# Patient Record
Sex: FEMALE | Race: OTHER | Employment: OTHER | URBAN - METROPOLITAN AREA
[De-identification: names, ages, dates, MRNs, and addresses within clinical notes are randomized per-mention and may not be internally consistent; named-entity substitution may affect disease eponyms.]

---

## 2017-01-12 ENCOUNTER — HOSPITAL ENCOUNTER (EMERGENCY)
Age: 42
Discharge: LEFT BEFORE DISCHARGE | End: 2017-01-12
Payer: COMMERCIAL

## 2017-01-12 VITALS
BODY MASS INDEX: 39.68 KG/M2 | OXYGEN SATURATION: 96 % | SYSTOLIC BLOOD PRESSURE: 114 MMHG | WEIGHT: 293 LBS | TEMPERATURE: 98 F | DIASTOLIC BLOOD PRESSURE: 76 MMHG | HEIGHT: 72 IN | HEART RATE: 86 BPM | RESPIRATION RATE: 18 BRPM

## 2017-01-12 LAB
BACTERIA: ABNORMAL /HPF
BILIRUBIN URINE: NEGATIVE
BLOOD, URINE: NEGATIVE
CLARITY: CLEAR
COLOR: YELLOW
EPITHELIAL CELLS, UA: ABNORMAL /HPF
GLUCOSE URINE: >=1000 MG/DL
KETONES, URINE: NEGATIVE MG/DL
LEUKOCYTE ESTERASE, URINE: ABNORMAL
NITRITE, URINE: NEGATIVE
PH UA: 5.5 (ref 5–9)
PROTEIN UA: NEGATIVE MG/DL
RBC UA: ABNORMAL /HPF (ref 0–2)
SPECIFIC GRAVITY UA: 1.02 (ref 1–1.03)
UROBILINOGEN, URINE: 0.2 E.U./DL
WBC UA: ABNORMAL /HPF (ref 0–5)
YEAST: PRESENT

## 2017-01-12 PROCEDURE — 99283 EMERGENCY DEPT VISIT LOW MDM: CPT

## 2017-01-12 PROCEDURE — 81001 URINALYSIS AUTO W/SCOPE: CPT

## 2017-01-12 PROCEDURE — 4500000002 HC ER NO CHARGE

## 2017-01-12 ASSESSMENT — PAIN SCALES - GENERAL: PAINLEVEL_OUTOF10: 10

## 2017-01-12 ASSESSMENT — PAIN DESCRIPTION - DESCRIPTORS: DESCRIPTORS: SHARP

## 2017-01-12 ASSESSMENT — PAIN DESCRIPTION - FREQUENCY: FREQUENCY: CONTINUOUS

## 2017-01-12 ASSESSMENT — PAIN DESCRIPTION - ORIENTATION: ORIENTATION: LEFT

## 2017-01-12 ASSESSMENT — PAIN DESCRIPTION - LOCATION: LOCATION: ABDOMEN

## 2017-02-13 ENCOUNTER — HOSPITAL ENCOUNTER (EMERGENCY)
Age: 42
Discharge: HOME | End: 2017-02-13
Payer: COMMERCIAL

## 2017-02-13 VITALS
TEMPERATURE: 98.4 F | HEART RATE: 108 BPM | WEIGHT: 293 LBS | RESPIRATION RATE: 20 BRPM | OXYGEN SATURATION: 96 % | SYSTOLIC BLOOD PRESSURE: 122 MMHG | DIASTOLIC BLOOD PRESSURE: 77 MMHG | BODY MASS INDEX: 44.08 KG/M2

## 2017-02-13 DIAGNOSIS — G89.29 OTHER CHRONIC PAIN: ICD-10-CM

## 2017-02-13 DIAGNOSIS — R51.9 ACUTE NONINTRACTABLE HEADACHE, UNSPECIFIED HEADACHE TYPE: Primary | ICD-10-CM

## 2017-02-13 LAB
CHP ED QC CHECK: NORMAL
CHP ED QC CHECK: NORMAL
GLUCOSE BLD-MCNC: 227 MG/DL
GLUCOSE BLD-MCNC: 227 MG/DL (ref 60–115)
PERFORMED ON: ABNORMAL
PREGNANCY TEST URINE, POC: NEGATIVE

## 2017-02-13 PROCEDURE — 93005 ELECTROCARDIOGRAM TRACING: CPT

## 2017-02-13 PROCEDURE — 99284 EMERGENCY DEPT VISIT MOD MDM: CPT

## 2017-02-13 PROCEDURE — 6370000000 HC RX 637 (ALT 250 FOR IP): Performed by: PERSONAL EMERGENCY RESPONSE ATTENDANT

## 2017-02-13 PROCEDURE — 6360000002 HC RX W HCPCS: Performed by: PERSONAL EMERGENCY RESPONSE ATTENDANT

## 2017-02-13 RX ORDER — ONDANSETRON 4 MG/1
4 TABLET, ORALLY DISINTEGRATING ORAL ONCE
Status: COMPLETED | OUTPATIENT
Start: 2017-02-13 | End: 2017-02-13

## 2017-02-13 RX ORDER — ACETAMINOPHEN 500 MG
1000 TABLET ORAL ONCE
Status: COMPLETED | OUTPATIENT
Start: 2017-02-13 | End: 2017-02-13

## 2017-02-13 RX ORDER — CYCLOBENZAPRINE HCL 10 MG
10 TABLET ORAL ONCE
Status: COMPLETED | OUTPATIENT
Start: 2017-02-13 | End: 2017-02-13

## 2017-02-13 RX ADMIN — ACETAMINOPHEN 1000 MG: 500 TABLET ORAL at 23:04

## 2017-02-13 RX ADMIN — CYCLOBENZAPRINE HYDROCHLORIDE 10 MG: 10 TABLET, FILM COATED ORAL at 23:04

## 2017-02-13 RX ADMIN — ONDANSETRON 4 MG: 4 TABLET, ORALLY DISINTEGRATING ORAL at 23:04

## 2017-02-13 ASSESSMENT — ENCOUNTER SYMPTOMS
SORE THROAT: 0
ABDOMINAL PAIN: 0
COLOR CHANGE: 0
VOMITING: 0
DIARRHEA: 0
RHINORRHEA: 0
SHORTNESS OF BREATH: 0
COUGH: 0
BLOOD IN STOOL: 0
NAUSEA: 1

## 2017-02-13 ASSESSMENT — PAIN DESCRIPTION - PAIN TYPE: TYPE: ACUTE PAIN

## 2017-02-13 ASSESSMENT — PAIN DESCRIPTION - LOCATION: LOCATION: HEAD

## 2017-02-13 ASSESSMENT — PAIN SCALES - GENERAL: PAINLEVEL_OUTOF10: 10

## 2017-02-13 ASSESSMENT — PAIN DESCRIPTION - DESCRIPTORS: DESCRIPTORS: ACHING;HEADACHE

## 2017-02-14 LAB
EKG ATRIAL RATE: 98 BPM
EKG P AXIS: 31 DEGREES
EKG P-R INTERVAL: 156 MS
EKG Q-T INTERVAL: 362 MS
EKG QRS DURATION: 84 MS
EKG QTC CALCULATION (BAZETT): 462 MS
EKG R AXIS: 12 DEGREES
EKG T AXIS: 5 DEGREES
EKG VENTRICULAR RATE: 98 BPM

## 2017-02-27 ENCOUNTER — HOSPITAL ENCOUNTER (OUTPATIENT)
Dept: PHYSICAL THERAPY | Age: 42
Setting detail: THERAPIES SERIES
Discharge: HOME | End: 2017-02-27
Payer: COMMERCIAL

## 2017-02-27 PROCEDURE — 97162 PT EVAL MOD COMPLEX 30 MIN: CPT

## 2017-02-27 ASSESSMENT — PAIN DESCRIPTION - LOCATION: LOCATION: BACK

## 2017-02-27 ASSESSMENT — PAIN SCALES - GENERAL: PAINLEVEL_OUTOF10: 10

## 2017-02-28 ENCOUNTER — HOSPITAL ENCOUNTER (OUTPATIENT)
Dept: PHYSICAL THERAPY | Age: 42
Setting detail: THERAPIES SERIES
Discharge: HOME | End: 2017-02-28
Payer: COMMERCIAL

## 2017-02-28 PROCEDURE — 97113 AQUATIC THERAPY/EXERCISES: CPT

## 2017-02-28 ASSESSMENT — PAIN DESCRIPTION - ORIENTATION: ORIENTATION: RIGHT;LEFT

## 2017-02-28 ASSESSMENT — PAIN DESCRIPTION - LOCATION: LOCATION: BACK

## 2017-02-28 ASSESSMENT — PAIN SCALES - GENERAL: PAINLEVEL_OUTOF10: 8

## 2017-03-07 ENCOUNTER — HOSPITAL ENCOUNTER (OUTPATIENT)
Dept: PHYSICAL THERAPY | Age: 42
Setting detail: THERAPIES SERIES
Discharge: HOME | End: 2017-03-07
Payer: COMMERCIAL

## 2017-03-07 PROCEDURE — 97113 AQUATIC THERAPY/EXERCISES: CPT

## 2017-03-09 ENCOUNTER — HOSPITAL ENCOUNTER (OUTPATIENT)
Dept: PHYSICAL THERAPY | Age: 42
Setting detail: THERAPIES SERIES
Discharge: HOME | End: 2017-03-09
Payer: COMMERCIAL

## 2017-03-14 ENCOUNTER — HOSPITAL ENCOUNTER (OUTPATIENT)
Dept: PHYSICAL THERAPY | Age: 42
Setting detail: THERAPIES SERIES
Discharge: HOME | End: 2017-03-14
Payer: COMMERCIAL

## 2017-03-14 PROCEDURE — 97113 AQUATIC THERAPY/EXERCISES: CPT

## 2017-03-14 ASSESSMENT — PAIN DESCRIPTION - ORIENTATION: ORIENTATION: LOWER;LEFT;RIGHT

## 2017-03-14 ASSESSMENT — PAIN DESCRIPTION - DESCRIPTORS: DESCRIPTORS: ACHING

## 2017-03-14 ASSESSMENT — PAIN SCALES - GENERAL: PAINLEVEL_OUTOF10: 8

## 2017-03-14 ASSESSMENT — PAIN DESCRIPTION - LOCATION: LOCATION: BACK;LEG

## 2017-03-16 ENCOUNTER — HOSPITAL ENCOUNTER (OUTPATIENT)
Dept: PHYSICAL THERAPY | Age: 42
Setting detail: THERAPIES SERIES
Discharge: HOME | End: 2017-03-16
Payer: COMMERCIAL

## 2017-03-16 PROCEDURE — 97113 AQUATIC THERAPY/EXERCISES: CPT

## 2017-03-21 ENCOUNTER — HOSPITAL ENCOUNTER (OUTPATIENT)
Dept: PHYSICAL THERAPY | Age: 42
Setting detail: THERAPIES SERIES
Discharge: HOME | End: 2017-03-21
Payer: COMMERCIAL

## 2017-03-21 PROCEDURE — 97113 AQUATIC THERAPY/EXERCISES: CPT

## 2017-03-21 ASSESSMENT — PAIN DESCRIPTION - ORIENTATION: ORIENTATION: LOWER

## 2017-03-21 ASSESSMENT — PAIN DESCRIPTION - LOCATION: LOCATION: BACK

## 2017-03-21 ASSESSMENT — PAIN SCALES - GENERAL: PAINLEVEL_OUTOF10: 8

## 2017-03-23 ENCOUNTER — HOSPITAL ENCOUNTER (OUTPATIENT)
Dept: PHYSICAL THERAPY | Age: 42
Setting detail: THERAPIES SERIES
Discharge: HOME | End: 2017-03-23
Payer: COMMERCIAL

## 2017-03-23 PROCEDURE — 97113 AQUATIC THERAPY/EXERCISES: CPT

## 2017-03-23 PROCEDURE — 97110 THERAPEUTIC EXERCISES: CPT

## 2017-04-06 ENCOUNTER — HOSPITAL ENCOUNTER (OUTPATIENT)
Dept: PHYSICAL THERAPY | Age: 42
Setting detail: THERAPIES SERIES
Discharge: HOME | End: 2017-04-06
Payer: COMMERCIAL

## 2017-04-06 PROCEDURE — 97113 AQUATIC THERAPY/EXERCISES: CPT

## 2017-04-06 ASSESSMENT — PAIN DESCRIPTION - FREQUENCY: FREQUENCY: CONTINUOUS

## 2017-04-06 ASSESSMENT — PAIN DESCRIPTION - DESCRIPTORS: DESCRIPTORS: TIGHTNESS

## 2017-04-06 ASSESSMENT — PAIN SCALES - GENERAL: PAINLEVEL_OUTOF10: 8

## 2017-04-06 ASSESSMENT — PAIN DESCRIPTION - LOCATION: LOCATION: BACK

## 2017-04-06 ASSESSMENT — PAIN DESCRIPTION - ORIENTATION: ORIENTATION: LOWER

## 2017-04-06 ASSESSMENT — PAIN DESCRIPTION - PAIN TYPE: TYPE: CHRONIC PAIN

## 2017-04-19 ENCOUNTER — HOSPITAL ENCOUNTER (OUTPATIENT)
Dept: PHYSICAL THERAPY | Age: 42
Setting detail: THERAPIES SERIES
Discharge: HOME | End: 2017-04-19
Payer: COMMERCIAL

## 2017-04-19 PROCEDURE — 97113 AQUATIC THERAPY/EXERCISES: CPT

## 2017-04-19 ASSESSMENT — PAIN DESCRIPTION - PAIN TYPE: TYPE: CHRONIC PAIN

## 2017-04-19 ASSESSMENT — PAIN DESCRIPTION - LOCATION: LOCATION: BACK

## 2017-04-19 ASSESSMENT — PAIN DESCRIPTION - ORIENTATION: ORIENTATION: LOWER

## 2017-04-19 ASSESSMENT — PAIN DESCRIPTION - DESCRIPTORS: DESCRIPTORS: ACHING

## 2017-04-19 ASSESSMENT — PAIN SCALES - GENERAL: PAINLEVEL_OUTOF10: 9

## 2017-04-27 ENCOUNTER — HOSPITAL ENCOUNTER (OUTPATIENT)
Dept: PHYSICAL THERAPY | Age: 42
Setting detail: THERAPIES SERIES
Discharge: HOME | End: 2017-04-27
Payer: COMMERCIAL

## 2017-04-27 PROCEDURE — 97113 AQUATIC THERAPY/EXERCISES: CPT

## 2017-04-27 ASSESSMENT — PAIN DESCRIPTION - ORIENTATION: ORIENTATION: LOWER

## 2017-04-27 ASSESSMENT — PAIN DESCRIPTION - LOCATION: LOCATION: BACK

## 2017-04-27 ASSESSMENT — PAIN DESCRIPTION - FREQUENCY: FREQUENCY: CONTINUOUS

## 2017-04-27 ASSESSMENT — PAIN DESCRIPTION - DESCRIPTORS: DESCRIPTORS: ACHING

## 2017-04-27 ASSESSMENT — PAIN DESCRIPTION - PAIN TYPE: TYPE: CHRONIC PAIN

## 2017-04-27 ASSESSMENT — PAIN SCALES - GENERAL: PAINLEVEL_OUTOF10: 9

## 2017-05-04 ENCOUNTER — HOSPITAL ENCOUNTER (OUTPATIENT)
Dept: PHYSICAL THERAPY | Age: 42
Setting detail: THERAPIES SERIES
Discharge: HOME | End: 2017-05-04
Payer: COMMERCIAL

## 2017-05-04 PROCEDURE — 97113 AQUATIC THERAPY/EXERCISES: CPT

## 2017-05-04 ASSESSMENT — PAIN DESCRIPTION - LOCATION: LOCATION: BACK

## 2017-05-04 ASSESSMENT — PAIN SCALES - GENERAL: PAINLEVEL_OUTOF10: 8

## 2017-05-04 ASSESSMENT — PAIN DESCRIPTION - ORIENTATION: ORIENTATION: LOWER

## 2017-05-10 ENCOUNTER — HOSPITAL ENCOUNTER (OUTPATIENT)
Dept: PHYSICAL THERAPY | Age: 42
Setting detail: THERAPIES SERIES
Discharge: HOME | End: 2017-05-10
Payer: COMMERCIAL

## 2017-05-10 PROCEDURE — 97113 AQUATIC THERAPY/EXERCISES: CPT

## 2017-05-10 ASSESSMENT — PAIN SCALES - GENERAL: PAINLEVEL_OUTOF10: 6

## 2017-05-10 ASSESSMENT — PAIN DESCRIPTION - DESCRIPTORS: DESCRIPTORS: ACHING

## 2017-05-10 ASSESSMENT — PAIN DESCRIPTION - LOCATION: LOCATION: BACK

## 2017-05-10 ASSESSMENT — PAIN DESCRIPTION - ORIENTATION: ORIENTATION: LOWER

## 2017-05-11 ENCOUNTER — HOSPITAL ENCOUNTER (OUTPATIENT)
Dept: PHYSICAL THERAPY | Age: 42
Setting detail: THERAPIES SERIES
Discharge: HOME | End: 2017-05-11
Payer: COMMERCIAL

## 2017-05-11 PROCEDURE — 97113 AQUATIC THERAPY/EXERCISES: CPT

## 2017-05-11 ASSESSMENT — PAIN DESCRIPTION - DESCRIPTORS: DESCRIPTORS: ACHING

## 2017-05-11 ASSESSMENT — PAIN DESCRIPTION - LOCATION: LOCATION: BACK

## 2017-05-11 ASSESSMENT — PAIN DESCRIPTION - ORIENTATION: ORIENTATION: LOWER

## 2017-05-11 ASSESSMENT — PAIN SCALES - GENERAL: PAINLEVEL_OUTOF10: 7

## 2017-05-11 ASSESSMENT — PAIN DESCRIPTION - PAIN TYPE: TYPE: CHRONIC PAIN

## 2017-05-18 ENCOUNTER — HOSPITAL ENCOUNTER (OUTPATIENT)
Dept: PHYSICAL THERAPY | Age: 42
Setting detail: THERAPIES SERIES
Discharge: HOME | End: 2017-05-18
Payer: COMMERCIAL

## 2017-05-18 PROCEDURE — 97113 AQUATIC THERAPY/EXERCISES: CPT

## 2017-05-18 ASSESSMENT — PAIN DESCRIPTION - ORIENTATION: ORIENTATION: LOWER

## 2017-05-18 ASSESSMENT — PAIN DESCRIPTION - LOCATION: LOCATION: BACK

## 2017-05-18 ASSESSMENT — PAIN SCALES - GENERAL: PAINLEVEL_OUTOF10: 8

## 2017-05-18 ASSESSMENT — PAIN DESCRIPTION - PAIN TYPE: TYPE: CHRONIC PAIN

## 2017-05-18 ASSESSMENT — PAIN DESCRIPTION - DESCRIPTORS: DESCRIPTORS: ACHING

## 2017-05-20 ENCOUNTER — HOSPITAL ENCOUNTER (EMERGENCY)
Age: 42
Discharge: HOME | End: 2017-05-20
Payer: COMMERCIAL

## 2017-05-20 VITALS
RESPIRATION RATE: 20 BRPM | SYSTOLIC BLOOD PRESSURE: 136 MMHG | OXYGEN SATURATION: 98 % | TEMPERATURE: 98.9 F | HEART RATE: 93 BPM | DIASTOLIC BLOOD PRESSURE: 84 MMHG

## 2017-05-20 DIAGNOSIS — G89.29 CHRONIC ABDOMINAL PAIN: Primary | ICD-10-CM

## 2017-05-20 DIAGNOSIS — Z79.4 UNCONTROLLED TYPE 2 DIABETES MELLITUS WITH HYPERGLYCEMIA, WITH LONG-TERM CURRENT USE OF INSULIN (HCC): ICD-10-CM

## 2017-05-20 DIAGNOSIS — E66.01 MORBID OBESITY, UNSPECIFIED OBESITY TYPE (HCC): ICD-10-CM

## 2017-05-20 DIAGNOSIS — E11.65 UNCONTROLLED TYPE 2 DIABETES MELLITUS WITH HYPERGLYCEMIA, WITH LONG-TERM CURRENT USE OF INSULIN (HCC): ICD-10-CM

## 2017-05-20 DIAGNOSIS — R10.9 CHRONIC ABDOMINAL PAIN: Primary | ICD-10-CM

## 2017-05-20 LAB
GLUCOSE BLD-MCNC: 265 MG/DL
GLUCOSE BLD-MCNC: 265 MG/DL (ref 60–115)
PERFORMED ON: ABNORMAL

## 2017-05-20 PROCEDURE — 6360000002 HC RX W HCPCS: Performed by: PHYSICIAN ASSISTANT

## 2017-05-20 PROCEDURE — 6370000000 HC RX 637 (ALT 250 FOR IP): Performed by: PHYSICIAN ASSISTANT

## 2017-05-20 PROCEDURE — 99283 EMERGENCY DEPT VISIT LOW MDM: CPT

## 2017-05-20 RX ORDER — METOCLOPRAMIDE 10 MG/1
10 TABLET ORAL 4 TIMES DAILY
Qty: 20 TABLET | Refills: 0 | Status: ON HOLD | OUTPATIENT
Start: 2017-05-20 | End: 2019-01-11 | Stop reason: HOSPADM

## 2017-05-20 RX ORDER — DICYCLOMINE HYDROCHLORIDE 10 MG/1
10 CAPSULE ORAL
Qty: 30 CAPSULE | Refills: 0 | Status: ON HOLD | OUTPATIENT
Start: 2017-05-20 | End: 2019-01-11 | Stop reason: HOSPADM

## 2017-05-20 RX ORDER — ONDANSETRON 4 MG/1
4 TABLET, ORALLY DISINTEGRATING ORAL ONCE
Status: COMPLETED | OUTPATIENT
Start: 2017-05-20 | End: 2017-05-20

## 2017-05-20 RX ADMIN — ONDANSETRON 4 MG: 4 TABLET, ORALLY DISINTEGRATING ORAL at 19:54

## 2017-05-20 RX ADMIN — Medication 30 ML: at 19:54

## 2017-05-20 ASSESSMENT — ENCOUNTER SYMPTOMS
PHOTOPHOBIA: 0
NAUSEA: 1
COUGH: 0
ABDOMINAL PAIN: 1
BACK PAIN: 0
SORE THROAT: 0
TROUBLE SWALLOWING: 0
SHORTNESS OF BREATH: 0
DIARRHEA: 1
VOMITING: 1

## 2017-05-20 ASSESSMENT — PAIN DESCRIPTION - LOCATION: LOCATION: ABDOMEN

## 2017-05-20 ASSESSMENT — PAIN DESCRIPTION - PAIN TYPE: TYPE: CHRONIC PAIN

## 2017-05-20 ASSESSMENT — PAIN SCALES - GENERAL: PAINLEVEL_OUTOF10: 8

## 2017-06-14 ENCOUNTER — HOSPITAL ENCOUNTER (OUTPATIENT)
Dept: PHYSICAL THERAPY | Age: 42
Setting detail: THERAPIES SERIES
Discharge: HOME | End: 2017-06-14
Payer: COMMERCIAL

## 2017-06-14 PROCEDURE — 97113 AQUATIC THERAPY/EXERCISES: CPT

## 2017-06-14 ASSESSMENT — PAIN DESCRIPTION - ORIENTATION: ORIENTATION: LOWER

## 2017-06-14 ASSESSMENT — PAIN DESCRIPTION - PAIN TYPE: TYPE: CHRONIC PAIN

## 2017-06-14 ASSESSMENT — PAIN DESCRIPTION - DESCRIPTORS: DESCRIPTORS: ACHING

## 2017-06-14 ASSESSMENT — PAIN SCALES - GENERAL: PAINLEVEL_OUTOF10: 9

## 2017-06-14 ASSESSMENT — PAIN DESCRIPTION - LOCATION: LOCATION: BACK

## 2017-06-21 ENCOUNTER — HOSPITAL ENCOUNTER (OUTPATIENT)
Dept: PHYSICAL THERAPY | Age: 42
Setting detail: THERAPIES SERIES
Discharge: HOME | End: 2017-06-21
Payer: COMMERCIAL

## 2017-06-21 PROCEDURE — 97110 THERAPEUTIC EXERCISES: CPT

## 2017-06-21 ASSESSMENT — PAIN DESCRIPTION - LOCATION: LOCATION: BACK

## 2017-06-21 ASSESSMENT — PAIN SCALES - GENERAL: PAINLEVEL_OUTOF10: 8

## 2017-06-21 ASSESSMENT — PAIN DESCRIPTION - DESCRIPTORS: DESCRIPTORS: ACHING

## 2017-06-21 ASSESSMENT — PAIN DESCRIPTION - ORIENTATION: ORIENTATION: LOWER

## 2017-06-22 ENCOUNTER — HOSPITAL ENCOUNTER (OUTPATIENT)
Dept: PHYSICAL THERAPY | Age: 42
Setting detail: THERAPIES SERIES
Discharge: HOME | End: 2017-06-22
Payer: COMMERCIAL

## 2017-06-22 PROCEDURE — 97110 THERAPEUTIC EXERCISES: CPT

## 2017-06-22 ASSESSMENT — PAIN SCALES - GENERAL: PAINLEVEL_OUTOF10: 10

## 2017-06-22 ASSESSMENT — PAIN DESCRIPTION - LOCATION: LOCATION: BACK;ABDOMEN

## 2017-06-22 ASSESSMENT — PAIN DESCRIPTION - DESCRIPTORS: DESCRIPTORS: ACHING;CRAMPING

## 2017-06-22 ASSESSMENT — PAIN DESCRIPTION - ORIENTATION: ORIENTATION: LOWER

## 2017-06-28 ENCOUNTER — HOSPITAL ENCOUNTER (OUTPATIENT)
Dept: PHYSICAL THERAPY | Age: 42
Setting detail: THERAPIES SERIES
Discharge: HOME | End: 2017-06-28
Payer: COMMERCIAL

## 2017-06-28 PROCEDURE — 97110 THERAPEUTIC EXERCISES: CPT

## 2017-06-28 ASSESSMENT — PAIN SCALES - GENERAL: PAINLEVEL_OUTOF10: 4

## 2017-06-28 ASSESSMENT — PAIN DESCRIPTION - DESCRIPTORS: DESCRIPTORS: ACHING

## 2017-06-28 ASSESSMENT — PAIN DESCRIPTION - LOCATION: LOCATION: BACK;ABDOMEN

## 2017-06-28 ASSESSMENT — PAIN DESCRIPTION - PAIN TYPE: TYPE: CHRONIC PAIN

## 2017-06-28 ASSESSMENT — PAIN DESCRIPTION - ORIENTATION: ORIENTATION: LOWER

## 2017-06-29 ENCOUNTER — HOSPITAL ENCOUNTER (OUTPATIENT)
Dept: PHYSICAL THERAPY | Age: 42
Setting detail: THERAPIES SERIES
Discharge: HOME | End: 2017-06-29
Payer: COMMERCIAL

## 2017-06-29 PROCEDURE — 97110 THERAPEUTIC EXERCISES: CPT

## 2017-06-29 ASSESSMENT — PAIN DESCRIPTION - LOCATION: LOCATION: BACK

## 2017-06-29 ASSESSMENT — PAIN DESCRIPTION - ORIENTATION: ORIENTATION: LOWER

## 2017-06-29 ASSESSMENT — PAIN DESCRIPTION - DESCRIPTORS: DESCRIPTORS: ACHING

## 2017-07-05 ENCOUNTER — HOSPITAL ENCOUNTER (OUTPATIENT)
Dept: PHYSICAL THERAPY | Age: 42
Setting detail: THERAPIES SERIES
Discharge: HOME | End: 2017-07-05
Payer: COMMERCIAL

## 2017-07-05 PROCEDURE — 97110 THERAPEUTIC EXERCISES: CPT

## 2017-07-05 ASSESSMENT — PAIN DESCRIPTION - DESCRIPTORS: DESCRIPTORS: ACHING

## 2017-07-05 ASSESSMENT — PAIN SCALES - GENERAL: PAINLEVEL_OUTOF10: 3

## 2017-07-05 ASSESSMENT — PAIN DESCRIPTION - LOCATION: LOCATION: BACK

## 2017-07-05 ASSESSMENT — PAIN DESCRIPTION - ORIENTATION: ORIENTATION: LOWER

## 2017-07-12 ENCOUNTER — HOSPITAL ENCOUNTER (OUTPATIENT)
Dept: PHYSICAL THERAPY | Age: 42
Setting detail: THERAPIES SERIES
Discharge: HOME | End: 2017-07-12
Payer: COMMERCIAL

## 2017-07-12 PROCEDURE — 97110 THERAPEUTIC EXERCISES: CPT

## 2017-07-12 ASSESSMENT — PAIN DESCRIPTION - LOCATION: LOCATION: ABDOMEN

## 2017-07-12 ASSESSMENT — PAIN DESCRIPTION - PAIN TYPE: TYPE: CHRONIC PAIN

## 2017-07-12 ASSESSMENT — PAIN SCALES - GENERAL: PAINLEVEL_OUTOF10: 10

## 2017-07-13 ASSESSMENT — PAIN DESCRIPTION - LOCATION: LOCATION: ABDOMEN

## 2017-07-13 ASSESSMENT — PAIN SCALES - GENERAL: PAINLEVEL_OUTOF10: 8

## 2017-07-19 ENCOUNTER — HOSPITAL ENCOUNTER (OUTPATIENT)
Dept: PHYSICAL THERAPY | Age: 42
Setting detail: THERAPIES SERIES
Discharge: HOME | End: 2017-07-19
Payer: COMMERCIAL

## 2017-07-26 ENCOUNTER — HOSPITAL ENCOUNTER (OUTPATIENT)
Dept: PHYSICAL THERAPY | Age: 42
Setting detail: THERAPIES SERIES
Discharge: HOME | End: 2017-07-26
Payer: COMMERCIAL

## 2017-07-27 ENCOUNTER — HOSPITAL ENCOUNTER (OUTPATIENT)
Dept: PHYSICAL THERAPY | Age: 42
Setting detail: THERAPIES SERIES
Discharge: HOME | End: 2017-07-27
Payer: COMMERCIAL

## 2017-07-27 PROCEDURE — 97110 THERAPEUTIC EXERCISES: CPT

## 2017-07-27 ASSESSMENT — PAIN SCALES - GENERAL: PAINLEVEL_OUTOF10: 8

## 2017-07-27 ASSESSMENT — PAIN DESCRIPTION - ORIENTATION: ORIENTATION: LOWER

## 2017-07-27 ASSESSMENT — PAIN DESCRIPTION - LOCATION: LOCATION: BACK

## 2017-08-10 ENCOUNTER — HOSPITAL ENCOUNTER (OUTPATIENT)
Dept: PHYSICAL THERAPY | Age: 42
Setting detail: THERAPIES SERIES
Discharge: HOME | End: 2017-08-10
Payer: COMMERCIAL

## 2017-08-10 PROCEDURE — 97110 THERAPEUTIC EXERCISES: CPT

## 2017-08-10 ASSESSMENT — PAIN SCALES - GENERAL: PAINLEVEL_OUTOF10: 7

## 2017-08-10 ASSESSMENT — PAIN DESCRIPTION - ORIENTATION: ORIENTATION: LOWER

## 2017-08-10 ASSESSMENT — PAIN DESCRIPTION - LOCATION: LOCATION: BACK

## 2017-08-10 ASSESSMENT — PAIN DESCRIPTION - DESCRIPTORS: DESCRIPTORS: ACHING

## 2017-08-10 ASSESSMENT — PAIN DESCRIPTION - FREQUENCY: FREQUENCY: CONTINUOUS

## 2017-08-10 ASSESSMENT — PAIN DESCRIPTION - PAIN TYPE: TYPE: CHRONIC PAIN

## 2017-09-07 ENCOUNTER — HOSPITAL ENCOUNTER (EMERGENCY)
Age: 42
Discharge: HOME | End: 2017-09-07
Attending: FAMILY MEDICINE
Payer: COMMERCIAL

## 2017-09-07 ENCOUNTER — APPOINTMENT (OUTPATIENT)
Dept: CT IMAGING | Age: 42
End: 2017-09-07
Payer: COMMERCIAL

## 2017-09-07 VITALS
RESPIRATION RATE: 15 BRPM | OXYGEN SATURATION: 99 % | TEMPERATURE: 96.9 F | SYSTOLIC BLOOD PRESSURE: 117 MMHG | WEIGHT: 293 LBS | BODY MASS INDEX: 39.68 KG/M2 | DIASTOLIC BLOOD PRESSURE: 78 MMHG | HEIGHT: 72 IN | HEART RATE: 88 BPM

## 2017-09-07 DIAGNOSIS — B37.81 CANDIDA ESOPHAGITIS (HCC): ICD-10-CM

## 2017-09-07 DIAGNOSIS — R10.13 ABDOMINAL PAIN, EPIGASTRIC: Primary | ICD-10-CM

## 2017-09-07 LAB
ALBUMIN SERPL-MCNC: 4.2 G/DL (ref 3.9–4.9)
ALP BLD-CCNC: 104 U/L (ref 40–130)
ALT SERPL-CCNC: 50 U/L (ref 0–33)
AMPHETAMINE SCREEN, URINE: NORMAL
AMYLASE: 12 U/L (ref 28–100)
ANION GAP SERPL CALCULATED.3IONS-SCNC: 22 MEQ/L (ref 7–13)
AST SERPL-CCNC: 32 U/L (ref 0–35)
BARBITURATE SCREEN URINE: NORMAL
BASOPHILS ABSOLUTE: 0.1 K/UL (ref 0–0.2)
BASOPHILS RELATIVE PERCENT: 0.9 %
BENZODIAZEPINE SCREEN, URINE: NORMAL
BILIRUB SERPL-MCNC: 0.3 MG/DL (ref 0–1.2)
BILIRUBIN URINE: NEGATIVE
BLOOD, URINE: NEGATIVE
BUN BLDV-MCNC: 18 MG/DL (ref 6–20)
CALCIUM SERPL-MCNC: 9.6 MG/DL (ref 8.6–10.2)
CANNABINOID SCREEN URINE: NORMAL
CHLORIDE BLD-SCNC: 95 MEQ/L (ref 98–107)
CLARITY: CLEAR
CO2: 22 MEQ/L (ref 22–29)
COCAINE METABOLITE SCREEN URINE: NORMAL
COLOR: YELLOW
CREAT SERPL-MCNC: 0.77 MG/DL (ref 0.5–0.9)
EOSINOPHILS ABSOLUTE: 0 K/UL (ref 0–0.7)
EOSINOPHILS RELATIVE PERCENT: 0.1 %
GFR AFRICAN AMERICAN: >60
GFR NON-AFRICAN AMERICAN: >60
GLOBULIN: 2.8 G/DL (ref 2.3–3.5)
GLUCOSE BLD-MCNC: 471 MG/DL (ref 74–109)
GLUCOSE URINE: >=1000 MG/DL
HCT VFR BLD CALC: 40.6 % (ref 37–47)
HEMOGLOBIN: 13.4 G/DL (ref 12–16)
KETONES, URINE: NEGATIVE MG/DL
LEUKOCYTE ESTERASE, URINE: NEGATIVE
LYMPHOCYTES ABSOLUTE: 1.7 K/UL (ref 1–4.8)
LYMPHOCYTES RELATIVE PERCENT: 16.8 %
Lab: NORMAL
MCH RBC QN AUTO: 28.9 PG (ref 27–31.3)
MCHC RBC AUTO-ENTMCNC: 32.9 % (ref 33–37)
MCV RBC AUTO: 87.9 FL (ref 82–100)
MONOCYTES ABSOLUTE: 0.4 K/UL (ref 0.2–0.8)
MONOCYTES RELATIVE PERCENT: 3.6 %
NEUTROPHILS ABSOLUTE: 7.9 K/UL (ref 1.4–6.5)
NEUTROPHILS RELATIVE PERCENT: 78.6 %
NITRITE, URINE: NEGATIVE
OPIATE SCREEN URINE: NORMAL
PDW BLD-RTO: 15.2 % (ref 11.5–14.5)
PH UA: 5.5 (ref 5–9)
PHENCYCLIDINE SCREEN URINE: NORMAL
PLATELET # BLD: 213 K/UL (ref 130–400)
POTASSIUM SERPL-SCNC: 4.6 MEQ/L (ref 3.5–5.1)
PROTEIN UA: NEGATIVE MG/DL
RBC # BLD: 4.62 M/UL (ref 4.2–5.4)
SODIUM BLD-SCNC: 139 MEQ/L (ref 132–144)
SPECIFIC GRAVITY UA: 1.02 (ref 1–1.03)
TOTAL PROTEIN: 7 G/DL (ref 6.4–8.1)
URINE REFLEX TO CULTURE: ABNORMAL
UROBILINOGEN, URINE: 0.2 E.U./DL
WBC # BLD: 10.1 K/UL (ref 4.8–10.8)

## 2017-09-07 PROCEDURE — 81003 URINALYSIS AUTO W/O SCOPE: CPT

## 2017-09-07 PROCEDURE — 96374 THER/PROPH/DIAG INJ IV PUSH: CPT

## 2017-09-07 PROCEDURE — 2500000003 HC RX 250 WO HCPCS: Performed by: FAMILY MEDICINE

## 2017-09-07 PROCEDURE — 80053 COMPREHEN METABOLIC PANEL: CPT

## 2017-09-07 PROCEDURE — 6370000000 HC RX 637 (ALT 250 FOR IP): Performed by: FAMILY MEDICINE

## 2017-09-07 PROCEDURE — 85025 COMPLETE CBC W/AUTO DIFF WBC: CPT

## 2017-09-07 PROCEDURE — 96372 THER/PROPH/DIAG INJ SC/IM: CPT

## 2017-09-07 PROCEDURE — 6360000002 HC RX W HCPCS: Performed by: FAMILY MEDICINE

## 2017-09-07 PROCEDURE — S0028 INJECTION, FAMOTIDINE, 20 MG: HCPCS | Performed by: FAMILY MEDICINE

## 2017-09-07 PROCEDURE — 74176 CT ABD & PELVIS W/O CONTRAST: CPT

## 2017-09-07 PROCEDURE — 36415 COLL VENOUS BLD VENIPUNCTURE: CPT

## 2017-09-07 PROCEDURE — 96376 TX/PRO/DX INJ SAME DRUG ADON: CPT

## 2017-09-07 PROCEDURE — 82150 ASSAY OF AMYLASE: CPT

## 2017-09-07 PROCEDURE — 99284 EMERGENCY DEPT VISIT MOD MDM: CPT

## 2017-09-07 PROCEDURE — 96375 TX/PRO/DX INJ NEW DRUG ADDON: CPT

## 2017-09-07 PROCEDURE — 80307 DRUG TEST PRSMV CHEM ANLYZR: CPT

## 2017-09-07 RX ORDER — MORPHINE SULFATE 4 MG/ML
4 INJECTION, SOLUTION INTRAMUSCULAR; INTRAVENOUS
Status: DISCONTINUED | OUTPATIENT
Start: 2017-09-07 | End: 2017-09-07 | Stop reason: HOSPADM

## 2017-09-07 RX ORDER — FLUCONAZOLE 200 MG/1
200 TABLET ORAL DAILY
Qty: 7 TABLET | Refills: 0 | Status: SHIPPED | OUTPATIENT
Start: 2017-09-07 | End: 2017-09-14

## 2017-09-07 RX ORDER — METOCLOPRAMIDE HYDROCHLORIDE 5 MG/ML
10 INJECTION INTRAMUSCULAR; INTRAVENOUS ONCE
Status: COMPLETED | OUTPATIENT
Start: 2017-09-07 | End: 2017-09-07

## 2017-09-07 RX ADMIN — MORPHINE SULFATE 4 MG: 4 INJECTION, SOLUTION INTRAMUSCULAR; INTRAVENOUS at 17:37

## 2017-09-07 RX ADMIN — Medication 30 ML: at 17:51

## 2017-09-07 RX ADMIN — METOCLOPRAMIDE 10 MG: 5 INJECTION, SOLUTION INTRAMUSCULAR; INTRAVENOUS at 17:21

## 2017-09-07 RX ADMIN — MORPHINE SULFATE 4 MG: 4 INJECTION, SOLUTION INTRAMUSCULAR; INTRAVENOUS at 20:23

## 2017-09-07 RX ADMIN — FAMOTIDINE 20 MG: 10 INJECTION, SOLUTION INTRAVENOUS at 17:36

## 2017-09-07 RX ADMIN — INSULIN HUMAN 8 UNITS: 100 INJECTION, SOLUTION PARENTERAL at 20:23

## 2017-09-07 ASSESSMENT — PAIN DESCRIPTION - PAIN TYPE: TYPE: ACUTE PAIN

## 2017-09-07 ASSESSMENT — PAIN DESCRIPTION - LOCATION: LOCATION: ABDOMEN

## 2017-09-07 ASSESSMENT — ENCOUNTER SYMPTOMS
ABDOMINAL DISTENTION: 0
VOMITING: 0
ABDOMINAL PAIN: 1
RESPIRATORY NEGATIVE: 1
RECTAL PAIN: 0
DIARRHEA: 0
ANAL BLEEDING: 0
NAUSEA: 1
BLOOD IN STOOL: 1
CONSTIPATION: 0

## 2017-09-07 ASSESSMENT — PAIN SCALES - GENERAL
PAINLEVEL_OUTOF10: 10
PAINLEVEL_OUTOF10: 10

## 2017-10-02 ENCOUNTER — HOSPITAL ENCOUNTER (EMERGENCY)
Age: 42
Discharge: HOME | End: 2017-10-03
Attending: EMERGENCY MEDICINE
Payer: COMMERCIAL

## 2017-10-02 DIAGNOSIS — K21.00 GASTROESOPHAGEAL REFLUX DISEASE WITH ESOPHAGITIS: Primary | ICD-10-CM

## 2017-10-02 PROCEDURE — 36415 COLL VENOUS BLD VENIPUNCTURE: CPT

## 2017-10-02 PROCEDURE — 80053 COMPREHEN METABOLIC PANEL: CPT

## 2017-10-02 PROCEDURE — 85025 COMPLETE CBC W/AUTO DIFF WBC: CPT

## 2017-10-02 PROCEDURE — 6370000000 HC RX 637 (ALT 250 FOR IP): Performed by: EMERGENCY MEDICINE

## 2017-10-02 PROCEDURE — 2580000003 HC RX 258: Performed by: EMERGENCY MEDICINE

## 2017-10-02 PROCEDURE — 99283 EMERGENCY DEPT VISIT LOW MDM: CPT

## 2017-10-02 RX ORDER — 0.9 % SODIUM CHLORIDE 0.9 %
1000 INTRAVENOUS SOLUTION INTRAVENOUS ONCE
Status: COMPLETED | OUTPATIENT
Start: 2017-10-02 | End: 2017-10-03

## 2017-10-02 RX ADMIN — Medication 30 ML: at 23:39

## 2017-10-02 RX ADMIN — SODIUM CHLORIDE 1000 ML: 9 INJECTION, SOLUTION INTRAVENOUS at 23:45

## 2017-10-02 ASSESSMENT — ENCOUNTER SYMPTOMS
COUGH: 0
VOMITING: 0
WHEEZING: 0
EYE DISCHARGE: 0
ABDOMINAL DISTENTION: 0
SORE THROAT: 0
SHORTNESS OF BREATH: 0
PHOTOPHOBIA: 0
CHEST TIGHTNESS: 0
ABDOMINAL PAIN: 1

## 2017-10-02 ASSESSMENT — PAIN DESCRIPTION - PAIN TYPE: TYPE: ACUTE PAIN

## 2017-10-02 ASSESSMENT — PAIN DESCRIPTION - LOCATION: LOCATION: ABDOMEN

## 2017-10-02 ASSESSMENT — PAIN SCALES - GENERAL: PAINLEVEL_OUTOF10: 10

## 2017-10-02 NOTE — ED AVS SNAPSHOT
· Your care plan at home      You may receive a survey regarding the care you received during your stay. Your input is valuable to us. We encourage you to complete and return your survey in the envelope provided. We hope you will choose us in the future for your healthcare needs. Patient Information     Patient Name LAZARO Thompson 1975      Care Provided at:     Name Address Phone       [address] [address] [phone]            Your Visit    Here you will find information about your visit, including the reason for your visit. Please take this sheet with you when you visit your doctor or other health care provider in the future. It will help determine the best possible medical care for you at that time. If you have any questions once you leave the hospital, please call the department phone number listed below. Diagnoses this visit     Your diagnosis was GASTROESOPHAGEAL REFLUX DISEASE WITH ESOPHAGITIS. Visit Information     Date of Visit Department Dept Phone    10/2/2017 Kendrick Luke [facility] [phone]      You were seen by     You were seen by Krzysztof Smith MD.       Follow-up Appointments    Below is a list of your follow-up and future appointments. This may not be a complete list as you may have made appointments directly with providers that we are not aware of or your providers may have made some for you. Please call your providers to confirm appointments. It is important to keep your appointments. Please bring your current insurance card, photo ID, co-pay, and all medication bottles to your appointment. If self-pay, payment is expected at the time of service. Follow-up Information     Follow up with Chuckie Perez In 2 days.     Specialty:  Family Medicine    Contact information:    [id number]  [facility] [address]        Preventive Care        Date Due Diabetic Foot Exam 3/26/1983    Eye Exam By An Eye Doctor 3/26/1983    HIV screening is recommended for all people regardless of risk factors  aged 15-65 years at least once (lifetime) who have never been HIV tested. 3/26/1988    Tetanus Combination Vaccine (1 - Tdap) 3/26/1992    Urine Check For Kidney Problems 11/1/2014    Hemoglobin A1C (Test For Long-Term Glucose Control) 10/12/2016    Cholesterol Screening 12/18/2016    Yearly Flu Vaccine (1) 9/1/2017    Pap Smear 4/14/2018                 Care Plan Once You Return Home    This section includes instructions you will need to follow once you leave the hospital.  Your care team will discuss these with you, so you and those caring for you know how to best care for your health needs at home. This section may also include educational information about certain health topics that may be of help to you. Important Information if you smoke or are exposed to smoking       SMOKING: QUIT SMOKING. THIS IS THE MOST IMPORTANT ACTION YOU CAN TAKE TO IMPROVE YOUR CURRENT AND FUTURE HEALTH. Call [facility] ([phone])    Smoking harms nonsmokers. When nonsmokers are around people who smoke, they absorb nicotine, carbon monoxide, and other ingredients of tobacco smoke. DO NOT SMOKE AROUND CHILDREN     Children exposed to secondhand smoke are at an increased risk of:  Sudden Infant Death Syndrome (SIDS), acute respiratory infections, inflammation of the middle ear, and severe asthma. Over a longer time, it causes heart disease and lung cancer. There is no safe level of exposure to secondhand smoke. Important information for a smoker       SMOKING: QUIT SMOKING. THIS IS THE MOST IMPORTANT ACTION YOU CAN TAKE TO IMPROVE YOUR CURRENT AND FUTURE HEALTH.      Call the [facility] ([phone]) el conducto que va de la garganta al [employer]. Emil válvula de emil dina vía theresa que el ácido del estómago se devuelva por zaria conducto. Cuando usted tiene GERD, esta válvula no se shira lo suficientemente firme. Si usted tiene síntomas leves de GERD, marcin acidez estomacal, es posible que pueda controlar el problema con antiácidos o medicamentos de [facility]. Cambiar la alimentación, bajar de peso y hacer otros cambios en el estilo de jarret también pueden ayudar a reducir los síntomas. La atención de seguimiento es emil parte clave de guardado tratamiento y seguridad. Asegúrese de hacer y acudir a todas las citas, y llame a guardado médico si está teniendo problemas. También es emil buena idea saber los resultados de los exámenes y mantener emil lista de los medicamentos que rachael. ¿Cómo puede cuidarse en el hogar? · [employer] medicamentos exactamente marcin le fueron recetados. Llame a guardado médico si armida estar teniendo problemas con guardado medicamento. · Guardado médico le podría recomendar medicamentos de [facility]. Para la indigestión leve u ocasional pueden servirle los antiácidos marcin Tums, Gaviscon, Mylanta o Maalox. Guardado médico también podría recomendar reductores de ácido de venta michelle, marcin Pepcid AC, Tagamet HB, Zantac 75 o Prilosec. Macrina y siga todas las instrucciones de la [place]. [place] estos medicamentos con frecuencia, hable con guardado médico.  · Cambie porfirio hábitos alimenticios. ¨ Es mejor comer varias comidas pequeñas en lugar de dos o aleta comidas abundantes. ¨ Después de comer, espere de 2 a 3 horas antes de recostarse. ¨ El chocolate, la menta y el alcohol pueden empeorar la GERD. ¨ En [employer], los alimentos condimentados, los alimentos que tienen mucho ácido (marcin los tomates y las naranjas) y el café pueden empeorar los síntomas de la GERD. Si porfirio síntomas empeoran después de comer un determinado alimento, se recomienda que deje de comer nichole alimento para xavier si porfirio síntomas mejoran. · No fume ni masque tabaco. Fumar puede agravar la GERD. Si necesita ayuda para dejar de usar tabaco, hable con guardado médico [employer] y medicamentos para dejar de usarlo. Éstos pueden aumentar christie probabilidades de dejar el hábito para siempre. · Si tiene síntomas de GERD judy la noche, eleve la cabecera de guardado cama entre 6 y 6 pulgadas (entre 15 y 20 cm) colocando ladrillos debajo del katherine de la cama o emil cuña de espuma debajo de la cabecera del colchón. (Usar almohadas adicionales no funciona). · No use ropa que le ajuste mucho la parte media del cuerpo. · Baje de peso, si necesita hacerlo. Perder sólo de 5 a 10 libras (2.3 a 4.5 kg) puede ayudarle. ¿Cuándo debe pedir ayuda? Llame a guardado médico ahora mismo o busque atención médica inmediata si:  · Tiene un dolor de estómago nuevo o distinto. · Christie heces son negruzcas y parecidas al alquitrán o tienen rastros de [place]. Preste especial atención a los cambios en guardado elena y asegúrese de comunicarse con guardado médico si:  · Christie síntomas no hill silvia después de 2 días. · La comida parece quedarle atorada en la garganta o el pecho. ¿Dónde puede encontrar más información en inglés? [place] a [email or web address]://[email or web address].health-[email or web address] org e ingrese a guardado cuenta de Jamin. Marsha Richard R400 en el Meri Estephanie \"Search Health Information\" para más información (en inglés) sobre \"Enfermedad de reflujo gastroesofágico (GERD): Instrucciones de cuidado - [ Gastroesophageal Reflux Disease (GERD): Care Instructions ]. \"     Si no tiene emil cuenta, richy clic en el enlace \"Sign Up Now\". Revisado: 9 agosto, 2016  Versión del contenido: 11.3  © 2810-3735 Healthwise, [facility]. Las instrucciones de cuidado fueron adaptadas bajo licencia por [facility] ([facility]). Si usted tiene [place] afección médica o sobre estas instrucciones, siempre pregunte a guardado profesional de elena. [facility] niega toda garantía o responsabilidad por guardado uso de esta información.

## 2017-10-03 VITALS
TEMPERATURE: 98.8 F | BODY MASS INDEX: 39.68 KG/M2 | WEIGHT: 293 LBS | SYSTOLIC BLOOD PRESSURE: 118 MMHG | HEIGHT: 72 IN | RESPIRATION RATE: 12 BRPM | DIASTOLIC BLOOD PRESSURE: 67 MMHG | OXYGEN SATURATION: 100 % | HEART RATE: 80 BPM

## 2017-10-03 LAB
ALBUMIN SERPL-MCNC: 4.2 G/DL (ref 3.9–4.9)
ALP BLD-CCNC: 138 U/L (ref 40–130)
ALT SERPL-CCNC: 73 U/L (ref 0–33)
ANION GAP SERPL CALCULATED.3IONS-SCNC: 16 MEQ/L (ref 7–13)
AST SERPL-CCNC: 49 U/L (ref 0–35)
BASOPHILS ABSOLUTE: 0.1 K/UL (ref 0–0.2)
BASOPHILS RELATIVE PERCENT: 1.1 %
BILIRUB SERPL-MCNC: 0.4 MG/DL (ref 0–1.2)
BUN BLDV-MCNC: 13 MG/DL (ref 6–20)
CALCIUM SERPL-MCNC: 10 MG/DL (ref 8.6–10.2)
CHLORIDE BLD-SCNC: 95 MEQ/L (ref 98–107)
CO2: 25 MEQ/L (ref 22–29)
CREAT SERPL-MCNC: 0.69 MG/DL (ref 0.5–0.9)
EOSINOPHILS ABSOLUTE: 0.2 K/UL (ref 0–0.7)
EOSINOPHILS RELATIVE PERCENT: 3.8 %
GFR AFRICAN AMERICAN: >60
GFR NON-AFRICAN AMERICAN: >60
GLOBULIN: 2.9 G/DL (ref 2.3–3.5)
GLUCOSE BLD-MCNC: 384 MG/DL (ref 74–109)
HCT VFR BLD CALC: 40.1 % (ref 37–47)
HEMOGLOBIN: 13.6 G/DL (ref 12–16)
LYMPHOCYTES ABSOLUTE: 2.2 K/UL (ref 1–4.8)
LYMPHOCYTES RELATIVE PERCENT: 37.5 %
MCH RBC QN AUTO: 29.4 PG (ref 27–31.3)
MCHC RBC AUTO-ENTMCNC: 33.9 % (ref 33–37)
MCV RBC AUTO: 86.8 FL (ref 82–100)
MONOCYTES ABSOLUTE: 0.4 K/UL (ref 0.2–0.8)
MONOCYTES RELATIVE PERCENT: 7.2 %
NEUTROPHILS ABSOLUTE: 3 K/UL (ref 1.4–6.5)
NEUTROPHILS RELATIVE PERCENT: 50.4 %
PDW BLD-RTO: 15.2 % (ref 11.5–14.5)
PLATELET # BLD: 136 K/UL (ref 130–400)
POTASSIUM SERPL-SCNC: 3.6 MEQ/L (ref 3.5–5.1)
RBC # BLD: 4.61 M/UL (ref 4.2–5.4)
SODIUM BLD-SCNC: 136 MEQ/L (ref 132–144)
TOTAL PROTEIN: 7.1 G/DL (ref 6.4–8.1)
WBC # BLD: 5.9 K/UL (ref 4.8–10.8)

## 2017-10-03 PROCEDURE — 6360000002 HC RX W HCPCS: Performed by: EMERGENCY MEDICINE

## 2017-10-03 PROCEDURE — 96374 THER/PROPH/DIAG INJ IV PUSH: CPT

## 2017-10-03 PROCEDURE — 6370000000 HC RX 637 (ALT 250 FOR IP): Performed by: EMERGENCY MEDICINE

## 2017-10-03 RX ORDER — LORAZEPAM 2 MG/ML
1 INJECTION INTRAMUSCULAR ONCE
Status: COMPLETED | OUTPATIENT
Start: 2017-10-03 | End: 2017-10-03

## 2017-10-03 RX ORDER — HYOSCYAMINE SULFATE 0.12 MG/1
0.12 TABLET SUBLINGUAL 2 TIMES DAILY PRN
Qty: 30 EACH | Refills: 0 | Status: ON HOLD | OUTPATIENT
Start: 2017-10-03 | End: 2019-01-11 | Stop reason: HOSPADM

## 2017-10-03 RX ADMIN — HYOSCYAMINE SULFATE 125 MCG: 0.12 TABLET ORAL at 01:22

## 2017-10-03 RX ADMIN — LORAZEPAM 1 MG: 2 INJECTION INTRAMUSCULAR; INTRAVENOUS at 00:56

## 2017-10-03 NOTE — ED NOTES
RN supervisor Tora Scheuermann at bedside with . Pt,  and supervisor spoke in great detail in regards to the pt not getting any pain medication. Pt was advised she was told per Dr Tierra Milan she needs to follow up with pain management. Pt very  argumentative in regards to why we are not controlling her pain. Pt explained again in great detail that her lab testing is all WNL and we medicated her with appropriate medication.  She was advised she will be D/C and needs to call pain management in the morning and follow up with them    Rei Rodriguez RN  10/03/17 0109       Rei Rodriguez RN  10/03/17 0110       Rei Rodriguez RN  10/03/17 [id number]

## 2017-10-03 NOTE — ED PROVIDER NOTES
[address] [facility] ED  eMERGENCY dEPARTMENT eNCOUnter      Pt Name: Bernardine Sandhoff  MRN: [id number]  [place] 1975  Date of evaluation: 10/2/2017  Provider: Jocelin Emerson MD    CHIEF COMPLAINT       Chief Complaint   Patient presents with    Emesis         HISTORY OF PRESENT ILLNESS   (Location/Symptom, Timing/Onset, Context/Setting, Quality, Duration, Modifying Factors, Severity)  Note limiting factors. Bernardine Sandhoff is a 38 y.o. female who presents to the emergency department Complaining of nausea vomiting and esophageal pain. Patient has been diagnosed with esophageal candidiasis through a scope recently and she completed Diflucan therapy but is still having symptoms. No other complaints at this time she does have multiple medical problems including diabetes laryngeal spasm. She has obesity contributing to her problems. HPI    Nursing Notes were reviewed. REVIEW OF SYSTEMS    (2-9 systems for level 4, 10 or more for level 5)     Review of Systems   Constitutional: Negative for chills and diaphoresis. HENT: Negative for congestion, ear pain, mouth sores and sore throat. Eyes: Negative for photophobia and discharge. Respiratory: Negative for cough, chest tightness, shortness of breath and wheezing. Cardiovascular: Negative for chest pain and palpitations. Gastrointestinal: Positive for abdominal pain. Negative for abdominal distention and vomiting. Endocrine: Negative for cold intolerance. Genitourinary: Negative for difficulty urinating, dysuria, flank pain, frequency and hematuria. Musculoskeletal: Negative for arthralgias. Skin: Negative for pallor and rash. Allergic/Immunologic: Negative for immunocompromised state. Neurological: Negative for dizziness and syncope. Hematological: Negative for adenopathy. Psychiatric/Behavioral: Negative for agitation and hallucinations. All other systems reviewed and are negative.       Except as There is no tenderness. There is no guarding. Musculoskeletal: Normal range of motion. Neurological: She is alert and oriented to person, place, and time. Skin: Skin is warm and dry. Psychiatric: She has a normal mood and affect. Nursing note and vitals reviewed. DIAGNOSTIC RESULTS     EKG: All EKG's are interpreted by the Emergency Department Physician who either signs or Co-signs this chart in the absence of a cardiologist.      RADIOLOGY:   Non-plain film images such as CT, Ultrasound and MRI are read by the radiologist. Plain radiographic images are visualized and preliminarily interpreted by the emergency physician with the below findings:      Interpretation per the Radiologist below, if available at the time of this note:    No orders to display         ED BEDSIDE ULTRASOUND:   Performed by ED Physician - none    LABS:  Labs Reviewed - No data to display    All other labs were within normal range or not returned as of this dictation. EMERGENCY DEPARTMENT COURSE and DIFFERENTIAL DIAGNOSIS/MDM:   Vitals:    Vitals:    10/02/17 2305   BP: 105/74   Pulse: 95   Resp: 18   Temp: 98.8 °F (37.1 °C)   TempSrc: Oral   SpO2: 97%   Weight: (!) 340 lb (154.2 kg)   Height: 6' (1.829 m)         ED Course     MDM patient has normal laboratory studies. I believe her history is inaccurate with not really keep fluids down and vomiting. As her laboratory studies are normal.  Her vital signs are normal.  The patient was very unsatisfied by not receiving pain medications here. She's been told through the  phone with me present and the nurse present on the  phone that this is a chronic problem and she will not receive narcotic pain medications for it. She is to follow-up with pain management and/or the gastroenterologist.  She has told the care source coordinator that she did not get a phone  which is untrue.   At this point patient is discharged home with prescription for

## 2017-10-03 NOTE — ED NOTES
on phone to assist with patient questions and plan of care. Dr Dorina Leal in to speak wit hpatient-iintrepretaion assistance in process. Patient I requesting \"strong\" pain medicine and her POC was discussed and questions answered. Medicated per order with med information shared via . Patient states she understands POC.      Ene Macdonald RN  10/02/17 6239

## 2017-11-14 ENCOUNTER — APPOINTMENT (OUTPATIENT)
Dept: CT IMAGING | Age: 42
DRG: 282 | End: 2017-11-14
Payer: COMMERCIAL

## 2017-11-14 ENCOUNTER — APPOINTMENT (OUTPATIENT)
Dept: GENERAL RADIOLOGY | Age: 42
DRG: 282 | End: 2017-11-14
Payer: COMMERCIAL

## 2017-11-14 ENCOUNTER — HOSPITAL ENCOUNTER (INPATIENT)
Age: 42
LOS: 2 days | Discharge: HOME | DRG: 282 | End: 2017-11-16
Attending: STUDENT IN AN ORGANIZED HEALTH CARE EDUCATION/TRAINING PROGRAM | Admitting: INTERNAL MEDICINE
Payer: COMMERCIAL

## 2017-11-14 DIAGNOSIS — R10.9 INTRACTABLE ABDOMINAL PAIN: Primary | ICD-10-CM

## 2017-11-14 DIAGNOSIS — E66.01 MORBID OBESITY WITH BMI OF 45.0-49.9, ADULT (HCC): ICD-10-CM

## 2017-11-14 PROBLEM — K85.90 PANCREATITIS, UNSPECIFIED PANCREATITIS TYPE: Status: ACTIVE | Noted: 2017-11-14

## 2017-11-14 LAB
ALBUMIN SERPL-MCNC: 4.4 G/DL (ref 3.9–4.9)
ALP BLD-CCNC: 109 U/L (ref 40–130)
ALT SERPL-CCNC: 48 U/L (ref 0–33)
AMPHETAMINE SCREEN, URINE: NORMAL
AMYLASE: 46 U/L (ref 28–100)
ANION GAP SERPL CALCULATED.3IONS-SCNC: 15 MEQ/L (ref 7–13)
APTT: 24.2 SEC (ref 21.6–35.4)
AST SERPL-CCNC: 44 U/L (ref 0–35)
BARBITURATE SCREEN URINE: NORMAL
BASOPHILS ABSOLUTE: 0.1 K/UL (ref 0–0.2)
BASOPHILS RELATIVE PERCENT: 1.1 %
BENZODIAZEPINE SCREEN, URINE: NORMAL
BETA-HYDROXYBUTYRATE: 0.7 MG/DL (ref 0.2–2.8)
BILIRUB SERPL-MCNC: 0.3 MG/DL (ref 0–1.2)
BILIRUBIN URINE: NEGATIVE
BLOOD, URINE: NEGATIVE
BUN BLDV-MCNC: 11 MG/DL (ref 6–20)
C-REACTIVE PROTEIN, HIGH SENSITIVITY: 5.9 MG/L (ref 0–5)
CALCIUM SERPL-MCNC: 9.6 MG/DL (ref 8.6–10.2)
CANNABINOID SCREEN URINE: NORMAL
CHLORIDE BLD-SCNC: 98 MEQ/L (ref 98–107)
CHP ED QC CHECK: NORMAL
CLARITY: CLEAR
CO2: 28 MEQ/L (ref 22–29)
COCAINE METABOLITE SCREEN URINE: NORMAL
COLOR: YELLOW
CREAT SERPL-MCNC: 0.61 MG/DL (ref 0.5–0.9)
EOSINOPHILS ABSOLUTE: 0.7 K/UL (ref 0–0.7)
EOSINOPHILS RELATIVE PERCENT: 6.7 %
GFR AFRICAN AMERICAN: >60
GFR NON-AFRICAN AMERICAN: >60
GLOBULIN: 2.8 G/DL (ref 2.3–3.5)
GLUCOSE BLD-MCNC: 115 MG/DL (ref 74–109)
GLUCOSE BLD-MCNC: 245 MG/DL (ref 60–115)
GLUCOSE URINE: NEGATIVE MG/DL
HBA1C MFR BLD: 9.4 % (ref 4.8–5.9)
HCT VFR BLD CALC: 39.6 % (ref 37–47)
HEMOGLOBIN: 13.3 G/DL (ref 12–16)
INR BLD: 1
KETONES, URINE: NEGATIVE MG/DL
LACTIC ACID: 2.5 MMOL/L (ref 0.5–2.2)
LEUKOCYTE ESTERASE, URINE: NEGATIVE
LIPASE: 228 U/L (ref 13–60)
LYMPHOCYTES ABSOLUTE: 2.8 K/UL (ref 1–4.8)
LYMPHOCYTES RELATIVE PERCENT: 27.9 %
Lab: NORMAL
MCH RBC QN AUTO: 29.6 PG (ref 27–31.3)
MCHC RBC AUTO-ENTMCNC: 33.6 % (ref 33–37)
MCV RBC AUTO: 88.1 FL (ref 82–100)
MONOCYTES ABSOLUTE: 0.6 K/UL (ref 0.2–0.8)
MONOCYTES RELATIVE PERCENT: 6.2 %
NEUTROPHILS ABSOLUTE: 5.8 K/UL (ref 1.4–6.5)
NEUTROPHILS RELATIVE PERCENT: 58.1 %
NITRITE, URINE: NEGATIVE
OPIATE SCREEN URINE: NORMAL
PDW BLD-RTO: 15.5 % (ref 11.5–14.5)
PERFORMED ON: ABNORMAL
PH UA: 6 (ref 5–9)
PHENCYCLIDINE SCREEN URINE: NORMAL
PLATELET # BLD: 216 K/UL (ref 130–400)
POTASSIUM SERPL-SCNC: 4.1 MEQ/L (ref 3.5–5.1)
PREGNANCY TEST URINE, POC: NEGATIVE
PROTEIN UA: NEGATIVE MG/DL
PROTHROMBIN TIME: 10.3 SEC (ref 8.1–13.7)
RBC # BLD: 4.49 M/UL (ref 4.2–5.4)
SODIUM BLD-SCNC: 141 MEQ/L (ref 132–144)
SPECIFIC GRAVITY UA: 1.01 (ref 1–1.03)
TOTAL CK: 50 U/L (ref 0–170)
TOTAL PROTEIN: 7.2 G/DL (ref 6.4–8.1)
URINE REFLEX TO CULTURE: NORMAL
UROBILINOGEN, URINE: 0.2 E.U./DL
WBC # BLD: 10 K/UL (ref 4.8–10.8)

## 2017-11-14 PROCEDURE — 83605 ASSAY OF LACTIC ACID: CPT

## 2017-11-14 PROCEDURE — 82550 ASSAY OF CK (CPK): CPT

## 2017-11-14 PROCEDURE — 82150 ASSAY OF AMYLASE: CPT

## 2017-11-14 PROCEDURE — 2500000003 HC RX 250 WO HCPCS: Performed by: STUDENT IN AN ORGANIZED HEALTH CARE EDUCATION/TRAINING PROGRAM

## 2017-11-14 PROCEDURE — 80307 DRUG TEST PRSMV CHEM ANLYZR: CPT

## 2017-11-14 PROCEDURE — 71010 XR CHEST PORTABLE: CPT

## 2017-11-14 PROCEDURE — 83036 HEMOGLOBIN GLYCOSYLATED A1C: CPT

## 2017-11-14 PROCEDURE — 86141 C-REACTIVE PROTEIN HS: CPT

## 2017-11-14 PROCEDURE — 85610 PROTHROMBIN TIME: CPT

## 2017-11-14 PROCEDURE — 2580000003 HC RX 258: Performed by: STUDENT IN AN ORGANIZED HEALTH CARE EDUCATION/TRAINING PROGRAM

## 2017-11-14 PROCEDURE — 6360000002 HC RX W HCPCS: Performed by: PHYSICIAN ASSISTANT

## 2017-11-14 PROCEDURE — 2580000003 HC RX 258: Performed by: INTERNAL MEDICINE

## 2017-11-14 PROCEDURE — 6360000002 HC RX W HCPCS: Performed by: STUDENT IN AN ORGANIZED HEALTH CARE EDUCATION/TRAINING PROGRAM

## 2017-11-14 PROCEDURE — 99285 EMERGENCY DEPT VISIT HI MDM: CPT

## 2017-11-14 PROCEDURE — 85025 COMPLETE CBC W/AUTO DIFF WBC: CPT

## 2017-11-14 PROCEDURE — 81003 URINALYSIS AUTO W/O SCOPE: CPT

## 2017-11-14 PROCEDURE — 1210000000 HC MED SURG R&B

## 2017-11-14 PROCEDURE — 85730 THROMBOPLASTIN TIME PARTIAL: CPT

## 2017-11-14 PROCEDURE — 96375 TX/PRO/DX INJ NEW DRUG ADDON: CPT

## 2017-11-14 PROCEDURE — 74150 CT ABDOMEN W/O CONTRAST: CPT

## 2017-11-14 PROCEDURE — 80053 COMPREHEN METABOLIC PANEL: CPT

## 2017-11-14 PROCEDURE — 83690 ASSAY OF LIPASE: CPT

## 2017-11-14 PROCEDURE — 82010 KETONE BODYS QUAN: CPT

## 2017-11-14 PROCEDURE — 96374 THER/PROPH/DIAG INJ IV PUSH: CPT

## 2017-11-14 PROCEDURE — 36415 COLL VENOUS BLD VENIPUNCTURE: CPT

## 2017-11-14 RX ORDER — KETAMINE HYDROCHLORIDE 100 MG/ML
0.15 INJECTION, SOLUTION INTRAMUSCULAR; INTRAVENOUS ONCE
Status: COMPLETED | OUTPATIENT
Start: 2017-11-14 | End: 2017-11-14

## 2017-11-14 RX ORDER — KETOROLAC TROMETHAMINE 30 MG/ML
30 INJECTION, SOLUTION INTRAMUSCULAR; INTRAVENOUS ONCE
Status: COMPLETED | OUTPATIENT
Start: 2017-11-14 | End: 2017-11-14

## 2017-11-14 RX ORDER — SODIUM CHLORIDE 9 MG/ML
INJECTION, SOLUTION INTRAVENOUS CONTINUOUS
Status: DISCONTINUED | OUTPATIENT
Start: 2017-11-14 | End: 2017-11-16 | Stop reason: HOSPADM

## 2017-11-14 RX ORDER — SODIUM CHLORIDE 0.9 % (FLUSH) 0.9 %
10 SYRINGE (ML) INJECTION PRN
Status: DISCONTINUED | OUTPATIENT
Start: 2017-11-14 | End: 2017-11-16 | Stop reason: HOSPADM

## 2017-11-14 RX ORDER — NICOTINE POLACRILEX 4 MG
15 LOZENGE BUCCAL PRN
Status: DISCONTINUED | OUTPATIENT
Start: 2017-11-14 | End: 2017-11-16 | Stop reason: HOSPADM

## 2017-11-14 RX ORDER — ONDANSETRON 2 MG/ML
4 INJECTION INTRAMUSCULAR; INTRAVENOUS ONCE
Status: COMPLETED | OUTPATIENT
Start: 2017-11-14 | End: 2017-11-14

## 2017-11-14 RX ORDER — ONDANSETRON 2 MG/ML
4 INJECTION INTRAMUSCULAR; INTRAVENOUS EVERY 6 HOURS PRN
Status: DISCONTINUED | OUTPATIENT
Start: 2017-11-14 | End: 2017-11-16 | Stop reason: HOSPADM

## 2017-11-14 RX ORDER — SODIUM CHLORIDE 0.9 % (FLUSH) 0.9 %
10 SYRINGE (ML) INJECTION EVERY 12 HOURS SCHEDULED
Status: DISCONTINUED | OUTPATIENT
Start: 2017-11-14 | End: 2017-11-16 | Stop reason: HOSPADM

## 2017-11-14 RX ORDER — ACETAMINOPHEN 325 MG/1
650 TABLET ORAL EVERY 4 HOURS PRN
Status: DISCONTINUED | OUTPATIENT
Start: 2017-11-14 | End: 2017-11-16 | Stop reason: HOSPADM

## 2017-11-14 RX ORDER — DEXTROSE MONOHYDRATE 25 G/50ML
12.5 INJECTION, SOLUTION INTRAVENOUS PRN
Status: DISCONTINUED | OUTPATIENT
Start: 2017-11-14 | End: 2017-11-16 | Stop reason: HOSPADM

## 2017-11-14 RX ORDER — 0.9 % SODIUM CHLORIDE 0.9 %
1000 INTRAVENOUS SOLUTION INTRAVENOUS ONCE
Status: COMPLETED | OUTPATIENT
Start: 2017-11-14 | End: 2017-11-14

## 2017-11-14 RX ORDER — DEXTROSE MONOHYDRATE 50 MG/ML
100 INJECTION, SOLUTION INTRAVENOUS PRN
Status: DISCONTINUED | OUTPATIENT
Start: 2017-11-14 | End: 2017-11-16 | Stop reason: HOSPADM

## 2017-11-14 RX ORDER — MORPHINE SULFATE 2 MG/ML
2 INJECTION, SOLUTION INTRAMUSCULAR; INTRAVENOUS EVERY 4 HOURS PRN
Status: DISCONTINUED | OUTPATIENT
Start: 2017-11-14 | End: 2017-11-16 | Stop reason: HOSPADM

## 2017-11-14 RX ORDER — ONDANSETRON 2 MG/ML
4 INJECTION INTRAMUSCULAR; INTRAVENOUS EVERY 6 HOURS PRN
Status: DISCONTINUED | OUTPATIENT
Start: 2017-11-14 | End: 2017-11-14 | Stop reason: SDUPTHER

## 2017-11-14 RX ADMIN — KETAMINE HYDROCHLORIDE 20 MG: 100 INJECTION, SOLUTION, CONCENTRATE INTRAMUSCULAR; INTRAVENOUS at 20:54

## 2017-11-14 RX ADMIN — KETOROLAC TROMETHAMINE 30 MG: 30 INJECTION, SOLUTION INTRAMUSCULAR at 18:24

## 2017-11-14 RX ADMIN — SODIUM CHLORIDE 1000 ML: 9 INJECTION, SOLUTION INTRAVENOUS at 20:53

## 2017-11-14 RX ADMIN — ONDANSETRON 4 MG: 2 INJECTION, SOLUTION INTRAMUSCULAR; INTRAVENOUS at 18:24

## 2017-11-14 RX ADMIN — ONDANSETRON 4 MG: 2 INJECTION INTRAMUSCULAR; INTRAVENOUS at 23:36

## 2017-11-14 RX ADMIN — Medication 2 MG: at 23:36

## 2017-11-14 RX ADMIN — SODIUM CHLORIDE: 9 INJECTION, SOLUTION INTRAVENOUS at 23:35

## 2017-11-14 ASSESSMENT — ENCOUNTER SYMPTOMS
SINUS PRESSURE: 0
NAUSEA: 1
BACK PAIN: 0
CHEST TIGHTNESS: 0
COUGH: 0
TROUBLE SWALLOWING: 0
VOMITING: 1
SHORTNESS OF BREATH: 0
DIARRHEA: 0
ABDOMINAL PAIN: 1

## 2017-11-14 ASSESSMENT — PAIN DESCRIPTION - PAIN TYPE
TYPE: ACUTE PAIN
TYPE: ACUTE PAIN

## 2017-11-14 ASSESSMENT — PAIN DESCRIPTION - ORIENTATION
ORIENTATION: LOWER
ORIENTATION: LOWER

## 2017-11-14 ASSESSMENT — PAIN SCALES - GENERAL
PAINLEVEL_OUTOF10: 10
PAINLEVEL_OUTOF10: 8
PAINLEVEL_OUTOF10: 8
PAINLEVEL_OUTOF10: 10
PAINLEVEL_OUTOF10: 10

## 2017-11-14 ASSESSMENT — PAIN DESCRIPTION - LOCATION
LOCATION: ABDOMEN
LOCATION: ABDOMEN

## 2017-11-14 ASSESSMENT — PAIN DESCRIPTION - DESCRIPTORS
DESCRIPTORS: CRAMPING
DESCRIPTORS: CRAMPING

## 2017-11-14 ASSESSMENT — PAIN DESCRIPTION - FREQUENCY
FREQUENCY: CONTINUOUS
FREQUENCY: CONTINUOUS

## 2017-11-14 NOTE — ED PROVIDER NOTES
[address] [facility] ED  eMERGENCY dEPARTMENT eNCOUnter      Pt Name: Jhonny Rawls  MRN: [id number]  [place] 1975  Date of evaluation: 11/14/2017  Provider: Ruy Sesay, [address] [place] [address]       Chief Complaint   Patient presents with    Abdominal Pain     pt c/o abdominal pain , nausea, and vomiting for the past 2 days         HISTORY OF PRESENT ILLNESS   (Location/Symptom, Timing/Onset, Context/Setting, Quality, Duration, Modifying Factors, Severity)  Note limiting factors. Jhonny Rawls is a 38 y.o. female who presents to the emergency department With complaint of 2 days of left lower quadrant abdominal pain, nausea and vomiting. Patient states that she's vomited 2 times. No fever. No chills. Patient also denies any diarrhea. Patient states that her last blood sugar was 99. HPI    Nursing Notes were reviewed. REVIEW OF SYSTEMS    (2-9 systems for level 4, 10 or more for level 5)     Review of Systems   Constitutional: Negative for activity change, appetite change, chills, fever and unexpected weight change. HENT: Negative for drooling, ear pain, nosebleeds, sinus pressure and trouble swallowing. Respiratory: Negative for cough, chest tightness and shortness of breath. Cardiovascular: Negative for chest pain and leg swelling. Gastrointestinal: Positive for abdominal pain, nausea and vomiting. Negative for diarrhea. Endocrine: Negative for polydipsia and polyphagia. Genitourinary: Negative for dysuria, flank pain and frequency. Musculoskeletal: Negative for back pain and myalgias. Skin: Negative for pallor and rash. Neurological: Negative for syncope, weakness and headaches. Hematological: Does not bruise/bleed easily. All other systems reviewed and are negative. Except as noted above the remainder of the review of systems was reviewed and negative.        PAST MEDICAL HISTORY     Past Medical History:   Diagnosis Date    Asthma  Chronic abdominal pain     Hyperlipidemia     Hypertension     Type II or unspecified type diabetes mellitus without mention of complication, not stated as uncontrolled          SURGICAL HISTORY       Past Surgical History:   Procedure Laterality Date    COLONOSCOPY  2/21/14    hardy    UPPER GASTROINTESTINAL ENDOSCOPY  2/21/14    hardy         CURRENT MEDICATIONS       Previous Medications    ALBUTEROL IN    Inhale  into the lungs. ALBUTEROL SULFATE (PROAIR HFA IN)    Inhale  into the lungs. AMLODIPINE BESYLATE PO    Take  by mouth. AMOXICILLIN PO    Take  by mouth. ARIPIPRAZOLE (ABILIFY PO)    Take  by mouth. ASPIRIN 81 MG TABLET    Take 81 mg by mouth daily. BUPROPION HCL (WELLBUTRIN PO)    Take  by mouth. CLOTRIMAZOLE-BETAMETHASONE (LOTRISONE) 1-0.05 % CREAM    Apply topically 2 times daily. DICYCLOMINE (BENTYL) 10 MG CAPSULE    Take 1 capsule by mouth 4 times daily (before meals and nightly)    ESOMEPRAZOLE (NEXIUM) 40 MG CAPSULE    Take 40 mg by mouth every morning (before breakfast). FLUTICASONE-SALMETEROL (ADVAIR DISKUS IN)    Inhale  into the lungs. GLIPIZIDE PO    Take  by mouth. HYOSCYAMINE SULFATE SL (LEVSIN/SL) 0.125 MG SUBL    Place 0.125 mg under the tongue 2 times daily as needed (pain)    INSULIN LISPRO (HUMALOG KWIKPEN) 200 UNIT/ML SOPN PEN    Inject 45 Units into the skin 3 times daily (with meals)    INSULIN ZINC HUMAN (NOVOLIN L SC)    Inject  into the skin. LIDOCAINE (LMX) 4 % CREAM    Apply a half dollar sized amount to intact skin topically up to twice daily as needed for pain    LIDOCAINE-PRILOCAINE (EMLA) 2.5-2.5 % CREAM    Apply topically as needed. LISINOPRIL PO    Take  by mouth. MEDROXYPROGESTERONE (PROVERA) 10 MG TABLET    Take 1 tablet by mouth daily    MELOXICAM (MOBIC) 7.5 MG TABLET    Take 1 tablet by mouth daily    METFORMIN HCL PO    Take  by mouth.     METOCLOPRAMIDE (REGLAN) 10 MG TABLET    Take 1 tablet by mouth 4 times daily for 10 days Take 30 min before meals and at bedtime    METRONIDAZOLE (FLAGYL PO)    Take  by mouth. NORGESTIMATE-ETHINYL ESTRADIOL (SPRINTEC 28) 0.25-35 MG-MCG PER TABLET    Take 1 tablet by mouth daily    OLANZAPINE PO    Take  by mouth. OXYCODONE HCL PO    Take  by mouth. PRAVASTATIN SODIUM PO    Take  by mouth. TRAMADOL HCL PO    Take  by mouth. ALLERGIES     Shellfish-derived products; Amoxicillin; Macrobid [nitrofurantoin monohyd macro]; Reglan [metoclopramide]; Singulair [montelukast]; and Nubain [nalbuphine hcl]    FAMILY HISTORY       Family History   Problem Relation Age of Onset    Other Mother      Diverticulitis          SOCIAL HISTORY       Social History     Social History    Marital status: Single     Spouse name: N/A    Number of children: N/A    Years of education: N/A     Social History Main Topics    Smoking status: Former Smoker     Packs/day: 1.00    Smokeless tobacco: Never Used    Alcohol use No    Drug use: No    Sexual activity: Not Asked     Other Topics Concern    None     Social History Narrative    None       SCREENINGS             PHYSICAL EXAM    (up to 7 for level 4, 8 or more for level 5)     ED Triage Vitals [11/14/17 1716]   BP Temp Temp Source Pulse Resp SpO2 Height Weight   128/72 97.9 °F (36.6 °C) Oral 96 18 97 % 6' (1.829 m) (!) 340 lb (154.2 kg)       Physical Exam   Constitutional: She is oriented to person, place, and time. She appears well-developed and well-nourished. No distress. HENT:   Head: Normocephalic and atraumatic. Head is without Aparicio's sign. Right Ear: External ear normal.   Left Ear: External ear normal.   Nose: Nose normal.   Mouth/Throat: Oropharynx is clear and moist. No oropharyngeal exudate. Eyes: Conjunctivae and EOM are normal. Pupils are equal, round, and reactive to light. No foreign body present in the right eye. Left eye exhibits no exudate. No scleral icterus. Neck: Normal range of motion. Neck supple.  No JVD present. No neck rigidity. No tracheal deviation present. Cardiovascular: Normal rate, regular rhythm, normal heart sounds and intact distal pulses. Exam reveals no gallop, no distant heart sounds and no friction rub. No murmur heard. Pulmonary/Chest: Effort normal and breath sounds normal. No stridor. No respiratory distress. She has no wheezes. She has no rales. She exhibits no tenderness. Abdominal: Soft. Normal appearance, normal aorta and bowel sounds are normal. She exhibits no shifting dullness, no distension, no pulsatile liver, no fluid wave, no abdominal bruit, no ascites, no pulsatile midline mass and no mass. There is no hepatosplenomegaly, splenomegaly or hepatomegaly. There is tenderness in the left lower quadrant. There is guarding. There is no rebound, no CVA tenderness, no tenderness at McBurney's point and negative Alanis's sign. Obese. Left flank tenderness to palpation. No flank ecchymosis. Negative Red sign. Musculoskeletal: Normal range of motion. She exhibits no edema or tenderness. Lymphadenopathy:        Head (right side): No submental adenopathy present. Head (left side): No submental adenopathy present. Neurological: She is alert and oriented to person, place, and time. She has normal reflexes. No cranial nerve deficit. Coordination normal.   Skin: Skin is warm and dry. No rash noted. She is not diaphoretic. No erythema. No pallor. Psychiatric: She has a normal mood and affect. Her behavior is normal. Judgment and thought content normal.   Nursing note and vitals reviewed.       DIAGNOSTIC RESULTS     EKG: All EKG's are interpreted by the Emergency Department Physician who either signs or Co-signs this chart in the absence of a cardiologist.        RADIOLOGY:   Non-plain film images such as CT, Ultrasound and MRI are read by the radiologist. Plain radiographic images are visualized and preliminarily interpreted by the emergency physician with the below Temp: 97.9 °F (36.6 °C)    TempSrc: Oral    SpO2: 97% 99%   Weight: (!) 340 lb (154.2 kg)    Height: 6' (1.829 m)            MDM  Patient has severe left lower abdominal pain. CT scan shows fatty infiltration of the pancreas. Patient also has a history of diabetes, and schizophrenia. Patient has elevated lipase level. No prior lipase levels available. There is a concern to the patient has pancreatitis but is an unreliable historian. Patient has a narcotic allergy to Nubain which causes swelling and rash and I will give the patient ketamine IV in order to avoid a possible anaphylactic reaction. CONSULTS:  IP CONSULT TO HOSPITALIST    PROCEDURES:  Unless otherwise noted below, none     Procedures    FINAL IMPRESSION      1. Intractable abdominal pain    2.  Morbid obesity with BMI of 45.0-49.9, adult ([facility] [address]          DISPOSITION/PLAN   DISPOSITION Admitted    PATIENT REFERRED TO:  Williams [address]  [address] [facility] [address]            DISCHARGE MEDICATIONS:  New Prescriptions    No medications on file          (Please note that portions of this note were completed with a voice recognition program.  Efforts were made to edit the dictations but occasionally words are mis-transcribed.)    Jessica Delvalle DO (electronically signed)  Attending Emergency Physician         Jessica Delvalle DO  11/14/17 1986

## 2017-11-14 NOTE — ED TRIAGE NOTES
Pt c/o lower abdominal pain , nausea, and vomiting for the past 2 days, denies diarrhea and trauma, LBM today, Hx of chronic abdominal pain.

## 2017-11-15 ENCOUNTER — APPOINTMENT (OUTPATIENT)
Dept: ULTRASOUND IMAGING | Age: 42
DRG: 282 | End: 2017-11-15
Payer: COMMERCIAL

## 2017-11-15 LAB
ALBUMIN SERPL-MCNC: 3.8 G/DL (ref 3.9–4.9)
ALP BLD-CCNC: 85 U/L (ref 40–130)
ALT SERPL-CCNC: 42 U/L (ref 0–33)
AMYLASE: 16 U/L (ref 28–100)
ANION GAP SERPL CALCULATED.3IONS-SCNC: 12 MEQ/L (ref 7–13)
AST SERPL-CCNC: 42 U/L (ref 0–35)
BASOPHILS ABSOLUTE: 0.1 K/UL (ref 0–0.2)
BASOPHILS RELATIVE PERCENT: 1.2 %
BILIRUB SERPL-MCNC: 0.5 MG/DL (ref 0–1.2)
BILIRUBIN DIRECT: 0.2 MG/DL (ref 0–0.3)
BILIRUBIN, INDIRECT: 0.3 MG/DL (ref 0–0.6)
BUN BLDV-MCNC: 11 MG/DL (ref 6–20)
CALCIUM SERPL-MCNC: 8.6 MG/DL (ref 8.6–10.2)
CHLORIDE BLD-SCNC: 103 MEQ/L (ref 98–107)
CHOLESTEROL, TOTAL: 171 MG/DL (ref 0–199)
CO2: 28 MEQ/L (ref 22–29)
CREAT SERPL-MCNC: 0.6 MG/DL (ref 0.5–0.9)
EOSINOPHILS ABSOLUTE: 0.5 K/UL (ref 0–0.7)
EOSINOPHILS RELATIVE PERCENT: 8 %
GFR AFRICAN AMERICAN: >60
GFR NON-AFRICAN AMERICAN: >60
GLUCOSE BLD-MCNC: 172 MG/DL (ref 60–115)
GLUCOSE BLD-MCNC: 201 MG/DL (ref 74–109)
GLUCOSE BLD-MCNC: 220 MG/DL (ref 60–115)
GLUCOSE BLD-MCNC: 252 MG/DL (ref 60–115)
GLUCOSE BLD-MCNC: 265 MG/DL (ref 60–115)
HCT VFR BLD CALC: 36.3 % (ref 37–47)
HDLC SERPL-MCNC: 60 MG/DL (ref 40–59)
HEMOGLOBIN: 12.1 G/DL (ref 12–16)
LACTIC ACID: 2.3 MMOL/L (ref 0.5–2.2)
LDL CHOLESTEROL CALCULATED: 89 MG/DL (ref 0–129)
LIPASE: 25 U/L (ref 13–60)
LYMPHOCYTES ABSOLUTE: 2.2 K/UL (ref 1–4.8)
LYMPHOCYTES RELATIVE PERCENT: 33 %
MAGNESIUM: 2 MG/DL (ref 1.7–2.3)
MCH RBC QN AUTO: 30 PG (ref 27–31.3)
MCHC RBC AUTO-ENTMCNC: 33.4 % (ref 33–37)
MCV RBC AUTO: 89.7 FL (ref 82–100)
MONOCYTES ABSOLUTE: 0.4 K/UL (ref 0.2–0.8)
MONOCYTES RELATIVE PERCENT: 5.9 %
NEUTROPHILS ABSOLUTE: 3.4 K/UL (ref 1.4–6.5)
NEUTROPHILS RELATIVE PERCENT: 51.9 %
PDW BLD-RTO: 15.7 % (ref 11.5–14.5)
PERFORMED ON: ABNORMAL
PLATELET # BLD: 167 K/UL (ref 130–400)
POTASSIUM SERPL-SCNC: 4.1 MEQ/L (ref 3.5–5.1)
RBC # BLD: 4.04 M/UL (ref 4.2–5.4)
SODIUM BLD-SCNC: 143 MEQ/L (ref 132–144)
TOTAL PROTEIN: 6.1 G/DL (ref 6.4–8.1)
TRIGL SERPL-MCNC: 109 MG/DL (ref 0–200)
WBC # BLD: 6.6 K/UL (ref 4.8–10.8)

## 2017-11-15 PROCEDURE — 6360000002 HC RX W HCPCS: Performed by: INTERNAL MEDICINE

## 2017-11-15 PROCEDURE — 83735 ASSAY OF MAGNESIUM: CPT

## 2017-11-15 PROCEDURE — 80076 HEPATIC FUNCTION PANEL: CPT

## 2017-11-15 PROCEDURE — 76705 ECHO EXAM OF ABDOMEN: CPT

## 2017-11-15 PROCEDURE — 83690 ASSAY OF LIPASE: CPT

## 2017-11-15 PROCEDURE — 2580000003 HC RX 258: Performed by: INTERNAL MEDICINE

## 2017-11-15 PROCEDURE — 80048 BASIC METABOLIC PNL TOTAL CA: CPT

## 2017-11-15 PROCEDURE — 94640 AIRWAY INHALATION TREATMENT: CPT

## 2017-11-15 PROCEDURE — 80061 LIPID PANEL: CPT

## 2017-11-15 PROCEDURE — 83605 ASSAY OF LACTIC ACID: CPT

## 2017-11-15 PROCEDURE — 82150 ASSAY OF AMYLASE: CPT

## 2017-11-15 PROCEDURE — 6360000002 HC RX W HCPCS: Performed by: PHYSICIAN ASSISTANT

## 2017-11-15 PROCEDURE — 1210000000 HC MED SURG R&B

## 2017-11-15 PROCEDURE — 6370000000 HC RX 637 (ALT 250 FOR IP): Performed by: INTERNAL MEDICINE

## 2017-11-15 PROCEDURE — 85025 COMPLETE CBC W/AUTO DIFF WBC: CPT

## 2017-11-15 PROCEDURE — 36415 COLL VENOUS BLD VENIPUNCTURE: CPT

## 2017-11-15 RX ORDER — HYDRALAZINE HYDROCHLORIDE 20 MG/ML
10 INJECTION INTRAMUSCULAR; INTRAVENOUS EVERY 4 HOURS PRN
Status: DISCONTINUED | OUTPATIENT
Start: 2017-11-15 | End: 2017-11-16 | Stop reason: HOSPADM

## 2017-11-15 RX ORDER — KETOROLAC TROMETHAMINE 30 MG/ML
30 INJECTION, SOLUTION INTRAMUSCULAR; INTRAVENOUS ONCE
Status: COMPLETED | OUTPATIENT
Start: 2017-11-15 | End: 2017-11-15

## 2017-11-15 RX ADMIN — Medication 2 MG: at 09:44

## 2017-11-15 RX ADMIN — Medication 2 PUFF: at 20:00

## 2017-11-15 RX ADMIN — SODIUM CHLORIDE, PRESERVATIVE FREE 10 ML: 5 INJECTION INTRAVENOUS at 09:48

## 2017-11-15 RX ADMIN — Medication 2 MG: at 23:40

## 2017-11-15 RX ADMIN — Medication 2 MG: at 13:53

## 2017-11-15 RX ADMIN — Medication 2 MG: at 04:20

## 2017-11-15 RX ADMIN — SODIUM CHLORIDE: 9 INJECTION, SOLUTION INTRAVENOUS at 04:20

## 2017-11-15 RX ADMIN — Medication 2 MG: at 18:50

## 2017-11-15 RX ADMIN — KETOROLAC TROMETHAMINE 30 MG: 30 INJECTION, SOLUTION INTRAMUSCULAR at 07:50

## 2017-11-15 RX ADMIN — SODIUM CHLORIDE, PRESERVATIVE FREE 10 ML: 5 INJECTION INTRAVENOUS at 07:50

## 2017-11-15 RX ADMIN — SODIUM CHLORIDE: 9 INJECTION, SOLUTION INTRAVENOUS at 16:32

## 2017-11-15 RX ADMIN — ENOXAPARIN SODIUM 40 MG: 40 INJECTION, SOLUTION INTRAVENOUS; SUBCUTANEOUS at 07:50

## 2017-11-15 RX ADMIN — ONDANSETRON 4 MG: 2 INJECTION INTRAMUSCULAR; INTRAVENOUS at 23:40

## 2017-11-15 ASSESSMENT — PAIN SCALES - GENERAL
PAINLEVEL_OUTOF10: 7
PAINLEVEL_OUTOF10: 10
PAINLEVEL_OUTOF10: 8
PAINLEVEL_OUTOF10: 10

## 2017-11-15 NOTE — PROGRESS NOTES
Hospitalist   Progress Note       Jhonny Rawls is a 38 y.o. female   1975     SUBJECTIVE:   Patient  Seen  And  Examined  Still  Complains  Of  Nausea  And  vomitting     And  epigastic  Pain  But  Exam  unreamarkable  With  Normal  lipse  25  today    OBJECTIVE:    Review of Systems:  General appearance: alert, appears stated age and cooperative  Skin: Skin color, texture, normal. No rashes or lesions  HEENT: No nose bleed, headache, vision problems  CV: C/O chest pain, tightness, pressure,   Respiratory: C/o no SOB, JUAREZ, Orthopnea, PND  GI: No abdominal pain, black stool, bloating  Limbs: No c/o edema, pain, swelling, intermittent claudication, joint pains  Neuro: No dizziness, lightheadedness, syncope, gait problems, memory problems  Psych: grossly normal. No SI/depression. Vitals:   Blood pressure 121/66, pulse 74, temperature 98.1 °F (36.7 °C), temperature source Oral, resp. rate 16, height 6' (1.829 m), weight 283 lb 4.7 oz (128.5 kg), SpO2 93 %, not currently breastfeeding.     HEENT: AT, NC, PERRLA  Neck: No JVD  Heart: S1 S2 audible, no murmur   Lungs: CTA   Abdomen  tender   But  soft  Limbs: No edema   CNS: no focal deficit      Past Medical History:   Diagnosis Date    Asthma     Chronic abdominal pain     Depression     Hyperlipidemia     Hypertension     NATALEE (obstructive sleep apnea)     Schizophrenia (HCC)     Type II or unspecified type diabetes mellitus without mention of complication, not stated as uncontrolled         Patient Active Problem List   Diagnosis    Anxiety    Asthma    Auditory hallucination    Depression    Uncontrolled type 2 diabetes mellitus ([address]    Diabetes mellitus ([facility]    Family history of coronary arteriosclerosis    Gastroesophageal reflux disease    Hyperlipidemia    Hypertension    Hypothyroidism due to Hashimoto's thyroiditis    Ingrowing nail    Laryngeal spasm    Combined fat and carbohydrate induced hyperlipemia    Morbid obesity ([address]    Obstructive sleep apnea syndrome    Pain of toe    Schizophrenia (HCC)    Type 2 diabetes mellitus with other diabetic neurological complication    Type 1 diabetes mellitus (HCC)    Vitamin D deficiency    Pancreatitis, unspecified pancreatitis type        Allergies   Allergen Reactions    Shellfish-Derived Products Anaphylaxis    Amoxicillin Swelling    Macrobid [Nitrofurantoin Monohyd Macro]     Reglan [Metoclopramide]     Singulair [Montelukast]     Nubain [Nalbuphine Hcl] Swelling and Rash        Current Inpatient Medications:    Current Facility-Administered Medications   Medication Dose Route Frequency Provider Last Rate Last Dose    insulin lispro (HUMALOG) injection vial 0-6 Units  0-6 Units Subcutaneous 4 times per day [place] Decant RUSS Sedar, DO        sodium chloride flush 0.9 % injection 10 mL  10 mL Intravenous 2 times per day Patrice Decemi SOLANO Sedar, DO   10 mL at 11/15/17 0948    sodium chloride flush 0.9 % injection 10 mL  10 mL Intravenous PRN Deondre Lawson Sedar, DO        acetaminophen (TYLENOL) tablet 650 mg  650 mg Oral Q4H PRN Ortizyl Renny Sedar, DO        magnesium hydroxide (MILK OF MAGNESIA) 400 MG/5ML suspension 30 mL  30 mL Oral Daily PRN Deondre Lawson Sedar, DO        enoxaparin (LOVENOX) injection 40 mg  40 mg Subcutaneous Daily Parviz SOLANO Sedar, DO   40 mg at 11/15/17 0750    0.9 % sodium chloride infusion   Intravenous Continuous Deondre Lawson Sedar,  mL/hr at 11/15/17 0420      ondansetron (ZOFRAN) injection 4 mg  4 mg Intravenous Q6H PRN Katherine Hurley PA-C   4 mg at 11/14/17 2336    morphine injection 2 mg  2 mg Intravenous Q4H PRN Katherine Hurley PA-C   2 mg at 11/15/17 0944    glucose (GLUTOSE) 40 % oral gel 15 g  15 g Oral PRN Katherine Hurley PA-C        dextrose 50 % solution 12.5 g  12.5 g Intravenous PRN Katherine Hurley PA-C        glucagon (rDNA) injection 1 mg  1 mg Intramuscular PRN Katherine Hurley PA-C        dextrose 5 % solution  100 mL/hr Intravenous PRN Akanksha KILLIAN 11/14/2017    AMORPHOUS 1+ 08/04/2015     ABG:    Lab Results   Component Value Date    PHART 7.434 02/14/2015    OGY6WEB 35 02/14/2015    PO2ART 82 02/14/2015    GKG2NOM 23.6 02/14/2015    BEART 0 02/14/2015    FTG8GNZ 25 02/14/2015    U8HKGESU 96 02/14/2015     FLP:    Lab Results   Component Value Date    TRIG 109 11/15/2017    HDL 60 11/15/2017    LDLCALC 89 11/15/2017     TSH:    Lab Results   Component Value Date    TSH 2.800 12/18/2015      DATA:   ECG: Sinus Rhythm       ASSESSMENT:   1  pancreatitis    Lipase  Now  Normal  2  Nausea  And  vomitting  Given  Diabetes  Will  Consult  GI  Evaluate  For  Possible  Gastroparesis  3  Dm  Sliding  Scale  4  Morbid  Obesity  5  NATALEE  6 dehydration  7  schiozophrenia  PLAN    has  Had  Multiple  Ct  Of  Abd/pelvis  Since 2013  Gi  Consult  Possible  Gastroparesis  Clear  Liquid  ,  advance

## 2017-11-15 NOTE — H&P
tablet by mouth 4 times daily for 10 days Take 30 min before meals and at bedtime 5/20/17 5/30/17  Taylor Mandel PA-C   dicyclomine (BENTYL) 10 MG capsule Take 1 capsule by mouth 4 times daily (before meals and nightly) 5/20/17 5/20/18  Taylor Mandel PA-C   insulin lispro (HUMALOG KWIKPEN) 200 UNIT/ML SOPN pen Inject 45 Units into the skin 3 times daily (with meals) 11/5/16   Audrey Faye PA-C   meloxicam (MOBIC) 7.5 MG tablet Take 1 tablet by mouth daily 11/5/16   Audrey Faye PA-C   lidocaine (LMX) 4 % cream Apply a half dollar sized amount to intact skin topically up to twice daily as needed for pain 9/16/16   Leroy Foster MD   lidocaine-prilocaine (EMLA) 2.5-2.5 % cream Apply topically as needed. 9/16/16   Leroy Foster MD   norgestimate-ethinyl estradiol ([address] [id number]) 0.25-35 MG-MCG per tablet Take 1 tablet by mouth daily 10/13/15   Carmela Tomlinson MD   medroxyPROGESTERone (PROVERA) 10 MG tablet Take 1 tablet by mouth daily 9/29/15 12/21/16  Carmela Tomlinson MD   clotrimazole-betamethasone (LOTRISONE) 1-0.05 % cream Apply topically 2 times daily. 9/29/15   Carmela Tomlinson MD   TRAMADOL HCL PO Take  by mouth. Historical Provider, MD   aspirin 81 MG tablet Take 81 mg by mouth daily. Historical Provider, MD   MetroNIDAZOLE (FLAGYL PO) Take  by mouth. Historical Provider, MD   AMLODIPINE BESYLATE PO Take  by mouth. Historical Provider, MD   LISINOPRIL PO Take  by mouth. Historical Provider, MD   METFORMIN HCL PO Take  by mouth. Historical Provider, MD   OLANZAPINE PO Take  by mouth. Historical Provider, MD   BuPROPion HCl (WELLBUTRIN PO) Take  by mouth. Historical Provider, MD   Fluticasone-Salmeterol (ADVAIR DISKUS IN) Inhale  into the lungs. Historical Provider, MD   AMOXICILLIN PO Take  by mouth. Historical Provider, MD   GLIPIZIDE PO Take  by mouth. Historical Provider, MD   Albuterol Sulfate (PROAIR HFA IN) Inhale  into the lungs.     Historical Time: 11/14/17  6:00 PM   Result Value Ref Range    Beta-Hydroxybutyrate 0.7 0.2 - 2.8 mg/dL   Protime-INR    Collection Time: 11/14/17  6:00 PM   Result Value Ref Range    Protime 10.3 8.1 - 13.7 sec    INR 1.0    Comprehensive Metabolic Panel    Collection Time: 11/14/17  6:00 PM   Result Value Ref Range    Sodium 141 132 - 144 mEq/L    Potassium 4.1 3.5 - 5.1 mEq/L    Chloride 98 98 - 107 mEq/L    CO2 28 22 - 29 mEq/L    Anion Gap 15 (H) 7 - 13 mEq/L    Glucose 115 (H) 74 - 109 mg/dL    BUN 11 6 - 20 mg/dL    CREATININE 0.61 0.50 - 0.90 mg/dL    GFR Non-African American >60.0 >60    GFR  >60.0 >60    Calcium 9.6 8.6 - 10.2 mg/dL    Total Protein 7.2 6.4 - 8.1 g/dL    Alb 4.4 3.9 - 4.9 g/dL    Total Bilirubin 0.3 0.0 - 1.2 mg/dL    Alkaline Phosphatase 109 40 - 130 U/L    ALT 48 (H) 0 - 33 U/L    AST 44 (H) 0 - 35 U/L    Globulin 2.8 2.3 - 3.5 g/dL   CBC Auto Differential    Collection Time: 11/14/17  6:00 PM   Result Value Ref Range    WBC 10.0 4.8 - 10.8 K/uL    RBC 4.49 4.20 - 5.40 M/uL    Hemoglobin 13.3 12.0 - 16.0 g/dL    Hematocrit 39.6 37.0 - 47.0 %    MCV 88.1 82.0 - 100.0 fL    MCH 29.6 27.0 - 31.3 pg    MCHC 33.6 33.0 - 37.0 %    RDW 15.5 (H) 11.5 - 14.5 %    Platelets 311 338 - 410 K/uL    Neutrophils % 58.1 %    Lymphocytes % 27.9 %    Monocytes % 6.2 %    Eosinophils % 6.7 %    Basophils % 1.1 %    Neutrophils # 5.8 1.4 - 6.5 K/uL    Lymphocytes # 2.8 1.0 - 4.8 K/uL    Monocytes # 0.6 0.2 - 0.8 K/uL    Eosinophils # 0.7 0.0 - 0.7 K/uL    Basophils # 0.1 0.0 - 0.2 K/uL   APTT    Collection Time: 11/14/17  6:00 PM   Result Value Ref Range    aPTT 24.2 21.6 - 35.4 sec   Urinalysis Reflex to Culture    Collection Time: 11/14/17  6:00 PM   Result Value Ref Range    Color, UA Yellow Straw/Yellow    Clarity, UA Clear Clear    Glucose, Ur Negative Negative mg/dL    Bilirubin Urine Negative Negative    Ketones, Urine Negative Negative mg/dL    Specific Gravity, UA 1.011 1.005 - 1.030    Blood, is no hydronephrosis. No ureteral calculi on either side. Small bowel and appendix unremarkable. Gas and stool noted throughout the colon with no evidence of acute colitis or bowel obstruction. Anastomosis in the proximal sigmoid remain stable and unchanged. No evidence of acute diverticulitis. No abdominal or pelvic abscess. Pelvic viscera intact. Vertebral bodies intact. Severe deformity of the hip joints and femoral heads noted likely reflect degenerative disease and may be related to congenital abnormality. NO ACUTE PROCESS OR INFLAMMATORY CHANGES IN THE ABDOMEN OR PELVIS. FATTY LIVER. FATTY INFILTRATION THROUGHOUT THE PANCREAS LIKELY RELATED TO DIABETES. 3 MM NONOBSTRUCTING STONE UPPER POLE LEFT KIDNEY UNCHANGED. NO EVIDENCE OF BOWEL OR URINARY TRACT OBSTRUCTION. SEVERE DEGENERATIVE HIP DISEASE BILATERALLY. All CT scans at this facility use dose modulation, iterative reconstruction, and/or weight based dosing when appropriate to reduce radiation dose to as low as reasonably achievable. Xr Chest Portable    Result Date: 11/14/2017  EXAM: PORTABLE CHEST COMPARISON: 8/5/2016 REASON FOR EXAMINATION: ABDOMINAL PAIN WITH NAUSEA AND VOMITING X2 DAYS FINDINGS: Portable chest x-ray demonstrates normal appearance of the heart and mediastinum. The lungs appear clear. The visualized bony thorax and remainder of the chest appears unremarkable. No change since 8/5/2016 PCXR.      NO EVIDENCE OF ACTIVE DISEASE IN THE CHEST.       VTE Prophylaxis: low molecular weight heparin -  start    ASSESSMENT AND PLAN  Abd pain/NV  HTN  HLD  DMII  NATALEE  Hx schizophrenia      Admit inpt   NPO  Prn analgesia and antiemetics  Glucose checks w/SSI  Pt may use home CPAP  Cbc, bmp Mg, amylase, lipase        Plan of care discussed with: patient via  phone    SIGNATURE: Tori Simpson PA-C  DATE: November 14, 2017  TIME: 10:36 PM     Dr. Tanya Barrett DO - supervising physician

## 2017-11-15 NOTE — FLOWSHEET NOTE
Patient resting in bed. Had Abdominal US this morning. Diet was changed to clear liquid and may advance as tolerated. Tolerated clear liquids fair. Had some dry heaves earlier but no emesis noted. Medicated with Morphine 2 mg. q 4hrs. For abdominal pain of 10/10. Will bring pain down to 8/10 only. Showered today.

## 2017-11-15 NOTE — PROGRESS NOTES
Assumed care of patient tonight. Assessment complete and meds were given. VS stable. Pt. Ambulates well to bathroom. Pt. Speaks very little english, so Blue phone is in room. Pt denies any needs at this time. Call bell and bedside table within reach will continue to monitor.  Electronically signed by Laith Fitzgerald RN on 11/15/2017 at 3:15 AM

## 2017-11-16 VITALS
HEART RATE: 82 BPM | WEIGHT: 283.29 LBS | BODY MASS INDEX: 38.37 KG/M2 | OXYGEN SATURATION: 96 % | SYSTOLIC BLOOD PRESSURE: 160 MMHG | HEIGHT: 72 IN | TEMPERATURE: 98.3 F | DIASTOLIC BLOOD PRESSURE: 89 MMHG | RESPIRATION RATE: 17 BRPM

## 2017-11-16 LAB
GLUCOSE BLD-MCNC: 204 MG/DL (ref 60–115)
GLUCOSE BLD-MCNC: 265 MG/DL (ref 60–115)
LIPASE: 16 U/L (ref 13–60)
PERFORMED ON: ABNORMAL
PERFORMED ON: ABNORMAL

## 2017-11-16 PROCEDURE — 6360000002 HC RX W HCPCS: Performed by: INTERNAL MEDICINE

## 2017-11-16 PROCEDURE — 94640 AIRWAY INHALATION TREATMENT: CPT

## 2017-11-16 PROCEDURE — 2580000003 HC RX 258: Performed by: INTERNAL MEDICINE

## 2017-11-16 PROCEDURE — 36415 COLL VENOUS BLD VENIPUNCTURE: CPT

## 2017-11-16 PROCEDURE — 6360000002 HC RX W HCPCS: Performed by: PHYSICIAN ASSISTANT

## 2017-11-16 PROCEDURE — 83690 ASSAY OF LIPASE: CPT

## 2017-11-16 RX ADMIN — Medication 2 MG: at 09:31

## 2017-11-16 RX ADMIN — SODIUM CHLORIDE 125 ML/HR: 9 INJECTION, SOLUTION INTRAVENOUS at 01:39

## 2017-11-16 RX ADMIN — Medication 2 MG: at 03:36

## 2017-11-16 RX ADMIN — Medication 2 PUFF: at 07:25

## 2017-11-16 RX ADMIN — ENOXAPARIN SODIUM 40 MG: 40 INJECTION, SOLUTION INTRAVENOUS; SUBCUTANEOUS at 08:04

## 2017-11-16 RX ADMIN — ONDANSETRON 4 MG: 2 INJECTION INTRAMUSCULAR; INTRAVENOUS at 09:27

## 2017-11-16 ASSESSMENT — PAIN SCALES - GENERAL
PAINLEVEL_OUTOF10: 7
PAINLEVEL_OUTOF10: 10
PAINLEVEL_OUTOF10: 10
PAINLEVEL_OUTOF10: 7

## 2017-11-16 NOTE — DISCHARGE SUMMARY
[facility] Discharge Note    Patient ID: Michelle Ford 38 y.o. 1975    Admission Date: 11/14/2017     Discharge Date:      Reason for Admission:  Principal Diagnosis:   Acute  Pancreatitis    Secondary Diagnosis:   1  Dm  2 morbid  Obesity  3 NATALEE  4schizophrenia  Procedures:  No discharge procedures on file. Brief Summary of Patient's Course:  Patient  admiited  With  Acute  Pancreatitis  With  Nausea  And  vomitting  And  Dehydration  Lipase  Down  fron  200 to  25  Now  16  Patient  Clinically  Stable  For  Discharge  Has  Multiple  CT  Of  abd  And  Pelvis  Since  2013  And  Frequent  ER  And  Hospital  evaluation    Discharge Instructions: Activity Limitations:  No restriction. Diet:  common adult    Follow Up Instructions: To office in: in 1-2   weeks  Instructed Re:   Fu  With  PCP  And  GI  In  1-2  weeks    Discharge Medications:     Medication List      CONTINUE taking these medications    ABILIFY PO     ADVAIR DISKUS IN     ALBUTEROL IN     AMLODIPINE BESYLATE PO     AMOXICILLIN PO     aspirin 81 MG tablet     clotrimazole-betamethasone 1-0.05 % cream  Commonly known as:  LOTRISONE  Apply topically 2 times daily. dicyclomine 10 MG capsule  Commonly known as:  BENTYL  Take 1 capsule by mouth 4 times daily (before meals and nightly)     FLAGYL PO     GLIPIZIDE PO     Hyoscyamine Sulfate SL 0.125 MG Subl  Commonly known as:  LEVSIN/SL  Place 0.125 mg under the tongue 2 times daily as needed (pain)     insulin lispro 200 UNIT/ML Sopn pen  Commonly known as:  HUMALOG KWIKPEN  Inject 45 Units into the skin 3 times daily (with meals)     lidocaine 4 % cream  Commonly known as:  LMX  Apply a half dollar sized amount to intact skin topically up to twice   daily as needed for pain     lidocaine-prilocaine 2.5-2.5 % cream  Commonly known as:  EMLA  Apply topically as needed.      LISINOPRIL PO     medroxyPROGESTERone 10 MG tablet  Commonly known as:  PROVERA  Take 1 tablet by mouth daily     meloxicam 7.5 MG tablet  Commonly known as:  MOBIC  Take 1 tablet by mouth daily     METFORMIN HCL PO     metoclopramide 10 MG tablet  Commonly known as:  REGLAN  Take 1 tablet by mouth 4 times daily for 10 days Take 30 min before meals   and at bedtime     NEXIUM 40 MG delayed release capsule  Generic drug:  esomeprazole     norgestimate-ethinyl estradiol 0.25-35 MG-MCG per tablet  Commonly known as:  SPRINTEC 28  Take 1 tablet by mouth daily     NOVOLIN L SC     OLANZAPINE PO     OXYCODONE HCL PO     PRAVASTATIN SODIUM PO     PROAIR HFA IN     TRAMADOL HCL PO     WELLBUTRIN PO                                      Attending Physician:   Chris Oviedo MD, 11/16/2017, 10:55 AM

## 2017-12-08 ENCOUNTER — HOSPITAL ENCOUNTER (EMERGENCY)
Age: 42
Discharge: HOME | End: 2017-12-09
Payer: COMMERCIAL

## 2017-12-08 DIAGNOSIS — R11.2 NON-INTRACTABLE VOMITING WITH NAUSEA, UNSPECIFIED VOMITING TYPE: ICD-10-CM

## 2017-12-08 DIAGNOSIS — R10.84 GENERALIZED ABDOMINAL PAIN: Primary | ICD-10-CM

## 2017-12-08 DIAGNOSIS — E10.65 TYPE 1 DIABETES MELLITUS WITH HYPERGLYCEMIA (HCC): ICD-10-CM

## 2017-12-08 LAB
ALBUMIN SERPL-MCNC: 4.2 G/DL (ref 3.9–4.9)
ALP BLD-CCNC: 93 U/L (ref 40–130)
ALT SERPL-CCNC: 38 U/L (ref 0–33)
AMPHETAMINE SCREEN, URINE: NORMAL
ANION GAP SERPL CALCULATED.3IONS-SCNC: 13 MEQ/L (ref 7–13)
AST SERPL-CCNC: 32 U/L (ref 0–35)
BACTERIA: NORMAL /HPF
BARBITURATE SCREEN URINE: NORMAL
BASOPHILS ABSOLUTE: 0.1 K/UL (ref 0–0.2)
BASOPHILS RELATIVE PERCENT: 1.3 %
BENZODIAZEPINE SCREEN, URINE: NORMAL
BILIRUB SERPL-MCNC: 0.3 MG/DL (ref 0–1.2)
BILIRUBIN URINE: NEGATIVE
BLOOD, URINE: NEGATIVE
BUN BLDV-MCNC: 14 MG/DL (ref 6–20)
CALCIUM SERPL-MCNC: 9.6 MG/DL (ref 8.6–10.2)
CANNABINOID SCREEN URINE: NORMAL
CHLORIDE BLD-SCNC: 100 MEQ/L (ref 98–107)
CLARITY: CLEAR
CO2: 27 MEQ/L (ref 22–29)
COCAINE METABOLITE SCREEN URINE: NORMAL
COLOR: ABNORMAL
CREAT SERPL-MCNC: 0.75 MG/DL (ref 0.5–0.9)
EOSINOPHILS ABSOLUTE: 0.6 K/UL (ref 0–0.7)
EOSINOPHILS RELATIVE PERCENT: 6.2 %
EPITHELIAL CELLS, UA: NORMAL /HPF
GFR AFRICAN AMERICAN: >60
GFR NON-AFRICAN AMERICAN: >60
GLOBULIN: 2.5 G/DL (ref 2.3–3.5)
GLUCOSE BLD-MCNC: 242 MG/DL (ref 74–109)
GLUCOSE URINE: 250 MG/DL
HCT VFR BLD CALC: 39.2 % (ref 37–47)
HEMOGLOBIN: 12.9 G/DL (ref 12–16)
KETONES, URINE: NEGATIVE MG/DL
LEUKOCYTE ESTERASE, URINE: ABNORMAL
LIPASE: 13 U/L (ref 13–60)
LYMPHOCYTES ABSOLUTE: 2.7 K/UL (ref 1–4.8)
LYMPHOCYTES RELATIVE PERCENT: 28.1 %
Lab: NORMAL
MCH RBC QN AUTO: 29.9 PG (ref 27–31.3)
MCHC RBC AUTO-ENTMCNC: 33 % (ref 33–37)
MCV RBC AUTO: 90.7 FL (ref 82–100)
MONOCYTES ABSOLUTE: 0.5 K/UL (ref 0.2–0.8)
MONOCYTES RELATIVE PERCENT: 4.8 %
NEUTROPHILS ABSOLUTE: 5.8 K/UL (ref 1.4–6.5)
NEUTROPHILS RELATIVE PERCENT: 59.6 %
NITRITE, URINE: NEGATIVE
OPIATE SCREEN URINE: NORMAL
PDW BLD-RTO: 15.1 % (ref 11.5–14.5)
PH UA: 5.5 (ref 5–9)
PHENCYCLIDINE SCREEN URINE: NORMAL
PLATELET # BLD: 193 K/UL (ref 130–400)
POTASSIUM SERPL-SCNC: 4 MEQ/L (ref 3.5–5.1)
PROTEIN UA: NEGATIVE MG/DL
RBC # BLD: 4.32 M/UL (ref 4.2–5.4)
RBC UA: NORMAL /HPF (ref 0–2)
SODIUM BLD-SCNC: 140 MEQ/L (ref 132–144)
SPECIFIC GRAVITY UA: 1.01 (ref 1–1.03)
TOTAL PROTEIN: 6.7 G/DL (ref 6.4–8.1)
URINE REFLEX TO CULTURE: YES
UROBILINOGEN, URINE: 0.2 E.U./DL
WBC # BLD: 9.7 K/UL (ref 4.8–10.8)
WBC UA: NORMAL /HPF (ref 0–5)

## 2017-12-08 PROCEDURE — 85025 COMPLETE CBC W/AUTO DIFF WBC: CPT

## 2017-12-08 PROCEDURE — 36415 COLL VENOUS BLD VENIPUNCTURE: CPT

## 2017-12-08 PROCEDURE — 83690 ASSAY OF LIPASE: CPT

## 2017-12-08 PROCEDURE — 99283 EMERGENCY DEPT VISIT LOW MDM: CPT

## 2017-12-08 PROCEDURE — 96372 THER/PROPH/DIAG INJ SC/IM: CPT

## 2017-12-08 PROCEDURE — 80053 COMPREHEN METABOLIC PANEL: CPT

## 2017-12-08 PROCEDURE — 6360000002 HC RX W HCPCS: Performed by: PHYSICIAN ASSISTANT

## 2017-12-08 PROCEDURE — 80307 DRUG TEST PRSMV CHEM ANLYZR: CPT

## 2017-12-08 PROCEDURE — 81001 URINALYSIS AUTO W/SCOPE: CPT

## 2017-12-08 RX ORDER — KETOROLAC TROMETHAMINE 30 MG/ML
60 INJECTION, SOLUTION INTRAMUSCULAR; INTRAVENOUS ONCE
Status: COMPLETED | OUTPATIENT
Start: 2017-12-08 | End: 2017-12-08

## 2017-12-08 RX ORDER — ONDANSETRON 4 MG/1
4 TABLET, ORALLY DISINTEGRATING ORAL ONCE
Status: COMPLETED | OUTPATIENT
Start: 2017-12-08 | End: 2017-12-08

## 2017-12-08 RX ADMIN — KETOROLAC TROMETHAMINE 60 MG: 30 INJECTION, SOLUTION INTRAMUSCULAR at 22:28

## 2017-12-08 RX ADMIN — ONDANSETRON 4 MG: 4 TABLET, ORALLY DISINTEGRATING ORAL at 22:14

## 2017-12-08 ASSESSMENT — PAIN DESCRIPTION - ORIENTATION: ORIENTATION: LEFT

## 2017-12-08 ASSESSMENT — PAIN DESCRIPTION - LOCATION: LOCATION: ABDOMEN

## 2017-12-08 ASSESSMENT — PAIN SCALES - GENERAL
PAINLEVEL_OUTOF10: 5
PAINLEVEL_OUTOF10: 3
PAINLEVEL_OUTOF10: 3

## 2017-12-08 ASSESSMENT — PAIN DESCRIPTION - DESCRIPTORS: DESCRIPTORS: PRESSURE;SHARP

## 2017-12-09 VITALS
HEART RATE: 103 BPM | TEMPERATURE: 98.6 F | WEIGHT: 293 LBS | BODY MASS INDEX: 39.68 KG/M2 | RESPIRATION RATE: 18 BRPM | SYSTOLIC BLOOD PRESSURE: 118 MMHG | OXYGEN SATURATION: 99 % | HEIGHT: 72 IN | DIASTOLIC BLOOD PRESSURE: 63 MMHG

## 2017-12-09 PROCEDURE — 87086 URINE CULTURE/COLONY COUNT: CPT

## 2017-12-09 PROCEDURE — 6370000000 HC RX 637 (ALT 250 FOR IP): Performed by: PHYSICIAN ASSISTANT

## 2017-12-09 RX ORDER — KETOROLAC TROMETHAMINE 10 MG/1
10 TABLET, FILM COATED ORAL EVERY 6 HOURS PRN
Qty: 20 TABLET | Refills: 0 | Status: ON HOLD | OUTPATIENT
Start: 2017-12-09 | End: 2019-01-11 | Stop reason: HOSPADM

## 2017-12-09 RX ORDER — TRAMADOL HYDROCHLORIDE 50 MG/1
50 TABLET ORAL ONCE
Status: COMPLETED | OUTPATIENT
Start: 2017-12-09 | End: 2017-12-09

## 2017-12-09 RX ORDER — ONDANSETRON 4 MG/1
4 TABLET, FILM COATED ORAL EVERY 8 HOURS PRN
Qty: 12 TABLET | Refills: 0 | Status: SHIPPED | OUTPATIENT
Start: 2017-12-09 | End: 2018-08-10

## 2017-12-09 RX ORDER — MELOXICAM 7.5 MG/1
7.5 TABLET ORAL DAILY
Qty: 7 TABLET | Refills: 0 | Status: SHIPPED | OUTPATIENT
Start: 2017-12-09 | End: 2017-12-09 | Stop reason: ALTCHOICE

## 2017-12-09 RX ADMIN — TRAMADOL HYDROCHLORIDE 50 MG: 50 TABLET, FILM COATED ORAL at 00:26

## 2017-12-09 ASSESSMENT — ENCOUNTER SYMPTOMS
ABDOMINAL PAIN: 1
ABDOMINAL DISTENTION: 0
PHOTOPHOBIA: 0
VOMITING: 1
VOICE CHANGE: 0
EYE DISCHARGE: 0
APNEA: 0
ANAL BLEEDING: 0
NAUSEA: 1

## 2017-12-09 ASSESSMENT — PAIN SCALES - GENERAL: PAINLEVEL_OUTOF10: 5

## 2017-12-09 NOTE — ED TRIAGE NOTES
PATIENT ARRIVED TO ER WITH C/O LLQ PAIN WITH RADIATION TO HER LEFT SIDE. C/O NAUSEA AND VOMITING X2 TIMES SINCE THIS AM.  # [id number] USED. PT STATES SHE DIDN'T TAKE ANYTHING FOR PAIN PTA. PT HAVING REGULAR BOWEL MOVEMENTS.

## 2017-12-09 NOTE — ED PROVIDER NOTES
[facility] ED  eMERGENCY dEPARTMENT eNCOUnter      Pt Name: Amrita Nixon  MRN: [id number]  [place] 1975  Date of evaluation: 12/8/2017  Provider: Jacqueline Melvin PA-C    CHIEF COMPLAINT       Chief Complaint   Patient presents with    Abdominal Pain     LLQ PAIN TO LEFT FLANK. NO BURNING WITH URINATION. PT HAS HAD EMESIS X2 SINCE THIS AM          HISTORY OF PRESENT ILLNESS   (Location/Symptom, Timing/Onset, Context/Setting, Quality, Duration, Modifying Factors, Severity)  Note limiting factors. Amrita Nixon is a 38 y.o. female who presents to the emergency department Patient complains of back pain abdominal pain around her bladder this morning she says she's been nauseous.  phone used for Belgian interpretation for entire proceeding. Patient denies fever states she is chilled states her sugars been good she notes that she has contacted internal medicine and her pain management specialist. Saeed Adams. Symptoms mild to moderate severity. Patient notes normal bowel movements. Chales Minus HPI    Nursing Notes were reviewed. REVIEW OF SYSTEMS    (2-9 systems for level 4, 10 or more for level 5)     Review of Systems   Constitutional: Negative for activity change, appetite change and unexpected weight change. HENT: Negative for ear discharge, nosebleeds and voice change. Eyes: Negative for photophobia and discharge. Respiratory: Negative for apnea. Cardiovascular: Negative for chest pain. Gastrointestinal: Positive for abdominal pain, nausea and vomiting. Negative for abdominal distention and anal bleeding. Endocrine: Negative for cold intolerance, heat intolerance and polyphagia. Genitourinary: Positive for frequency. Negative for genital sores. Musculoskeletal: Negative for joint swelling. Skin: Negative for pallor. Allergic/Immunologic: Negative for immunocompromised state. Neurological: Negative for seizures and facial asymmetry. Hematological: Does not bruise/bleed easily. Psychiatric/Behavioral: Negative for behavioral problems, self-injury and sleep disturbance. All other systems reviewed and are negative. Except as noted above the remainder of the review of systems was reviewed and negative. PAST MEDICAL HISTORY     Past Medical History:   Diagnosis Date    Asthma     Chronic abdominal pain     Depression     Hyperlipidemia     Hypertension     NATALEE (obstructive sleep apnea)     Schizophrenia (HCC)     Type II or unspecified type diabetes mellitus without mention of complication, not stated as uncontrolled          SURGICAL HISTORY       Past Surgical History:   Procedure Laterality Date    [facility]21/14    [facility]    UPPER GASTROINTESTINAL ENDOSCOPY  2/21/14    [facility]         CURRENT MEDICATIONS       Previous Medications    ALBUTEROL IN    Inhale  into the lungs. ALBUTEROL SULFATE (PROAIR HFA IN)    Inhale  into the lungs. AMLODIPINE BESYLATE PO    Take  by mouth. AMOXICILLIN PO    Take  by mouth. ARIPIPRAZOLE (ABILIFY PO)    Take  by mouth. ASPIRIN 81 MG TABLET    Take 81 mg by mouth daily. BUPROPION HCL (WELLBUTRIN PO)    Take  by mouth. CLOTRIMAZOLE-BETAMETHASONE (LOTRISONE) 1-0.05 % CREAM    Apply topically 2 times daily. DICYCLOMINE (BENTYL) 10 MG CAPSULE    Take 1 capsule by mouth 4 times daily (before meals and nightly)    ESOMEPRAZOLE (NEXIUM) 40 MG CAPSULE    Take 40 mg by mouth every morning (before breakfast). FLUTICASONE-SALMETEROL (ADVAIR DISKUS IN)    Inhale  into the lungs. GLIPIZIDE PO    Take  by mouth. HYOSCYAMINE SULFATE SL (LEVSIN/SL) 0.125 MG SUBL    Place 0.125 mg under the tongue 2 times daily as needed (pain)    INSULIN LISPRO (HUMALOG KWIKPEN) 200 UNIT/ML SOPN PEN    Inject 45 Units into the skin 3 times daily (with meals)    INSULIN ZINC HUMAN (NOVOLIN L SC)    Inject  into the skin.     LIDOCAINE (LMX) 4 % CREAM    Apply a half dollar sized amount to intact skin topically up to twice daily as needed for pain    LIDOCAINE-PRILOCAINE (EMLA) 2.5-2.5 % CREAM    Apply topically as needed. LISINOPRIL PO    Take  by mouth. MEDROXYPROGESTERONE (PROVERA) 10 MG TABLET    Take 1 tablet by mouth daily    METFORMIN HCL PO    Take  by mouth. METOCLOPRAMIDE (REGLAN) 10 MG TABLET    Take 1 tablet by mouth 4 times daily for 10 days Take 30 min before meals and at bedtime    METRONIDAZOLE (FLAGYL PO)    Take  by mouth. NORGESTIMATE-ETHINYL ESTRADIOL (SPRINTEC 28) 0.25-35 MG-MCG PER TABLET    Take 1 tablet by mouth daily    OLANZAPINE PO    Take  by mouth. OXYCODONE HCL PO    Take  by mouth. PRAVASTATIN SODIUM PO    Take  by mouth. TRAMADOL HCL PO    Take  by mouth. ALLERGIES     Shellfish-derived products; Amoxicillin; Macrobid [nitrofurantoin monohyd macro]; Reglan [metoclopramide]; Singulair [montelukast]; and Nubain [nalbuphine hcl]    FAMILY HISTORY       Family History   Problem Relation Age of Onset    Other Mother      Diverticulitis          SOCIAL HISTORY       Social History     Social History    Marital status: Single     Spouse name: N/A    Number of children: N/A    Years of education: N/A     Social History Main Topics    Smoking status: Former Smoker     Packs/day: 1.00    Smokeless tobacco: Never Used    Alcohol use No    Drug use: No    Sexual activity: Not Asked     Other Topics Concern    None     Social History Narrative    None       SCREENINGS    Emily Coma Scale  Eye Opening: Spontaneous  Best Verbal Response: Oriented  Best Motor Response: Obeys commands  Emily Coma Scale Score: 15        PHYSICAL EXAM    (up to 7 for level 4, 8 or more for level 5)     ED Triage Vitals [12/08/17 2201]   BP Temp Temp Source Pulse Resp SpO2 Height Weight   (!) 146/89 98.6 °F (37 °C) Oral 82 16 100 % 6' (1.829 m) (!) 340 lb (154.2 kg)       Physical Exam   Constitutional: She is oriented to person, place, and time.  She

## 2017-12-09 NOTE — ED NOTES
PT INSISTING SHE HAS A KIDNEY STONE. SHE REQUESTS AN UROLOGIST. UROLOGIST INFORMATION LEFT ON DC INSTRUCTIONS.       Blas Gonzalez RN  12/09/17 4280

## 2017-12-10 LAB — URINE CULTURE, ROUTINE: NORMAL

## 2018-01-18 ENCOUNTER — HOSPITAL ENCOUNTER (EMERGENCY)
Age: 43
Discharge: HOME | End: 2018-01-18
Attending: EMERGENCY MEDICINE
Payer: COMMERCIAL

## 2018-01-18 VITALS
SYSTOLIC BLOOD PRESSURE: 128 MMHG | DIASTOLIC BLOOD PRESSURE: 78 MMHG | HEART RATE: 98 BPM | RESPIRATION RATE: 18 BRPM | TEMPERATURE: 98.9 F | OXYGEN SATURATION: 99 %

## 2018-01-18 DIAGNOSIS — R10.84 GENERALIZED ABDOMINAL PAIN: Primary | ICD-10-CM

## 2018-01-18 DIAGNOSIS — R11.0 NAUSEA: ICD-10-CM

## 2018-01-18 PROCEDURE — 6360000002 HC RX W HCPCS: Performed by: EMERGENCY MEDICINE

## 2018-01-18 PROCEDURE — 99283 EMERGENCY DEPT VISIT LOW MDM: CPT

## 2018-01-18 PROCEDURE — 96372 THER/PROPH/DIAG INJ SC/IM: CPT

## 2018-01-18 RX ORDER — HYDROCODONE BITARTRATE AND ACETAMINOPHEN 5; 325 MG/1; MG/1
1 TABLET ORAL EVERY 6 HOURS PRN
Qty: 8 TABLET | Refills: 0 | Status: SHIPPED | OUTPATIENT
Start: 2018-01-18 | End: 2018-01-25

## 2018-01-18 RX ORDER — ONDANSETRON 4 MG/1
4 TABLET, FILM COATED ORAL EVERY 8 HOURS PRN
Qty: 20 TABLET | Refills: 0 | Status: SHIPPED | OUTPATIENT
Start: 2018-01-18 | End: 2018-08-10

## 2018-01-18 RX ORDER — PROMETHAZINE HYDROCHLORIDE 25 MG/1
25 SUPPOSITORY RECTAL EVERY 6 HOURS PRN
Qty: 20 SUPPOSITORY | Refills: 0 | Status: SHIPPED | OUTPATIENT
Start: 2018-01-18 | End: 2018-01-25

## 2018-01-18 RX ORDER — ONDANSETRON 2 MG/ML
4 INJECTION INTRAMUSCULAR; INTRAVENOUS ONCE
Status: COMPLETED | OUTPATIENT
Start: 2018-01-18 | End: 2018-01-18

## 2018-01-18 RX ADMIN — Medication 1.5 MG: at 23:04

## 2018-01-18 RX ADMIN — ONDANSETRON 4 MG: 2 INJECTION INTRAMUSCULAR; INTRAVENOUS at 23:04

## 2018-01-18 ASSESSMENT — PAIN DESCRIPTION - PAIN TYPE: TYPE: ACUTE PAIN

## 2018-01-18 ASSESSMENT — PAIN SCALES - GENERAL: PAINLEVEL_OUTOF10: 10

## 2018-01-18 ASSESSMENT — PAIN DESCRIPTION - LOCATION: LOCATION: ABDOMEN

## 2018-01-19 NOTE — ED PROVIDER NOTES
respirations: Normal             -Breath sounds equal bilaterally: Clear             -Wheezes: No             -Rales: No    BACK: -Flank pain: No              -Pain on palpation: No    ABD: -Distended: No           -Bruits: No           -Bowel sounds: Normal.           -Deep palpation: Non-tender           -Organomegaly palpable: No           -Abnormal masses: No    EXT: Gross appearance and use of all four extremities: Normal     SKIN: -Good turgor warm and dry. -Apparent lesions or rashes: No    NEURO: -Patient: alert                -Oriented to: person, place and time. -Appearance and judgment: appropriate.                -Cranial Nerves: Normal.                -Speech: Normal          DIAGNOSTIC RESULTS     EKG: All EKG's are interpreted by the Emergency Department Physician who either signs or Co-signs this chart in the absence of a cardiologist.        RADIOLOGY:   Non-plain film images such as CT, Ultrasound and MRI are read by the radiologist. Plain radiographic images are visualized and preliminarily interpreted by the emergency physician with the below findings:        Interpretation per the Radiologist below, if available at the time of this note:    No orders to display         ED BEDSIDE ULTRASOUND:   Performed by ED Physician - none    LABS:  Labs Reviewed - No data to display    All other labs were within normal range or not returned as of this dictation. EMERGENCY DEPARTMENT COURSE and DIFFERENTIAL DIAGNOSIS/MDM:   Vitals:    Vitals:    01/18/18 2206 01/18/18 2208   BP:  131/89   Pulse: 115    Resp: 18    Temp:  98.9 °F (37.2 °C)   TempSrc: Oral    SpO2: 97%            MDM    CRITICAL CARE TIME   Total Critical Care time was  minutes, excluding separately reportable procedures. There was a high probability of clinically significant/life threatening deterioration in the patient's condition which required my urgent intervention.       CONSULTS:  None    PROCEDURES:  Unless

## 2018-01-19 NOTE — ED NOTES
Dr Katie Sheehan at bedside     tracy Fuentes, [address] [place] [address]  01/18/18 9769

## 2018-01-31 ENCOUNTER — HOSPITAL ENCOUNTER (EMERGENCY)
Age: 43
Discharge: HOME | End: 2018-01-31
Attending: EMERGENCY MEDICINE
Payer: COMMERCIAL

## 2018-01-31 ENCOUNTER — APPOINTMENT (OUTPATIENT)
Dept: GENERAL RADIOLOGY | Age: 43
End: 2018-01-31
Payer: COMMERCIAL

## 2018-01-31 VITALS
HEIGHT: 66 IN | TEMPERATURE: 98.9 F | WEIGHT: 293 LBS | RESPIRATION RATE: 16 BRPM | BODY MASS INDEX: 47.09 KG/M2 | DIASTOLIC BLOOD PRESSURE: 73 MMHG | HEART RATE: 88 BPM | OXYGEN SATURATION: 100 % | SYSTOLIC BLOOD PRESSURE: 119 MMHG

## 2018-01-31 DIAGNOSIS — K31.84 GASTROPARESIS: Primary | ICD-10-CM

## 2018-01-31 DIAGNOSIS — R10.10 PAIN OF UPPER ABDOMEN: ICD-10-CM

## 2018-01-31 LAB
ALBUMIN SERPL-MCNC: 4.3 G/DL (ref 3.9–4.9)
ALP BLD-CCNC: 103 U/L (ref 40–130)
ALT SERPL-CCNC: 57 U/L (ref 0–33)
ANION GAP SERPL CALCULATED.3IONS-SCNC: 17 MEQ/L (ref 7–13)
AST SERPL-CCNC: 50 U/L (ref 0–35)
BASOPHILS ABSOLUTE: 0.1 K/UL (ref 0–0.2)
BASOPHILS RELATIVE PERCENT: 1.1 %
BILIRUB SERPL-MCNC: 0.2 MG/DL (ref 0–1.2)
BUN BLDV-MCNC: 11 MG/DL (ref 6–20)
CALCIUM SERPL-MCNC: 9.6 MG/DL (ref 8.6–10.2)
CHLORIDE BLD-SCNC: 101 MEQ/L (ref 98–107)
CO2: 25 MEQ/L (ref 22–29)
CREAT SERPL-MCNC: 0.66 MG/DL (ref 0.5–0.9)
EOSINOPHILS ABSOLUTE: 0.6 K/UL (ref 0–0.7)
EOSINOPHILS RELATIVE PERCENT: 6.8 %
GFR AFRICAN AMERICAN: >60
GFR NON-AFRICAN AMERICAN: >60
GLOBULIN: 2.6 G/DL (ref 2.3–3.5)
GLUCOSE BLD-MCNC: 137 MG/DL (ref 74–109)
HCT VFR BLD CALC: 40.1 % (ref 37–47)
HEMOGLOBIN: 13.1 G/DL (ref 12–16)
LIPASE: 14 U/L (ref 13–60)
LYMPHOCYTES ABSOLUTE: 2.5 K/UL (ref 1–4.8)
LYMPHOCYTES RELATIVE PERCENT: 29.8 %
MCH RBC QN AUTO: 29.4 PG (ref 27–31.3)
MCHC RBC AUTO-ENTMCNC: 32.8 % (ref 33–37)
MCV RBC AUTO: 89.5 FL (ref 82–100)
MONOCYTES ABSOLUTE: 0.5 K/UL (ref 0.2–0.8)
MONOCYTES RELATIVE PERCENT: 6.1 %
NEUTROPHILS ABSOLUTE: 4.6 K/UL (ref 1.4–6.5)
NEUTROPHILS RELATIVE PERCENT: 56.2 %
PDW BLD-RTO: 14.6 % (ref 11.5–14.5)
PLATELET # BLD: 170 K/UL (ref 130–400)
POTASSIUM SERPL-SCNC: 3.6 MEQ/L (ref 3.5–5.1)
RBC # BLD: 4.47 M/UL (ref 4.2–5.4)
SODIUM BLD-SCNC: 143 MEQ/L (ref 132–144)
TOTAL PROTEIN: 6.9 G/DL (ref 6.4–8.1)
WBC # BLD: 8.2 K/UL (ref 4.8–10.8)

## 2018-01-31 PROCEDURE — 2580000003 HC RX 258: Performed by: EMERGENCY MEDICINE

## 2018-01-31 PROCEDURE — 99284 EMERGENCY DEPT VISIT MOD MDM: CPT

## 2018-01-31 PROCEDURE — 96375 TX/PRO/DX INJ NEW DRUG ADDON: CPT

## 2018-01-31 PROCEDURE — 74018 RADEX ABDOMEN 1 VIEW: CPT

## 2018-01-31 PROCEDURE — 83690 ASSAY OF LIPASE: CPT

## 2018-01-31 PROCEDURE — 85025 COMPLETE CBC W/AUTO DIFF WBC: CPT

## 2018-01-31 PROCEDURE — 6360000002 HC RX W HCPCS: Performed by: EMERGENCY MEDICINE

## 2018-01-31 PROCEDURE — 80053 COMPREHEN METABOLIC PANEL: CPT

## 2018-01-31 PROCEDURE — 36415 COLL VENOUS BLD VENIPUNCTURE: CPT

## 2018-01-31 PROCEDURE — 96365 THER/PROPH/DIAG IV INF INIT: CPT

## 2018-01-31 RX ORDER — 0.9 % SODIUM CHLORIDE 0.9 %
1000 INTRAVENOUS SOLUTION INTRAVENOUS ONCE
Status: COMPLETED | OUTPATIENT
Start: 2018-01-31 | End: 2018-01-31

## 2018-01-31 RX ORDER — CYPROHEPTADINE HYDROCHLORIDE 4 MG/1
4 TABLET ORAL 3 TIMES DAILY
Qty: 20 TABLET | Refills: 0 | Status: ON HOLD | OUTPATIENT
Start: 2018-01-31 | End: 2021-03-25 | Stop reason: HOSPADM

## 2018-01-31 RX ORDER — MORPHINE SULFATE 4 MG/ML
4 INJECTION, SOLUTION INTRAMUSCULAR; INTRAVENOUS ONCE
Status: COMPLETED | OUTPATIENT
Start: 2018-01-31 | End: 2018-01-31

## 2018-01-31 RX ORDER — PROMETHAZINE HYDROCHLORIDE 25 MG/1
25 SUPPOSITORY RECTAL EVERY 6 HOURS PRN
Qty: 20 SUPPOSITORY | Refills: 0 | Status: SHIPPED | OUTPATIENT
Start: 2018-01-31 | End: 2018-02-07

## 2018-01-31 RX ADMIN — SODIUM CHLORIDE 50 ML: 9 INJECTION, SOLUTION INTRAVENOUS at 21:31

## 2018-01-31 RX ADMIN — Medication 4 MG: at 21:30

## 2018-01-31 RX ADMIN — SODIUM CHLORIDE 1000 ML: 9 INJECTION, SOLUTION INTRAVENOUS at 21:29

## 2018-01-31 RX ADMIN — PROMETHAZINE HYDROCHLORIDE 25 MG: 25 INJECTION INTRAMUSCULAR; INTRAVENOUS at 21:41

## 2018-01-31 ASSESSMENT — PAIN SCALES - GENERAL
PAINLEVEL_OUTOF10: 10
PAINLEVEL_OUTOF10: 10

## 2018-01-31 ASSESSMENT — ENCOUNTER SYMPTOMS
CHEST TIGHTNESS: 0
SHORTNESS OF BREATH: 0
ABDOMINAL PAIN: 1
SORE THROAT: 0
VOMITING: 1
EYE PAIN: 0
NAUSEA: 1

## 2018-01-31 ASSESSMENT — PAIN DESCRIPTION - ORIENTATION: ORIENTATION: MID

## 2018-01-31 ASSESSMENT — PAIN DESCRIPTION - LOCATION: LOCATION: ABDOMEN

## 2018-01-31 ASSESSMENT — PAIN DESCRIPTION - PAIN TYPE: TYPE: ACUTE PAIN

## 2018-02-01 NOTE — ED TRIAGE NOTES
Pt c/o mid abd pain, nausea and vomiting for 2 days. Pt states she had surgery 1/17/18 in [facility]. Pt states they \"put a camera in my mouth and then cut something out\". Pt states she vomited three times today and three times yesterday. Pt alert and oriented, skin p/w/d, no distress noted in triage.

## 2018-03-31 ENCOUNTER — HOSPITAL ENCOUNTER (EMERGENCY)
Age: 43
Discharge: HOME | End: 2018-03-31
Payer: COMMERCIAL

## 2018-03-31 ENCOUNTER — APPOINTMENT (OUTPATIENT)
Dept: CT IMAGING | Age: 43
End: 2018-03-31
Payer: COMMERCIAL

## 2018-03-31 VITALS
RESPIRATION RATE: 18 BRPM | WEIGHT: 293 LBS | DIASTOLIC BLOOD PRESSURE: 58 MMHG | OXYGEN SATURATION: 96 % | TEMPERATURE: 98.2 F | SYSTOLIC BLOOD PRESSURE: 147 MMHG | BODY MASS INDEX: 54.88 KG/M2 | HEART RATE: 99 BPM

## 2018-03-31 DIAGNOSIS — G89.29 FLANK PAIN, CHRONIC: ICD-10-CM

## 2018-03-31 DIAGNOSIS — R10.9 FLANK PAIN, CHRONIC: ICD-10-CM

## 2018-03-31 DIAGNOSIS — N30.00 ACUTE CYSTITIS WITHOUT HEMATURIA: Primary | ICD-10-CM

## 2018-03-31 DIAGNOSIS — G44.209 TENSION HEADACHE: ICD-10-CM

## 2018-03-31 LAB
ALBUMIN SERPL-MCNC: 4.4 G/DL (ref 3.9–4.9)
ALP BLD-CCNC: 85 U/L (ref 40–130)
ALT SERPL-CCNC: 32 U/L (ref 0–33)
ANION GAP SERPL CALCULATED.3IONS-SCNC: 19 MEQ/L (ref 7–13)
AST SERPL-CCNC: 21 U/L (ref 0–35)
BACTERIA: ABNORMAL /HPF
BASOPHILS ABSOLUTE: 0.1 K/UL (ref 0–0.2)
BASOPHILS RELATIVE PERCENT: 1.3 %
BILIRUB SERPL-MCNC: 0.3 MG/DL (ref 0–1.2)
BILIRUBIN URINE: NEGATIVE
BLOOD, URINE: NEGATIVE
BUN BLDV-MCNC: 11 MG/DL (ref 6–20)
CALCIUM SERPL-MCNC: 9.3 MG/DL (ref 8.6–10.2)
CHLORIDE BLD-SCNC: 97 MEQ/L (ref 98–107)
CHP ED QC CHECK: PRESENT
CLARITY: CLEAR
CO2: 22 MEQ/L (ref 22–29)
COLOR: YELLOW
CREAT SERPL-MCNC: 0.82 MG/DL (ref 0.5–0.9)
EOSINOPHILS ABSOLUTE: 1.1 K/UL (ref 0–0.7)
EOSINOPHILS RELATIVE PERCENT: 10.5 %
EPITHELIAL CELLS, UA: ABNORMAL /HPF
GFR AFRICAN AMERICAN: >60
GFR NON-AFRICAN AMERICAN: >60
GLOBULIN: 2.6 G/DL (ref 2.3–3.5)
GLUCOSE BLD-MCNC: 253 MG/DL (ref 74–109)
GLUCOSE URINE: 250 MG/DL
HCT VFR BLD CALC: 40.9 % (ref 37–47)
HEMOGLOBIN: 13.8 G/DL (ref 12–16)
KETONES, URINE: NEGATIVE MG/DL
LACTIC ACID: 4.9 MMOL/L (ref 0.5–2.2)
LEUKOCYTE ESTERASE, URINE: ABNORMAL
LIPASE: 16 U/L (ref 13–60)
LYMPHOCYTES ABSOLUTE: 3.9 K/UL (ref 1–4.8)
LYMPHOCYTES RELATIVE PERCENT: 36.4 %
MCH RBC QN AUTO: 30.2 PG (ref 27–31.3)
MCHC RBC AUTO-ENTMCNC: 33.8 % (ref 33–37)
MCV RBC AUTO: 89.2 FL (ref 82–100)
MONOCYTES ABSOLUTE: 0.6 K/UL (ref 0.2–0.8)
MONOCYTES RELATIVE PERCENT: 5.5 %
NEUTROPHILS ABSOLUTE: 5 K/UL (ref 1.4–6.5)
NEUTROPHILS RELATIVE PERCENT: 46.3 %
NITRITE, URINE: NEGATIVE
PDW BLD-RTO: 15 % (ref 11.5–14.5)
PH UA: 5 (ref 5–9)
PLATELET # BLD: 189 K/UL (ref 130–400)
POTASSIUM SERPL-SCNC: 4.1 MEQ/L (ref 3.5–5.1)
PREGNANCY TEST URINE, POC: NORMAL
PROTEIN UA: NEGATIVE MG/DL
RBC # BLD: 4.59 M/UL (ref 4.2–5.4)
RBC UA: ABNORMAL /HPF (ref 0–2)
SODIUM BLD-SCNC: 138 MEQ/L (ref 132–144)
SPECIFIC GRAVITY UA: 1.01 (ref 1–1.03)
TOTAL PROTEIN: 7 G/DL (ref 6.4–8.1)
URINE REFLEX TO CULTURE: YES
UROBILINOGEN, URINE: 0.2 E.U./DL
WBC # BLD: 10.7 K/UL (ref 4.8–10.8)
WBC UA: ABNORMAL /HPF (ref 0–5)

## 2018-03-31 PROCEDURE — 80053 COMPREHEN METABOLIC PANEL: CPT

## 2018-03-31 PROCEDURE — 96372 THER/PROPH/DIAG INJ SC/IM: CPT

## 2018-03-31 PROCEDURE — 99284 EMERGENCY DEPT VISIT MOD MDM: CPT

## 2018-03-31 PROCEDURE — 36415 COLL VENOUS BLD VENIPUNCTURE: CPT

## 2018-03-31 PROCEDURE — 6370000000 HC RX 637 (ALT 250 FOR IP): Performed by: PHYSICIAN ASSISTANT

## 2018-03-31 PROCEDURE — 2580000003 HC RX 258: Performed by: PHYSICIAN ASSISTANT

## 2018-03-31 PROCEDURE — 81001 URINALYSIS AUTO W/SCOPE: CPT

## 2018-03-31 PROCEDURE — 85025 COMPLETE CBC W/AUTO DIFF WBC: CPT

## 2018-03-31 PROCEDURE — 87086 URINE CULTURE/COLONY COUNT: CPT

## 2018-03-31 PROCEDURE — 83605 ASSAY OF LACTIC ACID: CPT

## 2018-03-31 PROCEDURE — 74150 CT ABDOMEN W/O CONTRAST: CPT

## 2018-03-31 PROCEDURE — 6360000002 HC RX W HCPCS: Performed by: PHYSICIAN ASSISTANT

## 2018-03-31 PROCEDURE — 96374 THER/PROPH/DIAG INJ IV PUSH: CPT

## 2018-03-31 PROCEDURE — 83690 ASSAY OF LIPASE: CPT

## 2018-03-31 RX ORDER — 0.9 % SODIUM CHLORIDE 0.9 %
1000 INTRAVENOUS SOLUTION INTRAVENOUS ONCE
Status: COMPLETED | OUTPATIENT
Start: 2018-03-31 | End: 2018-03-31

## 2018-03-31 RX ORDER — TRAMADOL HYDROCHLORIDE 50 MG/1
50 TABLET ORAL EVERY 6 HOURS PRN
Qty: 12 TABLET | Refills: 0 | Status: SHIPPED | OUTPATIENT
Start: 2018-03-31 | End: 2018-04-03

## 2018-03-31 RX ORDER — CIPROFLOXACIN 500 MG/1
500 TABLET, FILM COATED ORAL ONCE
Status: COMPLETED | OUTPATIENT
Start: 2018-03-31 | End: 2018-03-31

## 2018-03-31 RX ORDER — CIPROFLOXACIN 500 MG/1
500 TABLET, FILM COATED ORAL 2 TIMES DAILY
Qty: 20 TABLET | Refills: 0 | Status: SHIPPED | OUTPATIENT
Start: 2018-03-31 | End: 2018-04-10

## 2018-03-31 RX ORDER — ONDANSETRON 4 MG/1
4 TABLET, ORALLY DISINTEGRATING ORAL EVERY 8 HOURS PRN
Qty: 20 TABLET | Refills: 0 | Status: SHIPPED | OUTPATIENT
Start: 2018-03-31 | End: 2018-08-10

## 2018-03-31 RX ORDER — KETOROLAC TROMETHAMINE 30 MG/ML
30 INJECTION, SOLUTION INTRAMUSCULAR; INTRAVENOUS ONCE
Status: COMPLETED | OUTPATIENT
Start: 2018-03-31 | End: 2018-03-31

## 2018-03-31 RX ORDER — CIPROFLOXACIN 2 MG/ML
400 INJECTION, SOLUTION INTRAVENOUS ONCE
Status: DISCONTINUED | OUTPATIENT
Start: 2018-03-31 | End: 2018-03-31

## 2018-03-31 RX ORDER — ETODOLAC 400 MG/1
400 TABLET, FILM COATED ORAL 2 TIMES DAILY
Qty: 14 TABLET | Refills: 0 | Status: SHIPPED | OUTPATIENT
Start: 2018-03-31 | End: 2018-08-10

## 2018-03-31 RX ORDER — TRAMADOL HYDROCHLORIDE 50 MG/1
50 TABLET ORAL ONCE
Status: COMPLETED | OUTPATIENT
Start: 2018-03-31 | End: 2018-03-31

## 2018-03-31 RX ORDER — PROMETHAZINE HYDROCHLORIDE 25 MG/ML
25 INJECTION, SOLUTION INTRAMUSCULAR; INTRAVENOUS ONCE
Status: COMPLETED | OUTPATIENT
Start: 2018-03-31 | End: 2018-03-31

## 2018-03-31 RX ADMIN — PROMETHAZINE HYDROCHLORIDE 25 MG: 25 INJECTION INTRAMUSCULAR; INTRAVENOUS at 22:30

## 2018-03-31 RX ADMIN — CIPROFLOXACIN HYDROCHLORIDE 500 MG: 500 TABLET, FILM COATED ORAL at 23:20

## 2018-03-31 RX ADMIN — TRAMADOL HYDROCHLORIDE 50 MG: 50 TABLET, FILM COATED ORAL at 23:20

## 2018-03-31 RX ADMIN — KETOROLAC TROMETHAMINE 30 MG: 30 INJECTION, SOLUTION INTRAMUSCULAR; INTRAVENOUS at 22:30

## 2018-03-31 RX ADMIN — SODIUM CHLORIDE 1000 ML: 9 INJECTION, SOLUTION INTRAVENOUS at 22:30

## 2018-03-31 ASSESSMENT — PAIN SCALES - GENERAL
PAINLEVEL_OUTOF10: 10
PAINLEVEL_OUTOF10: 10

## 2018-03-31 ASSESSMENT — PAIN DESCRIPTION - ORIENTATION: ORIENTATION: LEFT

## 2018-03-31 ASSESSMENT — ENCOUNTER SYMPTOMS
TROUBLE SWALLOWING: 0
SHORTNESS OF BREATH: 0
COLOR CHANGE: 0
ALLERGIC/IMMUNOLOGIC NEGATIVE: 1
APNEA: 0
ABDOMINAL PAIN: 0
EYE PAIN: 0

## 2018-03-31 ASSESSMENT — PAIN DESCRIPTION - PAIN TYPE: TYPE: ACUTE PAIN

## 2018-03-31 ASSESSMENT — PAIN DESCRIPTION - LOCATION: LOCATION: FLANK

## 2018-04-02 LAB — URINE CULTURE, ROUTINE: NORMAL

## 2018-04-20 ENCOUNTER — HOSPITAL ENCOUNTER (OUTPATIENT)
Dept: ULTRASOUND IMAGING | Age: 43
Discharge: HOME | End: 2018-04-22
Payer: COMMERCIAL

## 2018-04-20 DIAGNOSIS — M79.605 LEFT LEG PAIN: ICD-10-CM

## 2018-04-20 PROCEDURE — 93971 EXTREMITY STUDY: CPT

## 2018-04-29 ASSESSMENT — ENCOUNTER SYMPTOMS
COUGH: 0
PHOTOPHOBIA: 0
DOUBLE VISION: 0
SHORTNESS OF BREATH: 0
DIARRHEA: 1
VOMITING: 1
NAUSEA: 1
HEMOPTYSIS: 0
SORE THROAT: 0
BLURRED VISION: 0
ORTHOPNEA: 0
ABDOMINAL PAIN: 1

## 2018-05-07 ENCOUNTER — HOSPITAL ENCOUNTER (EMERGENCY)
Age: 43
Discharge: HOME | End: 2018-05-08
Payer: COMMERCIAL

## 2018-05-07 VITALS
OXYGEN SATURATION: 99 % | TEMPERATURE: 98.3 F | WEIGHT: 293 LBS | SYSTOLIC BLOOD PRESSURE: 155 MMHG | BODY MASS INDEX: 54.88 KG/M2 | HEART RATE: 86 BPM | RESPIRATION RATE: 18 BRPM | DIASTOLIC BLOOD PRESSURE: 62 MMHG

## 2018-05-07 DIAGNOSIS — R51.9 ACUTE NONINTRACTABLE HEADACHE, UNSPECIFIED HEADACHE TYPE: Primary | ICD-10-CM

## 2018-05-07 LAB
ALBUMIN SERPL-MCNC: 4.4 G/DL (ref 3.9–4.9)
ALP BLD-CCNC: 106 U/L (ref 40–130)
ALT SERPL-CCNC: 35 U/L (ref 0–33)
ANION GAP SERPL CALCULATED.3IONS-SCNC: 14 MEQ/L (ref 7–13)
AST SERPL-CCNC: 25 U/L (ref 0–35)
BASOPHILS ABSOLUTE: 0.1 K/UL (ref 0–0.2)
BASOPHILS RELATIVE PERCENT: 1.1 %
BILIRUB SERPL-MCNC: 0.4 MG/DL (ref 0–1.2)
BILIRUBIN URINE: NEGATIVE
BLOOD, URINE: NEGATIVE
BUN BLDV-MCNC: 10 MG/DL (ref 6–20)
CALCIUM SERPL-MCNC: 9.2 MG/DL (ref 8.6–10.2)
CHLORIDE BLD-SCNC: 98 MEQ/L (ref 98–107)
CLARITY: CLEAR
CO2: 26 MEQ/L (ref 22–29)
COLOR: YELLOW
CREAT SERPL-MCNC: 0.57 MG/DL (ref 0.5–0.9)
EOSINOPHILS ABSOLUTE: 0.5 K/UL (ref 0–0.7)
EOSINOPHILS RELATIVE PERCENT: 5.2 %
GFR AFRICAN AMERICAN: >60
GFR NON-AFRICAN AMERICAN: >60
GLOBULIN: 2.7 G/DL (ref 2.3–3.5)
GLUCOSE BLD-MCNC: 232 MG/DL (ref 74–109)
GLUCOSE URINE: 500 MG/DL
HCT VFR BLD CALC: 40.1 % (ref 37–47)
HEMOGLOBIN: 13.6 G/DL (ref 12–16)
KETONES, URINE: NEGATIVE MG/DL
LEUKOCYTE ESTERASE, URINE: NEGATIVE
LYMPHOCYTES ABSOLUTE: 2.9 K/UL (ref 1–4.8)
LYMPHOCYTES RELATIVE PERCENT: 30 %
MCH RBC QN AUTO: 29.9 PG (ref 27–31.3)
MCHC RBC AUTO-ENTMCNC: 34 % (ref 33–37)
MCV RBC AUTO: 88 FL (ref 82–100)
MONOCYTES ABSOLUTE: 0.6 K/UL (ref 0.2–0.8)
MONOCYTES RELATIVE PERCENT: 6.4 %
NEUTROPHILS ABSOLUTE: 5.5 K/UL (ref 1.4–6.5)
NEUTROPHILS RELATIVE PERCENT: 57.3 %
NITRITE, URINE: NEGATIVE
PDW BLD-RTO: 14.5 % (ref 11.5–14.5)
PH UA: 5.5 (ref 5–9)
PLATELET # BLD: 192 K/UL (ref 130–400)
POTASSIUM SERPL-SCNC: 3.8 MEQ/L (ref 3.5–5.1)
PROTEIN UA: NEGATIVE MG/DL
RBC # BLD: 4.56 M/UL (ref 4.2–5.4)
SODIUM BLD-SCNC: 138 MEQ/L (ref 132–144)
SPECIFIC GRAVITY UA: 1.01 (ref 1–1.03)
TOTAL PROTEIN: 7.1 G/DL (ref 6.4–8.1)
URINE REFLEX TO CULTURE: ABNORMAL
UROBILINOGEN, URINE: 0.2 E.U./DL
WBC # BLD: 9.6 K/UL (ref 4.8–10.8)

## 2018-05-07 PROCEDURE — 2580000003 HC RX 258: Performed by: NURSE PRACTITIONER

## 2018-05-07 PROCEDURE — 99283 EMERGENCY DEPT VISIT LOW MDM: CPT

## 2018-05-07 PROCEDURE — 6360000002 HC RX W HCPCS: Performed by: NURSE PRACTITIONER

## 2018-05-07 PROCEDURE — 96374 THER/PROPH/DIAG INJ IV PUSH: CPT

## 2018-05-07 PROCEDURE — 36415 COLL VENOUS BLD VENIPUNCTURE: CPT

## 2018-05-07 PROCEDURE — 81003 URINALYSIS AUTO W/O SCOPE: CPT

## 2018-05-07 PROCEDURE — 80053 COMPREHEN METABOLIC PANEL: CPT

## 2018-05-07 PROCEDURE — 85025 COMPLETE CBC W/AUTO DIFF WBC: CPT

## 2018-05-07 PROCEDURE — 96375 TX/PRO/DX INJ NEW DRUG ADDON: CPT

## 2018-05-07 RX ORDER — DIPHENHYDRAMINE HYDROCHLORIDE 50 MG/ML
25 INJECTION INTRAMUSCULAR; INTRAVENOUS ONCE
Status: COMPLETED | OUTPATIENT
Start: 2018-05-07 | End: 2018-05-07

## 2018-05-07 RX ORDER — 0.9 % SODIUM CHLORIDE 0.9 %
1000 INTRAVENOUS SOLUTION INTRAVENOUS ONCE
Status: COMPLETED | OUTPATIENT
Start: 2018-05-07 | End: 2018-05-07

## 2018-05-07 RX ORDER — KETOROLAC TROMETHAMINE 30 MG/ML
30 INJECTION, SOLUTION INTRAMUSCULAR; INTRAVENOUS ONCE
Status: COMPLETED | OUTPATIENT
Start: 2018-05-07 | End: 2018-05-07

## 2018-05-07 RX ADMIN — KETOROLAC TROMETHAMINE 30 MG: 30 INJECTION, SOLUTION INTRAMUSCULAR at 22:28

## 2018-05-07 RX ADMIN — PROCHLORPERAZINE EDISYLATE 10 MG: 5 INJECTION INTRAMUSCULAR; INTRAVENOUS at 22:28

## 2018-05-07 RX ADMIN — SODIUM CHLORIDE 1000 ML: 9 INJECTION, SOLUTION INTRAVENOUS at 22:28

## 2018-05-07 RX ADMIN — DIPHENHYDRAMINE HYDROCHLORIDE 25 MG: 50 INJECTION, SOLUTION INTRAMUSCULAR; INTRAVENOUS at 22:28

## 2018-05-07 ASSESSMENT — PAIN DESCRIPTION - PAIN TYPE: TYPE: ACUTE PAIN

## 2018-05-07 ASSESSMENT — PAIN DESCRIPTION - LOCATION: LOCATION: HEAD

## 2018-05-07 ASSESSMENT — PAIN SCALES - GENERAL
PAINLEVEL_OUTOF10: 10
PAINLEVEL_OUTOF10: 10

## 2018-05-14 ASSESSMENT — ENCOUNTER SYMPTOMS
COUGH: 0
VOMITING: 0
COLOR CHANGE: 0
VOICE CHANGE: 0
NAUSEA: 0
SHORTNESS OF BREATH: 0
CONSTIPATION: 0
SORE THROAT: 0
TROUBLE SWALLOWING: 0
BACK PAIN: 0
ABDOMINAL PAIN: 0
DIARRHEA: 0

## 2018-06-02 ENCOUNTER — HOSPITAL ENCOUNTER (EMERGENCY)
Age: 43
Discharge: HOME | End: 2018-06-02
Payer: COMMERCIAL

## 2018-06-02 VITALS
BODY MASS INDEX: 39.68 KG/M2 | OXYGEN SATURATION: 96 % | WEIGHT: 293 LBS | DIASTOLIC BLOOD PRESSURE: 83 MMHG | HEART RATE: 88 BPM | RESPIRATION RATE: 16 BRPM | HEIGHT: 72 IN | SYSTOLIC BLOOD PRESSURE: 137 MMHG | TEMPERATURE: 98.3 F

## 2018-06-02 DIAGNOSIS — R10.9 ABDOMINAL PAIN, UNSPECIFIED ABDOMINAL LOCATION: Primary | ICD-10-CM

## 2018-06-02 DIAGNOSIS — J45.20 MILD INTERMITTENT ASTHMA, UNSPECIFIED WHETHER COMPLICATED: ICD-10-CM

## 2018-06-02 LAB
ALBUMIN SERPL-MCNC: 3.8 G/DL (ref 3.9–4.9)
ALP BLD-CCNC: 93 U/L (ref 40–130)
ALT SERPL-CCNC: 27 U/L (ref 0–33)
ANION GAP SERPL CALCULATED.3IONS-SCNC: 16 MEQ/L (ref 7–13)
AST SERPL-CCNC: 17 U/L (ref 0–35)
BASOPHILS ABSOLUTE: 0.1 K/UL (ref 0–0.2)
BASOPHILS RELATIVE PERCENT: 0.8 %
BILIRUB SERPL-MCNC: 0.3 MG/DL (ref 0–1.2)
BUN BLDV-MCNC: 16 MG/DL (ref 6–20)
CALCIUM SERPL-MCNC: 9.2 MG/DL (ref 8.6–10.2)
CHLORIDE BLD-SCNC: 96 MEQ/L (ref 98–107)
CO2: 27 MEQ/L (ref 22–29)
CREAT SERPL-MCNC: 0.94 MG/DL (ref 0.5–0.9)
EOSINOPHILS ABSOLUTE: 0.2 K/UL (ref 0–0.7)
EOSINOPHILS RELATIVE PERCENT: 2.1 %
GFR AFRICAN AMERICAN: >60
GFR NON-AFRICAN AMERICAN: >60
GLOBULIN: 2.4 G/DL (ref 2.3–3.5)
GLUCOSE BLD-MCNC: 297 MG/DL (ref 74–109)
HCT VFR BLD CALC: 40.4 % (ref 37–47)
HEMOGLOBIN: 13.5 G/DL (ref 12–16)
LYMPHOCYTES ABSOLUTE: 2.6 K/UL (ref 1–4.8)
LYMPHOCYTES RELATIVE PERCENT: 26.8 %
MCH RBC QN AUTO: 28.6 PG (ref 27–31.3)
MCHC RBC AUTO-ENTMCNC: 33.4 % (ref 33–37)
MCV RBC AUTO: 85.7 FL (ref 82–100)
MONOCYTES ABSOLUTE: 0.6 K/UL (ref 0.2–0.8)
MONOCYTES RELATIVE PERCENT: 5.8 %
NEUTROPHILS ABSOLUTE: 6.2 K/UL (ref 1.4–6.5)
NEUTROPHILS RELATIVE PERCENT: 64.5 %
PDW BLD-RTO: 14.7 % (ref 11.5–14.5)
PLATELET # BLD: 227 K/UL (ref 130–400)
POTASSIUM SERPL-SCNC: 3.9 MEQ/L (ref 3.5–5.1)
RBC # BLD: 4.71 M/UL (ref 4.2–5.4)
SODIUM BLD-SCNC: 139 MEQ/L (ref 132–144)
TOTAL PROTEIN: 6.2 G/DL (ref 6.4–8.1)
WBC # BLD: 9.7 K/UL (ref 4.8–10.8)

## 2018-06-02 PROCEDURE — 6360000002 HC RX W HCPCS: Performed by: PERSONAL EMERGENCY RESPONSE ATTENDANT

## 2018-06-02 PROCEDURE — 36415 COLL VENOUS BLD VENIPUNCTURE: CPT

## 2018-06-02 PROCEDURE — 94640 AIRWAY INHALATION TREATMENT: CPT

## 2018-06-02 PROCEDURE — 85025 COMPLETE CBC W/AUTO DIFF WBC: CPT

## 2018-06-02 PROCEDURE — 80053 COMPREHEN METABOLIC PANEL: CPT

## 2018-06-02 PROCEDURE — 6370000000 HC RX 637 (ALT 250 FOR IP): Performed by: PERSONAL EMERGENCY RESPONSE ATTENDANT

## 2018-06-02 PROCEDURE — 99284 EMERGENCY DEPT VISIT MOD MDM: CPT

## 2018-06-02 PROCEDURE — 96372 THER/PROPH/DIAG INJ SC/IM: CPT

## 2018-06-02 RX ORDER — IPRATROPIUM BROMIDE AND ALBUTEROL SULFATE 2.5; .5 MG/3ML; MG/3ML
1 SOLUTION RESPIRATORY (INHALATION) CONTINUOUS PRN
Status: DISCONTINUED | OUTPATIENT
Start: 2018-06-02 | End: 2018-06-02 | Stop reason: HOSPADM

## 2018-06-02 RX ORDER — DICYCLOMINE HYDROCHLORIDE 10 MG/ML
20 INJECTION INTRAMUSCULAR ONCE
Status: COMPLETED | OUTPATIENT
Start: 2018-06-02 | End: 2018-06-02

## 2018-06-02 RX ORDER — KETOROLAC TROMETHAMINE 30 MG/ML
60 INJECTION, SOLUTION INTRAMUSCULAR; INTRAVENOUS ONCE
Status: COMPLETED | OUTPATIENT
Start: 2018-06-02 | End: 2018-06-02

## 2018-06-02 RX ADMIN — IPRATROPIUM BROMIDE AND ALBUTEROL SULFATE 1 AMPULE: .5; 3 SOLUTION RESPIRATORY (INHALATION) at 18:44

## 2018-06-02 RX ADMIN — KETOROLAC TROMETHAMINE 60 MG: 30 INJECTION, SOLUTION INTRAMUSCULAR at 20:22

## 2018-06-02 RX ADMIN — DICYCLOMINE HYDROCHLORIDE 20 MG: 20 INJECTION, SOLUTION INTRAMUSCULAR at 18:46

## 2018-06-02 ASSESSMENT — PAIN DESCRIPTION - LOCATION
LOCATION: ABDOMEN
LOCATION: ABDOMEN

## 2018-06-02 ASSESSMENT — ENCOUNTER SYMPTOMS
VOMITING: 1
COUGH: 0
RHINORRHEA: 0
NAUSEA: 1
SORE THROAT: 0
SHORTNESS OF BREATH: 1
COLOR CHANGE: 0
ABDOMINAL PAIN: 1
BLOOD IN STOOL: 0
DIARRHEA: 0

## 2018-06-02 ASSESSMENT — PAIN SCALES - GENERAL
PAINLEVEL_OUTOF10: 10

## 2018-06-02 ASSESSMENT — PULMONARY FUNCTION TESTS: PEFR_L/MIN: 300

## 2018-06-02 ASSESSMENT — PAIN DESCRIPTION - ORIENTATION
ORIENTATION: RIGHT
ORIENTATION: RIGHT

## 2018-08-10 ENCOUNTER — HOSPITAL ENCOUNTER (EMERGENCY)
Age: 43
Discharge: HOME | End: 2018-08-10
Payer: COMMERCIAL

## 2018-08-10 VITALS
TEMPERATURE: 98.7 F | WEIGHT: 260 LBS | RESPIRATION RATE: 18 BRPM | HEIGHT: 68 IN | DIASTOLIC BLOOD PRESSURE: 76 MMHG | BODY MASS INDEX: 39.4 KG/M2 | HEART RATE: 81 BPM | OXYGEN SATURATION: 96 % | SYSTOLIC BLOOD PRESSURE: 138 MMHG

## 2018-08-10 DIAGNOSIS — B37.9 CANDIDA INFECTION: ICD-10-CM

## 2018-08-10 DIAGNOSIS — E11.65 TYPE 2 DIABETES MELLITUS WITH HYPERGLYCEMIA, UNSPECIFIED WHETHER LONG TERM INSULIN USE (HCC): Primary | ICD-10-CM

## 2018-08-10 DIAGNOSIS — R07.89 CHEST WALL PAIN: ICD-10-CM

## 2018-08-10 DIAGNOSIS — R11.2 NAUSEA AND VOMITING, INTRACTABILITY OF VOMITING NOT SPECIFIED, UNSPECIFIED VOMITING TYPE: ICD-10-CM

## 2018-08-10 DIAGNOSIS — A49.8 GARDNERELLA INFECTION: ICD-10-CM

## 2018-08-10 DIAGNOSIS — R10.84 GENERALIZED ABDOMINAL PAIN: ICD-10-CM

## 2018-08-10 LAB
ALBUMIN SERPL-MCNC: 4.7 G/DL (ref 3.9–4.9)
ALP BLD-CCNC: 123 U/L (ref 40–130)
ALT SERPL-CCNC: 30 U/L (ref 0–33)
AMPHETAMINE SCREEN, URINE: NORMAL
ANION GAP SERPL CALCULATED.3IONS-SCNC: 17 MEQ/L (ref 7–13)
AST SERPL-CCNC: 33 U/L (ref 0–35)
BACTERIA: ABNORMAL /HPF
BARBITURATE SCREEN URINE: NORMAL
BASOPHILS ABSOLUTE: 0 K/UL (ref 0–0.2)
BASOPHILS RELATIVE PERCENT: 0.2 %
BENZODIAZEPINE SCREEN, URINE: NORMAL
BILIRUB SERPL-MCNC: 0.5 MG/DL (ref 0–1.2)
BILIRUBIN URINE: NEGATIVE
BLOOD, URINE: ABNORMAL
BUN BLDV-MCNC: 13 MG/DL (ref 6–20)
CALCIUM SERPL-MCNC: 9.9 MG/DL (ref 8.6–10.2)
CANNABINOID SCREEN URINE: NORMAL
CHLORIDE BLD-SCNC: 99 MEQ/L (ref 98–107)
CHP ED QC CHECK: NORMAL
CLARITY: CLEAR
CO2: 24 MEQ/L (ref 22–29)
COCAINE METABOLITE SCREEN URINE: NORMAL
COLOR: YELLOW
CREAT SERPL-MCNC: 0.7 MG/DL (ref 0.5–0.9)
EKG ATRIAL RATE: 71 BPM
EKG P AXIS: 58 DEGREES
EKG P-R INTERVAL: 160 MS
EKG Q-T INTERVAL: 426 MS
EKG QRS DURATION: 100 MS
EKG QTC CALCULATION (BAZETT): 462 MS
EKG R AXIS: 143 DEGREES
EKG T AXIS: 29 DEGREES
EKG VENTRICULAR RATE: 71 BPM
EOSINOPHILS ABSOLUTE: 0.6 K/UL (ref 0–0.7)
EOSINOPHILS RELATIVE PERCENT: 6.9 %
EPITHELIAL CELLS, UA: ABNORMAL /HPF
GFR AFRICAN AMERICAN: >60
GFR NON-AFRICAN AMERICAN: >60
GLOBULIN: 3 G/DL (ref 2.3–3.5)
GLUCOSE BLD-MCNC: 398 MG/DL
GLUCOSE BLD-MCNC: 398 MG/DL (ref 60–115)
GLUCOSE BLD-MCNC: 422 MG/DL (ref 60–115)
GLUCOSE BLD-MCNC: 485 MG/DL (ref 74–109)
GLUCOSE URINE: >=1000 MG/DL
HCT VFR BLD CALC: 44.8 % (ref 37–47)
HEMOGLOBIN: 14.9 G/DL (ref 12–16)
KETONES, URINE: NEGATIVE MG/DL
LEUKOCYTE ESTERASE, URINE: ABNORMAL
LIPASE: 17 U/L (ref 13–60)
LYMPHOCYTES ABSOLUTE: 2.8 K/UL (ref 1–4.8)
LYMPHOCYTES RELATIVE PERCENT: 34 %
Lab: NORMAL
MCH RBC QN AUTO: 27.6 PG (ref 27–31.3)
MCHC RBC AUTO-ENTMCNC: 33.3 % (ref 33–37)
MCV RBC AUTO: 82.7 FL (ref 82–100)
MONOCYTES ABSOLUTE: 0.5 K/UL (ref 0.2–0.8)
MONOCYTES RELATIVE PERCENT: 5.8 %
NEUTROPHILS ABSOLUTE: 4.4 K/UL (ref 1.4–6.5)
NEUTROPHILS RELATIVE PERCENT: 53.1 %
NITRITE, URINE: NEGATIVE
OPIATE SCREEN URINE: NORMAL
PDW BLD-RTO: 17.1 % (ref 11.5–14.5)
PERFORMED ON: ABNORMAL
PERFORMED ON: ABNORMAL
PH UA: 5.5 (ref 5–9)
PHENCYCLIDINE SCREEN URINE: NORMAL
PLATELET # BLD: 197 K/UL (ref 130–400)
POTASSIUM SERPL-SCNC: 4.1 MEQ/L (ref 3.5–5.1)
PROTEIN UA: NEGATIVE MG/DL
RBC # BLD: 5.42 M/UL (ref 4.2–5.4)
RBC UA: ABNORMAL /HPF (ref 0–2)
SODIUM BLD-SCNC: 140 MEQ/L (ref 132–144)
SPECIFIC GRAVITY UA: 1.02 (ref 1–1.03)
TOTAL PROTEIN: 7.7 G/DL (ref 6.4–8.1)
TROPONIN: <0.01 NG/ML (ref 0–0.01)
URINE REFLEX TO CULTURE: YES
UROBILINOGEN, URINE: 0.2 E.U./DL
WBC # BLD: 8.3 K/UL (ref 4.8–10.8)
WBC UA: ABNORMAL /HPF (ref 0–5)
YEAST: PRESENT

## 2018-08-10 PROCEDURE — 96375 TX/PRO/DX INJ NEW DRUG ADDON: CPT

## 2018-08-10 PROCEDURE — 83690 ASSAY OF LIPASE: CPT

## 2018-08-10 PROCEDURE — 84484 ASSAY OF TROPONIN QUANT: CPT

## 2018-08-10 PROCEDURE — 85025 COMPLETE CBC W/AUTO DIFF WBC: CPT

## 2018-08-10 PROCEDURE — 96374 THER/PROPH/DIAG INJ IV PUSH: CPT

## 2018-08-10 PROCEDURE — 93005 ELECTROCARDIOGRAM TRACING: CPT

## 2018-08-10 PROCEDURE — 96376 TX/PRO/DX INJ SAME DRUG ADON: CPT

## 2018-08-10 PROCEDURE — 6360000002 HC RX W HCPCS: Performed by: PHYSICIAN ASSISTANT

## 2018-08-10 PROCEDURE — 36415 COLL VENOUS BLD VENIPUNCTURE: CPT

## 2018-08-10 PROCEDURE — 2580000003 HC RX 258: Performed by: PHYSICIAN ASSISTANT

## 2018-08-10 PROCEDURE — 80053 COMPREHEN METABOLIC PANEL: CPT

## 2018-08-10 PROCEDURE — 6370000000 HC RX 637 (ALT 250 FOR IP): Performed by: PHYSICIAN ASSISTANT

## 2018-08-10 PROCEDURE — 87086 URINE CULTURE/COLONY COUNT: CPT

## 2018-08-10 PROCEDURE — 81001 URINALYSIS AUTO W/SCOPE: CPT

## 2018-08-10 PROCEDURE — 99284 EMERGENCY DEPT VISIT MOD MDM: CPT

## 2018-08-10 PROCEDURE — 80307 DRUG TEST PRSMV CHEM ANLYZR: CPT

## 2018-08-10 RX ORDER — METRONIDAZOLE 500 MG/1
500 TABLET ORAL 2 TIMES DAILY
Qty: 14 TABLET | Refills: 0 | Status: SHIPPED | OUTPATIENT
Start: 2018-08-10 | End: 2018-08-17

## 2018-08-10 RX ORDER — KETOROLAC TROMETHAMINE 30 MG/ML
30 INJECTION, SOLUTION INTRAMUSCULAR; INTRAVENOUS ONCE
Status: COMPLETED | OUTPATIENT
Start: 2018-08-10 | End: 2018-08-10

## 2018-08-10 RX ORDER — FLUCONAZOLE 150 MG/1
150 TABLET ORAL ONCE
Qty: 1 TABLET | Refills: 0 | Status: SHIPPED | OUTPATIENT
Start: 2018-08-10 | End: 2018-08-10

## 2018-08-10 RX ORDER — ONDANSETRON 4 MG/1
4 TABLET, ORALLY DISINTEGRATING ORAL EVERY 8 HOURS PRN
Qty: 20 TABLET | Refills: 0 | Status: SHIPPED | OUTPATIENT
Start: 2018-08-10 | End: 2020-04-09

## 2018-08-10 RX ORDER — ETODOLAC 400 MG/1
400 TABLET, FILM COATED ORAL 2 TIMES DAILY
Qty: 14 TABLET | Refills: 0 | Status: SHIPPED | OUTPATIENT
Start: 2018-08-10 | End: 2020-04-09 | Stop reason: SDUPTHER

## 2018-08-10 RX ORDER — 0.9 % SODIUM CHLORIDE 0.9 %
1000 INTRAVENOUS SOLUTION INTRAVENOUS ONCE
Status: COMPLETED | OUTPATIENT
Start: 2018-08-10 | End: 2018-08-10

## 2018-08-10 RX ORDER — ONDANSETRON 4 MG/1
4 TABLET, ORALLY DISINTEGRATING ORAL ONCE
Status: COMPLETED | OUTPATIENT
Start: 2018-08-10 | End: 2018-08-10

## 2018-08-10 RX ADMIN — INSULIN HUMAN 10 UNITS: 100 INJECTION, SOLUTION PARENTERAL at 19:25

## 2018-08-10 RX ADMIN — INSULIN HUMAN 6 UNITS: 100 INJECTION, SOLUTION PARENTERAL at 22:33

## 2018-08-10 RX ADMIN — ONDANSETRON 4 MG: 4 TABLET, ORALLY DISINTEGRATING ORAL at 21:54

## 2018-08-10 RX ADMIN — SODIUM CHLORIDE 1000 ML: 9 INJECTION, SOLUTION INTRAVENOUS at 19:26

## 2018-08-10 RX ADMIN — KETOROLAC TROMETHAMINE 30 MG: 30 INJECTION, SOLUTION INTRAMUSCULAR at 20:12

## 2018-08-10 ASSESSMENT — ENCOUNTER SYMPTOMS
NAUSEA: 1
VOICE CHANGE: 0
EYE DISCHARGE: 0
COUGH: 0
ABDOMINAL PAIN: 1
APNEA: 0
ANAL BLEEDING: 0
VOMITING: 1
PHOTOPHOBIA: 0
ABDOMINAL DISTENTION: 0

## 2018-08-10 ASSESSMENT — PAIN DESCRIPTION - ORIENTATION: ORIENTATION: MID

## 2018-08-10 ASSESSMENT — PAIN DESCRIPTION - DESCRIPTORS: DESCRIPTORS: SORE

## 2018-08-10 ASSESSMENT — PAIN DESCRIPTION - ONSET: ONSET: ON-GOING

## 2018-08-10 ASSESSMENT — PAIN SCALES - GENERAL
PAINLEVEL_OUTOF10: 10
PAINLEVEL_OUTOF10: 10

## 2018-08-10 ASSESSMENT — PAIN DESCRIPTION - PROGRESSION: CLINICAL_PROGRESSION: NOT CHANGED

## 2018-08-10 ASSESSMENT — PAIN DESCRIPTION - FREQUENCY: FREQUENCY: CONTINUOUS

## 2018-08-10 ASSESSMENT — PAIN DESCRIPTION - PAIN TYPE: TYPE: ACUTE PAIN

## 2018-08-10 ASSESSMENT — PAIN SCALES - WONG BAKER: WONGBAKER_NUMERICALRESPONSE: 2

## 2018-08-10 ASSESSMENT — PAIN DESCRIPTION - LOCATION: LOCATION: ABDOMEN

## 2018-08-10 NOTE — PROGRESS NOTES
Patient stated she could not speak [place]. Use the  per policy. Patient stated she just received medications today and they were mapped for [facility]. Patient stated she had not started the medication. Patient was wanting pain medication.  Notified Jas GILL  Orders for labs are being discontinued and patient will receive medications

## 2018-08-11 NOTE — ED PROVIDER NOTES
called to and read back by Fariba Lugo RN, 08/10/2018  19:11, by Adria Valle   URINE RT REFLEX TO CULTURE - Abnormal; Notable for the following:     Glucose, Ur >=1000 (*)     Blood, Urine LARGE (*)     Leukocyte Esterase, Urine MODERATE (*)     All other components within normal limits   MICROSCOPIC URINALYSIS - Abnormal; Notable for the following:     WBC, UA 6-10 (*)     RBC, UA 10-20 (*)     Yeast, UA Present (*)     All other components within normal limits   POCT GLUCOSE - Abnormal; Notable for the following:     POC Glucose 422 (*)     All other components within normal limits   POCT GLUCOSE - Abnormal; Notable for the following:     POC Glucose 398 (*)     All other components within normal limits   POCT GLUCOSE - Normal   URINE CULTURE   LIPASE    Narrative:     CALL  [facility] tel. [phone],  glucose  results called to and read back by Fariba Lugo RN, 08/10/2018  19:11, by Adria Valle   URINE DRUG SCREEN   TROPONIN    Narrative:     CALL  [facility] tel. [phone],  glucose  results called to and read back by Fariba Lugo RN, 08/10/2018  19:11, by Adira Mountain       All other labs were within normal range or not returned as of this dictation.     EMERGENCY DEPARTMENT COURSE and DIFFERENTIAL DIAGNOSIS/MDM:   Vitals:    Vitals:    08/10/18 1807 08/10/18 2157   BP: (!) 154/90 138/76   Pulse: 80 81   Resp: 20 18   Temp: 98.7 °F (37.1 °C)    TempSrc: Oral    SpO2: 98% 96%   Weight: 260 lb (117.9 kg)    Height: 5' 8\" (1.727 m)           MDM  Number of Diagnoses or Management Options  Candida infection:   Chest wall pain:   Gardnerella infection:   Generalized abdominal pain:   Nausea and vomiting, intractability of vomiting not specified, unspecified vomiting type:   Type 2 diabetes mellitus with hyperglycemia, unspecified whether long term insulin use [facility]):   Diagnosis management comments: Patient is concerned about her infections that her primary care physician found she has abdominal pain nausea vomiting she is more Hi Katz PA-C  08/10/18 [address] [place] [address], MARCIA  08/10/18 [address] [place] [address], MARCIA  08/10/18 4113

## 2018-08-12 LAB — URINE CULTURE, ROUTINE: NORMAL

## 2019-01-09 ENCOUNTER — HOSPITAL ENCOUNTER (INPATIENT)
Age: 44
LOS: 2 days | Discharge: HOME | DRG: 420 | End: 2019-01-11
Attending: INTERNAL MEDICINE | Admitting: INTERNAL MEDICINE
Payer: COMMERCIAL

## 2019-01-09 ENCOUNTER — APPOINTMENT (OUTPATIENT)
Dept: CT IMAGING | Age: 44
DRG: 420 | End: 2019-01-09
Payer: COMMERCIAL

## 2019-01-09 DIAGNOSIS — R82.4 KETONURIA: ICD-10-CM

## 2019-01-09 DIAGNOSIS — R11.2 NAUSEA AND VOMITING, INTRACTABILITY OF VOMITING NOT SPECIFIED, UNSPECIFIED VOMITING TYPE: ICD-10-CM

## 2019-01-09 DIAGNOSIS — E11.65 TYPE 2 DIABETES MELLITUS WITH HYPERGLYCEMIA, UNSPECIFIED WHETHER LONG TERM INSULIN USE (HCC): Primary | ICD-10-CM

## 2019-01-09 PROBLEM — E11.10 DKA, TYPE 2, NOT AT GOAL (HCC): Status: ACTIVE | Noted: 2019-01-09

## 2019-01-09 LAB
ALBUMIN SERPL-MCNC: 4.7 G/DL (ref 3.9–4.9)
ALP BLD-CCNC: 111 U/L (ref 40–130)
ALT SERPL-CCNC: 37 U/L (ref 0–33)
ANION GAP SERPL CALCULATED.3IONS-SCNC: 19 MEQ/L (ref 7–13)
AST SERPL-CCNC: 29 U/L (ref 0–35)
BACTERIA: ABNORMAL /HPF
BASOPHILS ABSOLUTE: 0 K/UL (ref 0–0.2)
BASOPHILS RELATIVE PERCENT: 0.3 %
BETA-HYDROXYBUTYRATE: 8.5 MG/DL (ref 0.2–2.8)
BILIRUB SERPL-MCNC: 0.6 MG/DL (ref 0–1.2)
BILIRUBIN URINE: NEGATIVE
BLOOD, URINE: ABNORMAL
BUN BLDV-MCNC: 16 MG/DL (ref 6–20)
CALCIUM SERPL-MCNC: 10.2 MG/DL (ref 8.6–10.2)
CHLORIDE BLD-SCNC: 92 MEQ/L (ref 98–107)
CHP ED QC CHECK: YES
CLARITY: CLEAR
CO2: 25 MEQ/L (ref 22–29)
COLOR: YELLOW
CREAT SERPL-MCNC: 0.98 MG/DL (ref 0.5–0.9)
EOSINOPHILS ABSOLUTE: 0.3 K/UL (ref 0–0.7)
EOSINOPHILS RELATIVE PERCENT: 2.9 %
EPITHELIAL CELLS, UA: ABNORMAL /HPF
GFR AFRICAN AMERICAN: >60
GFR NON-AFRICAN AMERICAN: >60
GLOBULIN: 3.7 G/DL (ref 2.3–3.5)
GLUCOSE BLD-MCNC: 464 MG/DL (ref 60–115)
GLUCOSE BLD-MCNC: 474 MG/DL
GLUCOSE BLD-MCNC: 474 MG/DL (ref 60–115)
GLUCOSE BLD-MCNC: 637 MG/DL (ref 74–109)
GLUCOSE URINE: 100 MG/DL
HBA1C MFR BLD: 9.7 % (ref 4.8–5.9)
HCT VFR BLD CALC: 46.1 % (ref 37–47)
HEMOGLOBIN: 16 G/DL (ref 12–16)
KETONES, URINE: 15 MG/DL
LACTIC ACID: 2.1 MMOL/L (ref 0.5–2.2)
LACTIC ACID: 2.6 MMOL/L (ref 0.5–2.2)
LEUKOCYTE ESTERASE, URINE: ABNORMAL
LYMPHOCYTES ABSOLUTE: 2.5 K/UL (ref 1–4.8)
LYMPHOCYTES RELATIVE PERCENT: 23.8 %
MCH RBC QN AUTO: 30.9 PG (ref 27–31.3)
MCHC RBC AUTO-ENTMCNC: 34.6 % (ref 33–37)
MCV RBC AUTO: 89.3 FL (ref 82–100)
MONOCYTES ABSOLUTE: 0.6 K/UL (ref 0.2–0.8)
MONOCYTES RELATIVE PERCENT: 6.1 %
NEUTROPHILS ABSOLUTE: 7.1 K/UL (ref 1.4–6.5)
NEUTROPHILS RELATIVE PERCENT: 66.9 %
NITRITE, URINE: NEGATIVE
PDW BLD-RTO: 14.3 % (ref 11.5–14.5)
PERFORMED ON: ABNORMAL
PERFORMED ON: ABNORMAL
PH UA: 5 (ref 5–9)
PLATELET # BLD: 219 K/UL (ref 130–400)
POTASSIUM SERPL-SCNC: 4.1 MEQ/L (ref 3.5–5.1)
PROTEIN UA: NEGATIVE MG/DL
RBC # BLD: 5.16 M/UL (ref 4.2–5.4)
RBC UA: ABNORMAL /HPF (ref 0–2)
SODIUM BLD-SCNC: 136 MEQ/L (ref 132–144)
SPECIFIC GRAVITY UA: 1.03 (ref 1–1.03)
TOTAL PROTEIN: 8.4 G/DL (ref 6.4–8.1)
URINE REFLEX TO CULTURE: YES
UROBILINOGEN, URINE: 0.2 E.U./DL
WBC # BLD: 10.6 K/UL (ref 4.8–10.8)
WBC UA: ABNORMAL /HPF (ref 0–5)

## 2019-01-09 PROCEDURE — 2580000003 HC RX 258: Performed by: PHYSICIAN ASSISTANT

## 2019-01-09 PROCEDURE — 87077 CULTURE AEROBIC IDENTIFY: CPT

## 2019-01-09 PROCEDURE — 87186 SC STD MICRODIL/AGAR DIL: CPT

## 2019-01-09 PROCEDURE — 36415 COLL VENOUS BLD VENIPUNCTURE: CPT

## 2019-01-09 PROCEDURE — 96375 TX/PRO/DX INJ NEW DRUG ADDON: CPT

## 2019-01-09 PROCEDURE — S0028 INJECTION, FAMOTIDINE, 20 MG: HCPCS | Performed by: PHYSICIAN ASSISTANT

## 2019-01-09 PROCEDURE — 83605 ASSAY OF LACTIC ACID: CPT

## 2019-01-09 PROCEDURE — 99285 EMERGENCY DEPT VISIT HI MDM: CPT

## 2019-01-09 PROCEDURE — 87086 URINE CULTURE/COLONY COUNT: CPT

## 2019-01-09 PROCEDURE — 74176 CT ABD & PELVIS W/O CONTRAST: CPT

## 2019-01-09 PROCEDURE — 96372 THER/PROPH/DIAG INJ SC/IM: CPT

## 2019-01-09 PROCEDURE — 6360000002 HC RX W HCPCS: Performed by: PHYSICIAN ASSISTANT

## 2019-01-09 PROCEDURE — 2000000000 HC ICU R&B

## 2019-01-09 PROCEDURE — 6370000000 HC RX 637 (ALT 250 FOR IP): Performed by: PHYSICIAN ASSISTANT

## 2019-01-09 PROCEDURE — 81001 URINALYSIS AUTO W/SCOPE: CPT

## 2019-01-09 PROCEDURE — 85025 COMPLETE CBC W/AUTO DIFF WBC: CPT

## 2019-01-09 PROCEDURE — 96374 THER/PROPH/DIAG INJ IV PUSH: CPT

## 2019-01-09 PROCEDURE — 80053 COMPREHEN METABOLIC PANEL: CPT

## 2019-01-09 PROCEDURE — 87040 BLOOD CULTURE FOR BACTERIA: CPT

## 2019-01-09 PROCEDURE — 82010 KETONE BODYS QUAN: CPT

## 2019-01-09 PROCEDURE — 83036 HEMOGLOBIN GLYCOSYLATED A1C: CPT

## 2019-01-09 RX ORDER — DEXTROSE MONOHYDRATE 25 G/50ML
12.5 INJECTION, SOLUTION INTRAVENOUS PRN
Status: DISCONTINUED | OUTPATIENT
Start: 2019-01-09 | End: 2019-01-11 | Stop reason: HOSPADM

## 2019-01-09 RX ORDER — NICOTINE 21 MG/24HR
1 PATCH, TRANSDERMAL 24 HOURS TRANSDERMAL DAILY
Status: DISCONTINUED | OUTPATIENT
Start: 2019-01-10 | End: 2019-01-11 | Stop reason: HOSPADM

## 2019-01-09 RX ORDER — LEVOTHYROXINE SODIUM 0.05 MG/1
50 TABLET ORAL DAILY
COMMUNITY

## 2019-01-09 RX ORDER — DEXTROSE AND SODIUM CHLORIDE 5; .45 G/100ML; G/100ML
INJECTION, SOLUTION INTRAVENOUS CONTINUOUS PRN
Status: DISCONTINUED | OUTPATIENT
Start: 2019-01-09 | End: 2019-01-11 | Stop reason: HOSPADM

## 2019-01-09 RX ORDER — 0.9 % SODIUM CHLORIDE 0.9 %
1000 INTRAVENOUS SOLUTION INTRAVENOUS ONCE
Status: COMPLETED | OUTPATIENT
Start: 2019-01-09 | End: 2019-01-09

## 2019-01-09 RX ORDER — ONDANSETRON 2 MG/ML
4 INJECTION INTRAMUSCULAR; INTRAVENOUS ONCE
Status: COMPLETED | OUTPATIENT
Start: 2019-01-09 | End: 2019-01-09

## 2019-01-09 RX ORDER — DICYCLOMINE HYDROCHLORIDE 10 MG/ML
20 INJECTION INTRAMUSCULAR ONCE
Status: COMPLETED | OUTPATIENT
Start: 2019-01-09 | End: 2019-01-09

## 2019-01-09 RX ORDER — TOPIRAMATE 25 MG/1
25 TABLET ORAL DAILY
COMMUNITY

## 2019-01-09 RX ORDER — POTASSIUM CHLORIDE 7.45 MG/ML
10 INJECTION INTRAVENOUS PRN
Status: DISCONTINUED | OUTPATIENT
Start: 2019-01-09 | End: 2019-01-11 | Stop reason: HOSPADM

## 2019-01-09 RX ORDER — ONDANSETRON 4 MG/1
4 TABLET, ORALLY DISINTEGRATING ORAL ONCE
Status: COMPLETED | OUTPATIENT
Start: 2019-01-09 | End: 2019-01-09

## 2019-01-09 RX ORDER — TRAZODONE HYDROCHLORIDE 50 MG/1
50 TABLET ORAL NIGHTLY
COMMUNITY

## 2019-01-09 RX ORDER — SERTRALINE HYDROCHLORIDE 100 MG/1
100 TABLET, FILM COATED ORAL DAILY
COMMUNITY

## 2019-01-09 RX ORDER — MAGNESIUM SULFATE 1 G/100ML
1 INJECTION INTRAVENOUS PRN
Status: DISCONTINUED | OUTPATIENT
Start: 2019-01-09 | End: 2019-01-11 | Stop reason: HOSPADM

## 2019-01-09 RX ORDER — SODIUM CHLORIDE 450 MG/100ML
INJECTION, SOLUTION INTRAVENOUS CONTINUOUS
Status: DISCONTINUED | OUTPATIENT
Start: 2019-01-09 | End: 2019-01-11 | Stop reason: HOSPADM

## 2019-01-09 RX ORDER — MONTELUKAST SODIUM 10 MG/1
10 TABLET ORAL NIGHTLY
COMMUNITY

## 2019-01-09 RX ADMIN — DICYCLOMINE HYDROCHLORIDE 20 MG: 20 INJECTION, SOLUTION INTRAMUSCULAR at 19:14

## 2019-01-09 RX ADMIN — ONDANSETRON 4 MG: 4 TABLET, ORALLY DISINTEGRATING ORAL at 19:15

## 2019-01-09 RX ADMIN — SODIUM CHLORIDE 15.5 UNITS/HR: 9 INJECTION, SOLUTION INTRAVENOUS at 23:15

## 2019-01-09 RX ADMIN — SODIUM CHLORIDE 1000 ML: 9 INJECTION, SOLUTION INTRAVENOUS at 19:14

## 2019-01-09 RX ADMIN — ONDANSETRON 4 MG: 2 INJECTION INTRAMUSCULAR; INTRAVENOUS at 20:33

## 2019-01-09 RX ADMIN — SODIUM CHLORIDE 1000 ML: 9 INJECTION, SOLUTION INTRAVENOUS at 21:59

## 2019-01-09 RX ADMIN — SODIUM CHLORIDE: 4.5 INJECTION, SOLUTION INTRAVENOUS at 23:25

## 2019-01-09 RX ADMIN — INSULIN HUMAN 10 UNITS: 100 INJECTION, SOLUTION PARENTERAL at 20:38

## 2019-01-09 RX ADMIN — FAMOTIDINE 20 MG: 10 INJECTION, SOLUTION INTRAVENOUS at 19:14

## 2019-01-09 ASSESSMENT — ENCOUNTER SYMPTOMS
EYES NEGATIVE: 1
RESPIRATORY NEGATIVE: 1
VOMITING: 1
NAUSEA: 1
DIARRHEA: 1

## 2019-01-09 ASSESSMENT — PAIN DESCRIPTION - PAIN TYPE
TYPE: ACUTE PAIN
TYPE: ACUTE PAIN

## 2019-01-09 ASSESSMENT — PAIN DESCRIPTION - DESCRIPTORS
DESCRIPTORS: ACHING;CRAMPING;DISCOMFORT
DESCRIPTORS: CRAMPING

## 2019-01-09 ASSESSMENT — PAIN DESCRIPTION - LOCATION
LOCATION: ABDOMEN
LOCATION: ABDOMEN

## 2019-01-09 ASSESSMENT — PAIN DESCRIPTION - FREQUENCY
FREQUENCY: INTERMITTENT
FREQUENCY: INTERMITTENT

## 2019-01-09 ASSESSMENT — PAIN DESCRIPTION - PROGRESSION
CLINICAL_PROGRESSION: GRADUALLY WORSENING
CLINICAL_PROGRESSION: NOT CHANGED

## 2019-01-09 ASSESSMENT — PAIN SCALES - GENERAL
PAINLEVEL_OUTOF10: 10
PAINLEVEL_OUTOF10: 3

## 2019-01-09 ASSESSMENT — PAIN DESCRIPTION - ORIENTATION
ORIENTATION: MID
ORIENTATION: LOWER;MID

## 2019-01-09 ASSESSMENT — PAIN DESCRIPTION - ONSET
ONSET: ON-GOING
ONSET: GRADUAL

## 2019-01-10 LAB
AMYLASE: 10 U/L (ref 28–100)
ANION GAP SERPL CALCULATED.3IONS-SCNC: 10 MEQ/L (ref 7–13)
ANION GAP SERPL CALCULATED.3IONS-SCNC: 13 MEQ/L (ref 7–13)
ANION GAP SERPL CALCULATED.3IONS-SCNC: 14 MEQ/L (ref 7–13)
ANION GAP SERPL CALCULATED.3IONS-SCNC: 15 MEQ/L (ref 7–13)
BASOPHILS ABSOLUTE: 0.1 K/UL (ref 0–0.2)
BASOPHILS RELATIVE PERCENT: 1.3 %
BETA-HYDROXYBUTYRATE: 0.9 MG/DL (ref 0.2–2.8)
BUN BLDV-MCNC: 10 MG/DL (ref 6–20)
BUN BLDV-MCNC: 11 MG/DL (ref 6–20)
BUN BLDV-MCNC: 11 MG/DL (ref 6–20)
BUN BLDV-MCNC: 13 MG/DL (ref 6–20)
CALCIUM SERPL-MCNC: 8.7 MG/DL (ref 8.6–10.2)
CALCIUM SERPL-MCNC: 8.8 MG/DL (ref 8.6–10.2)
CALCIUM SERPL-MCNC: 8.8 MG/DL (ref 8.6–10.2)
CALCIUM SERPL-MCNC: 8.9 MG/DL (ref 8.6–10.2)
CHLORIDE BLD-SCNC: 102 MEQ/L (ref 98–107)
CHLORIDE BLD-SCNC: 104 MEQ/L (ref 98–107)
CHLORIDE BLD-SCNC: 104 MEQ/L (ref 98–107)
CHLORIDE BLD-SCNC: 106 MEQ/L (ref 98–107)
CO2: 23 MEQ/L (ref 22–29)
CO2: 23 MEQ/L (ref 22–29)
CO2: 25 MEQ/L (ref 22–29)
CO2: 26 MEQ/L (ref 22–29)
CREAT SERPL-MCNC: 0.69 MG/DL (ref 0.5–0.9)
CREAT SERPL-MCNC: 0.76 MG/DL (ref 0.5–0.9)
EOSINOPHILS ABSOLUTE: 0.5 K/UL (ref 0–0.7)
EOSINOPHILS RELATIVE PERCENT: 4.6 %
GFR AFRICAN AMERICAN: >60
GFR NON-AFRICAN AMERICAN: >60
GLUCOSE BLD-MCNC: 167 MG/DL (ref 60–115)
GLUCOSE BLD-MCNC: 173 MG/DL (ref 60–115)
GLUCOSE BLD-MCNC: 180 MG/DL (ref 60–115)
GLUCOSE BLD-MCNC: 187 MG/DL (ref 60–115)
GLUCOSE BLD-MCNC: 190 MG/DL (ref 74–109)
GLUCOSE BLD-MCNC: 192 MG/DL (ref 74–109)
GLUCOSE BLD-MCNC: 195 MG/DL (ref 60–115)
GLUCOSE BLD-MCNC: 211 MG/DL (ref 60–115)
GLUCOSE BLD-MCNC: 216 MG/DL (ref 60–115)
GLUCOSE BLD-MCNC: 224 MG/DL (ref 60–115)
GLUCOSE BLD-MCNC: 263 MG/DL (ref 60–115)
GLUCOSE BLD-MCNC: 266 MG/DL (ref 60–115)
GLUCOSE BLD-MCNC: 289 MG/DL (ref 60–115)
GLUCOSE BLD-MCNC: 310 MG/DL (ref 74–109)
GLUCOSE BLD-MCNC: 311 MG/DL (ref 60–115)
GLUCOSE BLD-MCNC: 323 MG/DL (ref 60–115)
GLUCOSE BLD-MCNC: 324 MG/DL (ref 60–115)
GLUCOSE BLD-MCNC: 330 MG/DL (ref 74–109)
GLUCOSE BLD-MCNC: 354 MG/DL (ref 60–115)
HCT VFR BLD CALC: 40.1 % (ref 37–47)
HEMOGLOBIN: 14.2 G/DL (ref 12–16)
LIPASE: 11 U/L (ref 13–60)
LYMPHOCYTES ABSOLUTE: 2.7 K/UL (ref 1–4.8)
LYMPHOCYTES RELATIVE PERCENT: 25.9 %
MAGNESIUM: 2 MG/DL (ref 1.7–2.3)
MAGNESIUM: 2.1 MG/DL (ref 1.7–2.3)
MAGNESIUM: 2.1 MG/DL (ref 1.7–2.3)
MAGNESIUM: 2.2 MG/DL (ref 1.7–2.3)
MCH RBC QN AUTO: 31.1 PG (ref 27–31.3)
MCHC RBC AUTO-ENTMCNC: 35.4 % (ref 33–37)
MCV RBC AUTO: 88 FL (ref 82–100)
MONOCYTES ABSOLUTE: 0.7 K/UL (ref 0.2–0.8)
MONOCYTES RELATIVE PERCENT: 6.8 %
NEUTROPHILS ABSOLUTE: 6.5 K/UL (ref 1.4–6.5)
NEUTROPHILS RELATIVE PERCENT: 61.4 %
PDW BLD-RTO: 14.2 % (ref 11.5–14.5)
PERFORMED ON: ABNORMAL
PHOSPHORUS: 2.7 MG/DL (ref 2.5–4.5)
PHOSPHORUS: 2.9 MG/DL (ref 2.5–4.5)
PHOSPHORUS: 3.5 MG/DL (ref 2.5–4.5)
PHOSPHORUS: 3.7 MG/DL (ref 2.5–4.5)
PLATELET # BLD: 194 K/UL (ref 130–400)
POTASSIUM SERPL-SCNC: 3.7 MEQ/L (ref 3.5–5.1)
POTASSIUM SERPL-SCNC: 3.8 MEQ/L (ref 3.5–5.1)
POTASSIUM SERPL-SCNC: 3.9 MEQ/L (ref 3.5–5.1)
POTASSIUM SERPL-SCNC: 4 MEQ/L (ref 3.5–5.1)
RBC # BLD: 4.56 M/UL (ref 4.2–5.4)
SODIUM BLD-SCNC: 139 MEQ/L (ref 132–144)
SODIUM BLD-SCNC: 140 MEQ/L (ref 132–144)
SODIUM BLD-SCNC: 142 MEQ/L (ref 132–144)
SODIUM BLD-SCNC: 144 MEQ/L (ref 132–144)
T4 FREE: 1.15 NG/DL (ref 0.93–1.7)
TSH SERPL DL<=0.05 MIU/L-ACNC: 1.52 UIU/ML (ref 0.27–4.2)
WBC # BLD: 10.6 K/UL (ref 4.8–10.8)

## 2019-01-10 PROCEDURE — 84443 ASSAY THYROID STIM HORMONE: CPT

## 2019-01-10 PROCEDURE — 6370000000 HC RX 637 (ALT 250 FOR IP): Performed by: INTERNAL MEDICINE

## 2019-01-10 PROCEDURE — 83690 ASSAY OF LIPASE: CPT

## 2019-01-10 PROCEDURE — 84439 ASSAY OF FREE THYROXINE: CPT

## 2019-01-10 PROCEDURE — 6360000002 HC RX W HCPCS: Performed by: INTERNAL MEDICINE

## 2019-01-10 PROCEDURE — 6370000000 HC RX 637 (ALT 250 FOR IP): Performed by: PHYSICIAN ASSISTANT

## 2019-01-10 PROCEDURE — 85025 COMPLETE CBC W/AUTO DIFF WBC: CPT

## 2019-01-10 PROCEDURE — 36415 COLL VENOUS BLD VENIPUNCTURE: CPT

## 2019-01-10 PROCEDURE — 1210000000 HC MED SURG R&B

## 2019-01-10 PROCEDURE — 80048 BASIC METABOLIC PNL TOTAL CA: CPT

## 2019-01-10 PROCEDURE — 99222 1ST HOSP IP/OBS MODERATE 55: CPT | Performed by: INTERNAL MEDICINE

## 2019-01-10 PROCEDURE — 6360000002 HC RX W HCPCS: Performed by: PHYSICIAN ASSISTANT

## 2019-01-10 PROCEDURE — 84100 ASSAY OF PHOSPHORUS: CPT

## 2019-01-10 PROCEDURE — 83735 ASSAY OF MAGNESIUM: CPT

## 2019-01-10 PROCEDURE — 82150 ASSAY OF AMYLASE: CPT

## 2019-01-10 PROCEDURE — 82010 KETONE BODYS QUAN: CPT

## 2019-01-10 PROCEDURE — 2580000003 HC RX 258: Performed by: PHYSICIAN ASSISTANT

## 2019-01-10 RX ORDER — DEXTROSE MONOHYDRATE 50 MG/ML
100 INJECTION, SOLUTION INTRAVENOUS PRN
Status: DISCONTINUED | OUTPATIENT
Start: 2019-01-10 | End: 2019-01-11 | Stop reason: HOSPADM

## 2019-01-10 RX ORDER — TOPIRAMATE 25 MG/1
25 TABLET ORAL DAILY
Status: DISCONTINUED | OUTPATIENT
Start: 2019-01-10 | End: 2019-01-11 | Stop reason: HOSPADM

## 2019-01-10 RX ORDER — PANTOPRAZOLE SODIUM 40 MG/1
40 TABLET, DELAYED RELEASE ORAL
Status: DISCONTINUED | OUTPATIENT
Start: 2019-01-11 | End: 2019-01-11 | Stop reason: HOSPADM

## 2019-01-10 RX ORDER — DEXTROSE MONOHYDRATE 25 G/50ML
12.5 INJECTION, SOLUTION INTRAVENOUS PRN
Status: DISCONTINUED | OUTPATIENT
Start: 2019-01-10 | End: 2019-01-11 | Stop reason: HOSPADM

## 2019-01-10 RX ORDER — TRAZODONE HYDROCHLORIDE 50 MG/1
50 TABLET ORAL NIGHTLY
Status: DISCONTINUED | OUTPATIENT
Start: 2019-01-10 | End: 2019-01-11 | Stop reason: HOSPADM

## 2019-01-10 RX ORDER — PRAVASTATIN SODIUM 10 MG
10 TABLET ORAL NIGHTLY
Status: DISCONTINUED | OUTPATIENT
Start: 2019-01-10 | End: 2019-01-11 | Stop reason: HOSPADM

## 2019-01-10 RX ORDER — NICOTINE POLACRILEX 4 MG
15 LOZENGE BUCCAL PRN
Status: DISCONTINUED | OUTPATIENT
Start: 2019-01-10 | End: 2019-01-11 | Stop reason: HOSPADM

## 2019-01-10 RX ORDER — LEVOTHYROXINE SODIUM 0.05 MG/1
50 TABLET ORAL DAILY
Status: DISCONTINUED | OUTPATIENT
Start: 2019-01-10 | End: 2019-01-11 | Stop reason: HOSPADM

## 2019-01-10 RX ORDER — SERTRALINE HYDROCHLORIDE 100 MG/1
100 TABLET, FILM COATED ORAL DAILY
Status: DISCONTINUED | OUTPATIENT
Start: 2019-01-10 | End: 2019-01-11 | Stop reason: HOSPADM

## 2019-01-10 RX ORDER — ASPIRIN 81 MG/1
81 TABLET, CHEWABLE ORAL DAILY
Status: DISCONTINUED | OUTPATIENT
Start: 2019-01-10 | End: 2019-01-11 | Stop reason: HOSPADM

## 2019-01-10 RX ORDER — SODIUM CHLORIDE AND POTASSIUM CHLORIDE .9; .15 G/100ML; G/100ML
SOLUTION INTRAVENOUS CONTINUOUS
Status: DISPENSED | OUTPATIENT
Start: 2019-01-10 | End: 2019-01-11

## 2019-01-10 RX ORDER — BUTALBITAL, ACETAMINOPHEN AND CAFFEINE 300; 40; 50 MG/1; MG/1; MG/1
1 CAPSULE ORAL EVERY 4 HOURS PRN
Status: DISCONTINUED | OUTPATIENT
Start: 2019-01-10 | End: 2019-01-11 | Stop reason: HOSPADM

## 2019-01-10 RX ORDER — INSULIN GLARGINE 100 [IU]/ML
40 INJECTION, SOLUTION SUBCUTANEOUS
Status: DISCONTINUED | OUTPATIENT
Start: 2019-01-10 | End: 2019-01-11 | Stop reason: HOSPADM

## 2019-01-10 RX ORDER — METOCLOPRAMIDE 10 MG/1
10 TABLET ORAL
Status: DISCONTINUED | OUTPATIENT
Start: 2019-01-10 | End: 2019-01-10

## 2019-01-10 RX ORDER — ONDANSETRON 2 MG/ML
4 INJECTION INTRAMUSCULAR; INTRAVENOUS EVERY 6 HOURS PRN
Status: DISCONTINUED | OUTPATIENT
Start: 2019-01-10 | End: 2019-01-11 | Stop reason: HOSPADM

## 2019-01-10 RX ADMIN — POTASSIUM CHLORIDE 10 MEQ: 7.46 INJECTION, SOLUTION INTRAVENOUS at 06:43

## 2019-01-10 RX ADMIN — ONDANSETRON 4 MG: 2 INJECTION INTRAMUSCULAR; INTRAVENOUS at 22:44

## 2019-01-10 RX ADMIN — INSULIN GLARGINE 40 UNITS: 100 INJECTION, SOLUTION SUBCUTANEOUS at 12:07

## 2019-01-10 RX ADMIN — DEXTROSE AND SODIUM CHLORIDE: 5; 450 INJECTION, SOLUTION INTRAVENOUS at 03:15

## 2019-01-10 RX ADMIN — POTASSIUM CHLORIDE AND SODIUM CHLORIDE: 900; 150 INJECTION, SOLUTION INTRAVENOUS at 11:59

## 2019-01-10 RX ADMIN — ONDANSETRON 4 MG: 2 INJECTION INTRAMUSCULAR; INTRAVENOUS at 15:37

## 2019-01-10 RX ADMIN — PRAVASTATIN SODIUM 10 MG: 10 TABLET ORAL at 21:27

## 2019-01-10 RX ADMIN — BUTALBITAL, ACETAMINOPHEN AND CAFFEINE 1 CAPSULE: 300; 40; 50 CAPSULE ORAL at 11:59

## 2019-01-10 RX ADMIN — TRAZODONE HYDROCHLORIDE 50 MG: 50 TABLET ORAL at 21:27

## 2019-01-10 RX ADMIN — LEVOTHYROXINE SODIUM 50 MCG: 50 TABLET ORAL at 11:59

## 2019-01-10 RX ADMIN — POTASSIUM CHLORIDE 10 MEQ: 7.46 INJECTION, SOLUTION INTRAVENOUS at 04:22

## 2019-01-10 RX ADMIN — SERTRALINE 100 MG: 100 TABLET, FILM COATED ORAL at 11:59

## 2019-01-10 RX ADMIN — ASPIRIN 81 MG 81 MG: 81 TABLET ORAL at 11:58

## 2019-01-10 RX ADMIN — POTASSIUM CHLORIDE 10 MEQ: 7.46 INJECTION, SOLUTION INTRAVENOUS at 05:10

## 2019-01-10 RX ADMIN — ENOXAPARIN SODIUM 40 MG: 40 INJECTION SUBCUTANEOUS at 08:46

## 2019-01-10 RX ADMIN — TOPIRAMATE 25 MG: 25 TABLET, FILM COATED ORAL at 11:59

## 2019-01-10 RX ADMIN — POTASSIUM CHLORIDE AND SODIUM CHLORIDE: 900; 150 INJECTION, SOLUTION INTRAVENOUS at 21:28

## 2019-01-10 ASSESSMENT — PAIN SCALES - GENERAL
PAINLEVEL_OUTOF10: 0

## 2019-01-11 VITALS
TEMPERATURE: 98.2 F | RESPIRATION RATE: 20 BRPM | BODY MASS INDEX: 39.68 KG/M2 | HEART RATE: 96 BPM | WEIGHT: 293 LBS | DIASTOLIC BLOOD PRESSURE: 64 MMHG | OXYGEN SATURATION: 97 % | SYSTOLIC BLOOD PRESSURE: 122 MMHG | HEIGHT: 72 IN

## 2019-01-11 LAB
ANION GAP SERPL CALCULATED.3IONS-SCNC: 13 MEQ/L (ref 7–13)
BUN BLDV-MCNC: 9 MG/DL (ref 6–20)
CALCIUM SERPL-MCNC: 8.7 MG/DL (ref 8.6–10.2)
CHLORIDE BLD-SCNC: 103 MEQ/L (ref 98–107)
CO2: 23 MEQ/L (ref 22–29)
CREAT SERPL-MCNC: 0.67 MG/DL (ref 0.5–0.9)
GFR AFRICAN AMERICAN: >60
GFR NON-AFRICAN AMERICAN: >60
GLUCOSE BLD-MCNC: 270 MG/DL (ref 60–115)
GLUCOSE BLD-MCNC: 323 MG/DL (ref 60–115)
GLUCOSE BLD-MCNC: 339 MG/DL (ref 74–109)
MAGNESIUM: 2.1 MG/DL (ref 1.7–2.3)
PERFORMED ON: ABNORMAL
PERFORMED ON: ABNORMAL
PHOSPHORUS: 3.7 MG/DL (ref 2.5–4.5)
POTASSIUM SERPL-SCNC: 4 MEQ/L (ref 3.5–5.1)
SODIUM BLD-SCNC: 139 MEQ/L (ref 132–144)

## 2019-01-11 PROCEDURE — 6360000002 HC RX W HCPCS: Performed by: INTERNAL MEDICINE

## 2019-01-11 PROCEDURE — 6360000002 HC RX W HCPCS: Performed by: PHYSICIAN ASSISTANT

## 2019-01-11 PROCEDURE — 84100 ASSAY OF PHOSPHORUS: CPT

## 2019-01-11 PROCEDURE — 36415 COLL VENOUS BLD VENIPUNCTURE: CPT

## 2019-01-11 PROCEDURE — 6370000000 HC RX 637 (ALT 250 FOR IP): Performed by: PHYSICIAN ASSISTANT

## 2019-01-11 PROCEDURE — 6370000000 HC RX 637 (ALT 250 FOR IP): Performed by: INTERNAL MEDICINE

## 2019-01-11 PROCEDURE — 80048 BASIC METABOLIC PNL TOTAL CA: CPT

## 2019-01-11 PROCEDURE — 83735 ASSAY OF MAGNESIUM: CPT

## 2019-01-11 RX ORDER — ERYTHROMYCIN ETHYLSUCCINATE 400 MG/1
400 TABLET ORAL
Status: DISCONTINUED | OUTPATIENT
Start: 2019-01-11 | End: 2019-01-11

## 2019-01-11 RX ORDER — ERYTHROMYCIN ETHYLSUCCINATE 400 MG/1
400 TABLET ORAL
Qty: 40 TABLET | Refills: 0 | Status: SHIPPED | OUTPATIENT
Start: 2019-01-11 | End: 2019-01-21

## 2019-01-11 RX ORDER — ERYTHROMYCIN ETHYLSUCCINATE 400 MG/1
400 TABLET ORAL
Status: DISCONTINUED | OUTPATIENT
Start: 2019-01-11 | End: 2019-01-11 | Stop reason: HOSPADM

## 2019-01-11 RX ORDER — ESOMEPRAZOLE MAGNESIUM 40 MG/1
40 CAPSULE, DELAYED RELEASE ORAL
Qty: 30 CAPSULE | Refills: 3 | Status: SHIPPED | OUTPATIENT
Start: 2019-01-11

## 2019-01-11 RX ADMIN — ASPIRIN 81 MG 81 MG: 81 TABLET ORAL at 07:57

## 2019-01-11 RX ADMIN — POTASSIUM CHLORIDE AND SODIUM CHLORIDE: 900; 150 INJECTION, SOLUTION INTRAVENOUS at 07:43

## 2019-01-11 RX ADMIN — INSULIN LISPRO 12 UNITS: 100 INJECTION, SOLUTION INTRAVENOUS; SUBCUTANEOUS at 07:58

## 2019-01-11 RX ADMIN — INSULIN LISPRO 9 UNITS: 100 INJECTION, SOLUTION INTRAVENOUS; SUBCUTANEOUS at 11:56

## 2019-01-11 RX ADMIN — PANTOPRAZOLE SODIUM 40 MG: 40 TABLET, DELAYED RELEASE ORAL at 05:43

## 2019-01-11 RX ADMIN — LEVOTHYROXINE SODIUM 50 MCG: 50 TABLET ORAL at 05:43

## 2019-01-11 RX ADMIN — TOPIRAMATE 25 MG: 25 TABLET, FILM COATED ORAL at 07:57

## 2019-01-11 RX ADMIN — SERTRALINE 100 MG: 100 TABLET, FILM COATED ORAL at 07:57

## 2019-01-11 RX ADMIN — INSULIN GLARGINE 40 UNITS: 100 INJECTION, SOLUTION SUBCUTANEOUS at 09:49

## 2019-01-11 RX ADMIN — ENOXAPARIN SODIUM 40 MG: 40 INJECTION SUBCUTANEOUS at 07:58

## 2019-01-11 RX ADMIN — ERYTHROMYCIN ETHYLSUCCINATE 400 MG: 400 TABLET ORAL at 14:20

## 2019-01-11 ASSESSMENT — PAIN SCALES - GENERAL: PAINLEVEL_OUTOF10: 0

## 2019-01-12 LAB
ORGANISM: ABNORMAL
ORGANISM: ABNORMAL
URINE CULTURE, ROUTINE: ABNORMAL

## 2019-01-15 LAB
BLOOD CULTURE, ROUTINE: NORMAL
CULTURE, BLOOD 2: NORMAL

## 2019-04-17 ENCOUNTER — HOSPITAL ENCOUNTER (OUTPATIENT)
Dept: ULTRASOUND IMAGING | Age: 44
Discharge: HOME | End: 2019-04-19
Payer: COMMERCIAL

## 2019-04-17 DIAGNOSIS — R10.9 ABDOMINAL PAIN, UNSPECIFIED ABDOMINAL LOCATION: ICD-10-CM

## 2019-04-17 PROCEDURE — 76856 US EXAM PELVIC COMPLETE: CPT

## 2019-04-17 PROCEDURE — 76830 TRANSVAGINAL US NON-OB: CPT

## 2019-05-16 ENCOUNTER — HOSPITAL ENCOUNTER (OUTPATIENT)
Dept: NON INVASIVE DIAGNOSTICS | Age: 44
Discharge: HOME | End: 2019-05-16
Payer: COMMERCIAL

## 2019-05-16 LAB
EKG ATRIAL RATE: 72 BPM
EKG P AXIS: 31 DEGREES
EKG P-R INTERVAL: 142 MS
EKG Q-T INTERVAL: 428 MS
EKG QRS DURATION: 86 MS
EKG QTC CALCULATION (BAZETT): 468 MS
EKG R AXIS: 240 DEGREES
EKG T AXIS: 33 DEGREES
EKG VENTRICULAR RATE: 72 BPM

## 2019-05-16 PROCEDURE — 93005 ELECTROCARDIOGRAM TRACING: CPT

## 2019-05-17 PROCEDURE — 93010 ELECTROCARDIOGRAM REPORT: CPT | Performed by: INTERNAL MEDICINE

## 2019-06-21 ENCOUNTER — HOSPITAL ENCOUNTER (OUTPATIENT)
Dept: WOMENS IMAGING | Age: 44
Discharge: HOME | End: 2019-06-23
Payer: COMMERCIAL

## 2019-06-21 DIAGNOSIS — Z13.820 OSTEOPOROSIS SCREENING: ICD-10-CM

## 2019-06-21 PROCEDURE — 77080 DXA BONE DENSITY AXIAL: CPT

## 2019-07-03 ENCOUNTER — TELEPHONE (OUTPATIENT)
Dept: GASTROENTEROLOGY | Age: 44
End: 2019-07-03

## 2020-04-03 ENCOUNTER — APPOINTMENT (OUTPATIENT)
Dept: GENERAL RADIOLOGY | Age: 45
End: 2020-04-03
Payer: COMMERCIAL

## 2020-04-03 ENCOUNTER — HOSPITAL ENCOUNTER (EMERGENCY)
Age: 45
Discharge: HOME | End: 2020-04-04
Payer: COMMERCIAL

## 2020-04-03 ENCOUNTER — APPOINTMENT (OUTPATIENT)
Dept: CT IMAGING | Age: 45
End: 2020-04-03
Payer: COMMERCIAL

## 2020-04-03 LAB
BASOPHILS ABSOLUTE: 0.1 K/UL (ref 0–0.2)
BASOPHILS RELATIVE PERCENT: 1.5 %
EOSINOPHILS ABSOLUTE: 0.5 K/UL (ref 0–0.7)
EOSINOPHILS RELATIVE PERCENT: 5.6 %
GLUCOSE BLD-MCNC: >600 MG/DL (ref 60–115)
HCT VFR BLD CALC: 48.5 % (ref 37–47)
HEMOGLOBIN: 16.3 G/DL (ref 12–16)
LYMPHOCYTES ABSOLUTE: 4 K/UL (ref 1–4.8)
LYMPHOCYTES RELATIVE PERCENT: 41 %
MCH RBC QN AUTO: 31.4 PG (ref 27–31.3)
MCHC RBC AUTO-ENTMCNC: 33.7 % (ref 33–37)
MCV RBC AUTO: 93.1 FL (ref 82–100)
MONOCYTES ABSOLUTE: 0.6 K/UL (ref 0.2–0.8)
MONOCYTES RELATIVE PERCENT: 6.3 %
NEUTROPHILS ABSOLUTE: 4.4 K/UL (ref 1.4–6.5)
NEUTROPHILS RELATIVE PERCENT: 45.6 %
PDW BLD-RTO: 13 % (ref 11.5–14.5)
PERFORMED ON: ABNORMAL
PLATELET # BLD: 231 K/UL (ref 130–400)
POC CREATININE WHOLE BLOOD: 0.8
RBC # BLD: 5.2 M/UL (ref 4.2–5.4)
WBC # BLD: 9.6 K/UL (ref 4.8–10.8)

## 2020-04-03 PROCEDURE — 82553 CREATINE MB FRACTION: CPT

## 2020-04-03 PROCEDURE — 71045 X-RAY EXAM CHEST 1 VIEW: CPT

## 2020-04-03 PROCEDURE — 85025 COMPLETE CBC W/AUTO DIFF WBC: CPT

## 2020-04-03 PROCEDURE — 36415 COLL VENOUS BLD VENIPUNCTURE: CPT

## 2020-04-03 PROCEDURE — 83605 ASSAY OF LACTIC ACID: CPT

## 2020-04-03 PROCEDURE — 81003 URINALYSIS AUTO W/O SCOPE: CPT

## 2020-04-03 PROCEDURE — 80053 COMPREHEN METABOLIC PANEL: CPT

## 2020-04-03 PROCEDURE — 99285 EMERGENCY DEPT VISIT HI MDM: CPT

## 2020-04-03 PROCEDURE — 82550 ASSAY OF CK (CPK): CPT

## 2020-04-03 PROCEDURE — 84484 ASSAY OF TROPONIN QUANT: CPT

## 2020-04-03 PROCEDURE — 83930 ASSAY OF BLOOD OSMOLALITY: CPT

## 2020-04-03 PROCEDURE — 83735 ASSAY OF MAGNESIUM: CPT

## 2020-04-03 RX ORDER — 0.9 % SODIUM CHLORIDE 0.9 %
2000 INTRAVENOUS SOLUTION INTRAVENOUS ONCE
Status: COMPLETED | OUTPATIENT
Start: 2020-04-03 | End: 2020-04-04

## 2020-04-03 RX ORDER — ONDANSETRON 2 MG/ML
4 INJECTION INTRAMUSCULAR; INTRAVENOUS ONCE
Status: COMPLETED | OUTPATIENT
Start: 2020-04-03 | End: 2020-04-04

## 2020-04-03 ASSESSMENT — PAIN DESCRIPTION - LOCATION: LOCATION: HEAD;CHEST

## 2020-04-03 ASSESSMENT — PAIN SCALES - GENERAL: PAINLEVEL_OUTOF10: 10

## 2020-04-03 ASSESSMENT — PAIN DESCRIPTION - PAIN TYPE: TYPE: ACUTE PAIN

## 2020-04-04 ENCOUNTER — APPOINTMENT (OUTPATIENT)
Dept: CT IMAGING | Age: 45
End: 2020-04-04
Payer: COMMERCIAL

## 2020-04-04 VITALS
TEMPERATURE: 97.9 F | RESPIRATION RATE: 22 BRPM | BODY MASS INDEX: 45.99 KG/M2 | HEIGHT: 67 IN | HEART RATE: 89 BPM | SYSTOLIC BLOOD PRESSURE: 152 MMHG | WEIGHT: 293 LBS | OXYGEN SATURATION: 95 % | DIASTOLIC BLOOD PRESSURE: 94 MMHG

## 2020-04-04 LAB
ALBUMIN SERPL-MCNC: 4.3 G/DL (ref 3.5–4.6)
ALP BLD-CCNC: 154 U/L (ref 40–130)
ALT SERPL-CCNC: 47 U/L (ref 0–33)
ANION GAP SERPL CALCULATED.3IONS-SCNC: 18 MEQ/L (ref 9–15)
AST SERPL-CCNC: 35 U/L (ref 0–35)
BILIRUB SERPL-MCNC: 0.4 MG/DL (ref 0.2–0.7)
BILIRUBIN URINE: NEGATIVE
BLOOD, URINE: NEGATIVE
BUN BLDV-MCNC: 14 MG/DL (ref 6–20)
CALCIUM SERPL-MCNC: 9.8 MG/DL (ref 8.5–9.9)
CHLORIDE BLD-SCNC: 89 MEQ/L (ref 95–107)
CK MB: 3.3 NG/ML (ref 0–3.8)
CLARITY: CLEAR
CO2: 22 MEQ/L (ref 20–31)
COLOR: YELLOW
CREAT SERPL-MCNC: 0.94 MG/DL (ref 0.5–0.9)
CREATINE KINASE-MB INDEX: 1.8 % (ref 0–3.5)
EKG ATRIAL RATE: 93 BPM
EKG P AXIS: 38 DEGREES
EKG P-R INTERVAL: 160 MS
EKG Q-T INTERVAL: 368 MS
EKG QRS DURATION: 82 MS
EKG QTC CALCULATION (BAZETT): 457 MS
EKG R AXIS: 223 DEGREES
EKG T AXIS: 13 DEGREES
EKG VENTRICULAR RATE: 93 BPM
GFR AFRICAN AMERICAN: >60
GFR AFRICAN AMERICAN: >60
GFR NON-AFRICAN AMERICAN: >60
GFR NON-AFRICAN AMERICAN: >60
GLOBULIN: 3.2 G/DL (ref 2.3–3.5)
GLUCOSE BLD-MCNC: 412 MG/DL (ref 60–115)
GLUCOSE BLD-MCNC: 484 MG/DL (ref 60–115)
GLUCOSE BLD-MCNC: 741 MG/DL (ref 70–99)
GLUCOSE URINE: >=1000 MG/DL
KETONES, URINE: NEGATIVE MG/DL
LACTIC ACID: 3.9 MMOL/L (ref 0.5–2.2)
LEUKOCYTE ESTERASE, URINE: NEGATIVE
MAGNESIUM: 2.4 MG/DL (ref 1.7–2.4)
NITRITE, URINE: NEGATIVE
OSMOLALITY: 322 MOSM/KG (ref 280–303)
PERFORMED ON: ABNORMAL
PERFORMED ON: ABNORMAL
PERFORMED ON: NORMAL
PH UA: 5.5 (ref 5–9)
POC CREATININE: 0.8 MG/DL (ref 0.6–1.1)
POC SAMPLE TYPE: NORMAL
POTASSIUM SERPL-SCNC: 4.6 MEQ/L (ref 3.4–4.9)
PROTEIN UA: NEGATIVE MG/DL
SODIUM BLD-SCNC: 129 MEQ/L (ref 135–144)
SPECIFIC GRAVITY UA: 1.03 (ref 1–1.03)
TOTAL CK: 182 U/L (ref 0–170)
TOTAL PROTEIN: 7.5 G/DL (ref 6.3–8)
TROPONIN: <0.01 NG/ML (ref 0–0.01)
UROBILINOGEN, URINE: 0.2 E.U./DL

## 2020-04-04 PROCEDURE — 96361 HYDRATE IV INFUSION ADD-ON: CPT

## 2020-04-04 PROCEDURE — 6370000000 HC RX 637 (ALT 250 FOR IP): Performed by: PHYSICIAN ASSISTANT

## 2020-04-04 PROCEDURE — 96375 TX/PRO/DX INJ NEW DRUG ADDON: CPT

## 2020-04-04 PROCEDURE — 96374 THER/PROPH/DIAG INJ IV PUSH: CPT

## 2020-04-04 PROCEDURE — 74176 CT ABD & PELVIS W/O CONTRAST: CPT

## 2020-04-04 PROCEDURE — 93005 ELECTROCARDIOGRAM TRACING: CPT | Performed by: PHYSICIAN ASSISTANT

## 2020-04-04 PROCEDURE — 6360000002 HC RX W HCPCS: Performed by: PHYSICIAN ASSISTANT

## 2020-04-04 PROCEDURE — 2580000003 HC RX 258: Performed by: PHYSICIAN ASSISTANT

## 2020-04-04 PROCEDURE — 96372 THER/PROPH/DIAG INJ SC/IM: CPT

## 2020-04-04 RX ORDER — 0.9 % SODIUM CHLORIDE 0.9 %
1000 INTRAVENOUS SOLUTION INTRAVENOUS ONCE
Status: COMPLETED | OUTPATIENT
Start: 2020-04-04 | End: 2020-04-04

## 2020-04-04 RX ORDER — INSULIN GLARGINE 100 [IU]/ML
14 INJECTION, SOLUTION SUBCUTANEOUS ONCE
Status: COMPLETED | OUTPATIENT
Start: 2020-04-04 | End: 2020-04-04

## 2020-04-04 RX ORDER — ONDANSETRON 4 MG/1
4 TABLET, FILM COATED ORAL 3 TIMES DAILY PRN
Qty: 15 TABLET | Refills: 0 | Status: SHIPPED | OUTPATIENT
Start: 2020-04-04 | End: 2020-04-09

## 2020-04-04 RX ADMIN — SODIUM CHLORIDE 1000 ML: 9 INJECTION, SOLUTION INTRAVENOUS at 01:32

## 2020-04-04 RX ADMIN — INSULIN HUMAN 10 UNITS: 100 INJECTION, SOLUTION PARENTERAL at 03:43

## 2020-04-04 RX ADMIN — HYDROMORPHONE HYDROCHLORIDE 1 MG: 1 INJECTION, SOLUTION INTRAMUSCULAR; INTRAVENOUS; SUBCUTANEOUS at 00:02

## 2020-04-04 RX ADMIN — ONDANSETRON 4 MG: 2 INJECTION INTRAMUSCULAR; INTRAVENOUS at 00:01

## 2020-04-04 RX ADMIN — SODIUM CHLORIDE 2000 ML: 9 INJECTION, SOLUTION INTRAVENOUS at 00:01

## 2020-04-04 RX ADMIN — INSULIN GLARGINE 14 UNITS: 100 INJECTION, SOLUTION SUBCUTANEOUS at 04:41

## 2020-04-04 ASSESSMENT — ENCOUNTER SYMPTOMS
EYE DISCHARGE: 0
RHINORRHEA: 0
COUGH: 0
COLOR CHANGE: 0
CHEST TIGHTNESS: 0
ABDOMINAL PAIN: 1
EYE REDNESS: 0
SHORTNESS OF BREATH: 0
NAUSEA: 1
DIARRHEA: 0
BACK PAIN: 0
VOMITING: 1
SINUS PAIN: 0

## 2020-04-04 ASSESSMENT — PAIN SCALES - GENERAL
PAINLEVEL_OUTOF10: 10
PAINLEVEL_OUTOF10: 10
PAINLEVEL_OUTOF10: 7

## 2020-04-04 ASSESSMENT — PAIN DESCRIPTION - PAIN TYPE
TYPE: ACUTE PAIN
TYPE: ACUTE PAIN

## 2020-04-04 ASSESSMENT — PAIN DESCRIPTION - LOCATION: LOCATION: ABDOMEN;HEAD

## 2020-04-04 NOTE — ED NOTES
Pt laying in bed on phone. OT taken 412. BRIDGET Stuart informed. No s/sym of distress. Call light in reach. Will cont to monitor.       Angelique Boswell RN  04/04/20 1938

## 2020-04-04 NOTE — ED NOTES
Urine sample collected, labeled and sent to the lab per this RN   EKG completed and given to [address], RN  04/04/20 0008

## 2020-04-04 NOTE — ED PROVIDER NOTES
for headaches. Negative for tremors, seizures, syncope and light-headedness. Hematological: Does not bruise/bleed easily. Psychiatric/Behavioral: Negative for agitation and behavioral problems. The patient is not nervous/anxious. Except as noted above the remainder of the review of systems was reviewed and negative. PAST MEDICAL HISTORY     Past Medical History:   Diagnosis Date    Asthma     Chronic abdominal pain     Depression     Hyperlipidemia     Hypertension     NATALEE (obstructive sleep apnea)     Schizophrenia (HCC)     Type II or unspecified type diabetes mellitus without mention of complication, not stated as uncontrolled          SURGICALHISTORY       Past Surgical History:   Procedure Laterality Date    [facility]/14    [facility]    UPPER GASTROINTESTINAL ENDOSCOPY  2/21/14    [facility]         CURRENT MEDICATIONS       Previous Medications    AMLODIPINE BESYLATE PO    Take  by mouth. ARIPIPRAZOLE (ABILIFY PO)    Take  by mouth. ASPIRIN 81 MG TABLET    Take 81 mg by mouth daily. BUPROPION HCL (WELLBUTRIN PO)    Take  by mouth. CYPROHEPTADINE (PERIACTIN) 4 MG TABLET    Take 1 tablet by mouth 3 times daily    ESOMEPRAZOLE (NEXIUM) 40 MG DELAYED RELEASE CAPSULE    Take 1 capsule by mouth every morning (before breakfast)    ETODOLAC (LODINE) 400 MG TABLET    Take 1 tablet by mouth 2 times daily    GLIPIZIDE PO    Take  by mouth. INSULIN GLARGINE (LANTUS SOLOSTAR) 100 UNIT/ML INJECTION PEN    Inject 50 Units into the skin nightly    INSULIN LISPRO (HUMALOG KWIKPEN) 200 UNIT/ML SOPN PEN    Inject 45 Units into the skin 3 times daily (with meals)    LEVOTHYROXINE (SYNTHROID) 50 MCG TABLET    Take 50 mcg by mouth Daily    LISINOPRIL PO    Take  by mouth. METFORMIN HCL PO    Take  by mouth.     MONTELUKAST (SINGULAIR) 10 MG TABLET    Take 10 mg by mouth nightly    ONDANSETRON (ZOFRAN ODT) 4 MG DISINTEGRATING TABLET    Take 1 tablet by mouth every 8 hours as needed for Nausea    PRAVASTATIN SODIUM PO    Take  by mouth. SERTRALINE (ZOLOFT) 100 MG TABLET    Take 100 mg by mouth daily    TOPIRAMATE (TOPAMAX) 25 MG TABLET    Take 25 mg by mouth daily    TRAZODONE (DESYREL) 50 MG TABLET    Take 50 mg by mouth nightly       ALLERGIES     Shellfish-derived products; Amoxicillin; Macrobid [nitrofurantoin monohyd macro]; Reglan [metoclopramide];  Singulair [montelukast]; and Nubain [nalbuphine hcl]    FAMILY HISTORY       Family History   Problem Relation Age of Onset    Other Mother         Diverticulitis          SOCIAL HISTORY       Social History     Socioeconomic History    Marital status: Single     Spouse name: None    Number of children: None    Years of education: None    Highest education level: None   Occupational History    None   Social Needs    Financial resource strain: None    Food insecurity     Worry: None     Inability: None    Transportation needs     Medical: None     Non-medical: None   Tobacco Use    Smoking status: Current Every Day Smoker     Packs/day: 1.00     Types: Cigarettes    Smokeless tobacco: Never Used   Substance and Sexual Activity    Alcohol use: No    Drug use: No    Sexual activity: None   Lifestyle    Physical activity     Days per week: None     Minutes per session: None    Stress: None   Relationships    Social connections     Talks on phone: None     Gets together: None     Attends Mu-ism service: None     Active member of club or organization: None     Attends meetings of clubs or organizations: None     Relationship status: None    Intimate partner violence     Fear of current or ex partner: None     Emotionally abused: None     Physically abused: None     Forced sexual activity: None   Other Topics Concern    None   Social History Narrative    Lives w brother       SCREENINGS              PHYSICAL EXAM    (up to 7 for level 4, 8 or more for level 5)     ED Triage Vitals [04/03/20 2303]   BP Temp Temp Source Pulse normal limits    Narrative:     CALL  [facility] tel. [phone],  glucose results called to and read back by pascual kellogg, 04/04/2020 00:52,  by [facility]   POCT GLUCOSE - Abnormal; Notable for the following components:    POC Glucose >600 (*)     All other components within normal limits   POCT GLUCOSE - Abnormal; Notable for the following components:    POC Glucose 484 (*)     All other components within normal limits   POCT CREATININE - URINE - Normal   TROPONIN   MAGNESIUM    Narrative:     CALL  [facility]  ED tel. [phone],  glucose results called to and read back by pascual kellogg, 04/04/2020 00:52,  by [facility]   [facility] & RELATIVE PERCENT    Narrative:     Sal Topete  ED tel. [phone],  glucose results called to and read back by pascual kellogg, 04/04/2020 00:52,  by [facility]   KETONES, BLOOD   COMPREHENSIVE METABOLIC PANEL       All other labs were within normal range or not returned as of this dictation. EMERGENCY DEPARTMENT COURSE and DIFFERENTIAL DIAGNOSIS/MDM:   Vitals:    Vitals:    04/04/20 0100 04/04/20 0200 04/04/20 0230 04/04/20 0331   BP: 125/64 127/77 (!) 137/111 (!) 140/108   Pulse: 82 77 82 80   Resp: 18 17 18 18   Temp:       TempSrc:       SpO2: 94% 98% (!) 85% 96%   Weight:       Height:           Female who presents with hyperglycemia nausea vomiting abdominal pain. Patient was given 1 mg of Dilaudid 4 mg of Zofran 2 L of IV fluid CBC CMP serum ketones urinalysis serum osmolality and urine ketones all obtained. Patient found to have a serum glucose of 744 but does not appear to be in DKA or HHS. As shown by her labs and her not being tachycardic. CT abdomen with out contrast was obtained that was negative for any infectious etiology. Patient's pain was improved with IV Dilaudid and nausea was improved with IV Zofran. patient was given 10 units of Humalog along with a third L of fluid.  Repeat blood glucose was measured at 484 so another 10 units of short acting insulin was given and blood

## 2020-04-04 NOTE — ED NOTES
Pt medicated per orders and given DC info. PT verbalized understanding of info and speaks broken english. Pt has no further questions or concern.  Pt has steady gait out and is on the phone with family      Mayra Barker RN  04/04/20 9204

## 2020-04-06 PROCEDURE — 93010 ELECTROCARDIOGRAM REPORT: CPT | Performed by: INTERNAL MEDICINE

## 2020-04-09 ENCOUNTER — APPOINTMENT (OUTPATIENT)
Dept: GENERAL RADIOLOGY | Age: 45
End: 2020-04-09
Payer: COMMERCIAL

## 2020-04-09 ENCOUNTER — HOSPITAL ENCOUNTER (EMERGENCY)
Age: 45
Discharge: HOME | End: 2020-04-09
Attending: STUDENT IN AN ORGANIZED HEALTH CARE EDUCATION/TRAINING PROGRAM
Payer: COMMERCIAL

## 2020-04-09 VITALS
RESPIRATION RATE: 23 BRPM | HEART RATE: 81 BPM | DIASTOLIC BLOOD PRESSURE: 76 MMHG | TEMPERATURE: 98.1 F | HEIGHT: 72 IN | WEIGHT: 293 LBS | SYSTOLIC BLOOD PRESSURE: 103 MMHG | OXYGEN SATURATION: 94 % | BODY MASS INDEX: 39.68 KG/M2

## 2020-04-09 LAB
ALBUMIN SERPL-MCNC: 4.2 G/DL (ref 3.5–4.6)
ALP BLD-CCNC: 119 U/L (ref 40–130)
ALT SERPL-CCNC: 50 U/L (ref 0–33)
AMPHETAMINE SCREEN, URINE: NORMAL
ANION GAP SERPL CALCULATED.3IONS-SCNC: 17 MEQ/L (ref 9–15)
AST SERPL-CCNC: 38 U/L (ref 0–35)
BARBITURATE SCREEN URINE: NORMAL
BASE EXCESS VENOUS: 1 (ref -3–3)
BASOPHILS ABSOLUTE: 0.1 K/UL (ref 0–0.2)
BASOPHILS RELATIVE PERCENT: 1.1 %
BENZODIAZEPINE SCREEN, URINE: NORMAL
BILIRUB SERPL-MCNC: 0.5 MG/DL (ref 0.2–0.7)
BILIRUBIN URINE: NEGATIVE
BLOOD, URINE: NEGATIVE
BUN BLDV-MCNC: 15 MG/DL (ref 6–20)
CALCIUM IONIZED: 1.17 MMOL/L (ref 1.12–1.32)
CALCIUM SERPL-MCNC: 9.8 MG/DL (ref 8.5–9.9)
CANNABINOID SCREEN URINE: NORMAL
CHLORIDE BLD-SCNC: 93 MEQ/L (ref 95–107)
CHP ED QC CHECK: YES
CLARITY: CLEAR
CO2: 23 MEQ/L (ref 20–31)
COCAINE METABOLITE SCREEN URINE: NORMAL
COLOR: YELLOW
CREAT SERPL-MCNC: 0.85 MG/DL (ref 0.5–0.9)
EKG ATRIAL RATE: 91 BPM
EKG P AXIS: 38 DEGREES
EKG P-R INTERVAL: 152 MS
EKG Q-T INTERVAL: 378 MS
EKG QRS DURATION: 82 MS
EKG QTC CALCULATION (BAZETT): 464 MS
EKG R AXIS: 242 DEGREES
EKG T AXIS: 20 DEGREES
EKG VENTRICULAR RATE: 91 BPM
EOSINOPHILS ABSOLUTE: 0.6 K/UL (ref 0–0.7)
EOSINOPHILS RELATIVE PERCENT: 6.6 %
GFR AFRICAN AMERICAN: >60
GFR AFRICAN AMERICAN: >60
GFR NON-AFRICAN AMERICAN: >60
GFR NON-AFRICAN AMERICAN: >60
GLOBULIN: 3.4 G/DL (ref 2.3–3.5)
GLUCOSE BLD-MCNC: 419 MG/DL
GLUCOSE BLD-MCNC: 419 MG/DL (ref 60–115)
GLUCOSE BLD-MCNC: 460 MG/DL
GLUCOSE BLD-MCNC: 460 MG/DL (ref 60–115)
GLUCOSE BLD-MCNC: 559 MG/DL
GLUCOSE BLD-MCNC: 559 MG/DL (ref 60–115)
GLUCOSE BLD-MCNC: 568 MG/DL (ref 70–99)
GLUCOSE BLD-MCNC: 641 MG/DL (ref 60–115)
GLUCOSE URINE: >=1000 MG/DL
HCO3 VENOUS: 26.6 MMOL/L (ref 23–29)
HCT VFR BLD CALC: 49.5 % (ref 37–47)
HEMOGLOBIN: 16.7 G/DL (ref 12–16)
HEMOGLOBIN: 18 GM/DL (ref 12–16)
KETONES, URINE: NEGATIVE MG/DL
LACTATE: 5.6 MMOL/L (ref 0.4–2)
LEUKOCYTE ESTERASE, URINE: NEGATIVE
LYMPHOCYTES ABSOLUTE: 2 K/UL (ref 1–4.8)
LYMPHOCYTES RELATIVE PERCENT: 23.2 %
Lab: NORMAL
MAGNESIUM: 2 MG/DL (ref 1.7–2.4)
MCH RBC QN AUTO: 31.1 PG (ref 27–31.3)
MCHC RBC AUTO-ENTMCNC: 33.8 % (ref 33–37)
MCV RBC AUTO: 92 FL (ref 82–100)
METHADONE SCREEN, URINE: NORMAL
MONOCYTES ABSOLUTE: 0.4 K/UL (ref 0.2–0.8)
MONOCYTES RELATIVE PERCENT: 4.9 %
NEUTROPHILS ABSOLUTE: 5.6 K/UL (ref 1.4–6.5)
NEUTROPHILS RELATIVE PERCENT: 64.2 %
NITRITE, URINE: NEGATIVE
OPIATE SCREEN URINE: NORMAL
OXYCODONE URINE: NORMAL
PCO2, VEN: 50.4 MM HG (ref 40–50)
PDW BLD-RTO: 13.2 % (ref 11.5–14.5)
PERFORMED ON: ABNORMAL
PH UA: 5 (ref 5–9)
PH VENOUS: 7.33 (ref 7.35–7.45)
PHENCYCLIDINE SCREEN URINE: NORMAL
PLATELET # BLD: 194 K/UL (ref 130–400)
PO2, VEN: <22 MM HG
POC CHLORIDE: 100 MEQ/L (ref 99–110)
POC CREATININE: 0.9 MG/DL (ref 0.6–1.1)
POC HEMATOCRIT: 53 % (ref 36–48)
POC POTASSIUM: 4.5 MEQ/L (ref 3.5–5.1)
POC SAMPLE TYPE: ABNORMAL
POC SODIUM: 134 MEQ/L (ref 136–145)
POTASSIUM SERPL-SCNC: 5.1 MEQ/L (ref 3.4–4.9)
PROPOXYPHENE SCREEN: NORMAL
PROTEIN UA: NEGATIVE MG/DL
RBC # BLD: 5.38 M/UL (ref 4.2–5.4)
SODIUM BLD-SCNC: 133 MEQ/L (ref 135–144)
SPECIFIC GRAVITY UA: 1.02 (ref 1–1.03)
TCO2 CALC VENOUS: 28 MMOL/L
TOTAL PROTEIN: 7.6 G/DL (ref 6.3–8)
TROPONIN: <0.01 NG/ML (ref 0–0.01)
TROPONIN: <0.01 NG/ML (ref 0–0.01)
URINE REFLEX TO CULTURE: ABNORMAL
UROBILINOGEN, URINE: 0.2 E.U./DL
WBC # BLD: 8.7 K/UL (ref 4.8–10.8)

## 2020-04-09 PROCEDURE — 93005 ELECTROCARDIOGRAM TRACING: CPT | Performed by: STUDENT IN AN ORGANIZED HEALTH CARE EDUCATION/TRAINING PROGRAM

## 2020-04-09 PROCEDURE — 82565 ASSAY OF CREATININE: CPT

## 2020-04-09 PROCEDURE — 84295 ASSAY OF SERUM SODIUM: CPT

## 2020-04-09 PROCEDURE — 99285 EMERGENCY DEPT VISIT HI MDM: CPT

## 2020-04-09 PROCEDURE — 96374 THER/PROPH/DIAG INJ IV PUSH: CPT

## 2020-04-09 PROCEDURE — 83605 ASSAY OF LACTIC ACID: CPT

## 2020-04-09 PROCEDURE — 80053 COMPREHEN METABOLIC PANEL: CPT

## 2020-04-09 PROCEDURE — 82948 REAGENT STRIP/BLOOD GLUCOSE: CPT

## 2020-04-09 PROCEDURE — 85014 HEMATOCRIT: CPT

## 2020-04-09 PROCEDURE — 81003 URINALYSIS AUTO W/O SCOPE: CPT

## 2020-04-09 PROCEDURE — 96376 TX/PRO/DX INJ SAME DRUG ADON: CPT

## 2020-04-09 PROCEDURE — 6360000002 HC RX W HCPCS: Performed by: PHYSICIAN ASSISTANT

## 2020-04-09 PROCEDURE — 2580000003 HC RX 258: Performed by: PHYSICIAN ASSISTANT

## 2020-04-09 PROCEDURE — 82330 ASSAY OF CALCIUM: CPT

## 2020-04-09 PROCEDURE — 85025 COMPLETE CBC W/AUTO DIFF WBC: CPT

## 2020-04-09 PROCEDURE — 36415 COLL VENOUS BLD VENIPUNCTURE: CPT

## 2020-04-09 PROCEDURE — 6370000000 HC RX 637 (ALT 250 FOR IP): Performed by: PHYSICIAN ASSISTANT

## 2020-04-09 PROCEDURE — 84484 ASSAY OF TROPONIN QUANT: CPT

## 2020-04-09 PROCEDURE — 83735 ASSAY OF MAGNESIUM: CPT

## 2020-04-09 PROCEDURE — 71045 X-RAY EXAM CHEST 1 VIEW: CPT

## 2020-04-09 PROCEDURE — 96375 TX/PRO/DX INJ NEW DRUG ADDON: CPT

## 2020-04-09 PROCEDURE — 82800 BLOOD PH: CPT

## 2020-04-09 PROCEDURE — 82435 ASSAY OF BLOOD CHLORIDE: CPT

## 2020-04-09 PROCEDURE — 80307 DRUG TEST PRSMV CHEM ANLYZR: CPT

## 2020-04-09 PROCEDURE — 84132 ASSAY OF SERUM POTASSIUM: CPT

## 2020-04-09 PROCEDURE — 96361 HYDRATE IV INFUSION ADD-ON: CPT

## 2020-04-09 RX ORDER — IBUPROFEN 200 MG
1 TABLET ORAL DAILY
Qty: 100 EACH | Refills: 0 | Status: SHIPPED | OUTPATIENT
Start: 2020-04-09 | End: 2020-08-11 | Stop reason: SDUPTHER

## 2020-04-09 RX ORDER — 0.9 % SODIUM CHLORIDE 0.9 %
1000 INTRAVENOUS SOLUTION INTRAVENOUS ONCE
Status: COMPLETED | OUTPATIENT
Start: 2020-04-09 | End: 2020-04-09

## 2020-04-09 RX ORDER — ETODOLAC 400 MG/1
400 TABLET, FILM COATED ORAL 2 TIMES DAILY
Qty: 14 TABLET | Refills: 0 | Status: SHIPPED | OUTPATIENT
Start: 2020-04-09 | End: 2020-08-11

## 2020-04-09 RX ORDER — DICYCLOMINE HYDROCHLORIDE 10 MG/1
10 CAPSULE ORAL EVERY 6 HOURS PRN
Qty: 20 CAPSULE | Refills: 0 | Status: SHIPPED | OUTPATIENT
Start: 2020-04-09 | End: 2020-08-11

## 2020-04-09 RX ORDER — MORPHINE SULFATE 2 MG/ML
4 INJECTION, SOLUTION INTRAMUSCULAR; INTRAVENOUS ONCE
Status: COMPLETED | OUTPATIENT
Start: 2020-04-09 | End: 2020-04-09

## 2020-04-09 RX ORDER — KETOROLAC TROMETHAMINE 30 MG/ML
30 INJECTION, SOLUTION INTRAMUSCULAR; INTRAVENOUS ONCE
Status: COMPLETED | OUTPATIENT
Start: 2020-04-09 | End: 2020-04-09

## 2020-04-09 RX ORDER — ONDANSETRON 2 MG/ML
4 INJECTION INTRAMUSCULAR; INTRAVENOUS ONCE
Status: COMPLETED | OUTPATIENT
Start: 2020-04-09 | End: 2020-04-09

## 2020-04-09 RX ORDER — ONDANSETRON 4 MG/1
4 TABLET, FILM COATED ORAL EVERY 8 HOURS PRN
Qty: 20 TABLET | Refills: 0 | Status: SHIPPED | OUTPATIENT
Start: 2020-04-09 | End: 2020-08-11 | Stop reason: SDUPTHER

## 2020-04-09 RX ADMIN — KETOROLAC TROMETHAMINE 30 MG: 30 INJECTION, SOLUTION INTRAMUSCULAR at 15:07

## 2020-04-09 RX ADMIN — ONDANSETRON 4 MG: 2 INJECTION INTRAMUSCULAR; INTRAVENOUS at 15:06

## 2020-04-09 RX ADMIN — INSULIN HUMAN 8 UNITS: 100 INJECTION, SOLUTION PARENTERAL at 17:02

## 2020-04-09 RX ADMIN — INSULIN HUMAN 6 UNITS: 100 INJECTION, SOLUTION PARENTERAL at 15:47

## 2020-04-09 RX ADMIN — SODIUM CHLORIDE 1000 ML: 9 INJECTION, SOLUTION INTRAVENOUS at 15:05

## 2020-04-09 RX ADMIN — MORPHINE SULFATE 4 MG: 2 INJECTION, SOLUTION INTRAMUSCULAR; INTRAVENOUS at 16:59

## 2020-04-09 ASSESSMENT — ENCOUNTER SYMPTOMS
VOMITING: 1
ABDOMINAL DISTENTION: 0
ANAL BLEEDING: 0
DIARRHEA: 0
VOICE CHANGE: 0
SORE THROAT: 1
BACK PAIN: 0
COUGH: 1
PHOTOPHOBIA: 0
BLOOD IN STOOL: 0
APNEA: 0
ABDOMINAL PAIN: 1
EYE DISCHARGE: 0
NAUSEA: 1
CHEST TIGHTNESS: 1

## 2020-04-09 ASSESSMENT — PAIN DESCRIPTION - LOCATION
LOCATION: CHEST;ABDOMEN
LOCATION: CHEST;ABDOMEN
LOCATION: CHEST

## 2020-04-09 ASSESSMENT — PAIN SCALES - GENERAL
PAINLEVEL_OUTOF10: 8
PAINLEVEL_OUTOF10: 10

## 2020-04-09 ASSESSMENT — PAIN DESCRIPTION - PAIN TYPE
TYPE: ACUTE PAIN

## 2020-04-09 ASSESSMENT — PAIN DESCRIPTION - ORIENTATION
ORIENTATION: MID
ORIENTATION: MID

## 2020-04-09 ASSESSMENT — PAIN DESCRIPTION - PROGRESSION: CLINICAL_PROGRESSION: GRADUALLY IMPROVING

## 2020-04-09 NOTE — ED PROVIDER NOTES
immunocompromised state. Neurological: Negative for seizures and facial asymmetry. Hematological: Does not bruise/bleed easily. Psychiatric/Behavioral: Negative for behavioral problems, self-injury and sleep disturbance. All other systems reviewed and are negative. Except as noted above the remainder of the review of systems was reviewed and negative. PAST MEDICAL HISTORY     Past Medical History:   Diagnosis Date    Asthma     Chronic abdominal pain     Depression     Hyperlipidemia     Hypertension     NATALEE (obstructive sleep apnea)     Schizophrenia (HCC)     Type II or unspecified type diabetes mellitus without mention of complication, not stated as uncontrolled          SURGICALHISTORY       Past Surgical History:   Procedure Laterality Date    [facility]/21/14    [facility]    UPPER GASTROINTESTINAL ENDOSCOPY  2/21/14    [facility]         CURRENT MEDICATIONS       Previous Medications    AMLODIPINE BESYLATE PO    Take  by mouth. ARIPIPRAZOLE (ABILIFY PO)    Take  by mouth. ASPIRIN 81 MG TABLET    Take 81 mg by mouth daily. BUPROPION HCL (WELLBUTRIN PO)    Take  by mouth. CYPROHEPTADINE (PERIACTIN) 4 MG TABLET    Take 1 tablet by mouth 3 times daily    ESOMEPRAZOLE (NEXIUM) 40 MG DELAYED RELEASE CAPSULE    Take 1 capsule by mouth every morning (before breakfast)    GLIPIZIDE PO    Take  by mouth. INSULIN GLARGINE (LANTUS SOLOSTAR) 100 UNIT/ML INJECTION PEN    Inject 50 Units into the skin nightly    INSULIN LISPRO (HUMALOG KWIKPEN) 200 UNIT/ML SOPN PEN    Inject 45 Units into the skin 3 times daily (with meals)    LEVOTHYROXINE (SYNTHROID) 50 MCG TABLET    Take 50 mcg by mouth Daily    LISINOPRIL PO    Take  by mouth. METFORMIN HCL PO    Take  by mouth. MONTELUKAST (SINGULAIR) 10 MG TABLET    Take 10 mg by mouth nightly    PRAVASTATIN SODIUM PO    Take  by mouth.     SERTRALINE (ZOLOFT) 100 MG TABLET    Take 100 mg by mouth daily    TOPIRAMATE (TOPAMAX) 25 MG TABLET    Take 25 mg by mouth daily    TRAZODONE (DESYREL) 50 MG TABLET    Take 50 mg by mouth nightly       ALLERGIES     Shellfish-derived products; Amoxicillin; Macrobid [nitrofurantoin monohyd macro]; Reglan [metoclopramide];  Singulair [montelukast]; and Nubain [nalbuphine hcl]    FAMILY HISTORY       Family History   Problem Relation Age of Onset    Other Mother         Diverticulitis          SOCIAL HISTORY       Social History     Socioeconomic History    Marital status: Single     Spouse name: None    Number of children: None    Years of education: None    Highest education level: None   Occupational History    None   Social Needs    Financial resource strain: None    Food insecurity     Worry: None     Inability: None    Transportation needs     Medical: None     Non-medical: None   Tobacco Use    Smoking status: Current Every Day Smoker     Packs/day: 1.00     Types: Cigarettes    Smokeless tobacco: Never Used   Substance and Sexual Activity    Alcohol use: No    Drug use: No    Sexual activity: None   Lifestyle    Physical activity     Days per week: None     Minutes per session: None    Stress: None   Relationships    Social connections     Talks on phone: None     Gets together: None     Attends Gnosticist service: None     Active member of club or organization: None     Attends meetings of clubs or organizations: None     Relationship status: None    Intimate partner violence     Fear of current or ex partner: None     Emotionally abused: None     Physically abused: None     Forced sexual activity: None   Other Topics Concern    None   Social History Narrative    Lives w brother       SCREENINGS      @FLOW([id number])@      PHYSICAL EXAM    (up to 7 for level 4, 8 or more for level 5)     ED Triage Vitals   BP Temp Temp Source Pulse Resp SpO2 Height Weight   04/09/20 1355 04/09/20 1355 04/09/20 1355 04/09/20 1355 04/09/20 1355 04/09/20 1355 04/09/20 1355 04/09/20 1358   (!) 127/94 98.1

## 2020-04-09 NOTE — ED NOTES
Resting comfortably. States pain a little better. Water given. Pt has TV remote in hand and is turning on at this time.      Romana Baron RN  04/09/20 3555

## 2020-04-09 NOTE — ED TRIAGE NOTES
Pt to ER with c/o SOB and chest pain, states worse when laying down. Pt reports that she woke up around 1000 this am, was talking to her boyfriend on the phone and started becoming SOB and developed CP. Pt has dry cough. Skin warm and dry.

## 2020-04-10 ENCOUNTER — HOSPITAL ENCOUNTER (EMERGENCY)
Age: 45
Discharge: HOME | End: 2020-04-10
Attending: EMERGENCY MEDICINE
Payer: COMMERCIAL

## 2020-04-10 ENCOUNTER — APPOINTMENT (OUTPATIENT)
Dept: CT IMAGING | Age: 45
End: 2020-04-10
Payer: COMMERCIAL

## 2020-04-10 ENCOUNTER — CARE COORDINATION (OUTPATIENT)
Dept: CARE COORDINATION | Age: 45
End: 2020-04-10

## 2020-04-10 VITALS
BODY MASS INDEX: 44.41 KG/M2 | HEART RATE: 84 BPM | RESPIRATION RATE: 18 BRPM | TEMPERATURE: 98 F | OXYGEN SATURATION: 96 % | HEIGHT: 68 IN | SYSTOLIC BLOOD PRESSURE: 105 MMHG | DIASTOLIC BLOOD PRESSURE: 78 MMHG | WEIGHT: 293 LBS

## 2020-04-10 LAB
ALBUMIN SERPL-MCNC: 4.5 G/DL (ref 3.5–4.6)
ALP BLD-CCNC: 112 U/L (ref 40–130)
ALT SERPL-CCNC: 46 U/L (ref 0–33)
ANION GAP SERPL CALCULATED.3IONS-SCNC: 18 MEQ/L (ref 9–15)
AST SERPL-CCNC: 32 U/L (ref 0–35)
BILIRUB SERPL-MCNC: 0.7 MG/DL (ref 0.2–0.7)
BUN BLDV-MCNC: 15 MG/DL (ref 6–20)
CALCIUM SERPL-MCNC: 9.9 MG/DL (ref 8.5–9.9)
CHLORIDE BLD-SCNC: 99 MEQ/L (ref 95–107)
CO2: 24 MEQ/L (ref 20–31)
CREAT SERPL-MCNC: 0.83 MG/DL (ref 0.5–0.9)
EKG ATRIAL RATE: 87 BPM
EKG P AXIS: 31 DEGREES
EKG P-R INTERVAL: 160 MS
EKG Q-T INTERVAL: 380 MS
EKG QRS DURATION: 88 MS
EKG QTC CALCULATION (BAZETT): 457 MS
EKG R AXIS: 269 DEGREES
EKG T AXIS: 19 DEGREES
EKG VENTRICULAR RATE: 87 BPM
GFR AFRICAN AMERICAN: >60
GFR NON-AFRICAN AMERICAN: >60
GLOBULIN: 3.2 G/DL (ref 2.3–3.5)
GLUCOSE BLD-MCNC: 366 MG/DL (ref 60–115)
GLUCOSE BLD-MCNC: 381 MG/DL (ref 60–115)
GLUCOSE BLD-MCNC: 399 MG/DL (ref 60–115)
GLUCOSE BLD-MCNC: 426 MG/DL (ref 70–99)
PERFORMED ON: ABNORMAL
POTASSIUM SERPL-SCNC: 3.9 MEQ/L (ref 3.4–4.9)
SODIUM BLD-SCNC: 141 MEQ/L (ref 135–144)
TOTAL PROTEIN: 7.7 G/DL (ref 6.3–8)
TROPONIN: <0.01 NG/ML (ref 0–0.01)

## 2020-04-10 PROCEDURE — 36415 COLL VENOUS BLD VENIPUNCTURE: CPT

## 2020-04-10 PROCEDURE — 6360000002 HC RX W HCPCS: Performed by: EMERGENCY MEDICINE

## 2020-04-10 PROCEDURE — 96375 TX/PRO/DX INJ NEW DRUG ADDON: CPT

## 2020-04-10 PROCEDURE — 96372 THER/PROPH/DIAG INJ SC/IM: CPT

## 2020-04-10 PROCEDURE — 96361 HYDRATE IV INFUSION ADD-ON: CPT

## 2020-04-10 PROCEDURE — 70450 CT HEAD/BRAIN W/O DYE: CPT

## 2020-04-10 PROCEDURE — 6370000000 HC RX 637 (ALT 250 FOR IP): Performed by: EMERGENCY MEDICINE

## 2020-04-10 PROCEDURE — 99285 EMERGENCY DEPT VISIT HI MDM: CPT

## 2020-04-10 PROCEDURE — 84484 ASSAY OF TROPONIN QUANT: CPT

## 2020-04-10 PROCEDURE — 80053 COMPREHEN METABOLIC PANEL: CPT

## 2020-04-10 PROCEDURE — 93005 ELECTROCARDIOGRAM TRACING: CPT

## 2020-04-10 PROCEDURE — 96374 THER/PROPH/DIAG INJ IV PUSH: CPT

## 2020-04-10 PROCEDURE — 93010 ELECTROCARDIOGRAM REPORT: CPT | Performed by: INTERNAL MEDICINE

## 2020-04-10 PROCEDURE — 2580000003 HC RX 258: Performed by: EMERGENCY MEDICINE

## 2020-04-10 RX ORDER — ONDANSETRON 2 MG/ML
4 INJECTION INTRAMUSCULAR; INTRAVENOUS ONCE
Status: COMPLETED | OUTPATIENT
Start: 2020-04-10 | End: 2020-04-10

## 2020-04-10 RX ORDER — 0.9 % SODIUM CHLORIDE 0.9 %
1000 INTRAVENOUS SOLUTION INTRAVENOUS ONCE
Status: COMPLETED | OUTPATIENT
Start: 2020-04-10 | End: 2020-04-10

## 2020-04-10 RX ORDER — MELOXICAM 15 MG/1
15 TABLET ORAL DAILY PRN
Qty: 90 TABLET | Refills: 1 | Status: SHIPPED | OUTPATIENT
Start: 2020-04-10

## 2020-04-10 RX ADMIN — INSULIN HUMAN 20 UNITS: 100 INJECTION, SOLUTION PARENTERAL at 07:17

## 2020-04-10 RX ADMIN — LIDOCAINE HYDROCHLORIDE: 20 SOLUTION ORAL; TOPICAL at 06:08

## 2020-04-10 RX ADMIN — HYDROMORPHONE HYDROCHLORIDE 1 MG: 1 INJECTION, SOLUTION INTRAMUSCULAR; INTRAVENOUS; SUBCUTANEOUS at 08:03

## 2020-04-10 RX ADMIN — SODIUM CHLORIDE 1000 ML: 9 INJECTION, SOLUTION INTRAVENOUS at 06:02

## 2020-04-10 RX ADMIN — ONDANSETRON 4 MG: 2 INJECTION INTRAMUSCULAR; INTRAVENOUS at 08:03

## 2020-04-10 ASSESSMENT — PAIN SCALES - GENERAL
PAINLEVEL_OUTOF10: 10

## 2020-04-10 ASSESSMENT — PAIN DESCRIPTION - ORIENTATION
ORIENTATION: MID
ORIENTATION: MID

## 2020-04-10 ASSESSMENT — ENCOUNTER SYMPTOMS
SHORTNESS OF BREATH: 0
COUGH: 0
PHOTOPHOBIA: 0
CHEST TIGHTNESS: 0
EYE DISCHARGE: 0
VOMITING: 0
ABDOMINAL PAIN: 0
WHEEZING: 0
SORE THROAT: 0
ABDOMINAL DISTENTION: 0

## 2020-04-10 ASSESSMENT — PAIN DESCRIPTION - PAIN TYPE
TYPE: ACUTE PAIN
TYPE: ACUTE PAIN

## 2020-04-10 ASSESSMENT — PAIN DESCRIPTION - LOCATION
LOCATION: CHEST
LOCATION: CHEST

## 2020-04-10 ASSESSMENT — PAIN DESCRIPTION - PROGRESSION
CLINICAL_PROGRESSION: NOT CHANGED
CLINICAL_PROGRESSION: NOT CHANGED

## 2020-04-10 NOTE — ED NOTES
Attempt to discharge patient using Malagasy interpretor ipad, patient stated that she is still having chest pain and SOB. Lungs are clear in all lobes. No distress noted. NSR on monitor. Dr. Jamey Kennedy made aware.        Winsome Morataya RN  04/10/20 9986

## 2020-04-10 NOTE — ED NOTES
Dr. Bala Turcios @ bedside with interpretor ipad to discuss care with patient. During discussion patient had a period of what appeared to be anxiety where she grabbed her chest and started crying. Dr. Bala Turcios reassured that patient recent care showed all workups have been negative.              Christiane Marie RN  04/10/20 4199

## 2020-04-10 NOTE — ED NOTES
Pt laying on left side. No acute distress noted at this time.      Mitchell Huffman RN  04/10/20 1023

## 2020-04-10 NOTE — ED PROVIDER NOTES
immunocompromised state. Neurological: Negative for dizziness and syncope. Hematological: Negative for adenopathy. Psychiatric/Behavioral: Negative for agitation and hallucinations. All other systems reviewed and are negative. Except as noted above the remainder of the review of systems was reviewed and negative. PAST MEDICAL HISTORY     Past Medical History:   Diagnosis Date    Asthma     Chronic abdominal pain     Depression     Hyperlipidemia     Hypertension     NATALEE (obstructive sleep apnea)     Schizophrenia (HCC)     Type II or unspecified type diabetes mellitus without mention of complication, not stated as uncontrolled          SURGICALHISTORY       Past Surgical History:   Procedure Laterality Date    [facility]21/14    [facility]    UPPER GASTROINTESTINAL ENDOSCOPY  2/21/14    [facility]         CURRENT MEDICATIONS       Previous Medications    AMLODIPINE BESYLATE PO    Take  by mouth. ARIPIPRAZOLE (ABILIFY PO)    Take  by mouth. ASPIRIN 81 MG TABLET    Take 81 mg by mouth daily. BUPROPION HCL (WELLBUTRIN PO)    Take  by mouth. CYPROHEPTADINE (PERIACTIN) 4 MG TABLET    Take 1 tablet by mouth 3 times daily    DICYCLOMINE (BENTYL) 10 MG CAPSULE    Take 1 capsule by mouth every 6 hours as needed (cramps)    ESOMEPRAZOLE (NEXIUM) 40 MG DELAYED RELEASE CAPSULE    Take 1 capsule by mouth every morning (before breakfast)    ETODOLAC (LODINE) 400 MG TABLET    Take 1 tablet by mouth 2 times daily    GLIPIZIDE PO    Take  by mouth. INSULIN GLARGINE (LANTUS SOLOSTAR) 100 UNIT/ML INJECTION PEN    Inject 50 Units into the skin nightly    INSULIN LISPRO (HUMALOG KWIKPEN) 200 UNIT/ML SOPN PEN    Inject 45 Units into the skin 3 times daily (with meals)    INSULIN SYRINGE .5CC/29G 29G X 1/2\" 0.5 ML MISC    1 each by Does not apply route daily    LEVOTHYROXINE (SYNTHROID) 50 MCG TABLET    Take 50 mcg by mouth Daily    LISINOPRIL PO    Take  by mouth.     METFORMIN HCL PO    Take Emotionally abused: Not on file     Physically abused: Not on file     Forced sexual activity: Not on file   Other Topics Concern    Not on file   Social History Narrative    Lives w brother       SCREENINGS      @[facility]([id number])@      PHYSICAL EXAM    (up to 7 for level 4, 8 or more for level 5)     ED Triage Vitals [04/10/20 0525]   BP Temp Temp Source Pulse Resp SpO2 Height Weight   118/84 98.4 °F (36.9 °C) Oral 84 18 95 % 5' 8\" (1.727 m) (!) 330 lb (149.7 kg)       Physical Exam  Vitals signs and nursing note reviewed. Constitutional:       Appearance: She is well-developed. HENT:      Head: Normocephalic. Right Ear: Tympanic membrane normal.      Left Ear: Tympanic membrane normal.      Nose: Nose normal.      Mouth/Throat:      Mouth: Mucous membranes are moist.   Eyes:      Conjunctiva/sclera: Conjunctivae normal.      Pupils: Pupils are equal, round, and reactive to light. Neck:      Musculoskeletal: Normal range of motion and neck supple. Cardiovascular:      Rate and Rhythm: Normal rate and regular rhythm. Heart sounds: Normal heart sounds. Pulmonary:      Effort: Pulmonary effort is normal.      Breath sounds: Normal breath sounds. Abdominal:      General: Bowel sounds are normal.      Palpations: Abdomen is soft. Tenderness: There is no abdominal tenderness. There is no guarding. Musculoskeletal: Normal range of motion. Skin:     General: Skin is warm and dry. Capillary Refill: Capillary refill takes less than 2 seconds. Neurological:      Mental Status: She is alert and oriented to person, place, and time.    Psychiatric:         Mood and Affect: Mood normal.         DIAGNOSTIC RESULTS     EKG: All EKG's are interpreted by the Emergency Department Physician who either signs or Co-signsthis chart in the absence of a cardiologist.        RADIOLOGY:   Non-plain filmimages such as CT, Ultrasound and MRI are read by the radiologist. Plain radiographic images are portions of this note were completed with a voice recognition program.  Efforts were made to edit the dictations but occasionally words are mis-transcribed.)    Liliana Delgadillo MD (electronically signed)  Attending Emergency Physician         Liliana Delgadillo MD  04/10/20 2418       Liliana Delgadillo MD  04/10/20 3773

## 2020-08-11 ENCOUNTER — APPOINTMENT (OUTPATIENT)
Dept: GENERAL RADIOLOGY | Age: 45
End: 2020-08-11
Payer: COMMERCIAL

## 2020-08-11 ENCOUNTER — HOSPITAL ENCOUNTER (EMERGENCY)
Age: 45
Discharge: HOME | End: 2020-08-11
Payer: COMMERCIAL

## 2020-08-11 VITALS
SYSTOLIC BLOOD PRESSURE: 125 MMHG | OXYGEN SATURATION: 99 % | RESPIRATION RATE: 18 BRPM | HEIGHT: 72 IN | DIASTOLIC BLOOD PRESSURE: 88 MMHG | TEMPERATURE: 98.8 F | WEIGHT: 293 LBS | HEART RATE: 85 BPM | BODY MASS INDEX: 39.68 KG/M2

## 2020-08-11 LAB
ALBUMIN SERPL-MCNC: 4.2 G/DL (ref 3.5–4.6)
ALP BLD-CCNC: 122 U/L (ref 40–130)
ALT SERPL-CCNC: 72 U/L (ref 0–33)
ANION GAP SERPL CALCULATED.3IONS-SCNC: 19 MEQ/L (ref 9–15)
APTT: 27.9 SEC (ref 24.4–36.8)
APTT: >150 SEC (ref 24.4–36.8)
AST SERPL-CCNC: 48 U/L (ref 0–35)
BASOPHILS ABSOLUTE: 0.1 K/UL (ref 0–0.2)
BASOPHILS RELATIVE PERCENT: 1.3 %
BILIRUB SERPL-MCNC: 0.3 MG/DL (ref 0.2–0.7)
BUN BLDV-MCNC: 17 MG/DL (ref 6–20)
CALCIUM SERPL-MCNC: 9.5 MG/DL (ref 8.5–9.9)
CHLORIDE BLD-SCNC: 99 MEQ/L (ref 95–107)
CO2: 20 MEQ/L (ref 20–31)
CREAT SERPL-MCNC: 0.87 MG/DL (ref 0.5–0.9)
EKG ATRIAL RATE: 89 BPM
EKG P AXIS: 42 DEGREES
EKG P-R INTERVAL: 144 MS
EKG Q-T INTERVAL: 376 MS
EKG QRS DURATION: 90 MS
EKG QTC CALCULATION (BAZETT): 457 MS
EKG R AXIS: -60 DEGREES
EKG T AXIS: 12 DEGREES
EKG VENTRICULAR RATE: 89 BPM
EOSINOPHILS ABSOLUTE: 0.3 K/UL (ref 0–0.7)
EOSINOPHILS RELATIVE PERCENT: 3.2 %
GFR AFRICAN AMERICAN: >60
GFR NON-AFRICAN AMERICAN: >60
GLOBULIN: 3 G/DL (ref 2.3–3.5)
GLUCOSE BLD-MCNC: 319 MG/DL (ref 60–115)
GLUCOSE BLD-MCNC: 413 MG/DL (ref 70–99)
HCT VFR BLD CALC: 42.2 % (ref 37–47)
HEMOGLOBIN: 14.2 G/DL (ref 12–16)
INR BLD: 1
INR BLD: >10
LYMPHOCYTES ABSOLUTE: 2.2 K/UL (ref 1–4.8)
LYMPHOCYTES RELATIVE PERCENT: 24 %
MCH RBC QN AUTO: 31.6 PG (ref 27–31.3)
MCHC RBC AUTO-ENTMCNC: 33.7 % (ref 33–37)
MCV RBC AUTO: 93.8 FL (ref 82–100)
MONOCYTES ABSOLUTE: 0.5 K/UL (ref 0.2–0.8)
MONOCYTES RELATIVE PERCENT: 5.3 %
NEUTROPHILS ABSOLUTE: 6.1 K/UL (ref 1.4–6.5)
NEUTROPHILS RELATIVE PERCENT: 66.2 %
PDW BLD-RTO: 14.2 % (ref 11.5–14.5)
PERFORMED ON: ABNORMAL
PLATELET # BLD: 182 K/UL (ref 130–400)
POTASSIUM REFLEX MAGNESIUM: 4.1 MEQ/L (ref 3.4–4.9)
PROTHROMBIN TIME: 13.6 SEC (ref 12.3–14.9)
PROTHROMBIN TIME: 87.7 SEC (ref 12.3–14.9)
RBC # BLD: 4.5 M/UL (ref 4.2–5.4)
SODIUM BLD-SCNC: 138 MEQ/L (ref 135–144)
TOTAL PROTEIN: 7.2 G/DL (ref 6.3–8)
TROPONIN: <0.01 NG/ML (ref 0–0.01)
WBC # BLD: 9.2 K/UL (ref 4.8–10.8)

## 2020-08-11 PROCEDURE — 96375 TX/PRO/DX INJ NEW DRUG ADDON: CPT

## 2020-08-11 PROCEDURE — 96374 THER/PROPH/DIAG INJ IV PUSH: CPT

## 2020-08-11 PROCEDURE — 85730 THROMBOPLASTIN TIME PARTIAL: CPT

## 2020-08-11 PROCEDURE — 84484 ASSAY OF TROPONIN QUANT: CPT

## 2020-08-11 PROCEDURE — 6360000002 HC RX W HCPCS: Performed by: PHYSICIAN ASSISTANT

## 2020-08-11 PROCEDURE — 71045 X-RAY EXAM CHEST 1 VIEW: CPT

## 2020-08-11 PROCEDURE — 36415 COLL VENOUS BLD VENIPUNCTURE: CPT

## 2020-08-11 PROCEDURE — 85610 PROTHROMBIN TIME: CPT

## 2020-08-11 PROCEDURE — 2580000003 HC RX 258: Performed by: PHYSICIAN ASSISTANT

## 2020-08-11 PROCEDURE — U0003 INFECTIOUS AGENT DETECTION BY NUCLEIC ACID (DNA OR RNA); SEVERE ACUTE RESPIRATORY SYNDROME CORONAVIRUS 2 (SARS-COV-2) (CORONAVIRUS DISEASE [COVID-19]), AMPLIFIED PROBE TECHNIQUE, MAKING USE OF HIGH THROUGHPUT TECHNOLOGIES AS DESCRIBED BY CMS-2020-01-R: HCPCS

## 2020-08-11 PROCEDURE — 80053 COMPREHEN METABOLIC PANEL: CPT

## 2020-08-11 PROCEDURE — 93010 ELECTROCARDIOGRAM REPORT: CPT | Performed by: INTERNAL MEDICINE

## 2020-08-11 PROCEDURE — 99285 EMERGENCY DEPT VISIT HI MDM: CPT

## 2020-08-11 PROCEDURE — 93005 ELECTROCARDIOGRAM TRACING: CPT | Performed by: STUDENT IN AN ORGANIZED HEALTH CARE EDUCATION/TRAINING PROGRAM

## 2020-08-11 PROCEDURE — 6370000000 HC RX 637 (ALT 250 FOR IP): Performed by: PHYSICIAN ASSISTANT

## 2020-08-11 PROCEDURE — 85025 COMPLETE CBC W/AUTO DIFF WBC: CPT

## 2020-08-11 RX ORDER — INSULIN LISPRO 200 [IU]/ML
45 INJECTION, SOLUTION SUBCUTANEOUS
Qty: 5 PEN | Refills: 0 | Status: ON HOLD | OUTPATIENT
Start: 2020-08-11 | End: 2021-03-25 | Stop reason: SDUPTHER

## 2020-08-11 RX ORDER — HYDROCODONE BITARTRATE AND ACETAMINOPHEN 5; 325 MG/1; MG/1
1 TABLET ORAL ONCE
Status: COMPLETED | OUTPATIENT
Start: 2020-08-11 | End: 2020-08-11

## 2020-08-11 RX ORDER — ONDANSETRON 4 MG/1
4 TABLET, FILM COATED ORAL EVERY 8 HOURS PRN
Qty: 20 TABLET | Refills: 0 | Status: ON HOLD | OUTPATIENT
Start: 2020-08-11 | End: 2021-03-25 | Stop reason: HOSPADM

## 2020-08-11 RX ORDER — BENZONATATE 100 MG/1
100 CAPSULE ORAL 3 TIMES DAILY PRN
Qty: 20 CAPSULE | Refills: 0 | Status: ON HOLD | OUTPATIENT
Start: 2020-08-11 | End: 2021-03-25 | Stop reason: HOSPADM

## 2020-08-11 RX ORDER — ONDANSETRON 2 MG/ML
4 INJECTION INTRAMUSCULAR; INTRAVENOUS ONCE
Status: COMPLETED | OUTPATIENT
Start: 2020-08-11 | End: 2020-08-11

## 2020-08-11 RX ORDER — MORPHINE SULFATE 2 MG/ML
4 INJECTION, SOLUTION INTRAMUSCULAR; INTRAVENOUS ONCE
Status: DISCONTINUED | OUTPATIENT
Start: 2020-08-11 | End: 2020-08-11

## 2020-08-11 RX ORDER — KETOROLAC TROMETHAMINE 30 MG/ML
30 INJECTION, SOLUTION INTRAMUSCULAR; INTRAVENOUS ONCE
Status: COMPLETED | OUTPATIENT
Start: 2020-08-11 | End: 2020-08-11

## 2020-08-11 RX ORDER — FENTANYL CITRATE 50 UG/ML
25 INJECTION, SOLUTION INTRAMUSCULAR; INTRAVENOUS ONCE
Status: COMPLETED | OUTPATIENT
Start: 2020-08-11 | End: 2020-08-11

## 2020-08-11 RX ORDER — 0.9 % SODIUM CHLORIDE 0.9 %
1000 INTRAVENOUS SOLUTION INTRAVENOUS ONCE
Status: COMPLETED | OUTPATIENT
Start: 2020-08-11 | End: 2020-08-11

## 2020-08-11 RX ORDER — BENZONATATE 100 MG/1
100 CAPSULE ORAL ONCE
Status: COMPLETED | OUTPATIENT
Start: 2020-08-11 | End: 2020-08-11

## 2020-08-11 RX ORDER — IBUPROFEN 200 MG
1 TABLET ORAL DAILY
Qty: 100 EACH | Refills: 0 | Status: SHIPPED | OUTPATIENT
Start: 2020-08-11

## 2020-08-11 RX ADMIN — SODIUM CHLORIDE 1000 ML: 9 INJECTION, SOLUTION INTRAVENOUS at 16:20

## 2020-08-11 RX ADMIN — HYDROCODONE BITARTRATE AND ACETAMINOPHEN 1 TABLET: 5; 325 TABLET ORAL at 15:45

## 2020-08-11 RX ADMIN — ONDANSETRON 4 MG: 2 INJECTION INTRAMUSCULAR; INTRAVENOUS at 14:42

## 2020-08-11 RX ADMIN — FENTANYL CITRATE 25 MCG: 50 INJECTION, SOLUTION INTRAMUSCULAR; INTRAVENOUS at 17:35

## 2020-08-11 RX ADMIN — KETOROLAC TROMETHAMINE 30 MG: 30 INJECTION, SOLUTION INTRAMUSCULAR at 14:42

## 2020-08-11 RX ADMIN — BENZONATATE 100 MG: 100 CAPSULE ORAL at 14:46

## 2020-08-11 RX ADMIN — SODIUM CHLORIDE 1000 ML: 9 INJECTION, SOLUTION INTRAVENOUS at 16:22

## 2020-08-11 RX ADMIN — INSULIN HUMAN 8 UNITS: 100 INJECTION, SOLUTION PARENTERAL at 16:20

## 2020-08-11 ASSESSMENT — PAIN DESCRIPTION - PAIN TYPE
TYPE: ACUTE PAIN

## 2020-08-11 ASSESSMENT — ENCOUNTER SYMPTOMS
PHOTOPHOBIA: 0
NAUSEA: 1
VOMITING: 1
COUGH: 1
SHORTNESS OF BREATH: 1
VOICE CHANGE: 0
EYE DISCHARGE: 0
APNEA: 0
ANAL BLEEDING: 0
ABDOMINAL PAIN: 0
ABDOMINAL DISTENTION: 0

## 2020-08-11 ASSESSMENT — PAIN SCALES - GENERAL
PAINLEVEL_OUTOF10: 10
PAINLEVEL_OUTOF10: 10
PAINLEVEL_OUTOF10: 2
PAINLEVEL_OUTOF10: 7
PAINLEVEL_OUTOF10: 7

## 2020-08-11 ASSESSMENT — PAIN DESCRIPTION - ORIENTATION
ORIENTATION: MID
ORIENTATION: MID
ORIENTATION: RIGHT;LEFT

## 2020-08-11 ASSESSMENT — PAIN DESCRIPTION - LOCATION
LOCATION: CHEST

## 2020-08-11 ASSESSMENT — PAIN DESCRIPTION - DESCRIPTORS
DESCRIPTORS: ACHING
DESCRIPTORS: ACHING

## 2020-08-11 ASSESSMENT — PAIN DESCRIPTION - PROGRESSION
CLINICAL_PROGRESSION: GRADUALLY WORSENING
CLINICAL_PROGRESSION: GRADUALLY WORSENING

## 2020-08-11 ASSESSMENT — PAIN DESCRIPTION - FREQUENCY
FREQUENCY: INTERMITTENT
FREQUENCY: INTERMITTENT

## 2020-08-11 ASSESSMENT — PAIN DESCRIPTION - ONSET
ONSET: ON-GOING
ONSET: ON-GOING

## 2020-08-11 NOTE — ED PROVIDER NOTES
[address] [facility] ED  eMERGENCY dEPARTMENT eNCOUnter      Pt Name: Vincent Knowles  MRN: [id number]  [place] 1975  Date of evaluation: 8/11/2020  Provider: Kevyn Mccallum Dr       Chief Complaint   Patient presents with    Chest Pain     and SOB off and on for 3 days. This episode started at 0300 and hasn't gone away         HISTORY OF PRESENT ILLNESS   (Location/Symptom, Timing/Onset,Context/Setting, Quality, Duration, Modifying Factors, Severity)  Note limiting factors. Vincent Knowles is a 50 y.o. female who presents to the emergency department patient presents with cough 6 days she is coughing harder to vomit chest pain she states she is short of breath states is been there for 3 days has not gone away pain is reproducible to touch or motion. Family translates where needed. Patient has history of asthma patient denies any leg swelling abdominal pain but notes she is nauseous and vomiting with coughing. She has no ill contacts at this time. Symptoms are mild to moderate in severity cough worsens her symptoms nothing improves her symptoms    HPI    NursingNotes were reviewed. REVIEW OF SYSTEMS    (2-9 systems for level 4, 10 or more for level 5)     Review of Systems   Constitutional: Negative for activity change, appetite change, chills, fever and unexpected weight change. HENT: Negative for ear discharge, nosebleeds and voice change. Eyes: Negative for photophobia and discharge. Respiratory: Positive for cough and shortness of breath. Negative for apnea. Cardiovascular: Positive for chest pain. Negative for leg swelling. Gastrointestinal: Positive for nausea and vomiting. Negative for abdominal distention, abdominal pain and anal bleeding. Endocrine: Negative for cold intolerance, heat intolerance and polyphagia. Genitourinary: Negative for genital sores. Musculoskeletal: Negative for joint swelling and neck stiffness.    Skin: Negative for pallor. Allergic/Immunologic: Negative for immunocompromised state. Neurological: Negative for seizures and facial asymmetry. Hematological: Does not bruise/bleed easily. Psychiatric/Behavioral: Negative for behavioral problems, self-injury and sleep disturbance. All other systems reviewed and are negative. Except as noted above the remainder of the review of systems was reviewed and negative. PAST MEDICAL HISTORY     Past Medical History:   Diagnosis Date    Asthma     Chronic abdominal pain     Depression     Hyperlipidemia     Hypertension     NATALEE (obstructive sleep apnea)     Schizophrenia (HCC)     Type II or unspecified type diabetes mellitus without mention of complication, not stated as uncontrolled          SURGICALHISTORY       Past Surgical History:   Procedure Laterality Date    [facility]/21/14    [facility]    UPPER GASTROINTESTINAL ENDOSCOPY  2/21/14    [facility]         CURRENT MEDICATIONS       Previous Medications    AMLODIPINE BESYLATE PO    Take  by mouth. ARIPIPRAZOLE (ABILIFY PO)    Take  by mouth. ASPIRIN 81 MG TABLET    Take 81 mg by mouth daily. BUPROPION HCL (WELLBUTRIN PO)    Take  by mouth. CYPROHEPTADINE (PERIACTIN) 4 MG TABLET    Take 1 tablet by mouth 3 times daily    DICYCLOMINE (BENTYL) 10 MG CAPSULE    Take 1 capsule by mouth every 6 hours as needed (cramps)    ESOMEPRAZOLE (NEXIUM) 40 MG DELAYED RELEASE CAPSULE    Take 1 capsule by mouth every morning (before breakfast)    ETODOLAC (LODINE) 400 MG TABLET    Take 1 tablet by mouth 2 times daily    GLIPIZIDE PO    Take  by mouth.     INSULIN GLARGINE (LANTUS SOLOSTAR) 100 UNIT/ML INJECTION PEN    Inject 50 Units into the skin nightly    INSULIN LISPRO (HUMALOG KWIKPEN) 200 UNIT/ML SOPN PEN    Inject 45 Units into the skin 3 times daily (with meals)    INSULIN SYRINGE .5CC/29G 29G X 1/2\" 0.5 ML MISC    1 each by Does not apply route daily    LEVOTHYROXINE (SYNTHROID) 50 MCG TABLET    Take 50 mcg by mouth Daily    LISINOPRIL PO    Take  by mouth. MELOXICAM (MOBIC) 15 MG TABLET    Take 1 tablet by mouth daily as needed for Pain    METFORMIN HCL PO    Take  by mouth. MONTELUKAST (SINGULAIR) 10 MG TABLET    Take 10 mg by mouth nightly    ONDANSETRON (ZOFRAN) 4 MG TABLET    Take 1 tablet by mouth every 8 hours as needed for Nausea    PRAVASTATIN SODIUM PO    Take  by mouth. SERTRALINE (ZOLOFT) 100 MG TABLET    Take 100 mg by mouth daily    TOPIRAMATE (TOPAMAX) 25 MG TABLET    Take 25 mg by mouth daily    TRAZODONE (DESYREL) 50 MG TABLET    Take 50 mg by mouth nightly       ALLERGIES     Shellfish-derived products; Amoxicillin; Macrobid [nitrofurantoin monohyd macro]; Reglan [metoclopramide];  Singulair [montelukast]; and Nubain [nalbuphine hcl]    FAMILY HISTORY       Family History   Problem Relation Age of Onset    Other Mother         Diverticulitis          SOCIAL HISTORY       Social History     Socioeconomic History    Marital status: Single     Spouse name: Not on file    Number of children: Not on file    Years of education: Not on file    Highest education level: Not on file   Occupational History    Not on file   Social Needs    Financial resource strain: Not on file    Food insecurity     Worry: Not on file     Inability: Not on file    Transportation needs     Medical: Not on file     Non-medical: Not on file   Tobacco Use    Smoking status: Current Every Day Smoker     Packs/day: 1.00     Types: Cigarettes    Smokeless tobacco: Never Used   Substance and Sexual Activity    Alcohol use: No    Drug use: No    Sexual activity: Not on file   Lifestyle    Physical activity     Days per week: Not on file     Minutes per session: Not on file    Stress: Not on file   Relationships    Social connections     Talks on phone: Not on file     Gets together: Not on file     Attends Buddhist service: Not on file     Active member of club or organization: Not on file Attends meetings of clubs or organizations: Not on file     Relationship status: Not on file    Intimate partner violence     Fear of current or ex partner: Not on file     Emotionally abused: Not on file     Physically abused: Not on file     Forced sexual activity: Not on file   Other Topics Concern    Not on file   Social History Narrative    Lives [place]       SCREENINGS      @[facility]([id number])@      PHYSICAL EXAM    (up to 7 for level 4, 8 or more for level 5)     ED Triage Vitals [08/11/20 1347]   BP Temp Temp Source Pulse Resp SpO2 Height Weight   (!) 140/86 98.8 °F (37.1 °C) Oral 100 18 95 % 6' (1.829 m) (!) 320 lb (145.2 kg)       Physical Exam  Vitals signs and nursing note reviewed. Constitutional:       General: She is not in acute distress. Appearance: Normal appearance. She is well-developed. She is obese. She is not ill-appearing, toxic-appearing or diaphoretic. HENT:      Head: Normocephalic and atraumatic. Right Ear: External ear normal.      Left Ear: External ear normal.      Nose: Nose normal.      Mouth/Throat:      Mouth: Mucous membranes are moist.      Pharynx: No oropharyngeal exudate or posterior oropharyngeal erythema. Eyes:      General:         Right eye: No discharge. Left eye: No discharge. Extraocular Movements: Extraocular movements intact. Pupils: Pupils are equal, round, and reactive to light. Neck:      Musculoskeletal: Normal range of motion and neck supple. Cardiovascular:      Rate and Rhythm: Normal rate and regular rhythm. Pulses: Normal pulses. Heart sounds: Normal heart sounds. Pulmonary:      Effort: Pulmonary effort is normal. No respiratory distress. Breath sounds: Normal breath sounds. No stridor. No wheezing or rales. Comments: Patient has diffuse tenderness to palpation. Chest:      Chest wall: Tenderness present. Abdominal:      General: Bowel sounds are normal. There is no distension. Palpations: Abdomen is soft. Tenderness: There is no abdominal tenderness. Musculoskeletal: Normal range of motion. Right lower leg: No edema. Left lower leg: No edema. Skin:     General: Skin is warm. Findings: No erythema. Neurological:      Mental Status: She is alert and oriented to person, place, and time. Psychiatric:         Mood and Affect: Mood normal.         DIAGNOSTIC RESULTS     EKG: All EKG's are interpreted by the Emergency Department Physician who either signs or Co-signsthis chart in the absence of a cardiologist.    EKG normal sinus rhythm rate 89 neg ST segment elevation. RADIOLOGY:   Non-plain filmimages such as CT, Ultrasound and MRI are read by the radiologist. Plain radiographic images are visualized and preliminarily interpreted by the emergency physician with the below findings:    nad    Interpretation per the Radiologist below, if available at the time ofthis note:    XR CHEST PORTABLE   Final Result   Impression:  No radiographic evidence of acute cardiopulmonary disease. Asymmetric prominence right hilar region likely secondary to the abnormally rotated exam. I cannot exclude a mass or malignancy involving the right hilum given this patient's history    of current tobacco usage/smoking. If there is concern for malignancy a CT scan of the chest is recommended.                ED BEDSIDE ULTRASOUND:   Performed by ED Physician - none    LABS:  Labs Reviewed   CBC WITH AUTO DIFFERENTIAL - Abnormal; Notable for the following components:       Result Value    MCH 31.6 (*)     All other components within normal limits   COMPREHENSIVE METABOLIC PANEL W/ REFLEX TO MG FOR LOW K - Abnormal; Notable for the following components:    Anion Gap 19 (*)     Glucose 413 (*)     ALT 72 (*)     AST 48 (*)     All other components within normal limits    Narrative:     CALL  [facility]  ED tel. [phone],  Glucose results called to and read back by Mckenna Wheeler, 08/11/2020 15:44, by  JANEL   TROPONIN   APTT   PROTIME-INR   COVID-19   COVID-19   COVID-19       All other labs were within normal range or not returned as of this dictation. EMERGENCY DEPARTMENT COURSE and DIFFERENTIAL DIAGNOSIS/MDM:   Vitals:    Vitals:    08/11/20 1503 08/11/20 1504 08/11/20 1505 08/11/20 1557   BP:    124/68   Pulse: 95 87 86 74   Resp: 29 28 24 18   Temp:       TempSrc:       SpO2: 97% 96% 99% 100%   Weight:       Height:                MDM  Number of Diagnoses or Management Options  Chest wall pain:   Type 2 diabetes mellitus with hyperglycemia, unspecified whether long term insulin use ([address]):   Viral upper respiratory tract infection with cough:   Diagnosis management comments: Patient has 3-day history of cough which gives her nauseousness & vomiting chest pain and headache. She has no ill contacts at this time family translates where needed. Patient's current vital signs at 3:22 PM are 78 heart rate respirations 20 afebrile 132/60/ 100% on room air negative leg swelling positive cough will add COVID testing. Patient follow-up with primary care physician return to if any symptoms worsen new symptoms develop we will provide isolation information as well. Patient is known to have poorly controlled diabetes today her glucose is 413 will add 2 L of fluid and Humulin and disposition when glucose below 400. Follow-up with primary care physician. Return to if any symptoms worsen new symptoms. Chest pain will also refer patient to cardiologist.  Will refer patient to endocrine. Amount and/or Complexity of Data Reviewed  Clinical lab tests: reviewed and ordered  Tests in the radiology section of CPT®: ordered        CRITICAL CARE TIME       CONSULTS:  None    PROCEDURES:  Unless otherwise noted below, none     Procedures    FINAL IMPRESSION      1. Viral upper respiratory tract infection with cough    2.  Type 2 diabetes mellitus with hyperglycemia, unspecified whether long term insulin use ([address] 3. Chest wall pain          DISPOSITION/PLAN   DISPOSITION        PATIENT REFERRED TO:  Megan Rodriguez, [facility] [facility]  [phone]    Call in 1 day      Dr Orellana/cardiology    Call in 1 day      Your endocrinologist      If symptoms worsen      DISCHARGE MEDICATIONS:  New Prescriptions    No medications on file          (Please note that portions of this note were completed with a voice recognition program.  Efforts were made to edit the dictations but occasionally words are mis-transcribed.)    Ariella White PA-C (electronically signed)  Attending Emergency Physician       Ariella White PA-C  08/14/20 0200

## 2020-08-11 NOTE — ED NOTES
Lab called PTT greater than 150, inr greater than 10, and pt 87.7     Swetha Barnard RN  08/11/20 1556

## 2020-08-12 ENCOUNTER — CARE COORDINATION (OUTPATIENT)
Dept: CARE COORDINATION | Age: 45
End: 2020-08-12

## 2020-08-14 LAB
SARS-COV-2: NOT DETECTED
SOURCE: NORMAL

## 2020-09-21 ENCOUNTER — HOSPITAL ENCOUNTER (EMERGENCY)
Age: 45
Discharge: HOME | End: 2020-09-22
Payer: COMMERCIAL

## 2020-09-21 PROCEDURE — 99285 EMERGENCY DEPT VISIT HI MDM: CPT

## 2020-09-21 RX ORDER — 0.9 % SODIUM CHLORIDE 0.9 %
1000 INTRAVENOUS SOLUTION INTRAVENOUS ONCE
Status: COMPLETED | OUTPATIENT
Start: 2020-09-21 | End: 2020-09-22

## 2020-09-21 ASSESSMENT — PAIN DESCRIPTION - ORIENTATION: ORIENTATION: MID

## 2020-09-21 ASSESSMENT — PAIN DESCRIPTION - LOCATION: LOCATION: CHEST

## 2020-09-21 ASSESSMENT — PAIN SCALES - GENERAL: PAINLEVEL_OUTOF10: 10

## 2020-09-21 ASSESSMENT — PAIN DESCRIPTION - DESCRIPTORS: DESCRIPTORS: ACHING

## 2020-09-21 ASSESSMENT — PAIN DESCRIPTION - PAIN TYPE: TYPE: ACUTE PAIN

## 2020-09-22 ENCOUNTER — APPOINTMENT (OUTPATIENT)
Dept: GENERAL RADIOLOGY | Age: 45
End: 2020-09-22
Payer: COMMERCIAL

## 2020-09-22 VITALS
HEIGHT: 66 IN | SYSTOLIC BLOOD PRESSURE: 128 MMHG | BODY MASS INDEX: 47.09 KG/M2 | RESPIRATION RATE: 24 BRPM | TEMPERATURE: 98 F | HEART RATE: 90 BPM | OXYGEN SATURATION: 97 % | DIASTOLIC BLOOD PRESSURE: 81 MMHG | WEIGHT: 293 LBS

## 2020-09-22 LAB
ALBUMIN SERPL-MCNC: 4.1 G/DL (ref 3.5–4.6)
ALP BLD-CCNC: 104 U/L (ref 40–130)
ALT SERPL-CCNC: 48 U/L (ref 0–33)
ANION GAP SERPL CALCULATED.3IONS-SCNC: 15 MEQ/L (ref 9–15)
AST SERPL-CCNC: 45 U/L (ref 0–35)
BASOPHILS ABSOLUTE: 0.2 K/UL (ref 0–0.2)
BASOPHILS RELATIVE PERCENT: 1.7 %
BILIRUB SERPL-MCNC: 0.4 MG/DL (ref 0.2–0.7)
BUN BLDV-MCNC: 15 MG/DL (ref 6–20)
CALCIUM SERPL-MCNC: 10.1 MG/DL (ref 8.5–9.9)
CHLORIDE BLD-SCNC: 99 MEQ/L (ref 95–107)
CHP ED QC CHECK: NORMAL
CO2: 27 MEQ/L (ref 20–31)
CREAT SERPL-MCNC: 0.78 MG/DL (ref 0.5–0.9)
EKG ATRIAL RATE: 100 BPM
EKG P AXIS: 33 DEGREES
EKG P-R INTERVAL: 152 MS
EKG Q-T INTERVAL: 348 MS
EKG QRS DURATION: 86 MS
EKG QTC CALCULATION (BAZETT): 448 MS
EKG R AXIS: 248 DEGREES
EKG T AXIS: 13 DEGREES
EKG VENTRICULAR RATE: 100 BPM
EOSINOPHILS ABSOLUTE: 0.9 K/UL (ref 0–0.7)
EOSINOPHILS RELATIVE PERCENT: 7.9 %
GFR AFRICAN AMERICAN: >60
GFR NON-AFRICAN AMERICAN: >60
GLOBULIN: 3.1 G/DL (ref 2.3–3.5)
GLUCOSE BLD-MCNC: 321 MG/DL
GLUCOSE BLD-MCNC: 321 MG/DL (ref 60–115)
GLUCOSE BLD-MCNC: 345 MG/DL (ref 70–99)
HCT VFR BLD CALC: 44.9 % (ref 37–47)
HEMOGLOBIN: 15 G/DL (ref 12–16)
LYMPHOCYTES ABSOLUTE: 3.6 K/UL (ref 1–4.8)
LYMPHOCYTES RELATIVE PERCENT: 30.4 %
MAGNESIUM: 1.8 MG/DL (ref 1.7–2.4)
MCH RBC QN AUTO: 30.5 PG (ref 27–31.3)
MCHC RBC AUTO-ENTMCNC: 33.5 % (ref 33–37)
MCV RBC AUTO: 90.9 FL (ref 82–100)
MONOCYTES ABSOLUTE: 0.5 K/UL (ref 0.2–0.8)
MONOCYTES RELATIVE PERCENT: 4.5 %
NEUTROPHILS ABSOLUTE: 6.5 K/UL (ref 1.4–6.5)
NEUTROPHILS RELATIVE PERCENT: 55.5 %
PDW BLD-RTO: 13.9 % (ref 11.5–14.5)
PERFORMED ON: ABNORMAL
PLATELET # BLD: 185 K/UL (ref 130–400)
POTASSIUM SERPL-SCNC: 4.2 MEQ/L (ref 3.4–4.9)
RBC # BLD: 4.94 M/UL (ref 4.2–5.4)
SODIUM BLD-SCNC: 141 MEQ/L (ref 135–144)
TOTAL CK: 72 U/L (ref 0–170)
TOTAL PROTEIN: 7.2 G/DL (ref 6.3–8)
TROPONIN: <0.01 NG/ML (ref 0–0.01)
WBC # BLD: 11.8 K/UL (ref 4.8–10.8)

## 2020-09-22 PROCEDURE — 93010 ELECTROCARDIOGRAM REPORT: CPT | Performed by: INTERNAL MEDICINE

## 2020-09-22 PROCEDURE — 2580000003 HC RX 258: Performed by: NURSE PRACTITIONER

## 2020-09-22 PROCEDURE — 96374 THER/PROPH/DIAG INJ IV PUSH: CPT

## 2020-09-22 PROCEDURE — 85025 COMPLETE CBC W/AUTO DIFF WBC: CPT

## 2020-09-22 PROCEDURE — 36415 COLL VENOUS BLD VENIPUNCTURE: CPT

## 2020-09-22 PROCEDURE — 96375 TX/PRO/DX INJ NEW DRUG ADDON: CPT

## 2020-09-22 PROCEDURE — 71045 X-RAY EXAM CHEST 1 VIEW: CPT

## 2020-09-22 PROCEDURE — 93005 ELECTROCARDIOGRAM TRACING: CPT | Performed by: EMERGENCY MEDICINE

## 2020-09-22 PROCEDURE — 84484 ASSAY OF TROPONIN QUANT: CPT

## 2020-09-22 PROCEDURE — 80053 COMPREHEN METABOLIC PANEL: CPT

## 2020-09-22 PROCEDURE — 83735 ASSAY OF MAGNESIUM: CPT

## 2020-09-22 PROCEDURE — 82550 ASSAY OF CK (CPK): CPT

## 2020-09-22 PROCEDURE — 6360000002 HC RX W HCPCS: Performed by: NURSE PRACTITIONER

## 2020-09-22 RX ORDER — ONDANSETRON 2 MG/ML
4 INJECTION INTRAMUSCULAR; INTRAVENOUS ONCE
Status: COMPLETED | OUTPATIENT
Start: 2020-09-22 | End: 2020-09-22

## 2020-09-22 RX ORDER — MORPHINE SULFATE 2 MG/ML
2 INJECTION, SOLUTION INTRAMUSCULAR; INTRAVENOUS
Status: DISCONTINUED | OUTPATIENT
Start: 2020-09-22 | End: 2020-09-22 | Stop reason: HOSPADM

## 2020-09-22 RX ADMIN — MORPHINE SULFATE 2 MG: 2 INJECTION, SOLUTION INTRAMUSCULAR; INTRAVENOUS at 01:15

## 2020-09-22 RX ADMIN — SODIUM CHLORIDE 1000 ML: 9 INJECTION, SOLUTION INTRAVENOUS at 01:14

## 2020-09-22 RX ADMIN — ONDANSETRON 4 MG: 2 INJECTION INTRAMUSCULAR; INTRAVENOUS at 01:14

## 2020-09-22 ASSESSMENT — ENCOUNTER SYMPTOMS
SORE THROAT: 0
RHINORRHEA: 0
CHEST TIGHTNESS: 1
ABDOMINAL PAIN: 0
NAUSEA: 0
PHOTOPHOBIA: 0
SHORTNESS OF BREATH: 1
VOMITING: 0
DIARRHEA: 1
BACK PAIN: 0
TROUBLE SWALLOWING: 0
WHEEZING: 0
ABDOMINAL DISTENTION: 0
COUGH: 0
CONSTIPATION: 0
COLOR CHANGE: 0

## 2020-09-22 ASSESSMENT — PAIN SCALES - GENERAL: PAINLEVEL_OUTOF10: 10

## 2020-09-22 NOTE — ED TRIAGE NOTES
Pt c/o chest pain x 20 mins after receiving \"bad news\". Pt states she is traveling to [place] in 4 hours and would like a Covid test.  Pt also states her glucometer at home read \"High\". Pt alert and oriented x 4. Skin pink, warm, dry. Respirations even and unlabored. No distress noted at this time.

## 2020-09-22 NOTE — ED PROVIDER NOTES
[address] [facility] ED  EMERGENCY DEPARTMENT ENCOUNTER      Pt Name: Karel Ann  MRN: [id number]  [place] 1975  Date of evaluation: 9/21/2020  Provider: Nichole Narayan       Chief Complaint   Patient presents with    Chest Pain    Hyperglycemia         HISTORY OF PRESENT ILLNESS   (Location/Symptom, Timing/Onset,Context/Setting, Quality, Duration, Modifying Factors, Severity)  Note limiting factors. Karel Ann is a 50 y.o. female who presents to the emergency department for complaint of chest tightness anxiety panic attack following a phone call where she found out that her  had passed away in [facility].  She states this happened immediately after talking on the phone with her daughter. She states she felt an intense tightness in the mid chest radiating across her entire chest she describes the pain moderate heaviness with no modifying factors. She states additionally she gets some nausea and abdominal cramping with this news. She denies any vomiting. She states she has had some loose stools recently but attributes this to other factors not related to this sudden onset of anxiety. She states her blood glucose has been running high when she checked it at home the machine is stated high. She states she does get high blood glucose readings when she is under significant stress. She states that she has also developed a headache at the back of her head after arriving to the ER that she rates as a 6 out of 10 aching pressure from crying. Prior to this she states she was doing well other than having some higher blood sugars over the past few days. She does have medication for this at home. She has not taken any additional insulin prior to arrival to be able. Her daughter is bedside with additional information and helping with some translation as the patient speaks limited [place].     Nursing Notes were reviewed. REVIEW OF SYSTEMS    (2-9 systems for level 4, 10 or more for level 5)     Review of Systems   Constitutional: Negative for activity change, appetite change, chills, diaphoresis, fatigue and fever. HENT: Negative for congestion, ear pain, postnasal drip, rhinorrhea, sore throat and trouble swallowing. Eyes: Negative for photophobia and visual disturbance. Respiratory: Positive for chest tightness and shortness of breath. Negative for cough and wheezing. Cardiovascular: Positive for chest pain. Negative for palpitations. Gastrointestinal: Positive for diarrhea. Negative for abdominal distention, abdominal pain, constipation, nausea and vomiting. Genitourinary: Negative for difficulty urinating, dysuria, flank pain, frequency and urgency. Musculoskeletal: Negative for arthralgias, back pain, myalgias, neck pain and neck stiffness. Skin: Negative for color change and rash. Neurological: Negative for dizziness, tremors, seizures, syncope, speech difficulty, weakness, light-headedness, numbness and headaches. Psychiatric/Behavioral: Positive for dysphoric mood. Negative for hallucinations, self-injury and suicidal ideas. The patient is nervous/anxious. The patient is not hyperactive. Except as noted above the remainder of the review of systems was reviewed and negative.        PAST MEDICAL HISTORY     Past Medical History:   Diagnosis Date    Asthma     Chronic abdominal pain     Depression     Hyperlipidemia     Hypertension     NATALEE (obstructive sleep apnea)     Schizophrenia (HCC)     Type II or unspecified type diabetes mellitus without mention of complication, not stated as uncontrolled      Past Surgical History:   Procedure Laterality Date    [facility]/14    [facility]    UPPER GASTROINTESTINAL ENDOSCOPY  2/21/14    [facility]     Social History     Socioeconomic History    Marital status: Single     Spouse name: None    Number of children: None    Years of education: None    Highest education level: None   Occupational History    None   Social Needs    Financial resource strain: None    Food insecurity     Worry: None     Inability: None    Transportation needs     Medical: None     Non-medical: None   Tobacco Use    Smoking status: Current Every Day Smoker     Packs/day: 1.00     Types: Cigarettes    Smokeless tobacco: Never Used   Substance and Sexual Activity    Alcohol use: No    Drug use: No    Sexual activity: None   Lifestyle    Physical activity     Days per week: None     Minutes per session: None    Stress: None   Relationships    Social connections     Talks on phone: None     Gets together: None     Attends [employer]ist service: None     Active member of club or organization: None     Attends meetings of clubs or organizations: None     Relationship status: None    Intimate partner violence     Fear of current or ex partner: None     Emotionally abused: None     Physically abused: None     Forced sexual activity: None   Other Topics Concern    None   Social History Narrative    Lives w brother       SCREENINGS             PHYSICAL EXAM    (up to 7 for level 4, 8 or more for level 5)     ED Triage Vitals [09/21/20 2342]   BP Temp Temp Source Pulse Resp SpO2 Height Weight   137/89 98 °F (36.7 °C) Temporal 72 20 97 % 5' 6\" (1.676 m) (!) 330 lb (149.7 kg)       Physical Exam  Constitutional:       General: She is not in acute distress. Appearance: Normal appearance. She is obese. She is not ill-appearing, toxic-appearing or diaphoretic. HENT:      Head: Normocephalic and atraumatic. Right Ear: External ear normal.      Left Ear: External ear normal.      Nose: Nose normal.      Mouth/Throat:      Mouth: Mucous membranes are moist.      Pharynx: Oropharynx is clear. No oropharyngeal exudate or posterior oropharyngeal erythema. Eyes:      General:         Right eye: No discharge. Left eye: No discharge.       Extraocular Movements: Extraocular movements intact. Conjunctiva/sclera: Conjunctivae normal.      Pupils: Pupils are equal, round, and reactive to light. Neck:      Musculoskeletal: Normal range of motion and neck supple. No neck rigidity or muscular tenderness. Cardiovascular:      Rate and Rhythm: Normal rate and regular rhythm. Pulses: Normal pulses. Pulmonary:      Effort: Pulmonary effort is normal.      Breath sounds: Normal breath sounds. Abdominal:      General: Bowel sounds are normal. There is no distension. Palpations: Abdomen is soft. Tenderness: There is no abdominal tenderness. Musculoskeletal: Normal range of motion. General: No tenderness or signs of injury. Skin:     General: Skin is warm and dry. Neurological:      General: No focal deficit present. Mental Status: She is alert and oriented to person, place, and time. Mental status is at baseline. Cranial Nerves: No cranial nerve deficit. Sensory: No sensory deficit. Motor: No weakness. Coordination: Coordination normal.   Psychiatric:         Attention and Perception: Attention and perception normal.         Mood and Affect: Mood is anxious. Affect is tearful. Speech: Speech is delayed. Behavior: Behavior is withdrawn. Thought Content:  Thought content normal.         Cognition and Memory: Cognition and memory normal.         Judgment: Judgment normal.         RESULTS     EKG: All EKG's are interpreted by the Emergency Department Physician who either signs or Co-signsthis chart in the absence of a cardiologist.    Sinus tachycardia 105 bpm no acute ST elevation or deviation no ectopy QTc 486 ms noted right superior axis deviation    RADIOLOGY:   Non-plain filmimages such as CT, Ultrasound and MRI are read by the radiologist. Plain radiographic images are visualized and preliminarily interpreted by the emergency physician with the below findings:    Portable chest x-ray negative for acute process no focal traits or effusions    Interpretation per the Radiologist below, if available at the time ofthis note:    XR CHEST PORTABLE    (Results Pending)         ED BEDSIDE ULTRASOUND:   Performed by ED Physician - none    LABS:  Labs Reviewed   CBC WITH AUTO DIFFERENTIAL - Abnormal; Notable for the following components:       Result Value    WBC 11.8 (*)     Eosinophils Absolute 0.9 (*)     All other components within normal limits   COMPREHENSIVE METABOLIC PANEL - Abnormal; Notable for the following components:    Glucose 345 (*)     Calcium 10.1 (*)     ALT 48 (*)     AST 45 (*)     All other components within normal limits   POCT GLUCOSE - Abnormal; Notable for the following components:    POC Glucose 321 (*)     All other components within normal limits   POCT GLUCOSE - Normal   TROPONIN   CK   MAGNESIUM   URINE RT REFLEX TO CULTURE       All other labs were within normal range or not returned as of this dictation. EMERGENCY DEPARTMENT COURSE and DIFFERENTIAL DIAGNOSIS/MDM:   Vitals:    Vitals:    09/21/20 2342 09/22/20 0115 09/22/20 0130 09/22/20 0228   BP: 137/89 131/87 (!) 142/88 128/81   Pulse: 72 99 99 90   Resp: 20 17 24 24   Temp: 98 °F (36.7 °C)      TempSrc: Temporal      SpO2: 97% 95% 97% 97%   Weight: (!) 330 lb (149.7 kg)      Height: 5' 6\" (1.676 m)               MDM patient presents afebrile nontoxic noted to have slightly elevated blood pressure likely due to anxiety and complex grieving. She presents following the news of her 's death in [facility].  She complains of a chest tightness heaviness type pain and anxiety symptoms. She does have a history of anxiety. Additionally states that her blood glucose runs high and is concerned that it is high at this time however the lab work shows ablation his blood glucose is over 300 and will be given IV fluids for this as she does have medications at home.   Due to the pain in her chest and elevated blood pressure patient was given IV morphine and states that this helped significantly has resolved the hypertension and her pain is resolved. Patient's troponin is negative there is no EKG changes. Chest x-ray shows no acute changes. Remainder lab work and physical exam grossly unremarkable. On reevaluation after she has been speaking with her family on the phone and her daughter is in the room she states she is feeling much better her symptoms have resolved she still feels anxious and is tearful would like to be discharged to be with her family. Patient does appear stable for discharge at this time. Directed to follow primary care provider soon as possible for evaluation return to the ER for any onset of new concerning symptoms or condition. Patient family verbalized understandable given instruction education. CRITICAL CARE TIME       CONSULTS:  None    PROCEDURES:  Unless otherwise noted below, none     Procedures    FINAL IMPRESSION      1. Anxiety state    2. Grieving    3.  Type 2 diabetes mellitus with hyperglycemia, with long-term current use of insulin [facility]          DISPOSITION/PLAN   DISPOSITION        PATIENT REFERRED TO:  Vijay Lombardi, [address] [place] [phone]    Call in 1 day        DISCHARGE MEDICATIONS:  New Prescriptions    No medications on file          (Please notethat portions of this note were completed with a voice recognition program.  Efforts were made to edit the dictations but occasionally words are mis-transcribed.)    DANIEL Daily CNP (electronically signed)  Attending Emergency Physician         DANIEL Daily CNP  09/22/20 5518

## 2020-09-22 NOTE — ED NOTES
Ava GILL at bedside.  Discharge instructions given Pt to Er exit     Janus Aschoff, RN  09/22/20 0517

## 2020-09-22 NOTE — ED NOTES
Pt wants to go home, pt states she feels fine. 0 distress, 0 pain, 0 n&v, 0 sob, 0 distress, a&ox4, skin w/d/pink, pulses palp, sr on monitor.      Zoë Bower RN  09/22/20 6493

## 2020-11-14 ENCOUNTER — HOSPITAL ENCOUNTER (EMERGENCY)
Age: 45
Discharge: HOME | End: 2020-11-14
Payer: COMMERCIAL

## 2020-11-14 ENCOUNTER — APPOINTMENT (OUTPATIENT)
Dept: CT IMAGING | Age: 45
End: 2020-11-14
Payer: COMMERCIAL

## 2020-11-14 ENCOUNTER — APPOINTMENT (OUTPATIENT)
Dept: GENERAL RADIOLOGY | Age: 45
End: 2020-11-14
Payer: COMMERCIAL

## 2020-11-14 VITALS
TEMPERATURE: 99 F | OXYGEN SATURATION: 94 % | HEART RATE: 75 BPM | SYSTOLIC BLOOD PRESSURE: 123 MMHG | BODY MASS INDEX: 54.88 KG/M2 | DIASTOLIC BLOOD PRESSURE: 79 MMHG | RESPIRATION RATE: 14 BRPM | WEIGHT: 293 LBS

## 2020-11-14 LAB
ALBUMIN SERPL-MCNC: 4.8 G/DL (ref 3.5–4.6)
ALP BLD-CCNC: 121 U/L (ref 40–130)
ALT SERPL-CCNC: 51 U/L (ref 0–33)
ANION GAP SERPL CALCULATED.3IONS-SCNC: 15 MEQ/L (ref 9–15)
AST SERPL-CCNC: 40 U/L (ref 0–35)
BASOPHILS ABSOLUTE: 0.1 K/UL (ref 0–0.2)
BASOPHILS RELATIVE PERCENT: 1.1 %
BETA-HYDROXYBUTYRATE: 1 MG/DL (ref 0.2–2.8)
BILIRUB SERPL-MCNC: 0.6 MG/DL (ref 0.2–0.7)
BUN BLDV-MCNC: 15 MG/DL (ref 6–20)
CALCIUM SERPL-MCNC: 10.3 MG/DL (ref 8.5–9.9)
CHLORIDE BLD-SCNC: 95 MEQ/L (ref 95–107)
CHP ED QC CHECK: NORMAL
CHP ED QC CHECK: YES
CO2: 26 MEQ/L (ref 20–31)
CREAT SERPL-MCNC: 0.92 MG/DL (ref 0.5–0.9)
EKG ATRIAL RATE: 88 BPM
EKG P AXIS: 31 DEGREES
EKG P-R INTERVAL: 150 MS
EKG Q-T INTERVAL: 388 MS
EKG QRS DURATION: 88 MS
EKG QTC CALCULATION (BAZETT): 469 MS
EKG R AXIS: 221 DEGREES
EKG T AXIS: 14 DEGREES
EKG VENTRICULAR RATE: 88 BPM
EOSINOPHILS ABSOLUTE: 0.3 K/UL (ref 0–0.7)
EOSINOPHILS RELATIVE PERCENT: 4.4 %
GFR AFRICAN AMERICAN: >60
GFR NON-AFRICAN AMERICAN: >60
GLOBULIN: 3.9 G/DL (ref 2.3–3.5)
GLUCOSE BLD-MCNC: 316 MG/DL
GLUCOSE BLD-MCNC: 316 MG/DL (ref 60–115)
GLUCOSE BLD-MCNC: 440 MG/DL
GLUCOSE BLD-MCNC: 440 MG/DL (ref 60–115)
GLUCOSE BLD-MCNC: 469 MG/DL (ref 70–99)
HCG, URINE, POC: NEGATIVE
HCT VFR BLD CALC: 47.3 % (ref 37–47)
HEMOGLOBIN: 16.2 G/DL (ref 12–16)
LYMPHOCYTES ABSOLUTE: 3 K/UL (ref 1–4.8)
LYMPHOCYTES RELATIVE PERCENT: 38.2 %
Lab: NORMAL
MAGNESIUM: 2.2 MG/DL (ref 1.7–2.4)
MCH RBC QN AUTO: 30.6 PG (ref 27–31.3)
MCHC RBC AUTO-ENTMCNC: 34.1 % (ref 33–37)
MCV RBC AUTO: 89.7 FL (ref 82–100)
MONOCYTES ABSOLUTE: 0.3 K/UL (ref 0.2–0.8)
MONOCYTES RELATIVE PERCENT: 4.5 %
NEGATIVE QC PASS/FAIL: NORMAL
NEUTROPHILS ABSOLUTE: 4 K/UL (ref 1.4–6.5)
NEUTROPHILS RELATIVE PERCENT: 51.8 %
PDW BLD-RTO: 13.6 % (ref 11.5–14.5)
PERFORMED ON: ABNORMAL
PERFORMED ON: ABNORMAL
PLATELET # BLD: 194 K/UL (ref 130–400)
POSITIVE QC PASS/FAIL: NORMAL
POTASSIUM SERPL-SCNC: 4.3 MEQ/L (ref 3.4–4.9)
RBC # BLD: 5.28 M/UL (ref 4.2–5.4)
SODIUM BLD-SCNC: 136 MEQ/L (ref 135–144)
TOTAL CK: 75 U/L (ref 0–170)
TOTAL PROTEIN: 8.7 G/DL (ref 6.3–8)
TROPONIN: <0.01 NG/ML (ref 0–0.01)
WBC # BLD: 7.8 K/UL (ref 4.8–10.8)

## 2020-11-14 PROCEDURE — 6360000002 HC RX W HCPCS: Performed by: PHYSICIAN ASSISTANT

## 2020-11-14 PROCEDURE — 99284 EMERGENCY DEPT VISIT MOD MDM: CPT

## 2020-11-14 PROCEDURE — 84484 ASSAY OF TROPONIN QUANT: CPT

## 2020-11-14 PROCEDURE — 36415 COLL VENOUS BLD VENIPUNCTURE: CPT

## 2020-11-14 PROCEDURE — 82010 KETONE BODYS QUAN: CPT

## 2020-11-14 PROCEDURE — 71045 X-RAY EXAM CHEST 1 VIEW: CPT

## 2020-11-14 PROCEDURE — 6370000000 HC RX 637 (ALT 250 FOR IP): Performed by: PHYSICIAN ASSISTANT

## 2020-11-14 PROCEDURE — 70450 CT HEAD/BRAIN W/O DYE: CPT

## 2020-11-14 PROCEDURE — 82550 ASSAY OF CK (CPK): CPT

## 2020-11-14 PROCEDURE — 80053 COMPREHEN METABOLIC PANEL: CPT

## 2020-11-14 PROCEDURE — 96375 TX/PRO/DX INJ NEW DRUG ADDON: CPT

## 2020-11-14 PROCEDURE — 83735 ASSAY OF MAGNESIUM: CPT

## 2020-11-14 PROCEDURE — 96361 HYDRATE IV INFUSION ADD-ON: CPT

## 2020-11-14 PROCEDURE — 96374 THER/PROPH/DIAG INJ IV PUSH: CPT

## 2020-11-14 PROCEDURE — 93005 ELECTROCARDIOGRAM TRACING: CPT | Performed by: EMERGENCY MEDICINE

## 2020-11-14 PROCEDURE — 85025 COMPLETE CBC W/AUTO DIFF WBC: CPT

## 2020-11-14 PROCEDURE — 2580000003 HC RX 258: Performed by: PHYSICIAN ASSISTANT

## 2020-11-14 RX ORDER — ASPIRIN 81 MG/1
324 TABLET, CHEWABLE ORAL ONCE
Status: COMPLETED | OUTPATIENT
Start: 2020-11-14 | End: 2020-11-14

## 2020-11-14 RX ORDER — 0.9 % SODIUM CHLORIDE 0.9 %
30 INTRAVENOUS SOLUTION INTRAVENOUS ONCE
Status: COMPLETED | OUTPATIENT
Start: 2020-11-14 | End: 2020-11-14

## 2020-11-14 RX ORDER — ONDANSETRON 2 MG/ML
4 INJECTION INTRAMUSCULAR; INTRAVENOUS ONCE
Status: COMPLETED | OUTPATIENT
Start: 2020-11-14 | End: 2020-11-14

## 2020-11-14 RX ORDER — INSULIN LISPRO 200 [IU]/ML
45 INJECTION, SOLUTION SUBCUTANEOUS
Qty: 2 PEN | Refills: 3 | Status: ON HOLD | OUTPATIENT
Start: 2020-11-14 | End: 2021-03-25 | Stop reason: HOSPADM

## 2020-11-14 RX ORDER — KETOROLAC TROMETHAMINE 30 MG/ML
30 INJECTION, SOLUTION INTRAMUSCULAR; INTRAVENOUS ONCE
Status: COMPLETED | OUTPATIENT
Start: 2020-11-14 | End: 2020-11-14

## 2020-11-14 RX ADMIN — ASPIRIN 324 MG: 81 TABLET, CHEWABLE ORAL at 06:31

## 2020-11-14 RX ADMIN — SODIUM CHLORIDE 4626 ML: 9 INJECTION, SOLUTION INTRAVENOUS at 06:31

## 2020-11-14 RX ADMIN — ONDANSETRON 4 MG: 2 INJECTION INTRAMUSCULAR; INTRAVENOUS at 06:31

## 2020-11-14 RX ADMIN — KETOROLAC TROMETHAMINE 30 MG: 30 INJECTION, SOLUTION INTRAMUSCULAR at 07:45

## 2020-11-14 RX ADMIN — INSULIN HUMAN 10 UNITS: 100 INJECTION, SOLUTION PARENTERAL at 08:38

## 2020-11-14 ASSESSMENT — PAIN DESCRIPTION - PAIN TYPE
TYPE: ACUTE PAIN
TYPE: ACUTE PAIN

## 2020-11-14 ASSESSMENT — ENCOUNTER SYMPTOMS
COUGH: 0
DIARRHEA: 0
BACK PAIN: 0
CHEST TIGHTNESS: 0
NAUSEA: 1
SHORTNESS OF BREATH: 0
EYE REDNESS: 0
COLOR CHANGE: 0
SINUS PAIN: 0
VOMITING: 0
ABDOMINAL PAIN: 0
EYE DISCHARGE: 0
RHINORRHEA: 0

## 2020-11-14 ASSESSMENT — PAIN DESCRIPTION - LOCATION
LOCATION: CHEST
LOCATION: CHEST

## 2020-11-14 ASSESSMENT — PAIN DESCRIPTION - ORIENTATION: ORIENTATION: MID

## 2020-11-14 ASSESSMENT — PAIN SCALES - GENERAL
PAINLEVEL_OUTOF10: 5
PAINLEVEL_OUTOF10: 10

## 2020-11-14 ASSESSMENT — PAIN DESCRIPTION - DESCRIPTORS: DESCRIPTORS: PRESSURE

## 2020-11-14 NOTE — ED NOTES
Bed: 06  Expected date:   Expected time:   Means of arrival:   Comments:  45yr female, dizzy, confused, OT HI, #18 with IVF     Amanda Mauricio, RN  11/14/20 2431

## 2020-11-14 NOTE — ED NOTES
Discharge instructions provided in both [place] and [facility] [place] Attempting to set up ride home for patient.       Vaishnavi Molina RN  11/14/20 3253

## 2020-11-14 NOTE — ED NOTES
Patient to CT per cart. Patient able to state name and birthday appropriately. Patient returned to room. Patient placed on bedside monitor. Blood drawn for Beta-hydroxybutyrate. Medicated as ordered for pain.       Aldo Espinoza RN  11/14/20 5759

## 2020-11-14 NOTE — ED TRIAGE NOTES
Patient arrived to ER via EMS for dizziness, high blood sugar and chest pain. Patient states she is unsure when the patient started. Patients blood sugar read high on her at home meter. Patients daughter states that she is making odd statements at home. Patient is Uz[place] speaking only. Resp even and unlabored. Skin warm, dry and intact. She is alert and oriented x2.

## 2020-11-14 NOTE — ED NOTES
BRIDGET Joya at bedside to assess patient. Ipad used to interpret patient.         Charan King RN  11/14/20 7503

## 2020-11-14 NOTE — ED NOTES
OT checked by this tech. OT is [facility] aware.       Tico Avendano  11/14/20 9739

## 2020-11-14 NOTE — ED PROVIDER NOTES
[address] [facility]  EMERGENCY DEPARTMENT ENCOUNTER      Pt Name: Jody Desouza  MRN: [id number]  [place] 1975  Date of evaluation: 11/14/2020  Provider: Sanket Merrill PA-C    CHIEF COMPLAINT       Chief Complaint   Patient presents with    Dizziness    Hyperglycemia         HISTORY OF PRESENT ILLNESS   (Location/Symptom, Timing/Onset, Context/Setting, Quality, Duration, Modifying Factors, Severity)  Note limiting factors. Jody Desouza is a 50 y.o. female who presents to the emergency department altered mental status and hyperglycemia. Patient is only Speaking accompanied by daughter. Patient was brought to the ED via EMS. On arrival blood sugar was 440. Patient states she does not know her own birthday when asked by the  service however answered every other question correctly. Asking for pain medication. Stating that her chest, headache, leg hurt. Chest pain is \" strong\", constant it has come and gone but today is it is not going away. Patient states it is worse with touching and was very sensitive to me touching her chest.  Patient states that the next the pain better. Patient denies trouble breathing. Patient denies diarrhea vomiting she does endorse some nausea. HPI    Nursing Notes were reviewed. REVIEW OF SYSTEMS    (2-9 systems for level 4, 10 or more for level 5)     Review of Systems   Constitutional: Negative for activity change, chills, fatigue and fever. HENT: Negative for congestion, hearing loss, rhinorrhea, sinus pain, sneezing and tinnitus. Eyes: Negative for discharge, redness and visual disturbance. Respiratory: Negative for cough, chest tightness and shortness of breath. Cardiovascular: Negative for chest pain and leg swelling. Gastrointestinal: Positive for nausea. Negative for abdominal pain, diarrhea and vomiting. Endocrine: Negative for heat intolerance, polydipsia and polyuria. Hyperglycemia. Genitourinary: Negative for dysuria, flank pain, hematuria and urgency. Musculoskeletal: Negative for back pain, joint swelling and myalgias. Skin: Negative for color change, rash and wound. Allergic/Immunologic: Negative for immunocompromised state. Neurological: Positive for dizziness. Negative for tremors, seizures, syncope, light-headedness and headaches. Hematological: Does not bruise/bleed easily. Psychiatric/Behavioral: Negative for agitation and behavioral problems. The patient is not nervous/anxious. Except as noted above the remainder of the review of systems was reviewed and negative. PAST MEDICAL HISTORY     Past Medical History:   Diagnosis Date    Asthma     Chronic abdominal pain     Depression     Hyperlipidemia     Hypertension     NATALEE (obstructive sleep apnea)     Schizophrenia (HCC)     Type II or unspecified type diabetes mellitus without mention of complication, not stated as uncontrolled          SURGICAL HISTORY       Past Surgical History:   Procedure Laterality Date    [facility]    UPPER GASTROINTESTINAL ENDOSCOPY  2/21/14    [facility]         CURRENT MEDICATIONS       Previous Medications    AMLODIPINE BESYLATE PO    Take  by mouth. ARIPIPRAZOLE (ABILIFY PO)    Take  by mouth. ASPIRIN 81 MG TABLET    Take 81 mg by mouth daily. BENZONATATE (TESSALON PERLES) 100 MG CAPSULE    Take 1 capsule by mouth 3 times daily as needed for Cough    BUPROPION HCL (WELLBUTRIN PO)    Take  by mouth. CYPROHEPTADINE (PERIACTIN) 4 MG TABLET    Take 1 tablet by mouth 3 times daily    ESOMEPRAZOLE (NEXIUM) 40 MG DELAYED RELEASE CAPSULE    Take 1 capsule by mouth every morning (before breakfast)    GLIPIZIDE PO    Take  by mouth.     INSULIN GLARGINE (LANTUS SOLOSTAR) 100 UNIT/ML INJECTION PEN    Inject 50 Units into the skin nightly    INSULIN LISPRO (HUMALOG KWIKPEN) 200 UNIT/ML SOPN PEN    Inject 45 Units into the skin 3 times daily (with meals)    INSULIN SYRINGE .5CC/29G 29G X 1/2\" 0.5 ML MISC    1 each by Does not apply route daily    LEVOTHYROXINE (SYNTHROID) 50 MCG TABLET    Take 50 mcg by mouth Daily    LISINOPRIL PO    Take  by mouth. MELOXICAM (MOBIC) 15 MG TABLET    Take 1 tablet by mouth daily as needed for Pain    METFORMIN HCL PO    Take  by mouth. MONTELUKAST (SINGULAIR) 10 MG TABLET    Take 10 mg by mouth nightly    ONDANSETRON (ZOFRAN) 4 MG TABLET    Take 1 tablet by mouth every 8 hours as needed for Nausea    PRAVASTATIN SODIUM PO    Take  by mouth. SERTRALINE (ZOLOFT) 100 MG TABLET    Take 100 mg by mouth daily    TOPIRAMATE (TOPAMAX) 25 MG TABLET    Take 25 mg by mouth daily    TRAZODONE (DESYREL) 50 MG TABLET    Take 50 mg by mouth nightly       ALLERGIES     Shellfish-derived products; Amoxicillin; Macrobid [nitrofurantoin monohyd macro]; Reglan [metoclopramide];  Singulair [montelukast]; and Nubain [nalbuphine hcl]    FAMILY HISTORY       Family History   Problem Relation Age of Onset    Other Mother         Diverticulitis          SOCIAL HISTORY       Social History     Socioeconomic History    Marital status: Single     Spouse name: None    Number of children: None    Years of education: None    Highest education level: None   Occupational History    None   Social Needs    Financial resource strain: None    Food insecurity     Worry: None     Inability: None    Transportation needs     Medical: None     Non-medical: None   Tobacco Use    Smoking status: Current Every Day Smoker     Packs/day: 1.00     Types: Cigarettes    Smokeless tobacco: Never Used   Substance and Sexual Activity    Alcohol use: No    Drug use: No    Sexual activity: None   Lifestyle    Physical activity     Days per week: None     Minutes per session: None    Stress: None   Relationships    Social connections     Talks on phone: None     Gets together: None     Attends Mor[employer] service: None     Active member of club or organization: None     Attends meetings of clubs or organizations: None     Relationship status: None    Intimate partner violence     Fear of current or ex partner: None     Emotionally abused: None     Physically abused: None     Forced sexual activity: None   Other Topics Concern    None   Social History Narrative    Lives w brother       SCREENINGS        Kent Coma Scale  Eye Opening: Spontaneous  Best Verbal Response: Oriented  Best Motor Response: Obeys commands  Kent Coma Scale Score: 15               PHYSICAL EXAM    (up to 7 for level 4, 8 or more for level 5)     ED Triage Vitals [11/14/20 0608]   BP Temp Temp Source Pulse Resp SpO2 Height Weight   (!) 153/89 99 °F (37.2 °C) Oral 87 18 98 % -- (!) 340 lb (154.2 kg)       Physical Exam  Vitals signs and nursing note reviewed. Constitutional:       General: She is not in acute distress. Appearance: Normal appearance. She is normal weight. HENT:      Head: Normocephalic. Right Ear: Tympanic membrane, ear canal and external ear normal.      Left Ear: Tympanic membrane, ear canal and external ear normal.      Nose: Nose normal.      Mouth/Throat:      Mouth: Mucous membranes are moist.      Pharynx: Oropharynx is clear. No oropharyngeal exudate or posterior oropharyngeal erythema. Eyes:      Conjunctiva/sclera: Conjunctivae normal.      Pupils: Pupils are equal, round, and reactive to light. Neck:      Musculoskeletal: Normal range of motion. No neck rigidity. Cardiovascular:      Rate and Rhythm: Normal rate and regular rhythm. Pulses: Normal pulses. Heart sounds: Normal heart sounds. No murmur. No friction rub. Pulmonary:      Effort: Pulmonary effort is normal. No respiratory distress. Breath sounds: Normal breath sounds. No stridor. Abdominal:      General: Abdomen is flat. Bowel sounds are normal.      Palpations: Abdomen is soft. Genitourinary:     Vagina: No vaginal discharge.    Musculoskeletal: Normal range of motion. General: No swelling or tenderness. Neurological:      General: No focal deficit present. Mental Status: She is alert. She is disoriented. Psychiatric:         Mood and Affect: Mood normal.         Behavior: Behavior normal.         DIAGNOSTIC RESULTS     EKG: All EKG's are interpreted by the Emergency Department Physician who either signs or Co-signs this chart in the absence of a cardiologist.    Sinus rhythm, ventricular rate of 88 bpm, no ST segment elevation or depression, no signs of ischemia. QTC of 469 ms. RADIOLOGY:   Non-plain film images such as CT, Ultrasound and MRI are read by the radiologist. Plain radiographic images are visualized and preliminarily interpreted by the emergency physician with the below findings:    No acute cardiopulmonary process. Interpretation per the Radiologist below, if available at the time of this note:    CT Head WO Contrast   Final Result      No acute intracranial process or significant interval change from prior. XR CHEST PORTABLE   Final Result   NO ACUTE CARDIOPULMONARY DISEASE.             ED BEDSIDE ULTRASOUND:   Performed by ED Physician - none    LABS:  Labs Reviewed   COMPREHENSIVE METABOLIC PANEL - Abnormal; Notable for the following components:       Result Value    Glucose 469 (*)     CREATININE 0.92 (*)     Calcium 10.3 (*)     Total Protein 8.7 (*)     Alb 4.8 (*)     ALT 51 (*)     AST 40 (*)     Globulin 3.9 (*)     All other components within normal limits    Narrative:     CALL  [facility] tel. [phone],  Glucose results called to and read back by Alisha Grayson, 11/14/2020 07:57, by  MOY   CBC WITH AUTO DIFFERENTIAL - Abnormal; Notable for the following components:    Hemoglobin 16.2 (*)     Hematocrit 47.3 (*)     All other components within normal limits   POCT GLUCOSE - Abnormal; Notable for the following components:    POC Glucose 440 (*)     All other components within normal limits   POCT important. I also stressed the importance of taking her insulin daily as I cannot believe the patient is taking hurts for multiple days. Patient's daughter not understanding. Patient remained hemodynamically stable, pain better controlled. Patient discharged home in stable condition. CONSULTS:  None    PROCEDURES:  Unless otherwise noted below, none     Procedures        FINAL IMPRESSION      1. Hyperglycemia due to diabetes mellitus (HCC)    2. Dehydration    3. Dizziness          DISPOSITION/PLAN   DISPOSITION Decision To Discharge 11/14/2020 09:24:13 AM      PATIENT REFERRED TO:  Shlomo Napoles, [address] [place] [phone]    Call in 2 days        DISCHARGE MEDICATIONS:  New Prescriptions    No medications on file     Controlled Substances Monitoring:     RX Monitoring 12/9/2017   Attestation The Prescription Monitoring Report for this patient was reviewed today.        (Please note that portions of this note were completed with a voice recognition program.  Efforts were made to edit the dictations but occasionally words are mis-transcribed.)    Mana Jordan PA-C (electronically signed)             Mana Jordan PA-C  11/14/20 5804

## 2020-11-16 PROCEDURE — 93010 ELECTROCARDIOGRAM REPORT: CPT | Performed by: INTERNAL MEDICINE

## 2021-03-20 ENCOUNTER — HOSPITAL ENCOUNTER (INPATIENT)
Age: 46
LOS: 4 days | Discharge: HOME | DRG: 198 | End: 2021-03-26
Attending: STUDENT IN AN ORGANIZED HEALTH CARE EDUCATION/TRAINING PROGRAM | Admitting: INTERNAL MEDICINE
Payer: COMMERCIAL

## 2021-03-20 ENCOUNTER — APPOINTMENT (OUTPATIENT)
Dept: GENERAL RADIOLOGY | Age: 46
DRG: 198 | End: 2021-03-20
Payer: COMMERCIAL

## 2021-03-20 DIAGNOSIS — I20.8 ANGINAL CHEST PAIN AT REST (HCC): Primary | ICD-10-CM

## 2021-03-20 DIAGNOSIS — E11.65 TYPE 2 DIABETES MELLITUS WITH HYPERGLYCEMIA, UNSPECIFIED WHETHER LONG TERM INSULIN USE (HCC): ICD-10-CM

## 2021-03-20 DIAGNOSIS — E66.01 MORBID OBESITY WITH BMI OF 45.0-49.9, ADULT (HCC): ICD-10-CM

## 2021-03-20 DIAGNOSIS — R06.00 DYSPNEA AND RESPIRATORY ABNORMALITIES: ICD-10-CM

## 2021-03-20 DIAGNOSIS — R06.89 DYSPNEA AND RESPIRATORY ABNORMALITIES: ICD-10-CM

## 2021-03-20 PROBLEM — R07.9 CHEST PAIN: Status: ACTIVE | Noted: 2021-03-20

## 2021-03-20 LAB
ALBUMIN SERPL-MCNC: 3.7 G/DL (ref 3.5–4.6)
ALP BLD-CCNC: 168 U/L (ref 40–130)
ALT SERPL-CCNC: 125 U/L (ref 0–33)
ANION GAP SERPL CALCULATED.3IONS-SCNC: 10 MEQ/L (ref 9–15)
APTT: 27 SEC (ref 24.4–36.8)
AST SERPL-CCNC: 108 U/L (ref 0–35)
BASOPHILS ABSOLUTE: 0.1 K/UL (ref 0–0.2)
BASOPHILS RELATIVE PERCENT: 1.2 %
BILIRUB SERPL-MCNC: 0.3 MG/DL (ref 0.2–0.7)
BUN BLDV-MCNC: 11 MG/DL (ref 6–20)
C-REACTIVE PROTEIN, HIGH SENSITIVITY: 9.8 MG/L (ref 0–5)
CALCIUM SERPL-MCNC: 9.3 MG/DL (ref 8.5–9.9)
CHLORIDE BLD-SCNC: 97 MEQ/L (ref 95–107)
CO2: 27 MEQ/L (ref 20–31)
CREAT SERPL-MCNC: 0.72 MG/DL (ref 0.5–0.9)
D DIMER: 0.32 MG/L FEU (ref 0–0.5)
EOSINOPHILS ABSOLUTE: 0.6 K/UL (ref 0–0.7)
EOSINOPHILS RELATIVE PERCENT: 8.5 %
GFR AFRICAN AMERICAN: >60
GFR NON-AFRICAN AMERICAN: >60
GLOBULIN: 3.7 G/DL (ref 2.3–3.5)
GLUCOSE BLD-MCNC: 406 MG/DL (ref 60–115)
GLUCOSE BLD-MCNC: 493 MG/DL (ref 70–99)
HCT VFR BLD CALC: 42.8 % (ref 37–47)
HEMOGLOBIN: 14.4 G/DL (ref 12–16)
INR BLD: 1
LYMPHOCYTES ABSOLUTE: 1.6 K/UL (ref 1–4.8)
LYMPHOCYTES RELATIVE PERCENT: 23.8 %
MAGNESIUM: 2.1 MG/DL (ref 1.7–2.4)
MCH RBC QN AUTO: 30.9 PG (ref 27–31.3)
MCHC RBC AUTO-ENTMCNC: 33.6 % (ref 33–37)
MCV RBC AUTO: 91.8 FL (ref 82–100)
MONOCYTES ABSOLUTE: 0.4 K/UL (ref 0.2–0.8)
MONOCYTES RELATIVE PERCENT: 6.7 %
NEUTROPHILS ABSOLUTE: 3.9 K/UL (ref 1.4–6.5)
NEUTROPHILS RELATIVE PERCENT: 59.8 %
PDW BLD-RTO: 13.7 % (ref 11.5–14.5)
PERFORMED ON: ABNORMAL
PLATELET # BLD: 164 K/UL (ref 130–400)
POTASSIUM SERPL-SCNC: 4.5 MEQ/L (ref 3.4–4.9)
PRO-BNP: 51 PG/ML
PROCALCITONIN: 0.2 NG/ML (ref 0–0.15)
PROTHROMBIN TIME: 13.6 SEC (ref 12.3–14.9)
RBC # BLD: 4.66 M/UL (ref 4.2–5.4)
SARS-COV-2, NAAT: NOT DETECTED
SODIUM BLD-SCNC: 134 MEQ/L (ref 135–144)
TOTAL CK: 58 U/L (ref 0–170)
TOTAL PROTEIN: 7.4 G/DL (ref 6.3–8)
TROPONIN: <0.01 NG/ML (ref 0–0.01)
TSH SERPL DL<=0.05 MIU/L-ACNC: 3.49 UIU/ML (ref 0.44–3.86)
WBC # BLD: 6.6 K/UL (ref 4.8–10.8)

## 2021-03-20 PROCEDURE — 83880 ASSAY OF NATRIURETIC PEPTIDE: CPT

## 2021-03-20 PROCEDURE — 83735 ASSAY OF MAGNESIUM: CPT

## 2021-03-20 PROCEDURE — 85025 COMPLETE CBC W/AUTO DIFF WBC: CPT

## 2021-03-20 PROCEDURE — 80074 ACUTE HEPATITIS PANEL: CPT

## 2021-03-20 PROCEDURE — 99285 EMERGENCY DEPT VISIT HI MDM: CPT

## 2021-03-20 PROCEDURE — 84443 ASSAY THYROID STIM HORMONE: CPT

## 2021-03-20 PROCEDURE — 96374 THER/PROPH/DIAG INJ IV PUSH: CPT

## 2021-03-20 PROCEDURE — 96372 THER/PROPH/DIAG INJ SC/IM: CPT

## 2021-03-20 PROCEDURE — 85379 FIBRIN DEGRADATION QUANT: CPT

## 2021-03-20 PROCEDURE — 87635 SARS-COV-2 COVID-19 AMP PRB: CPT

## 2021-03-20 PROCEDURE — 80053 COMPREHEN METABOLIC PANEL: CPT

## 2021-03-20 PROCEDURE — 93005 ELECTROCARDIOGRAM TRACING: CPT | Performed by: STUDENT IN AN ORGANIZED HEALTH CARE EDUCATION/TRAINING PROGRAM

## 2021-03-20 PROCEDURE — 86141 C-REACTIVE PROTEIN HS: CPT

## 2021-03-20 PROCEDURE — 85610 PROTHROMBIN TIME: CPT

## 2021-03-20 PROCEDURE — 36415 COLL VENOUS BLD VENIPUNCTURE: CPT

## 2021-03-20 PROCEDURE — 71046 X-RAY EXAM CHEST 2 VIEWS: CPT

## 2021-03-20 PROCEDURE — 82550 ASSAY OF CK (CPK): CPT

## 2021-03-20 PROCEDURE — 85730 THROMBOPLASTIN TIME PARTIAL: CPT

## 2021-03-20 PROCEDURE — 84484 ASSAY OF TROPONIN QUANT: CPT

## 2021-03-20 PROCEDURE — 2500000003 HC RX 250 WO HCPCS: Performed by: STUDENT IN AN ORGANIZED HEALTH CARE EDUCATION/TRAINING PROGRAM

## 2021-03-20 PROCEDURE — 84145 PROCALCITONIN (PCT): CPT

## 2021-03-20 PROCEDURE — 6370000000 HC RX 637 (ALT 250 FOR IP): Performed by: STUDENT IN AN ORGANIZED HEALTH CARE EDUCATION/TRAINING PROGRAM

## 2021-03-20 RX ORDER — NITROGLYCERIN 0.4 MG/1
0.4 TABLET SUBLINGUAL EVERY 5 MIN PRN
Status: DISCONTINUED | OUTPATIENT
Start: 2021-03-20 | End: 2021-03-21

## 2021-03-20 RX ORDER — ASPIRIN 81 MG/1
324 TABLET, CHEWABLE ORAL ONCE
Status: COMPLETED | OUTPATIENT
Start: 2021-03-20 | End: 2021-03-20

## 2021-03-20 RX ADMIN — FAMOTIDINE 20 MG: 10 INJECTION, SOLUTION INTRAVENOUS at 21:06

## 2021-03-20 RX ADMIN — NITROGLYCERIN 0.4 MG: 0.4 TABLET, ORALLY DISINTEGRATING SUBLINGUAL at 22:10

## 2021-03-20 RX ADMIN — NITROGLYCERIN 0.4 MG: 0.4 TABLET, ORALLY DISINTEGRATING SUBLINGUAL at 22:21

## 2021-03-20 RX ADMIN — NITROGLYCERIN 0.4 MG: 0.4 TABLET, ORALLY DISINTEGRATING SUBLINGUAL at 22:16

## 2021-03-20 RX ADMIN — ASPIRIN 324 MG: 81 TABLET, CHEWABLE ORAL at 21:05

## 2021-03-20 ASSESSMENT — PAIN DESCRIPTION - LOCATION
LOCATION: CHEST
LOCATION: CHEST

## 2021-03-20 ASSESSMENT — PAIN DESCRIPTION - FREQUENCY: FREQUENCY: INTERMITTENT

## 2021-03-20 ASSESSMENT — ENCOUNTER SYMPTOMS
SINUS PRESSURE: 0
NAUSEA: 0
CHEST TIGHTNESS: 0
SHORTNESS OF BREATH: 1
DIARRHEA: 0
COUGH: 0
TROUBLE SWALLOWING: 0
ABDOMINAL PAIN: 0
VOMITING: 0
BACK PAIN: 0

## 2021-03-20 ASSESSMENT — HEART SCORE: ECG: 1

## 2021-03-20 ASSESSMENT — PAIN SCALES - GENERAL
PAINLEVEL_OUTOF10: 10
PAINLEVEL_OUTOF10: 10

## 2021-03-20 ASSESSMENT — PAIN DESCRIPTION - ORIENTATION: ORIENTATION: MID

## 2021-03-20 ASSESSMENT — PAIN DESCRIPTION - DESCRIPTORS: DESCRIPTORS: SQUEEZING;PRESSURE

## 2021-03-20 ASSESSMENT — PAIN DESCRIPTION - PAIN TYPE: TYPE: ACUTE PAIN

## 2021-03-21 ENCOUNTER — APPOINTMENT (OUTPATIENT)
Dept: ULTRASOUND IMAGING | Age: 46
DRG: 198 | End: 2021-03-21
Payer: COMMERCIAL

## 2021-03-21 LAB
CK MB: 1.7 NG/ML (ref 0–3.8)
CK MB: 1.9 NG/ML (ref 0–3.8)
CREATINE KINASE-MB INDEX: 3.7 % (ref 0–3.5)
CREATINE KINASE-MB INDEX: 4.2 % (ref 0–3.5)
GLUCOSE BLD-MCNC: 337 MG/DL (ref 60–115)
GLUCOSE BLD-MCNC: 425 MG/DL (ref 60–115)
GLUCOSE BLD-MCNC: 461 MG/DL (ref 60–115)
GLUCOSE BLD-MCNC: 472 MG/DL (ref 60–115)
GLUCOSE BLD-MCNC: 501 MG/DL (ref 60–115)
HBA1C MFR BLD: 13.2 % (ref 4.8–5.9)
HCG, URINE, POC: NEGATIVE
Lab: NORMAL
NEGATIVE QC PASS/FAIL: NORMAL
PERFORMED ON: ABNORMAL
POSITIVE QC PASS/FAIL: NORMAL
TOTAL CK: 45 U/L (ref 0–170)
TOTAL CK: 46 U/L (ref 0–170)
TROPONIN: <0.01 NG/ML (ref 0–0.01)
TROPONIN: <0.01 NG/ML (ref 0–0.01)

## 2021-03-21 PROCEDURE — 96376 TX/PRO/DX INJ SAME DRUG ADON: CPT

## 2021-03-21 PROCEDURE — 99253 IP/OBS CNSLTJ NEW/EST LOW 45: CPT | Performed by: INTERNAL MEDICINE

## 2021-03-21 PROCEDURE — G0378 HOSPITAL OBSERVATION PER HR: HCPCS

## 2021-03-21 PROCEDURE — 96372 THER/PROPH/DIAG INJ SC/IM: CPT

## 2021-03-21 PROCEDURE — 6370000000 HC RX 637 (ALT 250 FOR IP): Performed by: INTERNAL MEDICINE

## 2021-03-21 PROCEDURE — 76705 ECHO EXAM OF ABDOMEN: CPT

## 2021-03-21 PROCEDURE — 99222 1ST HOSP IP/OBS MODERATE 55: CPT | Performed by: INTERNAL MEDICINE

## 2021-03-21 PROCEDURE — 82553 CREATINE MB FRACTION: CPT

## 2021-03-21 PROCEDURE — 36415 COLL VENOUS BLD VENIPUNCTURE: CPT

## 2021-03-21 PROCEDURE — 82550 ASSAY OF CK (CPK): CPT

## 2021-03-21 PROCEDURE — G0378 HOSPITAL OBSERVATION PER HR: HCPCS | Performed by: FAMILY MEDICINE

## 2021-03-21 PROCEDURE — 6370000000 HC RX 637 (ALT 250 FOR IP): Performed by: PHYSICIAN ASSISTANT

## 2021-03-21 PROCEDURE — 96375 TX/PRO/DX INJ NEW DRUG ADDON: CPT

## 2021-03-21 PROCEDURE — 6360000002 HC RX W HCPCS: Performed by: INTERNAL MEDICINE

## 2021-03-21 PROCEDURE — 94660 CPAP INITIATION&MGMT: CPT

## 2021-03-21 PROCEDURE — 84484 ASSAY OF TROPONIN QUANT: CPT

## 2021-03-21 PROCEDURE — 83036 HEMOGLOBIN GLYCOSYLATED A1C: CPT

## 2021-03-21 PROCEDURE — 93005 ELECTROCARDIOGRAM TRACING: CPT | Performed by: INTERNAL MEDICINE

## 2021-03-21 RX ORDER — CALCIUM CARBONATE 200(500)MG
1000 TABLET,CHEWABLE ORAL 3 TIMES DAILY PRN
Status: DISCONTINUED | OUTPATIENT
Start: 2021-03-21 | End: 2021-03-26 | Stop reason: HOSPADM

## 2021-03-21 RX ORDER — SUCRALFATE 1 G/1
1 TABLET ORAL EVERY 6 HOURS SCHEDULED
Status: DISCONTINUED | OUTPATIENT
Start: 2021-03-21 | End: 2021-03-26 | Stop reason: HOSPADM

## 2021-03-21 RX ORDER — METOPROLOL SUCCINATE 25 MG/1
25 TABLET, EXTENDED RELEASE ORAL DAILY
Status: DISCONTINUED | OUTPATIENT
Start: 2021-03-21 | End: 2021-03-26 | Stop reason: HOSPADM

## 2021-03-21 RX ORDER — SERTRALINE HYDROCHLORIDE 100 MG/1
100 TABLET, FILM COATED ORAL DAILY
Status: DISCONTINUED | OUTPATIENT
Start: 2021-03-21 | End: 2021-03-26 | Stop reason: HOSPADM

## 2021-03-21 RX ORDER — FAMOTIDINE 20 MG/1
20 TABLET, FILM COATED ORAL 2 TIMES DAILY
Status: DISCONTINUED | OUTPATIENT
Start: 2021-03-21 | End: 2021-03-26 | Stop reason: HOSPADM

## 2021-03-21 RX ORDER — ACETAMINOPHEN 650 MG/1
650 SUPPOSITORY RECTAL EVERY 6 HOURS PRN
Status: DISCONTINUED | OUTPATIENT
Start: 2021-03-21 | End: 2021-03-26 | Stop reason: HOSPADM

## 2021-03-21 RX ORDER — ACETAMINOPHEN 500 MG
500 TABLET ORAL EVERY 6 HOURS PRN
Status: DISCONTINUED | OUTPATIENT
Start: 2021-03-21 | End: 2021-03-21 | Stop reason: ALTCHOICE

## 2021-03-21 RX ORDER — INSULIN GLARGINE 100 [IU]/ML
50 INJECTION, SOLUTION SUBCUTANEOUS NIGHTLY
Status: DISCONTINUED | OUTPATIENT
Start: 2021-03-21 | End: 2021-03-24

## 2021-03-21 RX ORDER — DEXTROSE MONOHYDRATE 25 G/50ML
12.5 INJECTION, SOLUTION INTRAVENOUS PRN
Status: DISCONTINUED | OUTPATIENT
Start: 2021-03-21 | End: 2021-03-26 | Stop reason: HOSPADM

## 2021-03-21 RX ORDER — LEVOTHYROXINE SODIUM 0.05 MG/1
50 TABLET ORAL DAILY
Status: DISCONTINUED | OUTPATIENT
Start: 2021-03-21 | End: 2021-03-26 | Stop reason: HOSPADM

## 2021-03-21 RX ORDER — ASPIRIN 81 MG/1
81 TABLET ORAL DAILY
Status: DISCONTINUED | OUTPATIENT
Start: 2021-03-22 | End: 2021-03-26 | Stop reason: HOSPADM

## 2021-03-21 RX ORDER — PREGABALIN 75 MG/1
75 CAPSULE ORAL 2 TIMES DAILY
Status: DISCONTINUED | OUTPATIENT
Start: 2021-03-21 | End: 2021-03-26 | Stop reason: HOSPADM

## 2021-03-21 RX ORDER — LANOLIN ALCOHOL/MO/W.PET/CERES
400 CREAM (GRAM) TOPICAL DAILY
Status: DISCONTINUED | OUTPATIENT
Start: 2021-03-21 | End: 2021-03-26 | Stop reason: HOSPADM

## 2021-03-21 RX ORDER — PROMETHAZINE HYDROCHLORIDE 12.5 MG/1
12.5 TABLET ORAL EVERY 6 HOURS PRN
Status: DISCONTINUED | OUTPATIENT
Start: 2021-03-21 | End: 2021-03-26 | Stop reason: HOSPADM

## 2021-03-21 RX ORDER — AMLODIPINE BESYLATE 2.5 MG/1
2.5 TABLET ORAL DAILY
Status: DISCONTINUED | OUTPATIENT
Start: 2021-03-21 | End: 2021-03-25

## 2021-03-21 RX ORDER — ATORVASTATIN CALCIUM 40 MG/1
40 TABLET, FILM COATED ORAL NIGHTLY
Status: DISCONTINUED | OUTPATIENT
Start: 2021-03-21 | End: 2021-03-26 | Stop reason: HOSPADM

## 2021-03-21 RX ORDER — KETOROLAC TROMETHAMINE 15 MG/ML
15 INJECTION, SOLUTION INTRAMUSCULAR; INTRAVENOUS EVERY 8 HOURS PRN
Status: DISPENSED | OUTPATIENT
Start: 2021-03-21 | End: 2021-03-26

## 2021-03-21 RX ORDER — NICOTINE POLACRILEX 4 MG
15 LOZENGE BUCCAL PRN
Status: DISCONTINUED | OUTPATIENT
Start: 2021-03-21 | End: 2021-03-26 | Stop reason: HOSPADM

## 2021-03-21 RX ORDER — POTASSIUM CHLORIDE 7.45 MG/ML
10 INJECTION INTRAVENOUS PRN
Status: DISCONTINUED | OUTPATIENT
Start: 2021-03-21 | End: 2021-03-26 | Stop reason: HOSPADM

## 2021-03-21 RX ORDER — TOPIRAMATE 25 MG/1
25 TABLET ORAL DAILY
Status: DISCONTINUED | OUTPATIENT
Start: 2021-03-21 | End: 2021-03-26 | Stop reason: HOSPADM

## 2021-03-21 RX ORDER — NITROGLYCERIN 0.4 MG/1
0.4 TABLET SUBLINGUAL EVERY 5 MIN PRN
Status: DISCONTINUED | OUTPATIENT
Start: 2021-03-21 | End: 2021-03-21

## 2021-03-21 RX ORDER — TRAZODONE HYDROCHLORIDE 50 MG/1
50 TABLET ORAL NIGHTLY
Status: DISCONTINUED | OUTPATIENT
Start: 2021-03-21 | End: 2021-03-26 | Stop reason: HOSPADM

## 2021-03-21 RX ORDER — ACETAMINOPHEN 325 MG/1
650 TABLET ORAL EVERY 6 HOURS PRN
Status: DISCONTINUED | OUTPATIENT
Start: 2021-03-21 | End: 2021-03-26 | Stop reason: HOSPADM

## 2021-03-21 RX ORDER — INSULIN GLARGINE 100 [IU]/ML
50 INJECTION, SOLUTION SUBCUTANEOUS NIGHTLY
Status: DISCONTINUED | OUTPATIENT
Start: 2021-03-21 | End: 2021-03-21

## 2021-03-21 RX ORDER — NITROGLYCERIN 0.4 MG/1
0.4 TABLET SUBLINGUAL EVERY 5 MIN PRN
Status: COMPLETED | OUTPATIENT
Start: 2021-03-21 | End: 2021-03-23

## 2021-03-21 RX ORDER — ONDANSETRON 2 MG/ML
4 INJECTION INTRAMUSCULAR; INTRAVENOUS EVERY 6 HOURS PRN
Status: DISCONTINUED | OUTPATIENT
Start: 2021-03-21 | End: 2021-03-26 | Stop reason: HOSPADM

## 2021-03-21 RX ORDER — GABAPENTIN 400 MG/1
800 CAPSULE ORAL 3 TIMES DAILY
Status: DISCONTINUED | OUTPATIENT
Start: 2021-03-21 | End: 2021-03-26 | Stop reason: HOSPADM

## 2021-03-21 RX ORDER — DEXTROSE MONOHYDRATE 50 MG/ML
100 INJECTION, SOLUTION INTRAVENOUS PRN
Status: DISCONTINUED | OUTPATIENT
Start: 2021-03-21 | End: 2021-03-26 | Stop reason: HOSPADM

## 2021-03-21 RX ORDER — POTASSIUM CHLORIDE 20 MEQ/1
40 TABLET, EXTENDED RELEASE ORAL PRN
Status: DISCONTINUED | OUTPATIENT
Start: 2021-03-21 | End: 2021-03-26 | Stop reason: HOSPADM

## 2021-03-21 RX ORDER — ONDANSETRON 4 MG/1
4 TABLET, FILM COATED ORAL EVERY 8 HOURS PRN
Status: DISCONTINUED | OUTPATIENT
Start: 2021-03-21 | End: 2021-03-26 | Stop reason: HOSPADM

## 2021-03-21 RX ORDER — KETOROLAC TROMETHAMINE 15 MG/ML
15 INJECTION, SOLUTION INTRAMUSCULAR; INTRAVENOUS ONCE
Status: COMPLETED | OUTPATIENT
Start: 2021-03-21 | End: 2021-03-21

## 2021-03-21 RX ORDER — IBUPROFEN 200 MG
1 TABLET ORAL DAILY
Status: DISCONTINUED | OUTPATIENT
Start: 2021-03-21 | End: 2021-03-21

## 2021-03-21 RX ORDER — INSULIN GLARGINE 100 [IU]/ML
30 INJECTION, SOLUTION SUBCUTANEOUS NIGHTLY
Status: DISCONTINUED | OUTPATIENT
Start: 2021-03-21 | End: 2021-03-21

## 2021-03-21 RX ORDER — METOPROLOL SUCCINATE 25 MG/1
25 TABLET, EXTENDED RELEASE ORAL DAILY
Status: ON HOLD | COMMUNITY
End: 2021-03-25 | Stop reason: HOSPADM

## 2021-03-21 RX ADMIN — SUCRALFATE 1 G: 1 TABLET ORAL at 18:33

## 2021-03-21 RX ADMIN — TRAZODONE HYDROCHLORIDE 50 MG: 50 TABLET ORAL at 02:01

## 2021-03-21 RX ADMIN — LIDOCAINE HYDROCHLORIDE: 20 SOLUTION ORAL; TOPICAL at 00:22

## 2021-03-21 RX ADMIN — PREGABALIN 75 MG: 75 CAPSULE ORAL at 08:20

## 2021-03-21 RX ADMIN — FAMOTIDINE 20 MG: 20 TABLET ORAL at 08:19

## 2021-03-21 RX ADMIN — MAGNESIUM OXIDE TAB 400 MG (240 MG ELEMENTAL MG) 400 MG: 400 (240 MG) TAB at 20:29

## 2021-03-21 RX ADMIN — SUCRALFATE 1 G: 1 TABLET ORAL at 02:02

## 2021-03-21 RX ADMIN — INSULIN LISPRO 18 UNITS: 100 INJECTION, SOLUTION INTRAVENOUS; SUBCUTANEOUS at 18:33

## 2021-03-21 RX ADMIN — NITROGLYCERIN 0.4 MG: 0.4 TABLET, ORALLY DISINTEGRATING SUBLINGUAL at 13:54

## 2021-03-21 RX ADMIN — ACETAMINOPHEN 650 MG: 325 TABLET ORAL at 02:01

## 2021-03-21 RX ADMIN — INSULIN GLARGINE 50 UNITS: 100 INJECTION, SOLUTION SUBCUTANEOUS at 11:21

## 2021-03-21 RX ADMIN — KETOROLAC TROMETHAMINE 15 MG: 15 INJECTION, SOLUTION INTRAMUSCULAR; INTRAVENOUS at 14:37

## 2021-03-21 RX ADMIN — LEVOTHYROXINE SODIUM 50 MCG: 0.05 TABLET ORAL at 05:01

## 2021-03-21 RX ADMIN — PREGABALIN 75 MG: 75 CAPSULE ORAL at 02:14

## 2021-03-21 RX ADMIN — FAMOTIDINE 20 MG: 20 TABLET ORAL at 02:01

## 2021-03-21 RX ADMIN — GABAPENTIN 800 MG: 400 CAPSULE ORAL at 20:29

## 2021-03-21 RX ADMIN — ONDANSETRON 4 MG: 2 INJECTION INTRAMUSCULAR; INTRAVENOUS at 02:02

## 2021-03-21 RX ADMIN — ATORVASTATIN CALCIUM 40 MG: 40 TABLET, FILM COATED ORAL at 20:30

## 2021-03-21 RX ADMIN — ENOXAPARIN SODIUM 40 MG: 40 INJECTION SUBCUTANEOUS at 08:20

## 2021-03-21 RX ADMIN — FAMOTIDINE 20 MG: 20 TABLET ORAL at 20:30

## 2021-03-21 RX ADMIN — PREGABALIN 75 MG: 75 CAPSULE ORAL at 20:30

## 2021-03-21 RX ADMIN — ATORVASTATIN CALCIUM 40 MG: 40 TABLET, FILM COATED ORAL at 02:01

## 2021-03-21 RX ADMIN — ACETAMINOPHEN 650 MG: 325 TABLET ORAL at 13:57

## 2021-03-21 RX ADMIN — KETOROLAC TROMETHAMINE 15 MG: 15 INJECTION, SOLUTION INTRAMUSCULAR; INTRAVENOUS at 05:01

## 2021-03-21 RX ADMIN — GABAPENTIN 800 MG: 400 CAPSULE ORAL at 08:20

## 2021-03-21 RX ADMIN — SUCRALFATE 1 G: 1 TABLET ORAL at 23:00

## 2021-03-21 RX ADMIN — NITROGLYCERIN 0.4 MG: 0.4 TABLET, ORALLY DISINTEGRATING SUBLINGUAL at 14:00

## 2021-03-21 RX ADMIN — TOPIRAMATE 25 MG: 25 TABLET, FILM COATED ORAL at 08:20

## 2021-03-21 RX ADMIN — SERTRALINE 100 MG: 100 TABLET, FILM COATED ORAL at 08:20

## 2021-03-21 RX ADMIN — AMLODIPINE BESYLATE 2.5 MG: 2.5 TABLET ORAL at 08:19

## 2021-03-21 RX ADMIN — INSULIN LISPRO 12 UNITS: 100 INJECTION, SOLUTION INTRAVENOUS; SUBCUTANEOUS at 08:21

## 2021-03-21 RX ADMIN — SUCRALFATE 1 G: 1 TABLET ORAL at 05:01

## 2021-03-21 ASSESSMENT — PAIN SCALES - GENERAL
PAINLEVEL_OUTOF10: 9
PAINLEVEL_OUTOF10: 10
PAINLEVEL_OUTOF10: 3
PAINLEVEL_OUTOF10: 10
PAINLEVEL_OUTOF10: 6

## 2021-03-21 NOTE — PROGRESS NOTES
Went into pt's room with Dr. Shae Rodgers. Pt started complaining of 10/10 chest pain. She did have a PRN order for nitro and I administered 2 doses. Chest pain was unrelieved. I got an EKG. I placed a call out to Dr. Anu Morales to update him of pt's pain. New orders received.

## 2021-03-21 NOTE — CONSULTS
Pulmonary and Critical Care Medicine  Consult Note  Encounter Date: 3/21/2021 1:46 PM    Ms. Franklin Babinski is a 50 y.o. female  : 1975  Requesting Provider: Dr. Neto Robbins    Reason for request: NATALEE          HISTORY OF PRESENT ILLNESS:    Patient is 50 y.o. presents to the hospital with complaints of chest discomfort. Majority of history is obtained from medical records, however I was able to speak with the patient by video . She does follow with cardiology as an outpatient. The patient did present to the hospital with complaints of chest discomfort. At the time of my encounter she did also complain of some chest discomfort. She states this bout of chest discomfort started recently. She does describe it as a pressure. She was given nitroglycerin by the bedside staff at the time of my examination given her current complaints of chest discomfort. She is also scheduled to have an ultrasound of her abdomen performed. She was evaluated by cardiology earlier today and does have an echocardiogram and a stress test ordered as well. Pulmonary was consulted by cardiology secondary to the patient's history of sleep apnea. The patient does have a known history of sleep apnea. She has been using her machine at home for several years. She is unsure of her settings, but states she is compliant with the machine at home. She does note some occasional shortness of breath with his chest discomfort but overall states that her breathing is been doing fairly well. There is also a reported history of asthma. She does use Singulair at home.           Past Medical History:        Diagnosis Date    Asthma     Chronic abdominal pain     Depression     Hyperlipidemia     Hypertension     NATALEE (obstructive sleep apnea)     Schizophrenia (HCC)     Type II or unspecified type diabetes mellitus without mention of complication, not stated as uncontrolled        Past Surgical History: Procedure Laterality Date    COLONOSCOPY  2/21/14    [facility] UPPER GASTROINTESTINAL ENDOSCOPY  2/21/14    [facility]       Social History:     reports that she has quit smoking. Her smoking use included cigarettes. She smoked 1.00 pack per day. She has never used smokeless tobacco. She reports that she does not drink alcohol or use drugs.     Family History:       Problem Relation Age of Onset    Other Mother         Diverticulitis       Allergies:  Shellfish-derived products, Amoxicillin, Macrobid [nitrofurantoin monohyd macro], Reglan [metoclopramide], Singulair [montelukast], and Nubain [nalbuphine hcl]        MEDICATIONS during current hospitalization:    Continuous Infusions:   dextrose         Scheduled Meds:   insulin lispro  0-18 Units Subcutaneous TID WC    insulin lispro  0-9 Units Subcutaneous Nightly    famotidine  20 mg Oral BID    atorvastatin  40 mg Oral Nightly    enoxaparin  40 mg Subcutaneous Daily    [START ON 3/22/2021] aspirin  81 mg Oral Daily    sucralfate  1 g Oral 4 times per day    amLODIPine  2.5 mg Oral Daily    sertraline  100 mg Oral Daily    traZODone  50 mg Oral Nightly    levothyroxine  50 mcg Oral Daily    topiramate  25 mg Oral Daily    pregabalin  75 mg Oral BID    insulin lispro  10 Units Subcutaneous TID WC    gabapentin  800 mg Oral TID    insulin glargine  50 Units Subcutaneous Nightly    magnesium oxide  400 mg Oral Daily    metoprolol succinate  25 mg Oral Daily       PRN Meds:glucose, dextrose, glucagon (rDNA), dextrose, promethazine **OR** ondansetron, acetaminophen **OR** acetaminophen, potassium chloride **OR** potassium alternative oral replacement **OR** potassium chloride, ondansetron, nitroGLYCERIN, calcium carbonate        REVIEW OF SYSTEMS:  ROS: 10 organs review of system is done including general, psychological, ENT, hematological, endocrine, respiratory, cardiovascular, gastrointestinal, musculoskeletal, neurological,  allergy and Immunology is done and is otherwise negative. PHYSICAL EXAM:    Vitals:  BP (!) 144/93   Pulse 83   Temp 97.4 °F (36.3 °C) (Oral)   Resp 18   Ht 6' (1.829 m)   Wt (!) 317 lb 4.8 oz (143.9 kg)   SpO2 97%   BMI 43.03 kg/m²     General: No acute distress, sitting on the edge of the bed  HEENT: Normocephalic, atraumatic, pupils equal round and reactive to light  Chest : Clear to auscultation bilaterally, no wheezes, no rhonchi, no respiratory distress  Heart[de-identified] Regular rate  ABD: Obese, positive bowel sounds, soft, nontender to palpation  Extremities : Warm, dry, edema noted  Neuro: Awake and alert, oriented x4  Skin: No rashes appreciated    Data Review  Recent Labs     03/20/21 2110   WBC 6.6   HGB 14.4   HCT 42.8         Recent Labs     03/20/21 2110   *   K 4.5   CL 97   CO2 27   BUN 11   CREATININE 0.72   GLUCOSE 493*       ABGs: No results for input(s): PHART, VHY5BHT, PO2ART, XUK0MYO, BEART, X5PSUMWG, WQW2ZSX in the last 72 hours. O2 Device: None (Room air)  Lab Results   Component Value Date    LACTA 3.9 04/03/2020    LACTA 2.1 01/09/2019    LACTA 2.6 01/09/2019     Radiology:  Images and report of chest x-ray from 3/20 reviewed--no acute cardiopulmonary disease      Assessment/Plan:       1. NATALEE --patient does have a known history of sleep apnea. She is compliant with use of her machine at home. She is unsure of her settings. I did asked the patient if her family could bring her machine in, however she stated that it would best if she uses one of our machines. CPAP of 12 will be ordered for her tonight. If the patient is able to bring her machine in, this will be best for her. Also since she does not know her settings, these were picked empirically. If she is able to determine her actual settings at home, this would be best.    2. Chest pain --continue with management and work-up per cardiology and the hospitalist.  She does have a stress test and echocardiogram ordered.   She also has an abdominal ultrasound ordered for this afternoon. 3. Asthma--she does use Singulair at home. She does not use any inhaled medications at home. At this time I do not feel she has a component of an asthma exacerbation.     Thank you for consultation    Electronically signed by Huseyin Dewitt DO, on 3/21/2021 at 1:46 PM

## 2021-03-21 NOTE — PROGRESS NOTES
Trop x2 neg. Acs ruled out. Further eval as per cardiology.    No medical barriers to dc when cleared by cardiology

## 2021-03-21 NOTE — CONSULTS
See full consult dictated on March 21    We were able to obtain a . Her discomfort does not appear to be like angina. She gets a squeezing sensation, lasts a second or 2 then disappears but will recur. Her monitor does show some PVCs. In addition, patient has a great deal of tactile pain over her left breast and left shoulder. Work-up so far is negative in terms of troponins. EKG shows no acute changes. Chest x-ray is unremarkable. She does have some risk factors, particular diabetes. She is also grossly overweight. I am highly suspicious for sleep apnea    Plan:  Regular stress test  We will review monitor  Echocardiogram  Check hemoglobin A1c and thyroids  Repeat EKG in a.m.   Add magnesium  Consult for sleep apnea-we will do nocturnal pulse oximetry  Further recommendations are pending  Dr. Marquise Godoy

## 2021-03-21 NOTE — CONSULTS
Inpatient consult to GI  Consult performed by: Klaus Gerardo MD  Consult ordered by: Ubaldo Hernández DO          Patient Name: Eugene Courtney Date: 3/20/2021  8:47 PM  MR #: [id number]  : 1975    Attending Physician: Salomon Mcgrath MD  Reason for consult: Chest pain (? Non cardiac chest pain)  History Obtained From:  patient, staff nurse and EMR  History of Presenting Illness:      Ricky Alvarado is a 50 y.o. female on hospital day 0, admitted with a left-sided chest pain and shortness of breath. GI consulted for evaluation of atypical chest pain rule out GI etiology for symptoms. Patient reports symptoms starting 4 to 5 days ago with left-sided pain, unable to describe any aggravating or relieving factors, some suggestion that this is related to food intake. Denies any history of peptic ulcer disease or esophagitis. Reports taking PPI therapy for last 8 months for abdominal pain/epigastric. Does not report any abdominal or epigastric pain at this time. She denies any hematemesis, melena or hematochezia. Significant obesity, poorly controlled diabetes with HbA1c checked today at 13.2.   She denies any abdominal pain, weight loss, change in appetite  History was obtained through telephone  services  S/p EGD and colonoscopy in 2014: Pandiverticulosis with extensive sigmoid diverticulosis, upper endoscopy with bilious fluid retained in the gastric body otherwise normal upper endoscopy    History:      Past Medical History:   Diagnosis Date    Asthma     Chronic abdominal pain     Depression     Hyperlipidemia     Hypertension     NATALEE (obstructive sleep apnea)     Schizophrenia (HCC)     Type II or unspecified type diabetes mellitus without mention of complication, not stated as uncontrolled      Past Surgical History:   Procedure Laterality Date    COLONOSCOPY  14    [place]    UPPER GASTROINTESTINAL ENDOSCOPY  14    [facility] Family History  Family History   Problem Relation Age of Onset    Other Mother         Diverticulitis     [] Unable to obtain due to ventilated and/ or neurologic status  Social History     Socioeconomic History    Marital status: Single     Spouse name: Not on file    Number of children: Not on file    Years of education: Not on file    Highest education level: Not on file   Occupational History    Not on file   Social Needs    Financial resource strain: Not on file    Food insecurity     Worry: Not on file     Inability: Not on file    Transportation needs     Medical: Not on file     Non-medical: Not on file   Tobacco Use    Smoking status: Former Smoker     Packs/day: 1.00     Types: Cigarettes    Smokeless tobacco: Never Used   Substance and Sexual Activity    Alcohol use: No    Drug use: No    Sexual activity: Not on file   Lifestyle    Physical activity     Days per week: Not on file     Minutes per session: Not on file    Stress: Not on file   Relationships    Social connections     Talks on phone: Not on file     Gets together: Not on file     Attends Anabaptism service: Not on file     Active member of club or organization: Not on file     Attends meetings of clubs or organizations: Not on file     Relationship status: Not on file    Intimate partner violence     Fear of current or ex partner: Not on file     Emotionally abused: Not on file     Physically abused: Not on file     Forced sexual activity: Not on file   Other Topics Concern    Not on file   Social History Narrative    Lives w brother      [] Unable to obtain due to ventilated and/ or neurologic status    Home Medications:      Medications Prior to Admission: metoprolol succinate (TOPROL XL) 25 MG extended release tablet, Take 25 mg by mouth daily  insulin lispro (HUMALOG KWIKPEN) 200 UNIT/ML SOPN pen, Inject 45 Units into the skin 3 times daily (with meals)  ondansetron (ZOFRAN) 4 MG tablet, Take 1 tablet by mouth every 8 hours as needed for Nausea  benzonatate (TESSALON PERLES) 100 MG capsule, Take 1 capsule by mouth 3 times daily as needed for Cough  insulin lispro (HUMALOG KWIKPEN) 200 UNIT/ML SOPN pen, Inject 45 Units into the skin 3 times daily (with meals)  INSULIN SYRINGE .5CC/29G 29G X 1/2\" 0.5 ML MISC, 1 each by Does not apply route daily  meloxicam (MOBIC) 15 MG tablet, Take 1 tablet by mouth daily as needed for Pain  insulin glargine (LANTUS SOLOSTAR) 100 UNIT/ML injection pen, Inject 50 Units into the skin nightly  esomeprazole (NEXIUM) 40 MG delayed release capsule, Take 1 capsule by mouth every morning (before breakfast) (Patient taking differently: Take 20 mg by mouth every morning (before breakfast) )  sertraline (ZOLOFT) 100 MG tablet, Take 100 mg by mouth daily  traZODone (DESYREL) 50 MG tablet, Take 50 mg by mouth nightly  levothyroxine (SYNTHROID) 50 MCG tablet, Take 50 mcg by mouth Daily  topiramate (TOPAMAX) 25 MG tablet, Take 25 mg by mouth daily  montelukast (SINGULAIR) 10 MG tablet, Take 10 mg by mouth nightly  cyproheptadine (PERIACTIN) 4 MG tablet, Take 1 tablet by mouth 3 times daily  aspirin 81 MG tablet, Take 81 mg by mouth daily. AMLODIPINE BESYLATE PO, Take  by mouth. LISINOPRIL PO, Take  by mouth. METFORMIN HCL PO, Take 500 mg by mouth   BuPROPion HCl (WELLBUTRIN PO), Take 150 mg by mouth   GLIPIZIDE PO, Take  by mouth. ARIPiprazole (ABILIFY PO), Take  by mouth. PRAVASTATIN SODIUM PO, Take  by mouth.     Current Hospital Medications:   Scheduled Meds:   insulin lispro  0-18 Units Subcutaneous TID WC    insulin lispro  0-9 Units Subcutaneous Nightly    famotidine  20 mg Oral BID    atorvastatin  40 mg Oral Nightly    enoxaparin  40 mg Subcutaneous Daily    [START ON 3/22/2021] aspirin  81 mg Oral Daily    sucralfate  1 g Oral 4 times per day    amLODIPine  2.5 mg Oral Daily    sertraline  100 mg Oral Daily    traZODone  50 mg Oral Nightly    levothyroxine  50 mcg Oral Daily    topiramate  25 mg Oral Daily    pregabalin  75 mg Oral BID    insulin lispro  10 Units Subcutaneous TID WC    gabapentin  800 mg Oral TID    insulin glargine  50 Units Subcutaneous Nightly    magnesium oxide  400 mg Oral Daily    metoprolol succinate  25 mg Oral Daily     Continuous Infusions:   dextrose       PRN Meds:.glucose, dextrose, glucagon (rDNA), dextrose, promethazine **OR** ondansetron, acetaminophen **OR** acetaminophen, potassium chloride **OR** potassium alternative oral replacement **OR** potassium chloride, ondansetron, nitroGLYCERIN, calcium carbonate, ketorolac   dextrose        Allergies: Allergies   Allergen Reactions    Shellfish-Derived Products Anaphylaxis    Amoxicillin Swelling    Macrobid [Nitrofurantoin Monohyd Macro]     Reglan [Metoclopramide]     Singulair [Montelukast]     Nubain [Nalbuphine Hcl] Swelling and Rash      Review of Systems:       [x] CV, Resp, Neuro, , and all other systems reviewed and negative other than listed in HPI. Objective Findings:     Vitals:   Vitals:    03/21/21 0535 03/21/21 0546 03/21/21 0743 03/21/21 1359   BP:  121/80 (!) 144/93 (!) 146/95   Pulse:  88 83 100   Resp:  19 18    Temp:  98.4 °F (36.9 °C) 97.4 °F (36.3 °C)    TempSrc:  Oral Oral    SpO2:  96% 97%    Weight: (!) 317 lb 4.8 oz (143.9 kg)      Height: 6' (1.829 m)         Physical Examination:  General: alert, morbid obesity  HEENT: Normocephalic, No scleral icterus. Neck: No JVD. Heart: Regular, no murmur, no rub/gallop. No RV heave. Lungs: Clear to ascultation, no rales/wheezing/rhonchi. Good chest wall excursion. Chest wall tenderness and left shoulder tenderness noted on palpation. No inflammation or erythema in the skin in the left chest  Abdomen: Appearance: central obesity, + Distension, Soft , no tenderness, no scars, Bowel sounds diminished  Extremities: No clubbing/cyanosis, no edema. Skin: Warm, dry, normal turgor, no rash, no bruise, no petichiae.   Neuro: No myoclonus or tremor. Psych: Normal affect    Results/ Medications reviewed 3/21/2021, 4:10 PM     Laboratory, Microbiology, Pathology, Radiology, Cardiology, Medications and Transcriptions reviewed  Scheduled Meds:   insulin lispro  0-18 Units Subcutaneous TID WC    insulin lispro  0-9 Units Subcutaneous Nightly    famotidine  20 mg Oral BID    atorvastatin  40 mg Oral Nightly    enoxaparin  40 mg Subcutaneous Daily    [START ON 3/22/2021] aspirin  81 mg Oral Daily    sucralfate  1 g Oral 4 times per day    amLODIPine  2.5 mg Oral Daily    sertraline  100 mg Oral Daily    traZODone  50 mg Oral Nightly    levothyroxine  50 mcg Oral Daily    topiramate  25 mg Oral Daily    pregabalin  75 mg Oral BID    insulin lispro  10 Units Subcutaneous TID WC    gabapentin  800 mg Oral TID    insulin glargine  50 Units Subcutaneous Nightly    magnesium oxide  400 mg Oral Daily    metoprolol succinate  25 mg Oral Daily     Continuous Infusions:   dextrose       Recent Labs     03/20/21 2110   WBC 6.6   HGB 14.4   HCT 42.8   MCV 91.8        Recent Labs     03/20/21 2110   *   K 4.5   CL 97   CO2 27   BUN 11   CREATININE 0.72     Recent Labs     03/20/21 2110   *   *   BILITOT 0.3   ALKPHOS 168*     No results for input(s): LIPASE, AMYLASE in the last 72 hours. Recent Labs     03/20/21 2110   PROT 7.4   INR 1.0     Xr Chest (2 Vw)  Result Date: 3/21/2021  EXAMINATION: XR CHEST (2 VW) CLINICAL HISTORY: CHEST PAIN, SHORTNESS OF BREATH COMPARISONS: NOVEMBER 14, 2020 FINDINGS: Osseous structures are intact. Cardiopericardial silhouette is normal. Pulmonary vasculature is normal. Lungs are clear. NO ACUTE CARDIOPULMONARY DISEASE. Impression:   50 y.o. female with significant central and generalized obesity with left-sided chest pain, unclear etiology. Basic cardiac work-up negative for ischemic process. Suspect noncardiac chest pain.   Patient on Nexium prior to admission. Abnormal LFTs (elevated transaminases) suggestive of fatty infiltration with steatohepatitis -will need further work-up on outpatient basis  Plan:   -EGD to rule out esophagitis and peptic ulcer disease (less likely)  -Continue with antireflux measures, PPI therapy  -Importance of weight reduction emphasized, outpatient work-up for NAFLD/LAYNE  Comments: Thank you for allowing us to participate in the care of this patient. Will continue to follow. Please call if questions or concerns arise. Electronically signed by Megan Jay MD on 3/21/2021 at 4:10 PM    Please note this report has been partially produced using speech recognition software and may cause contain errors related to that system including grammar, punctuation and spelling as well as words and phrases that may seem inappropriate. If there are questions or concerns please feel free to contact me to clarify.

## 2021-03-21 NOTE — ED PROVIDER NOTES
[address] [facility] ED  eMERGENCY dEPARTMENT eNCOUnter      Pt Name: Flaquita Diamond  MRN: [id number]  [place] 1975  Date of evaluation: 3/20/2021  Provider: Prashanth Conn, [address] [place] [address]       Chief Complaint   Patient presents with    Chest Pain    Shortness of Breath         HISTORY OF PRESENT ILLNESS   (Location/Symptom, Timing/Onset,Context/Setting, Quality, Duration, Modifying Factors, Severity)  Note limiting factors. Flaquita Diamond is a 50 y.o. female who presents to the emergency department with c/o SOB and Chest pain. Pain is achy and stabbing in quality. Patient denies fever chills or cough. Pain does not go into her back. Patient denies N/V/D. Records review shows prior stress test in Care everywhere 11/1/2016 that was normal.     Patient quit smoking. Patient denies modifying factors making her pain better. Patient's daughter is present and helps contribute to history. The history is provided by the patient and a relative. The history is limited by a language barrier. A  was used. NursingNotes were reviewed. REVIEW OF SYSTEMS    (2-9 systems for level 4, 10 or more for level 5)     Review of Systems   Constitutional: Negative for activity change, appetite change, chills, diaphoresis, fever and unexpected weight change. HENT: Negative for drooling, ear pain, nosebleeds, sinus pressure and trouble swallowing. Respiratory: Positive for shortness of breath. Negative for cough and chest tightness. Cardiovascular: Positive for chest pain. Negative for leg swelling. Gastrointestinal: Negative for abdominal pain, diarrhea, nausea and vomiting. Endocrine: Negative for polydipsia and polyphagia. Genitourinary: Negative for dysuria, flank pain and frequency. Musculoskeletal: Negative for back pain and myalgias. Skin: Negative for pallor and rash.    Neurological: Negative for syncope, weakness and headaches. Hematological: Does not bruise/bleed easily. All other systems reviewed and are negative. Except as noted above the remainder of the review of systems was reviewed and negative. PAST MEDICAL HISTORY     Past Medical History:   Diagnosis Date    Asthma     Chronic abdominal pain     Depression     Hyperlipidemia     Hypertension     NATALEE (obstructive sleep apnea)     Schizophrenia (HCC)     Type II or unspecified type diabetes mellitus without mention of complication, not stated as uncontrolled          SURGICALHISTORY       Past Surgical History:   Procedure Laterality Date    [facility]    [facility]         CURRENT MEDICATIONS       Previous Medications    AMLODIPINE BESYLATE PO    Take  by mouth. ARIPIPRAZOLE (ABILIFY PO)    Take  by mouth. ASPIRIN 81 MG TABLET    Take 81 mg by mouth daily. BENZONATATE (TESSALON PERLES) 100 MG CAPSULE    Take 1 capsule by mouth 3 times daily as needed for Cough    BUPROPION HCL (WELLBUTRIN PO)    Take  by mouth. CYPROHEPTADINE (PERIACTIN) 4 MG TABLET    Take 1 tablet by mouth 3 times daily    ESOMEPRAZOLE (NEXIUM) 40 MG DELAYED RELEASE CAPSULE    Take 1 capsule by mouth every morning (before breakfast)    GLIPIZIDE PO    Take  by mouth. INSULIN GLARGINE (LANTUS SOLOSTAR) 100 UNIT/ML INJECTION PEN    Inject 50 Units into the skin nightly    INSULIN LISPRO (HUMALOG KWIKPEN) 200 UNIT/ML SOPN PEN    Inject 45 Units into the skin 3 times daily (with meals)    INSULIN LISPRO (HUMALOG KWIKPEN) 200 UNIT/ML SOPN PEN    Inject 45 Units into the skin 3 times daily (with meals)    INSULIN SYRINGE .5CC/29G 29G X 1/2\" 0.5 ML MISC    1 each by Does not apply route daily    LEVOTHYROXINE (SYNTHROID) 50 MCG TABLET    Take 50 mcg by mouth Daily    LISINOPRIL PO    Take  by mouth.     MELOXICAM (MOBIC) 15 MG TABLET    Take 1 tablet by mouth daily as needed for Pain    METFORMIN HCL PO Take  by mouth. MONTELUKAST (SINGULAIR) 10 MG TABLET    Take 10 mg by mouth nightly    ONDANSETRON (ZOFRAN) 4 MG TABLET    Take 1 tablet by mouth every 8 hours as needed for Nausea    PRAVASTATIN SODIUM PO    Take  by mouth.     SERTRALINE (ZOLOFT) 100 MG TABLET    Take 100 mg by mouth daily    TOPIRAMATE (TOPAMAX) 25 MG TABLET    Take 25 mg by mouth daily    TRAZODONE (DESYREL) 50 MG TABLET    Take 50 mg by mouth nightly       ALLERGIES     Shellfish-derived products, Amoxicillin, Macrobid [nitrofurantoin monohyd macro], Reglan [metoclopramide], Singulair [montelukast], and Nubain [nalbuphine hcl]    FAMILY HISTORY       Family History   Problem Relation Age of Onset    Other Mother         Diverticulitis          SOCIAL HISTORY       Social History     Socioeconomic History    Marital status: Single     Spouse name: None    Number of children: None    Years of education: None    Highest education level: None   Occupational History    None   Social Needs    Financial resource strain: None    Food insecurity     Worry: None     Inability: None    Transportation needs     Medical: None     Non-medical: None   Tobacco Use    Smoking status: Former Smoker     Packs/day: 1.00     Types: Cigarettes    Smokeless tobacco: Never Used   Substance and Sexual Activity    Alcohol use: No    Drug use: No    Sexual activity: None   Lifestyle    Physical activity     Days per week: None     Minutes per session: None    Stress: None   Relationships    Social connections     Talks on phone: None     Gets together: None     Attends [employer] service: None     Active member of club or organization: None     Attends meetings of clubs or organizations: None     Relationship status: None    Intimate partner violence     Fear of current or ex partner: None     Emotionally abused: None     Physically abused: None     Forced sexual activity: None   Other Topics Concern    None   Social History Narrative    Lives w brother       SCREENINGS      @FLOW([id number])@      PHYSICAL EXAM    (up to 7 for level 4, 8 or more for level 5)     ED Triage Vitals [03/20/21 2054]   BP Temp Temp Source Pulse Resp SpO2 Height Weight   (!) 136/97 98.4 °F (36.9 °C) Oral 109 24 98 % 6' (1.829 m) (!) 340 lb (154.2 kg)       Physical Exam  Vitals signs and nursing note reviewed. Constitutional:       General: She is awake. She is not in acute distress. Appearance: Normal appearance. She is well-developed and well-groomed. She is morbidly obese. She is not ill-appearing, toxic-appearing or diaphoretic. Interventions: She is not intubated. Comments: No photophobia. No phonophobia. HENT:      Head: Normocephalic and atraumatic. No Aparicio's sign. Right Ear: External ear normal.      Left Ear: External ear normal.      Nose: Nose normal. No congestion or rhinorrhea. Mouth/Throat:      Mouth: Mucous membranes are moist.      Pharynx: Oropharynx is clear. No oropharyngeal exudate or posterior oropharyngeal erythema. Eyes:      General: No scleral icterus. Right eye: No foreign body or discharge. Left eye: No discharge. Extraocular Movements: Extraocular movements intact. Conjunctiva/sclera: Conjunctivae normal.      Left eye: No exudate. Pupils: Pupils are equal, round, and reactive to light. Neck:      Musculoskeletal: Normal range of motion and neck supple. No neck rigidity. Thyroid: No thyromegaly. Vascular: No hepatojugular reflux or JVD. Trachea: No tracheal deviation. Comments: No meningismus. Cardiovascular:      Rate and Rhythm: Regular rhythm. Tachycardia present. No extrasystoles are present. Pulses: Normal pulses. No decreased pulses. Heart sounds: Normal heart sounds. Heart sounds not distant. No murmur. No friction rub. No gallop. Pulmonary:      Effort: Pulmonary effort is normal. Tachypnea present.  No bradypnea, accessory muscle usage or respiratory MDM  Heart score is 5. This heart score calculation assumes a normal troponin. Patient given aspirin for cardiac protective purposes. Patient's risk factors are diabetes, family history of early coronary disease, hypertension, hyperlipidemia, and morbid obesity. From the time of patient's last stress test dated 11/1/2016 the patient has gained at least 57 pounds. Dimer is negative as excludes pulmonary embolism. Patient has uncontrolled diabetes and glucose is 493. Subcu insulin was ordered for her. Records show that the patient has seen Dr. Jocelyn Gray. And she has also seen cardiologist affiliated with the [facility]. CONSULTS:  None    PROCEDURES:  Unless otherwise noted below, none     Procedures    FINAL IMPRESSION      1. Anginal chest pain at rest [facility])    2. Dyspnea and respiratory abnormalities    3. Morbid obesity with BMI of 45.0-49.9, adult ([facility] [address]    4. Type 2 diabetes mellitus with hyperglycemia, unspecified whether long term insulin use ([facility]          DISPOSITION/PLAN   DISPOSITION Decision To Admit 03/20/2021 10:15:07 PM      PATIENT REFERRED TO:  No follow-up provider specified.     DISCHARGE MEDICATIONS:  New Prescriptions    No medications on file          (Please note that portions of this note were completed with a voice recognition program.  Efforts were made to edit the dictations but occasionally words are mis-transcribed.)    Matti Bang DO (electronically signed)  Attending Emergency Physician          Matti Bang DO  03/24/21 7228

## 2021-03-21 NOTE — CONSULTS
[address]                      [address], [address] [facility]                                  CONSULTATION    PATIENT NAME: Juliano Gonzalez         :        1975  MED REC NO:   [id number]                            ROOM:       W180  ACCOUNT NO:   [de-identified]                           ADMIT DATE: 2021  PROVIDER:     Spenser Hester MD    CONSULT DATE:  2021    HISTORY OF PRESENT ILLNESS:  This is a 77-year-old patient who we were  asked to consult on chest discomfort. She is a very pleasant patient,  however, unable to discuss any or talk any English of significance. We  did get a monitor interpretation. The patient describes of a squeezing-type sensation that happened  frequently. However, it was more like she had a couple seconds of  squeezing sensation, then would recur. They did not appear to be  particularly constant. The patient was admitted for her chest pain as well as the patient had  some PVCs on her monitor. PHYSICAL EXAMINATION:  GENERAL:  On exam, this patient is somnolent, but arousable. The  patient appeared to be in no acute distress. She was able to converse  through the  quite reasonably. She has the above-mentioned  symptoms. The patient denies having any cardiac workup in the past.   She has had a blood gas, but does not describe any sort of angiography. HEENT:  Atraumatic, normocephalic. No xanthelasma noted. No icterus is  noted. No visible thyromegaly. NECK:  She is extremely overweight, so neck exam is incomplete. HEART:  She has got regular rate and rhythm. No obvious murmur, S3 or  S4 is noted; although her heart sounds are somewhat distant due to the  patient's body habitus. LUNGS:  Clear to auscultation. ABDOMEN:  Grossly obese but benign. EXTREMITIES:  There is some subjective tenderness throughout her legs  which she describes having diabetic neuropathy.   She has got palpable  distal pulses and a trace of edema. Her EKG does show sinus rhythm, a low-voltage is noted most likely due  to body habitus. No acute ST-T wave changes are noted. Her monitor  does show occasional PVCs but no sustained ventricular tachycardia. The patient's other laboratory work is notable for the following. CPK  is only 46. Troponin is negative. Hemoglobin A1c is pending. Her  COVID is negative. Her lipid tests are also pending. She has a  thyroid, which is 3.49. Magnesium is 2.1. Pro-Time and INR is normal.   _____ is elevated at 9.8. Her electrolytes are notable for glucose of  493. CBC is unremarkable. She had a D-dimer which is negative. BTNP  which is also negative. Chest x-ray showed some borderline  cardiomegaly, otherwise unremarkable. PAST MEDICAL HISTORY:  Notable for the following. She has diabetes,  asthma, hyperlipidemia, abdominal pain, sleep apnea, some depression and  schizophrenia are listed. PAST SURGICAL HISTORY:  Notable for upper GI and colonoscopy. FAMILY HISTORY:  Notable for some sort of cardiac history with her  mother, but apparently she was on dialysis. SOCIAL HISTORY:  She does smoke. Denies any alcohol or drug abuse. Socially, the patient is single and apparently lives at home. REVIEW OF SYSTEMS:  Other 12-point review of systems are negative except  that she does have some diabetic neuropathy and takes some gabapentin. She does seem to be somewhat up on her medications. She does not  describe particularly pleuritic component to this. She does have some  tenderness, however, on her left breast and left shoulder. Other  12-point review of systems is negative except as outlined in the history  of present illness. ALLERGIES:  THE PATIENT HAS SOME ALLERGIES TO SHELLFISH, AMOXICILLIN,  MACROBID, REGLAN, SINGULAIR AND NUBAIN. ASSESSMENT:  1. The patient with some atypical chest pain. This seems to be rather  brief but will recur. Etiology could be she is feeling some  palpitations. 2.  Strongly suspicious of obstructive sleep apnea. 3.  The patient also has some tactile left breast and left shoulder pain  which would not be consistent with angina. PLAN:  1.  Pulmonary consult for obstructive sleep apnea. 2.  EKG in the morning. 3.  Regular stress test, i.e., _____ protocol. 4.  Echocardiogram.  5.  Further evaluation. The fact that she does not describe pleuritic  chest pain and her D-dimer is negative. In addition, the patient has  some diabetic neuropathy, the patient will get magnesium for PVCs. The patient's medications will also be reviewed but her glucose  obviously needs to be controlled. Further recommendations are pending. The fact that her BTNP and chest  x-ray cleared out any real evidence for congestive heart failure.         Camron Curry MD    D: 03/21/2021 12:20:54       T: 03/21/2021 12:25:24     [email or web address]  Job#: [id number]     Doc#: [id number]    CC:

## 2021-03-21 NOTE — FLOWSHEET NOTE
used to communicate with pt. Pt states she does not know her home medications and left the list at home. Dr. Amarjit Lopez notified. Pt complains of 9/10 chest pain that goes into left shoulder. Pt states that she did not have hardly any relief with the medications given in the ER. Dr. Amarjit Lopez notified of pt's nausea and pain. Orders to be given. Aren Mcgovern - Dr. Amarjit Lopez notified of pt's blood glucose of 425 and asked if he wants to give pt 9 units of sliding scale in addition to the one time dose of 10 units of humalog or to give one or the other. Also, asked if Dr. Amarjit Lopez wants to give pt nightly lantus as well.      Pt states she uses a home CPAP but does not have it with her, we will have to request for that to be brought in

## 2021-03-21 NOTE — ED TRIAGE NOTES
Pt states that she has been having CP since this morning. Pt states that it is going down her arm, pt states that pain is intermittent.  Pt is SOB at this time

## 2021-03-21 NOTE — H&P
Hospital Medicine  History and Physical    Patient:  Angelita Granados  MRN: [id number]    CHIEF COMPLAINT:    Chief Complaint   Patient presents with    Chest Pain    Shortness of Breath       History Obtained From:  patient, electronic medical record  Primary Care Physician: Sami Manriquez MD    HISTORY OF PRESENT ILLNESS:   The patient is a 50 y.o. female who presents with her daughter with a chief complaint of chest pain. Pt is Hungarian speaking and history is obtained via video interpretor, however even with interpretation services full history is very difficult to ascertain despite help from the patient's family as well. Pt presents afebrile tmax 98.4 RR 24   /97 saturating well on room air. BMP only significant for a glucose 493 on arrival. LFT elevated,   Alk Phos 168. Pt follows with Dr. Kasia Madera for cardiology. Pain history is difficult to ascertain, however pt notes she developed chest pain. it happened after eating, and that she was not able to lay down after eating due to worsened chest pain. She was given GI cocktail in ED with improvement, but notes that the chest pain is not similar to previous GI symptoms. . She has a history of CAD in the past. Troponin is not elevated. ekg shows no st changes. Pt also notes a history of DM, complicated by neuropathy. She reports tingling hands and feet, but cannot tolerate gabapentin. LFT are noted to be elevated in ED, though abd exam Is benign.      Past Medical History:      Diagnosis Date    Asthma     Chronic abdominal pain     Depression     Hyperlipidemia     Hypertension     NATALEE (obstructive sleep apnea)     Schizophrenia (HCC)     Type II or unspecified type diabetes mellitus without mention of complication, not stated as uncontrolled        Past Surgical History:      Procedure Laterality Date    [facility]    UPPER GASTROINTESTINAL ENDOSCOPY  2/21/14    [facility]       Medications Prior to Admission:    Prior to Admission medications    Medication Sig Start Date End Date Taking? Authorizing Provider   insulin lispro (HUMALOG KWIKPEN) 200 UNIT/ML SOPN pen Inject 45 Units into the skin 3 times daily (with meals) 11/14/20   Flory Spann PA-C   ondansetron (ZOFRAN) 4 MG tablet Take 1 tablet by mouth every 8 hours as needed for Nausea 8/11/20   Dania Miller PA-C   benzonatate (TESSALON PERLES) 100 MG capsule Take 1 capsule by mouth 3 times daily as needed for Cough 8/11/20   Dania Miller PA-C   insulin lispro (HUMALOG KWIKPEN) 200 UNIT/ML SOPN pen Inject 45 Units into the skin 3 times daily (with meals) 8/11/20   Dania Miller PA-C   INSULIN SYRINGE .5CC/29G 29G X 1/2\" 0.5 ML MISC 1 each by Does not apply route daily 8/11/20   Dania Miller PA-C   meloxicam (MOBIC) 15 MG tablet Take 1 tablet by mouth daily as needed for Pain 4/10/20   Tc Lucas MD   insulin glargine (LANTUS SOLOSTAR) 100 UNIT/ML injection pen Inject 50 Units into the skin nightly 1/11/19   Luz Nguyen MD   esomeprazole (NEXIUM) 40 MG delayed release capsule Take 1 capsule by mouth every morning (before breakfast) 1/11/19   Ashley Burt MD   sertraline (ZOLOFT) 100 MG tablet Take 100 mg by mouth daily    Historical Provider, MD   traZODone (DESYREL) 50 MG tablet Take 50 mg by mouth nightly    Historical Provider, MD   levothyroxine (SYNTHROID) 50 MCG tablet Take 50 mcg by mouth Daily    Historical Provider, MD   topiramate (TOPAMAX) 25 MG tablet Take 25 mg by mouth daily    Historical Provider, MD   montelukast (SINGULAIR) 10 MG tablet Take 10 mg by mouth nightly    Historical Provider, MD   cyproheptadine (PERIACTIN) 4 MG tablet Take 1 tablet by mouth 3 times daily 1/31/18   Dasia Pate DO   aspirin 81 MG tablet Take 81 mg by mouth daily. Historical Provider, MD   AMLODIPINE BESYLATE PO Take  by mouth. Historical Provider, MD   LISINOPRIL PO Take  by mouth.     Historical Provider, MD   METFORMIN HCL PO Take  by mouth. Historical Provider, MD   BuPROPion HCl (WELLBUTRIN PO) Take  by mouth. Historical Provider, MD   GLIPIZIDE PO Take  by mouth. Historical Provider, MD   ARIPiprazole (ABILIFY PO) Take  by mouth. Historical Provider, MD   PRAVASTATIN SODIUM PO Take  by mouth. Historical Provider, MD       Allergies:  Shellfish-derived products, Amoxicillin, Macrobid [nitrofurantoin monohyd macro], Reglan [metoclopramide], Singulair [montelukast], and Nubain [nalbuphine hcl]    Social History:   TOBACCO:   reports that she has quit smoking. Her smoking use included cigarettes. She smoked 1.00 pack per day. She has never used smokeless tobacco.  ETOH:   reports no history of alcohol use. OCCUPATION:  none    Family History:       Problem Relation Age of Onset    Other Mother         Diverticulitis       REVIEW OF SYSTEMS:  Ten systems reviewed and negative except for as above. Physical Exam:    Vitals: BP (!) 159/88   Pulse 99   Temp 98.2 °F (36.8 °C) (Oral)   Resp 18   Ht 6' (1.829 m)   Wt (!) 314 lb 6 oz (142.6 kg)   SpO2 97%   BMI 42.64 kg/m²   Constitutional: alert, appears stated age and cooperative. Morbidly obese  Skin: Skin color, texture, turgor normal. No rashes or lesions  Eyes:Eye: Normal external eye, conjunctiva, CHANTE. ENT: Head: Normocephalic, no lesions, without obvious abnormality. Neck: no adenopathy, no carotid bruit, no JVD, supple, symmetrical, trachea midline and thyroid not enlarged, symmetric, no tenderness/mass/nodules  Respiratory: clear to auscultation bilaterally  Cardiovascular: regular rate and rhythm, S1, S2 normal, no murmur, click, rub or gallop  Gastrointestinal: soft, non-tender; bowel sounds normal; no masses,  no organomegaly  Genitourinary: Deferred  Musculoskeletal:extremities normal, atraumatic, no cyanosis or edema  Neurologic: Mental status AAOx3 No facial asymmetry or droop. Normal muscle strength b/l. Psychiatric: Appropriate mood and affect. Good insight and judgement  Hematologic: No obvious bruising or bleeding    Recent Labs     03/20/21 2110   WBC 6.6   HGB 14.4        Recent Labs     03/20/21 2110   *   K 4.5   CL 97   CO2 27   BUN 11   CREATININE 0.72   GLUCOSE 493*   *   *   BILITOT 0.3   ALKPHOS 168*     Troponin T:   Recent Labs     03/20/21 2110   Emmet Fennel <0.010     ABGs:   Lab Results   Component Value Date    PHART 7.434 02/14/2015    PO2ART 82 02/14/2015    APK8AYC 35 02/14/2015     INR:   Recent Labs     03/20/21 2110   INR 1.0     URINALYSIS:No results for input(s): NITRITE, COLORU, PHUR, LABCAST, WBCUA, RBCUA, MUCUS, TRICHOMONAS, YEAST, BACTERIA, CLARITYU, SPECGRAV, LEUKOCYTESUR, UROBILINOGEN, BILIRUBINUR, BLOODU, GLUCOSEU, AMORPHOUS in the last 72 hours. Invalid input(s): Azell Govern  -----------------------------------------------------------------   No results found. EKG:     Assessment and Plan   1. Chest pain: Low concern for an acute coronary event though we will trend troponins and EKG given multitude of risk factors. Consult cardiology to evaluate for the need of outpatient stress testing. Given negative troponins and stable EKG however GI pathology seems more likely. Continue PPI, add Carafate, right upper quadrant ultrasound and elevated LFTs. .  Consult to GI given difficulty obtaining details about symptomatology to evaluate for the need of possible EGD. Aspirin, statin  2. Type 2 diabetes: Extensively educated about dietary changes. Consult the diabetes educator and nutritionist.  reduce insulin dosage initially on admission given reduced appetite and epigastric discomfort. Check hemoglobin A1c. .  Low-dose Lyrica for neuropathy  3. Morbid obesity: As above extensive education for the need of immediate and aggressive dietary changes and weight loss to reduce comorbidities possible complications from her medical issues  4.  T prophylaxis Lovenox    Patient Active Problem List   Diagnosis Code    Anxiety F41.9    Asthma J45.909    Auditory hallucination R44.0    Depression F32.9    Uncontrolled type 2 diabetes mellitus ([facility] [address] E11.65    Diabetes mellitus ([facility] [address] E11.9    Family history of coronary arteriosclerosis Z82.49    Gastroesophageal reflux disease K21.9    Hyperlipidemia E78.5    Hypertension I10    Hypothyroidism due to Hashimoto's thyroiditis E03.8, E06.3    Ingrowing nail L60.0    Laryngeal spasm J38.5    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    Morbid obesity (HCC) E66.01    Obstructive sleep apnea syndrome G47.33    Pain of toe M79.676    Schizophrenia (HCC) F20.9    Type 2 diabetes mellitus with other diabetic neurological complication (HCC) F31.86    Type 1 diabetes mellitus (HCC) E10.9    Vitamin D deficiency E55.9    Pancreatitis, unspecified pancreatitis type K85.90    DKA, type 2, not at goal [facility]) E11.10    Chest pain R07.9       Devon Cobb DO  Admitting Hospitalist    Emergency Contact:

## 2021-03-22 ENCOUNTER — ANCILLARY PROCEDURE (OUTPATIENT)
Dept: ENDOSCOPY | Age: 46
DRG: 198 | End: 2021-03-22
Payer: COMMERCIAL

## 2021-03-22 ENCOUNTER — ANESTHESIA (OUTPATIENT)
Dept: ENDOSCOPY | Age: 46
DRG: 198 | End: 2021-03-22
Payer: COMMERCIAL

## 2021-03-22 ENCOUNTER — ANESTHESIA EVENT (OUTPATIENT)
Dept: ENDOSCOPY | Age: 46
DRG: 198 | End: 2021-03-22
Payer: COMMERCIAL

## 2021-03-22 VITALS
RESPIRATION RATE: 11 BRPM | OXYGEN SATURATION: 81 % | SYSTOLIC BLOOD PRESSURE: 109 MMHG | DIASTOLIC BLOOD PRESSURE: 65 MMHG

## 2021-03-22 LAB
ALBUMIN SERPL-MCNC: 3.6 G/DL (ref 3.5–4.6)
ALP BLD-CCNC: 134 U/L (ref 40–130)
ALT SERPL-CCNC: 95 U/L (ref 0–33)
ANION GAP SERPL CALCULATED.3IONS-SCNC: 15 MEQ/L (ref 9–15)
AST SERPL-CCNC: 62 U/L (ref 0–35)
BILIRUB SERPL-MCNC: 0.7 MG/DL (ref 0.2–0.7)
BILIRUBIN DIRECT: <0.2 MG/DL (ref 0–0.4)
BILIRUBIN, INDIRECT: ABNORMAL MG/DL (ref 0–0.6)
BUN BLDV-MCNC: 17 MG/DL (ref 6–20)
CALCIUM SERPL-MCNC: 9.2 MG/DL (ref 8.5–9.9)
CHLORIDE BLD-SCNC: 100 MEQ/L (ref 95–107)
CHOLESTEROL, TOTAL: 194 MG/DL (ref 0–199)
CO2: 23 MEQ/L (ref 20–31)
CREAT SERPL-MCNC: 0.72 MG/DL (ref 0.5–0.9)
GFR AFRICAN AMERICAN: >60
GFR NON-AFRICAN AMERICAN: >60
GLUCOSE BLD-MCNC: 357 MG/DL (ref 60–115)
GLUCOSE BLD-MCNC: 368 MG/DL (ref 60–115)
GLUCOSE BLD-MCNC: 385 MG/DL (ref 60–115)
GLUCOSE BLD-MCNC: 401 MG/DL (ref 70–99)
GLUCOSE BLD-MCNC: 424 MG/DL (ref 60–115)
GLUCOSE BLD-MCNC: 437 MG/DL (ref 60–115)
HCT VFR BLD CALC: 42.6 % (ref 37–47)
HDLC SERPL-MCNC: 45 MG/DL (ref 40–59)
HEMOGLOBIN: 14.6 G/DL (ref 12–16)
LDL CHOLESTEROL CALCULATED: 115 MG/DL (ref 0–129)
LV EF: 58 %
LVEF MODALITY: NORMAL
MAGNESIUM: 2.4 MG/DL (ref 1.7–2.4)
MCH RBC QN AUTO: 31 PG (ref 27–31.3)
MCHC RBC AUTO-ENTMCNC: 34.2 % (ref 33–37)
MCV RBC AUTO: 90.7 FL (ref 82–100)
PDW BLD-RTO: 13.6 % (ref 11.5–14.5)
PERFORMED ON: ABNORMAL
PLATELET # BLD: 181 K/UL (ref 130–400)
POTASSIUM REFLEX MAGNESIUM: 4.5 MEQ/L (ref 3.4–4.9)
POTASSIUM SERPL-SCNC: 4.5 MEQ/L (ref 3.4–4.9)
RBC # BLD: 4.69 M/UL (ref 4.2–5.4)
SODIUM BLD-SCNC: 138 MEQ/L (ref 135–144)
TOTAL PROTEIN: 7.1 G/DL (ref 6.3–8)
TRIGL SERPL-MCNC: 171 MG/DL (ref 0–150)
WBC # BLD: 7.5 K/UL (ref 4.8–10.8)

## 2021-03-22 PROCEDURE — 3700000000 HC ANESTHESIA ATTENDED CARE: Performed by: INTERNAL MEDICINE

## 2021-03-22 PROCEDURE — 2700000000 HC OXYGEN THERAPY PER DAY

## 2021-03-22 PROCEDURE — 7100000011 HC PHASE II RECOVERY - ADDTL 15 MIN: Performed by: INTERNAL MEDICINE

## 2021-03-22 PROCEDURE — 93017 CV STRESS TEST TRACING ONLY: CPT

## 2021-03-22 PROCEDURE — 96376 TX/PRO/DX INJ SAME DRUG ADON: CPT

## 2021-03-22 PROCEDURE — 85027 COMPLETE CBC AUTOMATED: CPT

## 2021-03-22 PROCEDURE — 6360000002 HC RX W HCPCS: Performed by: NURSE ANESTHETIST, CERTIFIED REGISTERED

## 2021-03-22 PROCEDURE — 6370000000 HC RX 637 (ALT 250 FOR IP): Performed by: INTERNAL MEDICINE

## 2021-03-22 PROCEDURE — 80048 BASIC METABOLIC PNL TOTAL CA: CPT

## 2021-03-22 PROCEDURE — 7100000010 HC PHASE II RECOVERY - FIRST 15 MIN: Performed by: INTERNAL MEDICINE

## 2021-03-22 PROCEDURE — 2709999900 HC NON-CHARGEABLE SUPPLY: Performed by: INTERNAL MEDICINE

## 2021-03-22 PROCEDURE — 2580000003 HC RX 258

## 2021-03-22 PROCEDURE — C8929 TTE W OR WO FOL WCON,DOPPLER: HCPCS

## 2021-03-22 PROCEDURE — 80061 LIPID PANEL: CPT

## 2021-03-22 PROCEDURE — 94761 N-INVAS EAR/PLS OXIMETRY MLT: CPT

## 2021-03-22 PROCEDURE — 6360000004 HC RX CONTRAST MEDICATION: Performed by: INTERNAL MEDICINE

## 2021-03-22 PROCEDURE — 2500000003 HC RX 250 WO HCPCS: Performed by: NURSE ANESTHETIST, CERTIFIED REGISTERED

## 2021-03-22 PROCEDURE — G0378 HOSPITAL OBSERVATION PER HR: HCPCS

## 2021-03-22 PROCEDURE — 93010 ELECTROCARDIOGRAM REPORT: CPT | Performed by: INTERNAL MEDICINE

## 2021-03-22 PROCEDURE — 0DJ08ZZ INSPECTION OF UPPER INTESTINAL TRACT, VIA NATURAL OR ARTIFICIAL OPENING ENDOSCOPIC: ICD-10-PCS | Performed by: INTERNAL MEDICINE

## 2021-03-22 PROCEDURE — 80076 HEPATIC FUNCTION PANEL: CPT

## 2021-03-22 PROCEDURE — 36415 COLL VENOUS BLD VENIPUNCTURE: CPT

## 2021-03-22 PROCEDURE — 3609017100 HC EGD: Performed by: INTERNAL MEDICINE

## 2021-03-22 PROCEDURE — 99232 SBSQ HOSP IP/OBS MODERATE 35: CPT | Performed by: INTERNAL MEDICINE

## 2021-03-22 PROCEDURE — 2060000000 HC ICU INTERMEDIATE R&B

## 2021-03-22 PROCEDURE — 2580000003 HC RX 258: Performed by: INTERNAL MEDICINE

## 2021-03-22 PROCEDURE — 6360000002 HC RX W HCPCS: Performed by: INTERNAL MEDICINE

## 2021-03-22 PROCEDURE — 83735 ASSAY OF MAGNESIUM: CPT

## 2021-03-22 PROCEDURE — 94640 AIRWAY INHALATION TREATMENT: CPT

## 2021-03-22 RX ORDER — SODIUM CHLORIDE 9 MG/ML
INJECTION, SOLUTION INTRAVENOUS CONTINUOUS
Status: CANCELLED | OUTPATIENT
Start: 2021-03-22

## 2021-03-22 RX ORDER — PROPOFOL 10 MG/ML
INJECTION, EMULSION INTRAVENOUS PRN
Status: DISCONTINUED | OUTPATIENT
Start: 2021-03-22 | End: 2021-03-22 | Stop reason: SDUPTHER

## 2021-03-22 RX ORDER — SIMETHICONE 20 MG/.3ML
EMULSION ORAL PRN
Status: DISCONTINUED | OUTPATIENT
Start: 2021-03-22 | End: 2021-03-22 | Stop reason: ALTCHOICE

## 2021-03-22 RX ORDER — LIDOCAINE HYDROCHLORIDE 20 MG/ML
INJECTION, SOLUTION INFILTRATION; PERINEURAL PRN
Status: DISCONTINUED | OUTPATIENT
Start: 2021-03-22 | End: 2021-03-22 | Stop reason: SDUPTHER

## 2021-03-22 RX ORDER — BUDESONIDE AND FORMOTEROL FUMARATE DIHYDRATE 80; 4.5 UG/1; UG/1
2 AEROSOL RESPIRATORY (INHALATION) 2 TIMES DAILY
Status: DISCONTINUED | OUTPATIENT
Start: 2021-03-22 | End: 2021-03-26 | Stop reason: HOSPADM

## 2021-03-22 RX ORDER — MAGNESIUM HYDROXIDE 1200 MG/15ML
LIQUID ORAL PRN
Status: DISCONTINUED | OUTPATIENT
Start: 2021-03-22 | End: 2021-03-22 | Stop reason: ALTCHOICE

## 2021-03-22 RX ORDER — SODIUM CHLORIDE 9 MG/ML
INJECTION, SOLUTION INTRAVENOUS
Status: COMPLETED
Start: 2021-03-22 | End: 2021-03-22

## 2021-03-22 RX ADMIN — SODIUM CHLORIDE 500 ML: 9 INJECTION, SOLUTION INTRAVENOUS at 14:05

## 2021-03-22 RX ADMIN — INSULIN LISPRO 15 UNITS: 100 INJECTION, SOLUTION INTRAVENOUS; SUBCUTANEOUS at 12:17

## 2021-03-22 RX ADMIN — KETOROLAC TROMETHAMINE 15 MG: 15 INJECTION, SOLUTION INTRAMUSCULAR; INTRAVENOUS at 16:57

## 2021-03-22 RX ADMIN — PERFLUTREN 1.76 MG: 6.52 INJECTION, SUSPENSION INTRAVENOUS at 11:02

## 2021-03-22 RX ADMIN — INSULIN LISPRO 15 UNITS: 100 INJECTION, SOLUTION INTRAVENOUS; SUBCUTANEOUS at 16:54

## 2021-03-22 RX ADMIN — TOPIRAMATE 25 MG: 25 TABLET, FILM COATED ORAL at 09:00

## 2021-03-22 RX ADMIN — PREGABALIN 75 MG: 75 CAPSULE ORAL at 09:01

## 2021-03-22 RX ADMIN — ACETAMINOPHEN 650 MG: 325 TABLET ORAL at 06:49

## 2021-03-22 RX ADMIN — FAMOTIDINE 20 MG: 20 TABLET ORAL at 09:01

## 2021-03-22 RX ADMIN — BUDESONIDE AND FORMOTEROL FUMARATE DIHYDRATE 2 PUFF: 80; 4.5 AEROSOL RESPIRATORY (INHALATION) at 19:27

## 2021-03-22 RX ADMIN — CALCIUM CARBONATE 1000 MG: 500 TABLET, CHEWABLE ORAL at 02:41

## 2021-03-22 RX ADMIN — TRAZODONE HYDROCHLORIDE 50 MG: 50 TABLET ORAL at 20:15

## 2021-03-22 RX ADMIN — LEVOTHYROXINE SODIUM 50 MCG: 0.05 TABLET ORAL at 06:50

## 2021-03-22 RX ADMIN — GABAPENTIN 800 MG: 400 CAPSULE ORAL at 09:00

## 2021-03-22 RX ADMIN — GABAPENTIN 800 MG: 400 CAPSULE ORAL at 20:15

## 2021-03-22 RX ADMIN — GABAPENTIN 800 MG: 400 CAPSULE ORAL at 15:02

## 2021-03-22 RX ADMIN — FAMOTIDINE 20 MG: 20 TABLET ORAL at 20:15

## 2021-03-22 RX ADMIN — SUCRALFATE 1 G: 1 TABLET ORAL at 11:06

## 2021-03-22 RX ADMIN — INSULIN GLARGINE 50 UNITS: 100 INJECTION, SOLUTION SUBCUTANEOUS at 20:17

## 2021-03-22 RX ADMIN — AMLODIPINE BESYLATE 2.5 MG: 2.5 TABLET ORAL at 09:00

## 2021-03-22 RX ADMIN — ASPIRIN 81 MG: 81 TABLET, COATED ORAL at 09:01

## 2021-03-22 RX ADMIN — PROPOFOL 150 MG: 10 INJECTION, EMULSION INTRAVENOUS at 14:12

## 2021-03-22 RX ADMIN — INSULIN LISPRO 15 UNITS: 100 INJECTION, SOLUTION INTRAVENOUS; SUBCUTANEOUS at 09:01

## 2021-03-22 RX ADMIN — SUCRALFATE 1 G: 1 TABLET ORAL at 16:54

## 2021-03-22 RX ADMIN — LIDOCAINE HYDROCHLORIDE 50 MG: 20 INJECTION, SOLUTION INFILTRATION; PERINEURAL at 14:12

## 2021-03-22 RX ADMIN — PREGABALIN 75 MG: 75 CAPSULE ORAL at 20:15

## 2021-03-22 RX ADMIN — KETOROLAC TROMETHAMINE 15 MG: 15 INJECTION, SOLUTION INTRAMUSCULAR; INTRAVENOUS at 02:41

## 2021-03-22 RX ADMIN — METOPROLOL SUCCINATE 25 MG: 25 TABLET, EXTENDED RELEASE ORAL at 20:15

## 2021-03-22 RX ADMIN — SERTRALINE 100 MG: 100 TABLET, FILM COATED ORAL at 09:01

## 2021-03-22 RX ADMIN — MAGNESIUM OXIDE TAB 400 MG (240 MG ELEMENTAL MG) 400 MG: 400 (240 MG) TAB at 09:01

## 2021-03-22 ASSESSMENT — PULMONARY FUNCTION TESTS
PIF_VALUE: 1

## 2021-03-22 ASSESSMENT — PAIN SCALES - GENERAL
PAINLEVEL_OUTOF10: 9
PAINLEVEL_OUTOF10: 9
PAINLEVEL_OUTOF10: 0

## 2021-03-22 ASSESSMENT — PAIN DESCRIPTION - LOCATION: LOCATION: CHEST

## 2021-03-22 ASSESSMENT — PAIN DESCRIPTION - PAIN TYPE: TYPE: ACUTE PAIN

## 2021-03-22 ASSESSMENT — PAIN - FUNCTIONAL ASSESSMENT: PAIN_FUNCTIONAL_ASSESSMENT: 0-10

## 2021-03-22 ASSESSMENT — PAIN DESCRIPTION - DESCRIPTORS: DESCRIPTORS: STABBING

## 2021-03-22 NOTE — PROGRESS NOTES
INPATIENT PROGRESS NOTES    PATIENT NAME: Flaquita Diamond  MRN: [id number]  SERVICE DATE:  March 22, 2021   SERVICE TIME:  3:23 PM      PRIMARY SERVICE: Pulmonary Disease    CHIEF COMPLAINTS: Sleep apnea    INTERVAL HPI: Patient seen and examined at bedside, Interval Notes, orders reviewed. Nursing notes noted    Patient report shortness of breath, complains of pain left side of her chest, shortness of breath is chronic, no fever, slept with CPAP, she is on room air saturation 93 to 96%    Review of system:     GI Abdominal pain No  Skin Rash No    Social History     Tobacco Use    Smoking status: Former Smoker     Packs/day: 1.00     Types: Cigarettes    Smokeless tobacco: Never Used   Substance Use Topics    Alcohol use: No         Problem Relation Age of Onset    Other Mother         Diverticulitis    Colon Cancer Paternal Uncle     Colon Cancer Maternal Grandfather          OBJECTIVE    Body mass index is 46.11 kg/m². PHYSICAL EXAM:  Vitals:  BP (!) 139/93   Pulse 88   Temp 98.1 °F (36.7 °C) (Oral)   Resp 20   Ht 6' (1.829 m)   Wt (!) 340 lb (154.2 kg)   SpO2 96%   BMI 46.11 kg/m²     General: alert, cooperative, no distress  Head: normocephalic, atraumatic  Eyes:No gross abnormalities. ENT:  MMM no lesions  Neck:  supple and no masses  Chest : clear to auscultation bilaterally- no wheezes, rales or rhonchi, normal air movement, no respiratory distress, tender left anterior chest reproducing same pain noted in HPI  Heart[de-identified] Heart sounds are normal.  Regular rate and rhythm without murmur, gallop or rub. ABD:  symmetric, soft, non-tender, no guarding or rebound    Musculoskeletal : no cyanosis, no clubbing and no edema  Neuro:  Grossly normal  Skin: No rashes or nodules noted.   Lymph node:  no cervical nodes  Urology: No Fischer   Psychiatric: appropriate    DATA:   Recent Labs     03/20/21  2110 03/22/21  0624   WBC 6.6 7.5   HGB 14.4 14.6   HCT 42.8 42.6   MCV 91.8 90.7    181 Recent Labs     03/20/21  2110 03/22/21  0624   * 138   K 4.5 4.5  4.5   CL 97 100   CO2 27 23   BUN 11 17   CREATININE 0.72 0.72   GLUCOSE 493* 401*   CALCIUM 9.3 9.2   PROT 7.4 7.1   LABALBU 3.7 3.6   BILITOT 0.3 0.7   ALKPHOS 168* 134*   * 62*   * 95*   LABGLOM >60.0 >60.0   GFRAA >60.0 >60.0   GLOB 3.7*  --        MV Settings:          No results for input(s): PHART, DKM6QZM, PO2ART, QJW6VBI, BEART, X9BAUAIS in the last 72 hours.     O2 Device: None (Room air)  O2 Flow Rate (L/min): 2 L/min    DIET CARDIAC; Carb Control: 3 carb choices (45 gms)/meal; No Added Salt (3-4 GM)  Diet NPO, After Midnight Exceptions are: Ice Chips     MEDICATIONS during current hospitalization:    Continuous Infusions:   dextrose         Scheduled Meds:   insulin lispro  0-18 Units Subcutaneous TID WC    insulin lispro  0-9 Units Subcutaneous Nightly    famotidine  20 mg Oral BID    [Held by provider] atorvastatin  40 mg Oral Nightly    enoxaparin  40 mg Subcutaneous Daily    aspirin  81 mg Oral Daily    sucralfate  1 g Oral 4 times per day    amLODIPine  2.5 mg Oral Daily    sertraline  100 mg Oral Daily    traZODone  50 mg Oral Nightly    levothyroxine  50 mcg Oral Daily    topiramate  25 mg Oral Daily    pregabalin  75 mg Oral BID    insulin lispro  10 Units Subcutaneous TID WC    gabapentin  800 mg Oral TID    insulin glargine  50 Units Subcutaneous Nightly    magnesium oxide  400 mg Oral Daily    metoprolol succinate  25 mg Oral Daily       PRN Meds:glucose, dextrose, glucagon (rDNA), dextrose, promethazine **OR** ondansetron, acetaminophen **OR** acetaminophen, potassium chloride **OR** potassium alternative oral replacement **OR** potassium chloride, ondansetron, nitroGLYCERIN, calcium carbonate, ketorolac    Radiology  Echo Complete 2d W Doppler W Color    Result Date: 3/22/2021  Transthoracic Echocardiography Report (TTE)  Demographics  Patient Name  [address]      Gender Female                Stephie Smith  Patient       [id number]                Race               Other  Number                                        Ethnicity           or                                                             Visit Number  [id number]               Room Number        W180  Corporate ID                          Date of Study      03/22/2021  Accession     [id number]              Referring  Number                                Physician  Date of Birth 1975              Sonographer        Collin GRADY  Age           50 year(s)              Interpreting       [address] [place] [address] Godwin BATISTA Procedure Type of Study  TTE procedure:ECHOCARDIOGRAM 2D DOPPLER W COLOR W CONTRAST. Procedure Date Date: 03/22/2021 Start: 10:49 AM Study Location: Portable Technical Quality: Poor visualization due to body habitus. Indications:LVF. Patient Status: Routine Contrast Medium: Definity. Amount - 2 ml Height: 72 inches Weight: 317 pounds BSA: 2.59 m^2 BMI: 42.99 kg/m^2 BP: 144/75 mmHg  Conclusions  Summary  Moderate concentric left ventricular hypertrophy. Normal left ventricular size and function. Left ventricular ejection fraction is visually estimated at 55-60%. Normal diastolic parameters. Mildly enlarged right ventricle cavity. Normal RV systolic function. Trivial mitral regurgitation. Aortic valve appears to be tricuspid. Trivial tricuspid regurgitation with estimated RVSP of 28 mm Hg. Small circumferential pericardial effusion without hemodynamic compromise. Signature  ----------------------------------------------------------------  Electronically signed by Godwin Rivera MD(Interpreting  physician) on 03/22/2021 11:51 AM  ----------------------------------------------------------------  Findings Left Ventricle Moderate concentric left ventricular hypertrophy. Normal left ventricular size and function.  Left ventricular ejection fraction is visually estimated at 55-60%. Normal diastolic parameters. Right Ventricle Mildly enlarged right ventricle cavity. Normal RV systolic function. Left Atrium Normal left atrium. Right Atrium Normal right atrium. Mitral Valve Trivial mitral regurgitation. Tricuspid Valve Trivial tricuspid regurgitation with estimated RVSP of 28 mm Hg. Aortic Valve Aortic valve appears to be tricuspid. Pulmonic Valve Normal pulmonic valve structure and function. Pericardial Effusion Small circumferential pericardial effusion without hemodynamic compromise. Pleural Effusion No evidence of pleural effusion. Aorta \ Miscellaneous Miscellaneous normal findings were found. M-Mode Measurements (cm)  LVIDd: 5.71 cm                         LVIDs: 4.22 cm  IVSd: 1.07 cm                          IVSs: 1.42 cm  LVPWd: 1.06 cm                         LVPWs: 1.27 cm  Rt. Vent.  Dimension: 3.08 cm           AO Root Dimension: 2.94 cm                                         ACS: 1.87 cm                                         LA: 4.6 cm                                         LVOT: 2.02 cm Doppler Measurements:  AV Velocity:0.02 m/s                    MV Peak E-Wave: 0.84 m/s  AV Peak Gradient: 5.42 mmHg             MV Peak A-Wave: 0.77 m/s  AV Mean Gradient: 3.11 mmHg  AV Area (Continuity):2.26 cm^2  TR Velocity:2.36 m/s                    Estimated RAP:5 mmHg  TR Gradient:22.25 mmHg                  RVSP:27.25 mmHg Valves  Mitral Valve  Peak E-Wave: 0.84 m/s                Peak A-Wave: 0.77 m/s                                       E/A Ratio: 1.1                                       Peak Gradient: 2.85 mmHg                                       Deceleration Time: 213.9 msec  Tissue Doppler  E' Septal Velocity: 0.12 m/s  E' Lateral Velocity: 0.1 m/s  Aortic Valve  Peak Velocity: 1.16 m/s                Mean Velocity: 0.83 m/s  Peak Gradient: 5.42 mmHg               Mean Gradient: 3.11 mmHg  Area (continuity): 2.26 cm^2           Area (2D): 2.2 cm^2  AV VTI: 24.93 cm  Cusp Separation: 1.87 cm  Tricuspid Valve  Estimated RVSP: 27.25 mmHg              Estimated RAP: 5 mmHg  TR Velocity: 2.36 m/s                   TR Gradient: 22.25 mmHg  Pulmonic Valve  Peak Velocity: 1 m/s           Peak Gradient: 4.03 mmHg                                 Estimated PASP: 27.25 mmHg  LVOT  Peak Velocity: 0.84 m/s             Mean Velocity: 0.69 m/s  Peak Gradient: 2.8 mmHg             Mean Gradient: 1.98 mmHg  LVOT Diameter: 2.02 cm              LVOT VTI: 17.58 cm Structures  Left Atrium  LA Dimension: 4.6 cm  LA/Aorta: 1.56  Left Ventricle  Diastolic Dimension: 8.71 cm          Systolic Dimension: 0.69 cm  Septum Diastolic: 1.24 cm             Septum Systolic: 3.67 cm  PW Diastolic: 5.98 cm                 PW Systolic: 7.45 cm                                        FS: 26.1 %  LV EDV/LV EDV Index: 160.99 ml/62 m^2 LV ESV/LV ESV Index: 79.28 ml/31 m^2  EF Calculated: 50.8 %  LVOT Diameter: 2.02 cm  Right Atrium  RA Systolic Pressure: 5 mmHg  Right Ventricle  Diastolic Dimension: 9.34 cm                                    RV Systolic Pressure: 44.97 mmHg Aorta/ Miscellaneous Aorta  Aortic Root: 2.94 cm  LVOT Diameter: 2.02 cm    Xr Chest (2 Vw)    Result Date: 3/21/2021  EXAMINATION: XR CHEST (2 VW) CLINICAL HISTORY: CHEST PAIN, SHORTNESS OF BREATH COMPARISONS: NOVEMBER 14, 2020 FINDINGS: Osseous structures are intact. Cardiopericardial silhouette is normal. Pulmonary vasculature is normal. Lungs are clear. NO ACUTE CARDIOPULMONARY DISEASE. Us Abdomen Limited    Result Date: 3/22/2021  US ABDOMEN LIMITED : 3/21/2021 CLINICAL HISTORY:  RUQ US for elevated LFTs. COMPARISON: CT abdomen pelvis 4/4/2020. TECHNIQUE: Transabdominal ultrasound of the right upper quadrant was performed. FINDINGS: The study is limited by the patient's body habitus and mild to moderately enlarged moderately echogenic fatty liver.  The gallbladder, visualized pancreas, right kidney, and great vessels are unremarkable. There is no significant gallbladder distention, wall thickening, calculi, pericholecystic fluid, biliary dilatation, ascites, or other findings of concern identified. The common duct measures approximately 4 to 5 mm at the anand hepatis. MILD TO MODERATELY ENLARGED FATTY LIVER. OTHERWISE, NEGATIVE LIMITED RIGHT UPPER QUADRANT ULTRASOUND. Chest x-ray March 20 reviewed by me and is unremarkable    IMPRESSION AND SUGGESTION:  Patient is at risk due to   · NATALEE  · Morbid obesity  · Chest pain, with tenderness, likely musculoskeletal  · History of asthma, noncompliant with Singulair, no signs of exacerbation    Recommendation  · Continue CPAP while asleep  · Start Symbicort for asthma maintenance treatment  · Chest pain is musculoskeletal, use as needed pain meds  · Lose weight  · Encourage activity  · Watch volume status and avoid overload   .     Electronically signed by Torri Hutson MD,  FCCP   on 3/22/2021 at 3:23 PM

## 2021-03-22 NOTE — ANESTHESIA PRE PROCEDURE
Department of Anesthesiology  Preprocedure Note       Name:  Zeinab Zhou   Age:  50 y.o.  :  1975                                          MRN:  [id number]         Date:  3/22/2021      Surgeon: Avni Lord):  Steve Howell MD    Procedure: Procedure(s):  EGD DIAGNOSTIC ONLY    Medications prior to admission:   Prior to Admission medications    Medication Sig Start Date End Date Taking?  Authorizing Provider   metoprolol succinate (TOPROL XL) 25 MG extended release tablet Take 25 mg by mouth daily   Yes Historical Provider, MD   insulin lispro (HUMALOG KWIKPEN) 200 UNIT/ML SOPN pen Inject 45 Units into the skin 3 times daily (with meals) 20   Teagan Joseph PA-C   ondansetron (ZOFRAN) 4 MG tablet Take 1 tablet by mouth every 8 hours as needed for Nausea 20   Courtney Jim PA-C   benzonatate (TESSALON PERLES) 100 MG capsule Take 1 capsule by mouth 3 times daily as needed for Cough 20   Courtney Jim PA-C   insulin lispro (HUMALOG KWIKPEN) 200 UNIT/ML SOPN pen Inject 45 Units into the skin 3 times daily (with meals) 20   Courtney Jim PA-C   INSULIN SYRINGE .5CC/29G 29G X 1/2\" 0.5 ML MISC 1 each by Does not apply route daily 20   Courtney Jim PA-C   meloxicam (MOBIC) 15 MG tablet Take 1 tablet by mouth daily as needed for Pain 4/10/20   Sugar Mosqueda MD   insulin glargine (LANTUS SOLOSTAR) 100 UNIT/ML injection pen Inject 50 Units into the skin nightly 19   Daxa Benítez MD   esomeprazole (NEXIUM) 40 MG delayed release capsule Take 1 capsule by mouth every morning (before breakfast)  Patient taking differently: Take 20 mg by mouth every morning (before breakfast)  19   Josesito Peter MD   sertraline (ZOLOFT) 100 MG tablet Take 100 mg by mouth daily    Historical Provider, MD   traZODone (DESYREL) 50 MG tablet Take 50 mg by mouth nightly    Historical Provider, MD   levothyroxine (SYNTHROID) 50 MCG tablet Take 50 mcg by mouth Daily    Historical Provider, MD   topiramate (TOPAMAX) 25 MG tablet Take 25 mg by mouth daily    Historical Provider, MD   montelukast (SINGULAIR) 10 MG tablet Take 10 mg by mouth nightly    Historical Provider, MD   cyproheptadine (PERIACTIN) 4 MG tablet Take 1 tablet by mouth 3 times daily 1/31/18   Dasia Pate,    aspirin 81 MG tablet Take 81 mg by mouth daily. Historical Provider, MD   AMLODIPINE BESYLATE PO Take  by mouth. Historical Provider, MD   LISINOPRIL PO Take  by mouth. Historical Provider, MD   METFORMIN HCL PO Take 500 mg by mouth     Historical Provider, MD   BuPROPion HCl (WELLBUTRIN PO) Take 150 mg by mouth     Historical Provider, MD   GLIPIZIDE PO Take  by mouth. Historical Provider, MD   ARIPiprazole (ABILIFY PO) Take  by mouth. Historical Provider, MD   PRAVASTATIN SODIUM PO Take  by mouth.     Historical Provider, MD       Current medications:    Current Facility-Administered Medications   Medication Dose Route Frequency Provider Last Rate Last Admin    sodium chloride 0.9 % infusion             glucose (GLUTOSE) 40 % oral gel 15 g  15 g Oral PRN Patrice Dessert Sedar, DO        dextrose 50 % IV solution  12.5 g Intravenous PRN Patrice Dessert Sedar, DO        glucagon (rDNA) injection 1 mg  1 mg Intramuscular PRN Patrice Dessert Sedar, DO        dextrose 5 % solution  100 mL/hr Intravenous PRN Patrice Dessert Sedar, DO        insulin lispro (HUMALOG) injection vial 0-18 Units  0-18 Units Subcutaneous TID  Parviz RUSS Sedar, DO   15 Units at 03/22/21 1217    insulin lispro (HUMALOG) injection vial 0-9 Units  0-9 Units Subcutaneous Nightly Patrice Dessert Sedar, DO   9 Units at 03/21/21 2032    famotidine (PEPCID) tablet 20 mg  20 mg Oral BID Patrice Dessert Sedar, DO   20 mg at 03/22/21 0901    promethazine (PHENERGAN) tablet 12.5 mg  12.5 mg Oral Q6H PRN Patrice Dessert Sedar, DO        Or    ondansetron [facility]) injection 4 mg  4 mg Intravenous Q6H PRN Patrice Dessert Sedar, DO   4 mg at 03/21/21 0202    acetaminophen (TYLENOL) tablet 650 mg  650 mg Oral Q6H PRN Nelma Whiting Sedar, DO   650 mg at 03/22/21 0712    Or    acetaminophen (TYLENOL) suppository 650 mg  650 mg Rectal Q6H PRN Nelma Whiting Sedar, DO        potassium chloride (KLOR-CON M) extended release tablet 40 mEq  40 mEq Oral PRN Nelma Whiting Sedar, DO        Or    potassium bicarb-citric acid (EFFER-K) effervescent tablet 40 mEq  40 mEq Oral PRN Nelma Whiting Sedar, DO        Or    potassium chloride 10 mEq/100 mL IVPB (Peripheral Line)  10 mEq Intravenous PRN Nelma Whiting Sedar, DO        atorvastatin (LIPITOR) tablet 40 mg  40 mg Oral Nightly Parviz D Sedar, DO   40 mg at 03/21/21 2030    enoxaparin (LOVENOX) injection 40 mg  40 mg Subcutaneous Daily Melia Hubbard D Sedar, DO   40 mg at 03/21/21 0820    aspirin EC tablet 81 mg  81 mg Oral Daily Parviz SOLANO Sedar, DO   81 mg at 03/22/21 0901    sucralfate (CARAFATE) tablet 1 g  1 g Oral 4 times per day Nelma Whiting Sedar, DO   1 g at 03/22/21 1106    amLODIPine (NORVASC) tablet 2.5 mg  2.5 mg Oral Daily Nelma Whiting Sedar, DO   2.5 mg at 03/22/21 0900    sertraline (ZOLOFT) tablet 100 mg  100 mg Oral Daily Nelma Whiting Sedar, DO   100 mg at 03/22/21 0901    traZODone (DESYREL) tablet 50 mg  50 mg Oral Nightly Parviz SOLANO Sedar, DO   50 mg at 03/21/21 0201    levothyroxine (SYNTHROID) tablet 50 mcg  50 mcg Oral Daily Nelma Whiting Sedar, DO   50 mcg at 03/22/21 0650    topiramate (TOPAMAX) tablet 25 mg  25 mg Oral Daily Parviz D Sedar, DO   25 mg at 03/22/21 0900    ondansetron (ZOFRAN) tablet 4 mg  4 mg Oral Q8H PRN Nelma Whiting Sedar, DO        nitroGLYCERIN (NITROSTAT) SL tablet 0.4 mg  0.4 mg Sublingual Q5 Min PRN Nelma Whiting Sedar, DO   0.4 mg at 03/21/21 1400    pregabalin (LYRICA) capsule 75 mg  75 mg Oral BID Nemimia Whiting Sedar, DO   75 mg at 03/22/21 0901    insulin lispro (HUMALOG) injection vial 10 Units  10 Units Subcutaneous TID RENE SOLANO Sedar, DO   10 Units at 03/21/21 1834    calcium carbonate (TUMS) chewable tablet 1,000 mg  1,000 mg Oral TID PRN Fady Zamora Sedar, DO   1,000 mg at 03/22/21 0241    gabapentin (NEURONTIN) capsule 800 mg  800 mg Oral TID Dave Self MD   800 mg at 03/22/21 0900    insulin glargine (LANTUS) injection vial 50 Units  50 Units Subcutaneous Nightly Dave Self MD   50 Units at 03/21/21 1121    magnesium oxide (MAG-OX) tablet 400 mg  400 mg Oral Daily Solomon Lim MD   400 mg at 03/22/21 0901    metoprolol succinate (TOPROL XL) extended release tablet 25 mg  25 mg Oral Daily Solomon Lim MD   Stopped at 03/21/21 1537    ketorolac (TORADOL) injection 15 mg  15 mg Intravenous Q8H PRN Solomon Lim MD   15 mg at 03/22/21 0241       Allergies:     Allergies   Allergen Reactions    Shellfish-Derived Products Anaphylaxis    Amoxicillin Swelling    Macrobid [Nitrofurantoin Monohyd Macro]     Reglan [Metoclopramide]     Singulair [Montelukast]     Nubain [Nalbuphine Hcl] Swelling and Rash       Problem List:    Patient Active Problem List   Diagnosis Code    Anxiety F41.9    Asthma J45.909    Auditory hallucination R44.0    Depression F32.9    Uncontrolled type 2 diabetes mellitus ([facility] [address] [address] E11.65    Diabetes mellitus ([facility] [address] E11.9    Family history of coronary arteriosclerosis Z82.49    Gastroesophageal reflux disease K21.9    Hyperlipidemia E78.5    Hypertension I10    Hypothyroidism due to Hashimoto's thyroiditis E03.8, E06.3    Ingrowing nail L60.0    Laryngeal spasm J38.5    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    Morbid obesity (HCC) E66.01    Obstructive sleep apnea syndrome G47.33    Pain of toe M79.676    Schizophrenia ([facility]) F20.9    Type 2 diabetes mellitus with other diabetic neurological complication ([facility]) D10.34    Type 1 diabetes mellitus (HCC) E10.9    Vitamin D deficiency E55.9    Pancreatitis, unspecified pancreatitis type K85.90    DKA, type 2, not at goal [facility]) E11.10    Chest pain R07.9       Past Medical History:        Diagnosis Date    Asthma     Chronic abdominal pain     Depression     Hyperlipidemia  Hypertension     NATALEE (obstructive sleep apnea)     Schizophrenia (HCC)     Type II or unspecified type diabetes mellitus without mention of complication, not stated as uncontrolled        Past Surgical History:        Procedure Laterality Date    [facility]    UPPER GASTROINTESTINAL ENDOSCOPY  2/21/14    [facility]       Social History:    Social History     Tobacco Use    Smoking status: Former Smoker     Packs/day: 1.00     Types: Cigarettes    Smokeless tobacco: Never Used   Substance Use Topics    Alcohol use: No                                Counseling given: Not Answered      Vital Signs (Current):   Vitals:    03/22/21 0813 03/22/21 0908 03/22/21 0930 03/22/21 1349   BP: (!) 121/52   118/62   Pulse: 71   86   Resp: 16   18   Temp: 36.6 °C (97.9 °F)   36.6 °C (97.9 °F)   TempSrc: Oral   Temporal   SpO2: 93% 99% 96% 95%   Weight:    (!) 340 lb (154.2 kg)   Height:    6' (1.829 m)                                              BP Readings from Last 3 Encounters:   03/22/21 118/62   11/14/20 123/79   09/22/20 128/81       NPO Status: Time of last liquid consumption: 0900(With meds)                        Time of last solid consumption: 1700                        Date of last liquid consumption: 03/22/21                        Date of last solid food consumption: 03/21/21    BMI:   Wt Readings from Last 3 Encounters:   03/22/21 (!) 340 lb (154.2 kg)   11/14/20 (!) 340 lb (154.2 kg)   09/21/20 (!) 330 lb (149.7 kg)     Body mass index is 46.11 kg/m².     CBC:   Lab Results   Component Value Date    WBC 7.5 03/22/2021    RBC 4.69 03/22/2021    HGB 14.6 03/22/2021    HCT 42.6 03/22/2021    MCV 90.7 03/22/2021    RDW 13.6 03/22/2021     03/22/2021       CMP:   Lab Results   Component Value Date     03/22/2021    K 4.5 03/22/2021    K 4.5 03/22/2021     03/22/2021    CO2 23 03/22/2021    BUN 17 03/22/2021    CREATININE 0.72 03/22/2021    GFRAA >60.0 03/22/2021 LABGLOM >60.0 03/22/2021    GLUCOSE 401 03/22/2021    PROT 7.1 03/22/2021    CALCIUM 9.2 03/22/2021    BILITOT 0.7 03/22/2021    ALKPHOS 134 03/22/2021    AST 62 03/22/2021    ALT 95 03/22/2021       POC Tests:   Recent Labs     03/22/21  1130   POCGLU 357*       Coags:   Lab Results   Component Value Date    PROTIME 13.6 03/20/2021    INR 1.0 03/20/2021    APTT 27.0 03/20/2021       HCG (If Applicable):   Lab Results   Component Value Date    PREGTESTUR neg 03/31/2018        ABGs:   Lab Results   Component Value Date    PHART 7.434 02/14/2015    PO2ART 82 02/14/2015    TSI6DHL 35 02/14/2015    SIK4CIK 23.6 02/14/2015    BEART 0 02/14/2015    H0QPIPJP 96 02/14/2015        Type & Screen (If Applicable):  No results found for: LABABO, LABRH    Drug/Infectious Status (If Applicable):  No results found for: HIV, HEPCAB    COVID-19 Screening (If Applicable):   Lab Results   Component Value Date    COVID19 Not Detected 03/20/2021    COVID19 Not Detected 08/11/2020           Anesthesia Evaluation    Airway: Mallampati: III        Dental: normal exam         Pulmonary:   (+) sleep apnea:  decreased breath sounds,  asthma:                            Cardiovascular:    (+) hypertension:,         Rhythm: regular                      Neuro/Psych:   (+) neuromuscular disease:, psychiatric history:            GI/Hepatic/Renal:   (+) GERD:,           Endo/Other:    (+) Diabetes, hypothyroidism::., .                 Abdominal:   (+) obese,     Abdomen: soft. Vascular:                                        Anesthesia Plan      MAC     ASA 4       Induction: intravenous. Anesthetic plan and risks discussed with patient. Plan discussed with attending.                   Saloni Frost, APRN - CRNA   3/22/2021

## 2021-03-22 NOTE — PROGRESS NOTES
[facility] Heart Progress Note      Patient: Alexandre Field    Unit/Bed: J687/R063-66  YOB: 1975  MRN: [id number]  Admit Date:  3/20/2021  Hospital Day: 0    Rounding Date: 3/22/2021    Rounding Cardiologist:  TIFFANIE Gorman MD    PRIMARY Cardiologist: Eloy Gorman    Subjective Complaint:   Still with some tactile chest pain. Physical Examination:     BP (!) 139/93   Pulse 88   Temp 98.1 °F (36.7 °C) (Oral)   Resp 20   Ht 6' (1.829 m)   Wt (!) 340 lb (154.2 kg)   SpO2 96%   BMI 46.11 kg/m²         Intake/Output Summary (Last 24 hours) at 3/22/2021 1529  Last data filed at 3/22/2021 1431  Gross per 24 hour   Intake 510 ml   Output 500 ml   Net 10 ml                  Alexandre Field examined at bedside in in no apparent distress. Focused exam reveals:     Cardiac: Heart regular rate and rhythm     Lungs:  clear to auscultation bilaterally- no wheezes, rales or rhonchi, normal air movement, no respiratory distress    Extremities:   Trace edema    Telemetry:      normal sinus          LABS:   CBC:   Recent Labs     03/20/21 2110 03/22/21  0624   WBC 6.6 7.5   HGB 14.4 14.6    181      BMP:    Recent Labs     03/20/21 2110 03/22/21  0624   * 138   K 4.5 4.5  4.5   CL 97 100   CO2 27 23   BUN 11 17   CREATININE 0.72 0.72   GLUCOSE 493* 401*              Troponin: No results for input(s): TROPONINT in the last 72 hours. BNP:   Recent Labs     03/20/21 2110   PROBNP 51      INR:   Recent Labs     03/20/21 2110   INR 1.0      Mg:   Recent Labs     03/22/21  0624   MG 2.4       Cardiac Testing:   Attempted stress test, but patient was unable to ambulate to get heart rate up. Moderate concentric left ventricular hypertrophy. Normal left ventricular size and function. Left ventricular ejection fraction is visually estimated at 55-60%. Normal diastolic parameters. Mildly enlarged right ventricle cavity. Normal RV systolic function.    Trivial mitral regurgitation. Aortic valve appears to be tricuspid. Trivial tricuspid regurgitation with estimated RVSP of 28 mm Hg. Small circumferential pericardial effusion without hemodynamic compromise. Signature    Assessment:  Somewhat atypical chest pain, patient with significant risk factors-particularly diabetes--so far enzymes are negative and no significant EKG changes. However EKG is difficult to interpret due to body habitus  Elevated liver function test  Echocardiogram as described above  Strongly suspicious for obstructive sleep apnea  Significantly overweight  Status post EDG    Plan:  1. We will complete the cardiac work-up with Lexiscan Myoview testing in a.m. 2. Pending results, most likely can be discharged home depending on if in the amount of potential ischemia. 3. Due to elevated liver function tests have held up on the patient's statin  4.  No caffeine and n.p.o. except for meds in a.m.  5. Nuclear medicine was notified  Electronically signed by Tereso Garcia MD on 3/22/2021 at 3:29 PM

## 2021-03-22 NOTE — ANESTHESIA POSTPROCEDURE EVALUATION
Department of Anesthesiology  Postprocedure Note    Patient: Todd Lund  MRN: [id number]  YOB: 1975  Date of evaluation: 3/22/2021  Time:  2:22 PM     Procedure Summary     Date: 03/22/21 Room / Location: [address] [place] [address]    Anesthesia Start: 1409 Anesthesia Stop:     Procedure: EGD DIAGNOSTIC ONLY (N/A ) Diagnosis: (inpatient)    Surgeons: Mg King MD Responsible Provider: DANIEL Lindo CRNA    Anesthesia Type: MAC ASA Status: 4          Anesthesia Type: No value filed. Ciarra Phase I: Ciarra Score: 10    Ciarra Phase II:      Last vitals: Reviewed and per EMR flowsheets.        Anesthesia Post Evaluation    Patient location during evaluation: bedside  Level of consciousness: awake  Pain score: 0  Airway patency: patent  Nausea & Vomiting: no nausea  Complications: no  Cardiovascular status: hemodynamically stable  Respiratory status: acceptable  Hydration status: stable

## 2021-03-22 NOTE — ADDENDUM NOTE
Addendum  created 03/22/21 1430 by DANIEL Bloom CRNA    Intraprocedure Meds edited, Orders acknowledged in Narrator

## 2021-03-22 NOTE — PROCEDURES
[address]                      [address], [address] [facility]                              CARDIAC STRESS TEST    PATIENT NAME: Jose Alfredo Clements         :        1975  MED REC NO:   [id number]                            ROOM:       W180  ACCOUNT NO:   [de-identified]                           ADMIT DATE: 2021  PROVIDER:     Deepti Galo MD    CARDIOVASCULAR DIAGNOSTIC DEPARTMENT    DATE OF STUDY:  2021    EXERCISE STRESS TEST    INDICATION FOR TESTING:  Chest pain. RESTING ECG:  Normal sinus rhythm, diffuse low voltage, indeterminate  axis, poor R-wave progression. STRESS TEST:  The modified Samuel protocol terminated 1 minute into stage  I at 1.7 _____ because of fatigue, tiredness, and inability to tolerate  a treadmill test.  She was very short of breath throughout. ECG  monitoring revealed no significant ST-T abnormality or dysrhythmia. Chronotropic response is at very low level exercise was excessive  suggestive of deconditioning. Nonetheless, she achieved just 71% of  target heart rate at 118 beats per minute. No significant ST changes  were noted to occur. She had no chest pain. FINAL IMPRESSION:  1. Remarkably low functional capacity for age and inability to walk up  for any duration on the treadmill. 2.  Inadequate level exercise to make a diagnosis of coronary disease,  sensitivity markedly reduced achieving 71% of the target heart rate. 3.  Exertional dyspnea at a very low level of activity. 4.  Excessive chronotropic response at a very low level exercise  consistent with deconditioning. 5.  Need to consider differing imaging modality to assess for the  presence of coronary disease. At this very low level exercise, she had  no chest pain or EKG changes to suggest ischemia.         Chris Jalloh MD    D: 2021 #16:34:18       T: 2021 16:38:48     [email or web address]  Job#: [id number]     Doc#: [id number]    CC:

## 2021-03-22 NOTE — FLOWSHEET NOTE
0000 - Pt now NPO    0241 - Pt given PRN tums and toradol per orders. Kathy Pain - Dr. Jacob Juarez notified of pt's continuous chest pain, and notified that PRN Toradol is not due yet. He states to give pt PRN tylenol for now. Pt's morning Carafate and synthroid held due to pt's being NPO. Pt's call light within reach.

## 2021-03-22 NOTE — FLOWSHEET NOTE
used to communicate with patient. Explained to patient it is ok to take morning pills per stress lab. Patient aware of stress test and EGD today. Consent for EGD already signed. Patient denies and pain at this time. Answered patient's questions. Patient denies any needs at this time. Call light in reach. Electronically signed by Todd Melo on 3/22/2021 at 9:14 AM    1230- Patient was called for stress test around 11:15 however they are running behind and want the patient now. The patient is also being called for her EGD now. Explained to the GI nurse about the patient going to the stress test. Per nuclear medicine nurse patient will only be gone about an hour. GI nurse made aware and spoke with Dr. Beatriz Yusuf who then decided to post pone the EGD until tomorrow. Patient notified via . Electronically signed by Todd Melo on 3/22/2021 at 12:34 PM

## 2021-03-22 NOTE — PROGRESS NOTES
Hx,allergies and medications reviewed. Procedure explained and informed consent obtained. Use  [id number]    Tolerated treadmill fair for 1:40 minutes. Reached 71 % target HR. SOB noted and had to stop. Returned to baseline in recovery. Denies chest pain or pressure. EKG shows artifact. Dr. Melvin Weiner notified as well Brianna wright rn.

## 2021-03-23 LAB
ANION GAP SERPL CALCULATED.3IONS-SCNC: 16 MEQ/L (ref 9–15)
BUN BLDV-MCNC: 18 MG/DL (ref 6–20)
CALCIUM SERPL-MCNC: 8.8 MG/DL (ref 8.5–9.9)
CHLORIDE BLD-SCNC: 97 MEQ/L (ref 95–107)
CO2: 24 MEQ/L (ref 20–31)
CREAT SERPL-MCNC: 0.69 MG/DL (ref 0.5–0.9)
EKG ATRIAL RATE: 103 BPM
EKG ATRIAL RATE: 107 BPM
EKG ATRIAL RATE: 78 BPM
EKG P AXIS: 33 DEGREES
EKG P AXIS: 46 DEGREES
EKG P AXIS: 47 DEGREES
EKG P-R INTERVAL: 148 MS
EKG P-R INTERVAL: 150 MS
EKG P-R INTERVAL: 156 MS
EKG Q-T INTERVAL: 364 MS
EKG Q-T INTERVAL: 380 MS
EKG Q-T INTERVAL: 400 MS
EKG QRS DURATION: 82 MS
EKG QRS DURATION: 86 MS
EKG QRS DURATION: 94 MS
EKG QTC CALCULATION (BAZETT): 456 MS
EKG QTC CALCULATION (BAZETT): 485 MS
EKG QTC CALCULATION (BAZETT): 497 MS
EKG R AXIS: -69 DEGREES
EKG R AXIS: 213 DEGREES
EKG R AXIS: 244 DEGREES
EKG T AXIS: 15 DEGREES
EKG T AXIS: 25 DEGREES
EKG T AXIS: 35 DEGREES
EKG VENTRICULAR RATE: 103 BPM
EKG VENTRICULAR RATE: 107 BPM
EKG VENTRICULAR RATE: 78 BPM
GFR AFRICAN AMERICAN: >60
GFR NON-AFRICAN AMERICAN: >60
GLUCOSE BLD-MCNC: 334 MG/DL (ref 60–115)
GLUCOSE BLD-MCNC: 337 MG/DL (ref 60–115)
GLUCOSE BLD-MCNC: 355 MG/DL (ref 60–115)
GLUCOSE BLD-MCNC: 378 MG/DL (ref 70–99)
GLUCOSE BLD-MCNC: 390 MG/DL (ref 60–115)
GLUCOSE BLD-MCNC: 408 MG/DL (ref 60–115)
MAGNESIUM: 2.3 MG/DL (ref 1.7–2.4)
PERFORMED ON: ABNORMAL
POTASSIUM SERPL-SCNC: 4 MEQ/L (ref 3.4–4.9)
SODIUM BLD-SCNC: 137 MEQ/L (ref 135–144)

## 2021-03-23 PROCEDURE — G0378 HOSPITAL OBSERVATION PER HR: HCPCS

## 2021-03-23 PROCEDURE — 6360000002 HC RX W HCPCS: Performed by: INTERNAL MEDICINE

## 2021-03-23 PROCEDURE — 6370000000 HC RX 637 (ALT 250 FOR IP): Performed by: INTERNAL MEDICINE

## 2021-03-23 PROCEDURE — 94660 CPAP INITIATION&MGMT: CPT

## 2021-03-23 PROCEDURE — 93005 ELECTROCARDIOGRAM TRACING: CPT | Performed by: INTERNAL MEDICINE

## 2021-03-23 PROCEDURE — 2060000000 HC ICU INTERMEDIATE R&B

## 2021-03-23 PROCEDURE — 80048 BASIC METABOLIC PNL TOTAL CA: CPT

## 2021-03-23 PROCEDURE — 99233 SBSQ HOSP IP/OBS HIGH 50: CPT | Performed by: INTERNAL MEDICINE

## 2021-03-23 PROCEDURE — 2700000000 HC OXYGEN THERAPY PER DAY

## 2021-03-23 PROCEDURE — 96376 TX/PRO/DX INJ SAME DRUG ADON: CPT

## 2021-03-23 PROCEDURE — 94640 AIRWAY INHALATION TREATMENT: CPT

## 2021-03-23 PROCEDURE — 36415 COLL VENOUS BLD VENIPUNCTURE: CPT

## 2021-03-23 PROCEDURE — 83735 ASSAY OF MAGNESIUM: CPT

## 2021-03-23 PROCEDURE — 96372 THER/PROPH/DIAG INJ SC/IM: CPT

## 2021-03-23 PROCEDURE — 94761 N-INVAS EAR/PLS OXIMETRY MLT: CPT

## 2021-03-23 RX ORDER — KETOROLAC TROMETHAMINE 15 MG/ML
15 INJECTION, SOLUTION INTRAMUSCULAR; INTRAVENOUS ONCE
Status: DISCONTINUED | OUTPATIENT
Start: 2021-03-23 | End: 2021-03-26 | Stop reason: HOSPADM

## 2021-03-23 RX ORDER — IBUPROFEN 200 MG
400 TABLET ORAL EVERY 4 HOURS PRN
Status: DISCONTINUED | OUTPATIENT
Start: 2021-03-23 | End: 2021-03-26 | Stop reason: HOSPADM

## 2021-03-23 RX ORDER — KETOROLAC TROMETHAMINE 10 MG/1
10 TABLET, FILM COATED ORAL EVERY 4 HOURS PRN
Status: DISCONTINUED | OUTPATIENT
Start: 2021-03-23 | End: 2021-03-23 | Stop reason: CLARIF

## 2021-03-23 RX ADMIN — METOPROLOL SUCCINATE 25 MG: 25 TABLET, EXTENDED RELEASE ORAL at 09:35

## 2021-03-23 RX ADMIN — NITROGLYCERIN 0.4 MG: 0.4 TABLET, ORALLY DISINTEGRATING SUBLINGUAL at 15:58

## 2021-03-23 RX ADMIN — INSULIN GLARGINE 50 UNITS: 100 INJECTION, SOLUTION SUBCUTANEOUS at 21:21

## 2021-03-23 RX ADMIN — TRAZODONE HYDROCHLORIDE 50 MG: 50 TABLET ORAL at 21:18

## 2021-03-23 RX ADMIN — BUDESONIDE AND FORMOTEROL FUMARATE DIHYDRATE 2 PUFF: 80; 4.5 AEROSOL RESPIRATORY (INHALATION) at 09:08

## 2021-03-23 RX ADMIN — NITROGLYCERIN 0.4 MG: 0.4 TABLET, ORALLY DISINTEGRATING SUBLINGUAL at 15:53

## 2021-03-23 RX ADMIN — SERTRALINE 100 MG: 100 TABLET, FILM COATED ORAL at 09:35

## 2021-03-23 RX ADMIN — SUCRALFATE 1 G: 1 TABLET ORAL at 17:33

## 2021-03-23 RX ADMIN — ENOXAPARIN SODIUM 40 MG: 40 INJECTION SUBCUTANEOUS at 09:40

## 2021-03-23 RX ADMIN — CALCIUM CARBONATE 1000 MG: 500 TABLET, CHEWABLE ORAL at 03:28

## 2021-03-23 RX ADMIN — PREGABALIN 75 MG: 75 CAPSULE ORAL at 21:18

## 2021-03-23 RX ADMIN — AMLODIPINE BESYLATE 2.5 MG: 2.5 TABLET ORAL at 09:36

## 2021-03-23 RX ADMIN — GABAPENTIN 800 MG: 400 CAPSULE ORAL at 09:36

## 2021-03-23 RX ADMIN — GABAPENTIN 800 MG: 400 CAPSULE ORAL at 14:34

## 2021-03-23 RX ADMIN — SUCRALFATE 1 G: 1 TABLET ORAL at 05:46

## 2021-03-23 RX ADMIN — FAMOTIDINE 20 MG: 20 TABLET ORAL at 21:18

## 2021-03-23 RX ADMIN — SUCRALFATE 1 G: 1 TABLET ORAL at 00:34

## 2021-03-23 RX ADMIN — PREGABALIN 75 MG: 75 CAPSULE ORAL at 09:35

## 2021-03-23 RX ADMIN — TOPIRAMATE 25 MG: 25 TABLET, FILM COATED ORAL at 09:35

## 2021-03-23 RX ADMIN — MAGNESIUM OXIDE TAB 400 MG (240 MG ELEMENTAL MG) 400 MG: 400 (240 MG) TAB at 09:35

## 2021-03-23 RX ADMIN — INSULIN LISPRO 12 UNITS: 100 INJECTION, SOLUTION INTRAVENOUS; SUBCUTANEOUS at 17:36

## 2021-03-23 RX ADMIN — INSULIN LISPRO 7 UNITS: 100 INJECTION, SOLUTION INTRAVENOUS; SUBCUTANEOUS at 09:38

## 2021-03-23 RX ADMIN — IBUPROFEN 400 MG: 200 TABLET, FILM COATED ORAL at 17:33

## 2021-03-23 RX ADMIN — FAMOTIDINE 20 MG: 20 TABLET ORAL at 09:36

## 2021-03-23 RX ADMIN — INSULIN LISPRO 15 UNITS: 100 INJECTION, SOLUTION INTRAVENOUS; SUBCUTANEOUS at 12:35

## 2021-03-23 RX ADMIN — BUDESONIDE AND FORMOTEROL FUMARATE DIHYDRATE 2 PUFF: 80; 4.5 AEROSOL RESPIRATORY (INHALATION) at 19:33

## 2021-03-23 RX ADMIN — ASPIRIN 81 MG: 81 TABLET, COATED ORAL at 09:35

## 2021-03-23 RX ADMIN — LEVOTHYROXINE SODIUM 50 MCG: 0.05 TABLET ORAL at 05:46

## 2021-03-23 RX ADMIN — SUCRALFATE 1 G: 1 TABLET ORAL at 12:36

## 2021-03-23 RX ADMIN — KETOROLAC TROMETHAMINE 15 MG: 15 INJECTION, SOLUTION INTRAMUSCULAR; INTRAVENOUS at 16:11

## 2021-03-23 RX ADMIN — GABAPENTIN 800 MG: 400 CAPSULE ORAL at 21:18

## 2021-03-23 ASSESSMENT — PAIN SCALES - GENERAL
PAINLEVEL_OUTOF10: 10
PAINLEVEL_OUTOF10: 8
PAINLEVEL_OUTOF10: 0

## 2021-03-23 NOTE — PROGRESS NOTES
per day    amLODIPine  2.5 mg Oral Daily    sertraline  100 mg Oral Daily    traZODone  50 mg Oral Nightly    levothyroxine  50 mcg Oral Daily    topiramate  25 mg Oral Daily    pregabalin  75 mg Oral BID    insulin lispro  10 Units Subcutaneous TID WC    gabapentin  800 mg Oral TID    insulin glargine  50 Units Subcutaneous Nightly    magnesium oxide  400 mg Oral Daily    metoprolol succinate  25 mg Oral Daily     PRN Meds: glucose, dextrose, glucagon (rDNA), dextrose, promethazine **OR** ondansetron, acetaminophen **OR** acetaminophen, potassium chloride **OR** potassium alternative oral replacement **OR** potassium chloride, ondansetron, nitroGLYCERIN, calcium carbonate, ketorolac    Labs:   Recent Labs     03/20/21 2110 03/22/21  0624   WBC 6.6 7.5   HGB 14.4 14.6   HCT 42.8 42.6    181     Recent Labs     03/20/21 2110 03/22/21  0624   * 138   K 4.5 4.5  4.5   CL 97 100   CO2 27 23   BUN 11 17   CREATININE 0.72 0.72   CALCIUM 9.3 9.2     Recent Labs     03/20/21 2110 03/22/21  0624   * 62*   * 95*   BILIDIR  --  <0.2   BILITOT 0.3 0.7   ALKPHOS 168* 134*     Recent Labs     03/20/21 2110   INR 1.0     Recent Labs     03/20/21 2110 03/21/21  0556 03/21/21  1334   CKTOTAL 58 46 45   TROPONINI <0.010 <0.010 <0.010       Urinalysis:   Lab Results   Component Value Date    NITRU Negative 04/09/2020    WBCUA 20-50 01/09/2019    BACTERIA Many 01/09/2019    RBCUA 0-2 01/09/2019    BLOODU Negative 04/09/2020    SPECGRAV 1.025 04/09/2020    GLUCOSEU >=1000 04/09/2020       Radiology:   Most recent    Chest CT      WITH CONTRAST:No results found for this or any previous visit. WITHOUT CONTRAST: No results found for this or any previous visit.     CXR      2-view:   Results for orders placed during the hospital encounter of 03/20/21   XR CHEST (2 VW)    Narrative EXAMINATION: XR CHEST (2 VW)    CLINICAL HISTORY: CHEST PAIN, SHORTNESS OF BREATH    COMPARISONS: NOVEMBER 14, 2020    FINDINGS: Osseous structures are intact. Cardiopericardial silhouette is normal. Pulmonary vasculature is normal. Lungs are clear. Impression NO ACUTE CARDIOPULMONARY DISEASE. Portable:   Results for orders placed during the hospital encounter of 11/14/20   XR CHEST PORTABLE    Narrative EXAMINATION: XR CHEST PORTABLE    CLINICAL HISTORY: CHEST PAIN, DIZZINESS    COMPARISONS: SEPTEMBER 21, 2020    FINDINGS: Osseous structures intact. Cardiopericardial silhouette normal. Pulmonary vasculature normal. Lungs clear. Impression NO ACUTE CARDIOPULMONARY DISEASE. Echo No results found for this or any previous visit. Assessment/Plan:    Active Hospital Problems    Diagnosis Date Noted    Chest pain [R07.9] 03/20/2021     1- Chest pain - undergoing nuclear stress test, second part in am, however patient did not tolerate well. Likely non cardiac from evaluation. Negative trop.   Await cardiology recs    2 - NATALEE/asthma - pulm following, cont cpap and symbicort    Poss dc 3/23 if no further cardiac workup necessary    Electronically signed by Daxa Carranza MD on 3/23/2021 at 12:59 AM

## 2021-03-23 NOTE — FLOWSHEET NOTE
I called nuclear medicine to see when this patient would be going down for her stress test. Nuclear medicine said that they didn't have a rec for one to be completed. I notified Jeni Hood NP who stated that she would notify  Friday jihan and see what he wants to do. Diet resumed at this time and dietary notified to send up a lunch tray with the cart to the unit.  Electronically signed by Toni Larose RN on 3/23/2021 at 1:42 PM

## 2021-03-23 NOTE — PROGRESS NOTES
[facility] Heart Progress Note      Patient: Deb Swartzl    Unit/Bed: B117/Z269-95  YOB: 1975  MRN: [id number]  [facility] Date:  3/20/2021  Hospital Day: 1    Rounding Date: 3/23/2021    Rounding Cardiologist:  TIFFANIE Parker MD    PRIMARY Cardiologist: Rosa Maria Parker    Subjective Complaint:   Patient has a pain over her left breast and left arm. When I move the left arm it becomes much worse. Palpation of that area also makes it worse. This is atypical for angina. Physical Examination:     /81   Pulse 73   Temp 97.5 °F (36.4 °C) (Oral)   Resp 19   Ht 6' (1.829 m)   Wt (!) 340 lb (154.2 kg)   SpO2 93%   BMI 46.11 kg/m²         Intake/Output Summary (Last 24 hours) at 3/23/2021 1634  Last data filed at 3/23/2021 1230  Gross per 24 hour   Intake 1080 ml   Output --   Net 1080 ml                  Deb Camel examined at bedside in in no apparent distress. Focused exam reveals:     Cardiac: Heart regular rate and rhythm     Lungs:  clear to auscultation bilaterally- no wheezes, rales or rhonchi, normal air movement, no respiratory distress    Extremities:   1+ edema    Telemetry:      normal sinus          LABS:   CBC:   Recent Labs     03/20/21 2110 03/22/21  0624   WBC 6.6 7.5   HGB 14.4 14.6    181      BMP:    Recent Labs     03/20/21 2110 03/22/21  0624 03/23/21  0604   * 138 137   K 4.5 4.5  4.5 4.0   CL 97 100 97   CO2 27 23 24   BUN 11 17 18   CREATININE 0.72 0.72 0.69   GLUCOSE 493* 401* 378*              Troponin: No results for input(s): TROPONINT in the last 72 hours. BNP:   Recent Labs     03/20/21 2110   PROBNP 51      INR:   Recent Labs     03/20/21 2110   INR 1.0      Mg:   Recent Labs     03/23/21  0604   MG 2.3       Cardiac Testing:    none    Assessment:  Chest pain, not classic for angina.   But does have risk factors, particularly diabetes  So far enzymes and EKG are unremarkable  Possible small pericardial effusion-possible pericarditis although that would not give tactile chest pain or pain with moving her left arm  Plan:  1. Lexiscan Myoview in a.m. 2. Toradol for pain  3.  Possible discharge pending findings in a.m.  4. See orders  Electronically signed by Kameron Rg MD on 3/23/2021 at 4:34 PM

## 2021-03-23 NOTE — CARE COORDINATION
[facility] EMERGENCY [facility] Case Management Initial Discharge Assessment    Met with Patient- USING  [id number] to discuss discharge plan. PCP: Sheyla Huang MD          Date of Last Visit: 3MO    If no PCP, list provided? N/A    Discharge Planning    Living Arrangements: independently at home    Who do you live with? DTR    Who helps you with your care:  self    If lives at home:     Do you have any barriers navigating in your home? No BUT IF OUT SHE USES ROLATOR    Patient can perform ADL? Yes    Current Services (outpatient and in home) :  None    Dialysis: No    Is transportation available to get to your appointments? Yes- FRIEND OR EX- OR PROVIDE A RIDE. PT REFUSED TRANSPORTATION LIST    DME Equipment:  yes - ROLATOR     Respiratory equipment: CPAP without O2    Respiratory provider:  yes - Liat Weiner- [phone]     Pharmacy:  yes - [address] [place] [address] with Medication Assistance Program?  No      Patient agreeable to [address]? Yes, [address] [facility]    Patient agreeable to SNF/Rehab? N/A    Other discharge needs identified? Other HOME 02 EVAL- PT REQUESTS     Freedom of choice list provided with basic dialogue that supports the patient's individualized plan of care/goals and shares the quality data associated with the providers. Yes    The plan for Transition of Care is related to the following treatment goals: 02 TO BASELINE    Initial Discharge Plan? (Note: please see concurrent daily documentation for any updates after initial note). HOME, REQUESTS [address]. CHOSE [address] STATES DTR SPEAKS ENGLISH. PT ALSO STATES SHE NEEDS 02 CONTINUOUS.  WILL ASK FOR HOME 02 EVAL    The Patient and/or patient representative: PATIENT was provided with choice of any post-acute providers for care and equipment and agrees with discharge plan  Yes    Electronically signed by Susan Martinez RN on 3/23/2021 at 4:12 PM

## 2021-03-23 NOTE — PROGRESS NOTES
INPATIENT PROGRESS NOTES    PATIENT NAME: Franklin Babinski  MRN: [id number]  SERVICE DATE:  March 23, 2021   SERVICE TIME:  6:03 PM      PRIMARY SERVICE: Pulmonary Disease    CHIEF COMPLAIN: Sleep apnea      INTERVAL HPI: Patient seen and examined at bedside, Interval Notes, orders reviewed. Nursing notes noted  Patient is having shortness of breath with exertion. Complain of left-sided chest pain. No fever or chills. She is using CPAP therapy during sleep. O2 saturation on 2 L is 93%     OBJECTIVE    Body mass index is 46.11 kg/m². PHYSICAL EXAM:  Vitals:  /64   Pulse 73   Temp 97.5 °F (36.4 °C) (Oral)   Resp 19   Ht 6' (1.829 m)   Wt (!) 340 lb (154.2 kg)   SpO2 93%   BMI 46.11 kg/m²   General: Morbidly obese, alert, awake . comfortable in bed, No distress. Head: Atraumatic , Normocephalic   Eyes: PERRL. No sclera icterus. No conjunctival injection. No discharge   ENT: No nasal  discharge. Pharynx clear. Neck:  Trachea midline. No thyromegaly, no JVD, No cervical adenopathy. Chest : Bilaterally symmetrical ,Normal effort,  No accessory muscle use  Lung : . Fair BS bilateral, decreased BS at bases. No Rales. No wheezing. No rhonchi. Heart[de-identified] Normal  rate. Regular rhythm. No mumur ,  Rub or gallop  ABD: Non-tender. Non-distended. No masses. No organmegaly. Normal bowel sounds. No hernia. Ext : No Pitting both leg , No Cyanosis No clubbing  Neuro: no focal weakness.     DATA:   Recent Labs     03/20/21 2110 03/22/21  0624   WBC 6.6 7.5   HGB 14.4 14.6   HCT 42.8 42.6   MCV 91.8 90.7    181     Recent Labs     03/20/21 2110 03/22/21  0624 03/23/21  0604   * 138 137   K 4.5 4.5  4.5 4.0   CL 97 100 97   CO2 27 23 24   BUN 11 17 18   CREATININE 0.72 0.72 0.69   GLUCOSE 493* 401* 378*   CALCIUM 9.3 9.2 8.8   PROT 7.4 7.1  --    LABALBU 3.7 3.6  --    BILITOT 0.3 0.7  --    ALKPHOS 168* 134*  --    * 62*  --    * 95*  --    LABGLOM >60.0 >60.0 >60.0   GFRAA >60.0 >60.0 >60.0   GLOB 3.7*  --   --        MV Settings:          No results for input(s): PHART, TZM6RGJ, PO2ART, DEO8DFT, BEART, K3NCDQHB in the last 72 hours. O2 Device: None (Room air)  O2 Flow Rate (L/min): 2 L/min    DIET CARB CONTROL; Carb Control: 4 carb choices (60 gms)/meal; No Added Salt (3-4 GM);  No Caffeine     MEDICATIONS during current hospitalization:    Continuous Infusions:   dextrose         Scheduled Meds:   insulin lispro  20 Units Subcutaneous TID WC    ketorolac  15 mg Intravenous Once    budesonide-formoterol  2 puff Inhalation BID    insulin lispro  0-18 Units Subcutaneous TID WC    insulin lispro  0-9 Units Subcutaneous Nightly    famotidine  20 mg Oral BID    [Held by provider] atorvastatin  40 mg Oral Nightly    enoxaparin  40 mg Subcutaneous Daily    aspirin  81 mg Oral Daily    sucralfate  1 g Oral 4 times per day    amLODIPine  2.5 mg Oral Daily    sertraline  100 mg Oral Daily    traZODone  50 mg Oral Nightly    levothyroxine  50 mcg Oral Daily    topiramate  25 mg Oral Daily    pregabalin  75 mg Oral BID    gabapentin  800 mg Oral TID    insulin glargine  50 Units Subcutaneous Nightly    magnesium oxide  400 mg Oral Daily    metoprolol succinate  25 mg Oral Daily       PRN Meds:ibuprofen, glucose, dextrose, glucagon (rDNA), dextrose, promethazine **OR** ondansetron, acetaminophen **OR** acetaminophen, potassium chloride **OR** potassium alternative oral replacement **OR** potassium chloride, ondansetron, calcium carbonate, ketorolac    Radiology  Echo Complete 2d W Doppler W Color    Result Date: 3/22/2021  Transthoracic Echocardiography Report (TTE)  Demographics  Patient Name  [address]      Gender             Female                [address]  Patient       [id number]                Race               Other  Number                                        Ethnicity           or                                                            Visit Number  [id number]               Room Number        W180  Corporate ID                          Date of Study      03/22/2021  Accession     [id number]              Referring  Number                                Physician  Date of Birth 1975              Sonographer        [address] [place] [address]  Age           50 year(s)              Interpreting       [address] [place] [address] Godwin BATISTA Procedure Type of Study  TTE procedure:ECHOCARDIOGRAM 2D DOPPLER W COLOR W CONTRAST. Procedure Date Date: 03/22/2021 Start: 10:49 AM Study Location: Portable Technical Quality: Poor visualization due to body habitus. Indications:LVF. Patient Status: Routine Contrast Medium: Definity. Amount - 2 ml Height: 72 inches Weight: 317 pounds BSA: 2.59 m^2 BMI: 42.99 kg/m^2 BP: 144/75 mmHg  Conclusions  Summary  Moderate concentric left ventricular hypertrophy. Normal left ventricular size and function. Left ventricular ejection fraction is visually estimated at 55-60%. Normal diastolic parameters. Mildly enlarged right ventricle cavity. Normal RV systolic function. Trivial mitral regurgitation. Aortic valve appears to be tricuspid. Trivial tricuspid regurgitation with estimated RVSP of 28 mm Hg. Small circumferential pericardial effusion without hemodynamic compromise. Signature  ----------------------------------------------------------------  Electronically signed by Godwin Maloney MD(Interpreting  physician) on 03/22/2021 11:51 AM  ----------------------------------------------------------------  Findings Left Ventricle Moderate concentric left ventricular hypertrophy. Normal left ventricular size and function. Left ventricular ejection fraction is visually estimated at 55-60%. Normal diastolic parameters. Right Ventricle Mildly enlarged right ventricle cavity. Normal RV systolic function. Left Atrium Normal left atrium. Right Atrium Normal right atrium.  Mitral Valve Trivial mitral regurgitation. Tricuspid Valve Trivial tricuspid regurgitation with estimated RVSP of 28 mm Hg. Aortic Valve Aortic valve appears to be tricuspid. Pulmonic Valve Normal pulmonic valve structure and function. Pericardial Effusion Small circumferential pericardial effusion without hemodynamic compromise. Pleural Effusion No evidence of pleural effusion. Aorta \ Miscellaneous Miscellaneous normal findings were found. M-Mode Measurements (cm)  LVIDd: 5.71 cm                         LVIDs: 4.22 cm  IVSd: 1.07 cm                          IVSs: 1.42 cm  LVPWd: 1.06 cm                         LVPWs: 1.27 cm  Rt. Vent.  Dimension: 3.08 cm           AO Root Dimension: 2.94 cm                                         ACS: 1.87 cm                                         LA: 4.6 cm                                         LVOT: 2.02 cm Doppler Measurements:  AV Velocity:0.02 m/s                    MV Peak E-Wave: 0.84 m/s  AV Peak Gradient: 5.42 mmHg             MV Peak A-Wave: 0.77 m/s  AV Mean Gradient: 3.11 mmHg  AV Area (Continuity):2.26 cm^2  TR Velocity:2.36 m/s                    Estimated RAP:5 mmHg  TR Gradient:22.25 mmHg                  RVSP:27.25 mmHg Valves  Mitral Valve  Peak E-Wave: 0.84 m/s                Peak A-Wave: 0.77 m/s                                       E/A Ratio: 1.1                                       Peak Gradient: 2.85 mmHg                                       Deceleration Time: 213.9 msec  Tissue Doppler  E' Septal Velocity: 0.12 m/s  E' Lateral Velocity: 0.1 m/s  Aortic Valve  Peak Velocity: 1.16 m/s                Mean Velocity: 0.83 m/s  Peak Gradient: 5.42 mmHg               Mean Gradient: 3.11 mmHg  Area (continuity): 2.26 cm^2           Area (2D): 2.2 cm^2  AV VTI: 24.93 cm  Cusp Separation: 1.87 cm  Tricuspid Valve  Estimated RVSP: 27.25 mmHg              Estimated RAP: 5 mmHg  TR Velocity: 2.36 m/s                   TR Gradient: 22.25 mmHg  Pulmonic Valve  Peak Velocity: 1 m/s Peak Gradient: 4.03 mmHg                                 Estimated PASP: 27.25 mmHg  LVOT  Peak Velocity: 0.84 m/s             Mean Velocity: 0.69 m/s  Peak Gradient: 2.8 mmHg             Mean Gradient: 1.98 mmHg  LVOT Diameter: 2.02 cm              LVOT VTI: 17.58 cm Structures  Left Atrium  LA Dimension: 4.6 cm  LA/Aorta: 1.56  Left Ventricle  Diastolic Dimension: 2.18 cm          Systolic Dimension: 5.80 cm  Septum Diastolic: 9.77 cm             Septum Systolic: 0.17 cm  PW Diastolic: 7.08 cm                 PW Systolic: 8.53 cm                                        FS: 26.1 %  LV EDV/LV EDV Index: 160.99 ml/62 m^2 LV ESV/LV ESV Index: 79.28 ml/31 m^2  EF Calculated: 50.8 %  LVOT Diameter: 2.02 cm  Right Atrium  RA Systolic Pressure: 5 mmHg  Right Ventricle  Diastolic Dimension: 2.76 cm                                    RV Systolic Pressure: 16.75 mmHg Aorta/ Miscellaneous Aorta  Aortic Root: 2.94 cm  LVOT Diameter: 2.02 cm    Xr Chest (2 Vw)    Result Date: 3/21/2021  EXAMINATION: XR CHEST (2 VW) CLINICAL HISTORY: CHEST PAIN, SHORTNESS OF BREATH COMPARISONS: NOVEMBER 14, 2020 FINDINGS: Osseous structures are intact. Cardiopericardial silhouette is normal. Pulmonary vasculature is normal. Lungs are clear. NO ACUTE CARDIOPULMONARY DISEASE. Us Abdomen Limited    Result Date: 3/22/2021  US ABDOMEN LIMITED : 3/21/2021 CLINICAL HISTORY:  RUQ US for elevated LFTs. COMPARISON: CT abdomen pelvis 4/4/2020. TECHNIQUE: Transabdominal ultrasound of the right upper quadrant was performed. FINDINGS: The study is limited by the patient's body habitus and mild to moderately enlarged moderately echogenic fatty liver. The gallbladder, visualized pancreas, right kidney, and great vessels are unremarkable. There is no significant gallbladder distention, wall thickening, calculi, pericholecystic fluid, biliary dilatation, ascites, or other findings of concern identified.  The common duct measures approximately 4 to 5 mm at the anand hepatis. MILD TO MODERATELY ENLARGED FATTY LIVER. OTHERWISE, NEGATIVE LIMITED RIGHT UPPER QUADRANT ULTRASOUND. IMPRESSION AND SUGGESTION:  1. Obstructive sleep apnea on CPAP  2. Morbid obesity  3. Chest pain reproducible likely musculoskeletal in  4. History of asthma, no sign of exacerbation    Continue CPAP during sleep. Symbicort 2 puff twice daily. O2 to keep saturation 90% above. Encouraged to lose weight. Chest pain appears to be musculoskeletal, pain control as per primary care. Continue present treatment plan    NOTE: This report was transcribed using voice recognition software. Every effort was made to ensure accuracy; however, inadvertent computerized transcription errors may be present.       Electronically signed by Shawn Rosenthal MD, FCCP on 3/23/2021 at 6:03 PM

## 2021-03-23 NOTE — FLOWSHEET NOTE
Medicated with 400mg PO ibuprofen for continued complaints of chest/left arm pain rated 8/10. Patient sitting on the side of the bed with no signs of any acute distress. Family at bedside. Call light remains in reach.  Electronically signed by Clayton Franco RN on 3/23/2021 at 5:45 PM

## 2021-03-23 NOTE — FLOWSHEET NOTE
AM assessment completed. Patient resting in bed at this time with no complaints, except that she is hungry. Denies any chest pain at this time. Remains NSR on the monitor. +1 non-pitting edema noted to bilateral lower extremities. Pedal pulses palpable. Shortness of breath noted with exertion. Lungs are diminished with expiratory wheezes noted bilaterally. SATS 96% on Bi-pap, and switched over to 2L NC. She is A/Ox3 but primarily Latvian speaking. She can be up in the room with a stand-by assist. Denies any pain with elimination. Skin remains warm, dry and intact. #20g SL to right AC, flushed and patent. Vital signs stable and AM medications provided. Call light remains in reach.  Electronically signed by Ally Arrington RN on 3/23/2021 at 1:41 PM

## 2021-03-23 NOTE — PROGRESS NOTES
Nutrition Education    Consult recieved for diabetes diet education. Provided pt with Icelandic language written handout on Basic Diabetes Diet Guidelines with sample meal plans and information on food label reading. Pt indicated she could read the information. Recommend outpatient diabetes education after discharge    · Educated on Basic Diabetes Meal Planning Guidelines (via Icelandic language handout)  · Written educational materials provided. · Contact name and number provided.       Electronically signed by Jaida Wilson RD, LD on 3/23/21 at 2:14 PM EDT

## 2021-03-23 NOTE — FLOWSHEET NOTE
Medicated with 15mg IV toradol for complaints of 10/10 mid-sternal chest pain radiating to left arm. No signs of any acute distress noted at this time. Sitting on the side of the bed at this time with family at bedside. Call light remains in reach.  Electronically signed by Nader Gonzales RN on 3/23/2021 at 4:15 PM

## 2021-03-23 NOTE — FLOWSHEET NOTE
1553 Patient complains of squeezing chest pain, radiating to left arm, rated 10/10. She is sitting on the side of the bed at this time. Asked to lay down in the bed. O2 2L NC applied for comfort. No signs of any acute distress noted. /94, HR 84, PO2 94% RA. EKG obtained. 1 SL nitro administered. 1558 Patient resting in bed at this time. Still rates chest pain 10/10 with no change. No signs of any acute distress noted. /64, 04% 2L NC, HR 81. Another SL nitro administered at this time. [address] Amber Clifton notified of complaints of chest pain. He reviewed EKG and states this is not cardiac pain. IV toradol ordered. Patient still resting in bed at this time with no signs of any acute discomfort. Call light remains in reach.

## 2021-03-24 ENCOUNTER — APPOINTMENT (OUTPATIENT)
Dept: NUCLEAR MEDICINE | Age: 46
DRG: 198 | End: 2021-03-24
Payer: COMMERCIAL

## 2021-03-24 LAB
ALT SERPL-CCNC: 73 U/L (ref 0–33)
ANION GAP SERPL CALCULATED.3IONS-SCNC: 12 MEQ/L (ref 9–15)
BUN BLDV-MCNC: 21 MG/DL (ref 6–20)
CALCIUM SERPL-MCNC: 9 MG/DL (ref 8.5–9.9)
CHLORIDE BLD-SCNC: 99 MEQ/L (ref 95–107)
CO2: 24 MEQ/L (ref 20–31)
CREAT SERPL-MCNC: 0.79 MG/DL (ref 0.5–0.9)
GFR AFRICAN AMERICAN: >60
GFR NON-AFRICAN AMERICAN: >60
GLUCOSE BLD-MCNC: 349 MG/DL (ref 60–115)
GLUCOSE BLD-MCNC: 382 MG/DL (ref 70–99)
GLUCOSE BLD-MCNC: 386 MG/DL (ref 60–115)
GLUCOSE BLD-MCNC: 386 MG/DL (ref 60–115)
GLUCOSE BLD-MCNC: 423 MG/DL (ref 60–115)
HAV IGM SER IA-ACNC: NORMAL
HEPATITIS B CORE IGM ANTIBODY: NORMAL
HEPATITIS B SURFACE ANTIGEN INTERPRETATION: NORMAL
HEPATITIS C ANTIBODY INTERPRETATION: NORMAL
HEPATITIS INTERPRETATION:: NORMAL
PERFORMED ON: ABNORMAL
POTASSIUM SERPL-SCNC: 4 MEQ/L (ref 3.4–4.9)
SODIUM BLD-SCNC: 135 MEQ/L (ref 135–144)

## 2021-03-24 PROCEDURE — 2060000000 HC ICU INTERMEDIATE R&B

## 2021-03-24 PROCEDURE — 6360000002 HC RX W HCPCS: Performed by: INTERNAL MEDICINE

## 2021-03-24 PROCEDURE — 93017 CV STRESS TEST TRACING ONLY: CPT

## 2021-03-24 PROCEDURE — 99222 1ST HOSP IP/OBS MODERATE 55: CPT | Performed by: INTERNAL MEDICINE

## 2021-03-24 PROCEDURE — 2580000003 HC RX 258: Performed by: INTERNAL MEDICINE

## 2021-03-24 PROCEDURE — 78452 HT MUSCLE IMAGE SPECT MULT: CPT

## 2021-03-24 PROCEDURE — 6370000000 HC RX 637 (ALT 250 FOR IP): Performed by: INTERNAL MEDICINE

## 2021-03-24 PROCEDURE — 84460 ALANINE AMINO (ALT) (SGPT): CPT

## 2021-03-24 PROCEDURE — 80048 BASIC METABOLIC PNL TOTAL CA: CPT

## 2021-03-24 PROCEDURE — 96372 THER/PROPH/DIAG INJ SC/IM: CPT

## 2021-03-24 PROCEDURE — 94660 CPAP INITIATION&MGMT: CPT

## 2021-03-24 PROCEDURE — A9502 TC99M TETROFOSMIN: HCPCS | Performed by: INTERNAL MEDICINE

## 2021-03-24 PROCEDURE — G0378 HOSPITAL OBSERVATION PER HR: HCPCS

## 2021-03-24 PROCEDURE — 94640 AIRWAY INHALATION TREATMENT: CPT

## 2021-03-24 PROCEDURE — 36415 COLL VENOUS BLD VENIPUNCTURE: CPT

## 2021-03-24 PROCEDURE — 3430000000 HC RX DIAGNOSTIC RADIOPHARMACEUTICAL: Performed by: INTERNAL MEDICINE

## 2021-03-24 PROCEDURE — 99232 SBSQ HOSP IP/OBS MODERATE 35: CPT | Performed by: INTERNAL MEDICINE

## 2021-03-24 PROCEDURE — 94761 N-INVAS EAR/PLS OXIMETRY MLT: CPT

## 2021-03-24 PROCEDURE — 2700000000 HC OXYGEN THERAPY PER DAY

## 2021-03-24 RX ORDER — SODIUM CHLORIDE 0.9 % (FLUSH) 0.9 %
10 SYRINGE (ML) INJECTION PRN
Status: COMPLETED | OUTPATIENT
Start: 2021-03-24 | End: 2021-03-25

## 2021-03-24 RX ORDER — INSULIN GLARGINE 100 [IU]/ML
90 INJECTION, SOLUTION SUBCUTANEOUS NIGHTLY
Status: DISCONTINUED | OUTPATIENT
Start: 2021-03-24 | End: 2021-03-24

## 2021-03-24 RX ORDER — INSULIN GLARGINE 100 [IU]/ML
120 INJECTION, SOLUTION SUBCUTANEOUS NIGHTLY
Status: DISCONTINUED | OUTPATIENT
Start: 2021-03-25 | End: 2021-03-25

## 2021-03-24 RX ADMIN — SUCRALFATE 1 G: 1 TABLET ORAL at 23:03

## 2021-03-24 RX ADMIN — REGADENOSON 0.4 MG: 0.08 INJECTION, SOLUTION INTRAVENOUS at 09:01

## 2021-03-24 RX ADMIN — SUCRALFATE 1 G: 1 TABLET ORAL at 05:00

## 2021-03-24 RX ADMIN — GABAPENTIN 800 MG: 400 CAPSULE ORAL at 14:15

## 2021-03-24 RX ADMIN — TOPIRAMATE 25 MG: 25 TABLET, FILM COATED ORAL at 08:27

## 2021-03-24 RX ADMIN — INSULIN GLARGINE 90 UNITS: 100 INJECTION, SOLUTION SUBCUTANEOUS at 19:52

## 2021-03-24 RX ADMIN — SUCRALFATE 1 G: 1 TABLET ORAL at 00:10

## 2021-03-24 RX ADMIN — IBUPROFEN 400 MG: 200 TABLET, FILM COATED ORAL at 16:58

## 2021-03-24 RX ADMIN — LEVOTHYROXINE SODIUM 50 MCG: 0.05 TABLET ORAL at 05:00

## 2021-03-24 RX ADMIN — IBUPROFEN 400 MG: 200 TABLET, FILM COATED ORAL at 23:04

## 2021-03-24 RX ADMIN — MAGNESIUM OXIDE TAB 400 MG (240 MG ELEMENTAL MG) 400 MG: 400 (240 MG) TAB at 08:19

## 2021-03-24 RX ADMIN — BUDESONIDE AND FORMOTEROL FUMARATE DIHYDRATE 2 PUFF: 80; 4.5 AEROSOL RESPIRATORY (INHALATION) at 08:20

## 2021-03-24 RX ADMIN — INSULIN LISPRO 15 UNITS: 100 INJECTION, SOLUTION INTRAVENOUS; SUBCUTANEOUS at 16:59

## 2021-03-24 RX ADMIN — FAMOTIDINE 20 MG: 20 TABLET ORAL at 19:51

## 2021-03-24 RX ADMIN — INSULIN LISPRO 7 UNITS: 100 INJECTION, SOLUTION INTRAVENOUS; SUBCUTANEOUS at 08:20

## 2021-03-24 RX ADMIN — PREGABALIN 75 MG: 75 CAPSULE ORAL at 19:51

## 2021-03-24 RX ADMIN — SODIUM CHLORIDE, PRESERVATIVE FREE 10 ML: 5 INJECTION INTRAVENOUS at 09:01

## 2021-03-24 RX ADMIN — TRAZODONE HYDROCHLORIDE 50 MG: 50 TABLET ORAL at 19:52

## 2021-03-24 RX ADMIN — ASPIRIN 81 MG: 81 TABLET, COATED ORAL at 08:20

## 2021-03-24 RX ADMIN — INSULIN LISPRO 18 UNITS: 100 INJECTION, SOLUTION INTRAVENOUS; SUBCUTANEOUS at 11:51

## 2021-03-24 RX ADMIN — GABAPENTIN 800 MG: 400 CAPSULE ORAL at 19:52

## 2021-03-24 RX ADMIN — SUCRALFATE 1 G: 1 TABLET ORAL at 11:52

## 2021-03-24 RX ADMIN — GABAPENTIN 800 MG: 400 CAPSULE ORAL at 08:19

## 2021-03-24 RX ADMIN — SUCRALFATE 1 G: 1 TABLET ORAL at 16:55

## 2021-03-24 RX ADMIN — SERTRALINE 100 MG: 100 TABLET, FILM COATED ORAL at 08:20

## 2021-03-24 RX ADMIN — FAMOTIDINE 20 MG: 20 TABLET ORAL at 08:20

## 2021-03-24 RX ADMIN — BUDESONIDE AND FORMOTEROL FUMARATE DIHYDRATE 2 PUFF: 80; 4.5 AEROSOL RESPIRATORY (INHALATION) at 20:42

## 2021-03-24 RX ADMIN — ENOXAPARIN SODIUM 40 MG: 40 INJECTION SUBCUTANEOUS at 11:51

## 2021-03-24 RX ADMIN — SODIUM CHLORIDE, PRESERVATIVE FREE 10 ML: 5 INJECTION INTRAVENOUS at 19:52

## 2021-03-24 RX ADMIN — TETROFOSMIN 35.9 MILLICURIE: 1.38 INJECTION, POWDER, LYOPHILIZED, FOR SOLUTION INTRAVENOUS at 09:01

## 2021-03-24 RX ADMIN — IBUPROFEN 400 MG: 200 TABLET, FILM COATED ORAL at 11:56

## 2021-03-24 RX ADMIN — PREGABALIN 75 MG: 75 CAPSULE ORAL at 08:19

## 2021-03-24 ASSESSMENT — PAIN SCALES - GENERAL
PAINLEVEL_OUTOF10: 8
PAINLEVEL_OUTOF10: 0

## 2021-03-24 NOTE — FLOWSHEET NOTE
Medicated with 400mg PO ibuprofen for complaints of 8/10 mid-chest and left arm pain rated 8/10. No signs of any acute distress noted. Sitting on the side of the bed at this time. Call light remains in reach.  Electronically signed by Bruce Marinelli RN on 3/24/2021 at 11:59 AM

## 2021-03-24 NOTE — CARE COORDINATION
REFERRAL SENT TO [facility] THIS AM. CALL FROM HEIDY AT [facility] AND STATES THEY ARE UNABLE TO ACCEPT PT AT THIS TIME D/T NO OPENINGS. REFERRAL SENT TO [facility] WHO CAN TAKE AND HAS A Turkish SPEAKING RN AVAILABLE. KERI AT [address]

## 2021-03-24 NOTE — PROGRESS NOTES
Reviewed history, allergies, and medications. See MAR for meds given and meds held. Consent confirmed. Lexiscan exam explained. Placed patient on monitor. @ 1700 Har[address] here to inject Wally Greek. SOB noted during recovery phase. Denied chest pain. No ectopy noted. Patient off monitor and instructed to eat, will have last part of exam in 1 hour.

## 2021-03-24 NOTE — PROGRESS NOTES
[facility] Progress Note      Patient: Juan Kenney    Unit/Bed: W149/D232-95  YOB: 1975  MRN: [id number]  Admit Date:  3/20/2021  Hospital Day: 2    Rounding Date: 3/24/2021    Rounding Cardiologist:  TIFFANIE Mota MD    PRIMARY Cardiologist: Samy Mota    Subjective Complaint:   Patient seen in nuclear medicine. .     Physical Examination:     BP (!) 91/52   Pulse 80   Temp 97.7 °F (36.5 °C) (Oral)   Resp 18   Ht 6' (1.829 m)   Wt (!) 340 lb (154.2 kg)   SpO2 94%   BMI 46.11 kg/m²         Intake/Output Summary (Last 24 hours) at 3/24/2021 1108  Last data filed at 3/23/2021 1742  Gross per 24 hour   Intake 600 ml   Output --   Net 600 ml                  Juan Kenney examined at bedside in in no apparent distress. Focused exam reveals:     Cardiac: Heart regular rate and rhythm     Lungs:  clear to auscultation bilaterally- no wheezes, rales or rhonchi, normal air movement, no respiratory distress and decreased breath sounds noted-     Extremities:   Trace edema    Telemetry:      normal sinus  and sinus tachycardia         LABS:   CBC:   Recent Labs     03/22/21  0624   WBC 7.5   HGB 14.6         BMP:    Recent Labs     03/22/21  0624 03/23/21  0604 03/24/21  0554    137 135   K 4.5  4.5 4.0 4.0    97 99   CO2 23 24 24   BUN 17 18 21*   CREATININE 0.72 0.69 0.79   GLUCOSE 401* 378* 382*              Troponin: No results for input(s): TROPONINT in the last 72 hours. BNP: No results for input(s): PROBNP in the last 72 hours. INR: No results for input(s): INR in the last 72 hours. Mg:   Recent Labs     03/23/21  0604   MG 2.3       Cardiac Testing:   Summary   Moderate concentric left ventricular hypertrophy. Normal left ventricular size and function. Left ventricular ejection fraction is visually estimated at 55-60%. Normal diastolic parameters. Mildly enlarged right ventricle cavity. Normal RV systolic function. Trivial mitral regurgitation. Aortic valve appears to be tricuspid. Trivial tricuspid regurgitation with estimated RVSP of 28 mm Hg. Small circumferential pericardial effusion       Assessment:  Atypical chest pain  However, significant risk factors, mostly been diabetes  Overweight  Obstructive sleep apnea  Now borderline hypotensive  Plan:  1. Patient is getting stress portion of her [address] today  2. We will have at least this portion reviewed by my cardiology partner, and if normal consideration discharging today  3. If negative, follow-up on a as needed basis  4. If negative would d/c the betablocker in light of her diabetes and possible fluctuating glucose  5. Nonsteroidals or acetaminophen for pain  6. Treatment for sleep apnea  7. Reconcile medications to avoid hypotension  8.  See orders  Electronically signed by Radha Lockwood MD on 3/24/2021 at 11:08 AM

## 2021-03-24 NOTE — FLOWSHEET NOTE
Patient to nuclear medicine, via wheelchair, for part 1 of stress test. Electronically signed by Sarai Kent RN on 3/24/2021 at 10:44 AM

## 2021-03-24 NOTE — FLOWSHEET NOTE
AM assessment completed. Patient resting in bed at this time with no complaints. Denies any chest pain at this time. Remains NSR on the monitor. +1 non-pitting edema noted to bilateral lower extremities. Pedal pulses palpable. Shortness of breath noted with exertion. Lungs are diminished with expiratory wheezes noted on exertion. SATS 96% on bi-pap. She is A/Ox3 but is primarily [place] speaking. She can be up in the room independently. Denies any pain with elimination. Skin remains warm, dry and intact. #20g SL to right AC, flushed and patent. BP 91/52 this AM, metoprolol and norvasc held. Patient is asymptomatic. Taken off bi-pap and placed on 2L NC per respiratory therapist. Call light remains in reach.  Electronically signed by Kalpana Silva RN on 3/24/2021 at 10:43 AM

## 2021-03-24 NOTE — PROGRESS NOTES
First time. Care of this patient. The patient has been seen already by cardiology and pulmonary. She had the first session of a stress test.    Patient is morbidly obese. No apparent distress. BMI is 46. Chest is clear, heart is regular. Abdomen soft, no tenderness. Given her obesity her chest and abdominal exam is inconclusive. I could not exclude the possibility of intra-abdominal mass or organomegaly. Lower extremities trace pitting edema. No calf tenderness. D-dimer is negative. Chest pain  Obesity  Obstructive sleep apnea  Asthma, hypoxemia, probable hypoventilation syndrome, probable restrictive lung disease. Diabetes, uncontrolled. Cardio-pulmonary Endo consultation. I would defer further needed  work-up, investigation, management and treatment to the aforementioned the specialists given their expertise in specialty subject matter. Elevated LFTs. Ultrasound showed fatty infiltration of the liver. I ordered a viral study rule out viral hepatitis. I could not exclude other possible etiologies such as autoimmune, infiltrative or neoplastic liver disease. Patient may need to follow-up with hepatology in the outpatient setting. Metabolic syndrome. Patient is at high risk having pulmonary, cardiovascular complications, cardiac dysrhythmia, cardiac death.

## 2021-03-24 NOTE — PROGRESS NOTES
Hospitalist Daily Progress Note  Name: Sammy Steinberg  Age: 50 y.o. Gender: female  CodeStatus: Full Code  Allergies: Shellfish-Derived Products  Amoxicillin  Macrobid [Nitrofurantoin Monohyd Macro]  Reglan [Metoclopramide]  Singulair [Montelukast]  Nubain [Nalbuphine Hcl]    Chief Complaint:Chest Pain and Shortness of Breath      Primary Care Provider: Patito Jennings MD    InpatientTreatment Team: Treatment Team: Attending Provider: Ben Oscar MD; Consulting Physician: Roscoe Huffman MD; Consulting Physician: Alice Mccarty DO; Utilization Reviewer: Cynthia Ba RN; Registered Nurse: Darrick Story RN; : Yoon Raymond RN; : KATINA Lemus; Patient Care Tech: [facility]; Registered Nurse: Jeanmarie Lowe RN; Patient Care Tech: [facility]    Admission Date: 3/20/2021      Subjective: No chest pain, sob, nausea. When chest pain is present, is reproducible. Physical Exam  Vitals signs and nursing note reviewed. Constitutional:       Appearance: Normal appearance. Cardiovascular:      Rate and Rhythm: Normal rate and regular rhythm. Pulmonary:      Effort: Pulmonary effort is normal.      Breath sounds: Normal breath sounds. Abdominal:      General: Bowel sounds are normal.      Palpations: Abdomen is soft. Musculoskeletal: Normal range of motion. Skin:     General: Skin is warm and dry. Neurological:      Mental Status: She is alert and oriented to person, place, and time. Mental status is at baseline.          Medications:  Reviewed    Infusion Medications:    dextrose       Scheduled Medications:    insulin lispro  20 Units Subcutaneous TID WC    ketorolac  15 mg Intravenous Once    budesonide-formoterol  2 puff Inhalation BID    insulin lispro  0-18 Units Subcutaneous TID WC    insulin lispro  0-9 Units Subcutaneous Nightly    famotidine  20 mg Oral BID    [Held by provider] atorvastatin  40 mg Oral Nightly    enoxaparin  40 mg Subcutaneous Daily    aspirin  81 mg Oral Daily    sucralfate  1 g Oral 4 times per day    amLODIPine  2.5 mg Oral Daily    sertraline  100 mg Oral Daily    traZODone  50 mg Oral Nightly    levothyroxine  50 mcg Oral Daily    topiramate  25 mg Oral Daily    pregabalin  75 mg Oral BID    gabapentin  800 mg Oral TID    insulin glargine  50 Units Subcutaneous Nightly    magnesium oxide  400 mg Oral Daily    metoprolol succinate  25 mg Oral Daily     PRN Meds: ibuprofen, glucose, dextrose, glucagon (rDNA), dextrose, promethazine **OR** ondansetron, acetaminophen **OR** acetaminophen, potassium chloride **OR** potassium alternative oral replacement **OR** potassium chloride, ondansetron, calcium carbonate, ketorolac    Labs:   Recent Labs     03/22/21  0624   WBC 7.5   HGB 14.6   HCT 42.6        Recent Labs     03/22/21  0624 03/23/21  0604    137   K 4.5  4.5 4.0    97   CO2 23 24   BUN 17 18   CREATININE 0.72 0.69   CALCIUM 9.2 8.8     Recent Labs     03/22/21  0624   AST 62*   ALT 95*   BILIDIR <0.2   BILITOT 0.7   ALKPHOS 134*     No results for input(s): INR in the last 72 hours. Recent Labs     03/21/21  0556 03/21/21  1334   CKTOTAL 46 45   TROPONINI <0.010 <0.010       Urinalysis:   Lab Results   Component Value Date    NITRU Negative 04/09/2020    WBCUA 20-50 01/09/2019    BACTERIA Many 01/09/2019    RBCUA 0-2 01/09/2019    BLOODU Negative 04/09/2020    SPECGRAV 1.025 04/09/2020    GLUCOSEU >=1000 04/09/2020       Radiology:   Most recent    Chest CT      WITH CONTRAST:No results found for this or any previous visit. WITHOUT CONTRAST: No results found for this or any previous visit.     CXR      2-view:   Results for orders placed during the hospital encounter of 03/20/21   XR CHEST (2 VW)    Narrative EXAMINATION: XR CHEST (2 VW)    CLINICAL HISTORY: CHEST PAIN, SHORTNESS OF BREATH    COMPARISONS: NOVEMBER 14, 2020    FINDINGS: Osseous structures are intact. Cardiopericardial silhouette is normal. Pulmonary vasculature is normal. Lungs are clear. Impression NO ACUTE CARDIOPULMONARY DISEASE. Portable:   Results for orders placed during the hospital encounter of 11/14/20   XR CHEST PORTABLE    Narrative EXAMINATION: XR CHEST PORTABLE    CLINICAL HISTORY: CHEST PAIN, DIZZINESS    COMPARISONS: SEPTEMBER 21, 2020    FINDINGS: Osseous structures intact. Cardiopericardial silhouette normal. Pulmonary vasculature normal. Lungs clear. Impression NO ACUTE CARDIOPULMONARY DISEASE. Echo No results found for this or any previous visit. Assessment/Plan:    Active Hospital Problems    Diagnosis Date Noted    Chest pain [R07.9] 03/20/2021     1- Chest pain - undergoing nuclear stress test, second part in am, however patient did not tolerate well. Likely non cardiac from evaluation. Negative trop.   Await cardiology recs   3/23 - patient was to have nuc med stress today, however was not able to be completed until 3/24    2 - NATALEE/asthma - pulm following, cont cpap and symbicort    Poss dc 3/24 if cardiac workup negative    Electronically signed by Thee Purcell MD on 3/23/2021 at 11:19 PM

## 2021-03-24 NOTE — PROGRESS NOTES
71*   LABGLOM >60.0 >60.0 >60.0   GFRAA >60.0 >60.0 >60.0       MV Settings:          No results for input(s): PHART, JEW0TRT, PO2ART, VHY5RWH, BEART, I0SCDSBZ in the last 72 hours. O2 Device: Nasal cannula  O2 Flow Rate (L/min): 2 L/min    DIET CARB CONTROL; Carb Control: 4 carb choices (60 gms)/meal; No Added Salt (3-4 GM);  No Caffeine  Diet NPO, After Midnight Exceptions are: Ice Chips     MEDICATIONS during current hospitalization:    Continuous Infusions:   dextrose         Scheduled Meds:   [START ON 3/25/2021] insulin lispro  35 Units Subcutaneous TID WC    insulin glargine  90 Units Subcutaneous Nightly    ketorolac  15 mg Intravenous Once    budesonide-formoterol  2 puff Inhalation BID    insulin lispro  0-18 Units Subcutaneous TID WC    insulin lispro  0-9 Units Subcutaneous Nightly    famotidine  20 mg Oral BID    [Held by provider] atorvastatin  40 mg Oral Nightly    enoxaparin  40 mg Subcutaneous Daily    aspirin  81 mg Oral Daily    sucralfate  1 g Oral 4 times per day    amLODIPine  2.5 mg Oral Daily    sertraline  100 mg Oral Daily    traZODone  50 mg Oral Nightly    levothyroxine  50 mcg Oral Daily    topiramate  25 mg Oral Daily    pregabalin  75 mg Oral BID    gabapentin  800 mg Oral TID    magnesium oxide  400 mg Oral Daily    [Held by provider] metoprolol succinate  25 mg Oral Daily       PRN Meds:sodium chloride flush, ibuprofen, glucose, dextrose, glucagon (rDNA), dextrose, promethazine **OR** ondansetron, acetaminophen **OR** acetaminophen, potassium chloride **OR** potassium alternative oral replacement **OR** potassium chloride, ondansetron, calcium carbonate, ketorolac    Radiology  Echo Complete 2d W Doppler W Color    Result Date: 3/22/2021  Transthoracic Echocardiography Report (TTE)  Demographics  Patient Name  Elmer Groves      Gender             Female                Obey Felipe  Patient       [id number]                Race               Other  Number Ethnicity           or                                                             Visit Number  [id number]               Room Number        W180  Corporate ID                          Date of Study      03/22/2021  Accession     [id number]              Referring  Number                                Physician  Date of Birth 1975              Sonographer        [address] [place] [address]  Age           50 year(s)              Interpreting       [address] [place] [address] Godwin BATISTA Procedure Type of Study  TTE procedure:ECHOCARDIOGRAM 2D DOPPLER W COLOR W CONTRAST. Procedure Date Date: 03/22/2021 Start: 10:49 AM Study Location: Portable Technical Quality: Poor visualization due to body habitus. Indications:LVF. Patient Status: Routine Contrast Medium: Definity. Amount - 2 ml Height: 72 inches Weight: 317 pounds BSA: 2.59 m^2 BMI: 42.99 kg/m^2 BP: 144/75 mmHg  Conclusions  Summary  Moderate concentric left ventricular hypertrophy. Normal left ventricular size and function. Left ventricular ejection fraction is visually estimated at 55-60%. Normal diastolic parameters. Mildly enlarged right ventricle cavity. Normal RV systolic function. Trivial mitral regurgitation. Aortic valve appears to be tricuspid. Trivial tricuspid regurgitation with estimated RVSP of 28 mm Hg. Small circumferential pericardial effusion without hemodynamic compromise. Signature  ----------------------------------------------------------------  Electronically signed by Godwin Ramirez MD(Interpreting  physician) on 03/22/2021 11:51 AM  ----------------------------------------------------------------  Findings Left Ventricle Moderate concentric left ventricular hypertrophy. Normal left ventricular size and function. Left ventricular ejection fraction is visually estimated at 55-60%. Normal diastolic parameters. Right Ventricle Mildly enlarged right ventricle cavity. Normal RV systolic function. Left Atrium Normal left atrium. Right Atrium Normal right atrium. Mitral Valve Trivial mitral regurgitation. Tricuspid Valve Trivial tricuspid regurgitation with estimated RVSP of 28 mm Hg. Aortic Valve Aortic valve appears to be tricuspid. Pulmonic Valve Normal pulmonic valve structure and function. Pericardial Effusion Small circumferential pericardial effusion without hemodynamic compromise. Pleural Effusion No evidence of pleural effusion. Aorta \ Miscellaneous Miscellaneous normal findings were found. M-Mode Measurements (cm)  LVIDd: 5.71 cm                         LVIDs: 4.22 cm  IVSd: 1.07 cm                          IVSs: 1.42 cm  LVPWd: 1.06 cm                         LVPWs: 1.27 cm  Rt. Vent.  Dimension: 3.08 cm           AO Root Dimension: 2.94 cm                                         ACS: 1.87 cm                                         LA: 4.6 cm                                         LVOT: 2.02 cm Doppler Measurements:  AV Velocity:0.02 m/s                    MV Peak E-Wave: 0.84 m/s  AV Peak Gradient: 5.42 mmHg             MV Peak A-Wave: 0.77 m/s  AV Mean Gradient: 3.11 mmHg  AV Area (Continuity):2.26 cm^2  TR Velocity:2.36 m/s                    Estimated RAP:5 mmHg  TR Gradient:22.25 mmHg                  RVSP:27.25 mmHg Valves  Mitral Valve  Peak E-Wave: 0.84 m/s                Peak A-Wave: 0.77 m/s                                       E/A Ratio: 1.1                                       Peak Gradient: 2.85 mmHg                                       Deceleration Time: 213.9 msec  Tissue Doppler  E' Septal Velocity: 0.12 m/s  E' Lateral Velocity: 0.1 m/s  Aortic Valve  Peak Velocity: 1.16 m/s                Mean Velocity: 0.83 m/s  Peak Gradient: 5.42 mmHg               Mean Gradient: 3.11 mmHg  Area (continuity): 2.26 cm^2           Area (2D): 2.2 cm^2  AV VTI: 24.93 cm  Cusp Separation: 1.87 cm  Tricuspid Valve  Estimated RVSP: 27.25 mmHg              Estimated RAP: 5 mmHg  TR Velocity: 2.36 m/s                   TR Gradient: 22.25 mmHg  Pulmonic Valve  Peak Velocity: 1 m/s           Peak Gradient: 4.03 mmHg                                 Estimated PASP: 27.25 mmHg  LVOT  Peak Velocity: 0.84 m/s             Mean Velocity: 0.69 m/s  Peak Gradient: 2.8 mmHg             Mean Gradient: 1.98 mmHg  LVOT Diameter: 2.02 cm              LVOT VTI: 17.58 cm Structures  Left Atrium  LA Dimension: 4.6 cm  LA/Aorta: 1.56  Left Ventricle  Diastolic Dimension: 7.63 cm          Systolic Dimension: 0.74 cm  Septum Diastolic: 0.44 cm             Septum Systolic: 7.15 cm  PW Diastolic: 5.48 cm                 PW Systolic: 6.47 cm                                        FS: 26.1 %  LV EDV/LV EDV Index: 160.99 ml/62 m^2 LV ESV/LV ESV Index: 79.28 ml/31 m^2  EF Calculated: 50.8 %  LVOT Diameter: 2.02 cm  Right Atrium  RA Systolic Pressure: 5 mmHg  Right Ventricle  Diastolic Dimension: 9.08 cm                                    RV Systolic Pressure: 07.00 mmHg Aorta/ Miscellaneous Aorta  Aortic Root: 2.94 cm  LVOT Diameter: 2.02 cm    Xr Chest (2 Vw)    Result Date: 3/21/2021  EXAMINATION: XR CHEST (2 VW) CLINICAL HISTORY: CHEST PAIN, SHORTNESS OF BREATH COMPARISONS: NOVEMBER 14, 2020 FINDINGS: Osseous structures are intact. Cardiopericardial silhouette is normal. Pulmonary vasculature is normal. Lungs are clear. NO ACUTE CARDIOPULMONARY DISEASE. Us Abdomen Limited    Result Date: 3/22/2021  US ABDOMEN LIMITED : 3/21/2021 CLINICAL HISTORY:  RUQ US for elevated LFTs. COMPARISON: CT abdomen pelvis 4/4/2020. TECHNIQUE: Transabdominal ultrasound of the right upper quadrant was performed. FINDINGS: The study is limited by the patient's body habitus and mild to moderately enlarged moderately echogenic fatty liver. The gallbladder, visualized pancreas, right kidney, and great vessels are unremarkable.  There is no significant gallbladder distention, wall thickening, calculi, pericholecystic fluid, biliary dilatation, ascites, or other findings of concern identified. The common duct measures approximately 4 to 5 mm at the anand hepatis. MILD TO MODERATELY ENLARGED FATTY LIVER. OTHERWISE, NEGATIVE LIMITED RIGHT UPPER QUADRANT ULTRASOUND. IMPRESSION AND SUGGESTION:  1. Obstructive sleep apnea on CPAP  2. Morbid obesity  3. Chest pain reproducible likely musculoskeletal in  4. History of asthma, no sign of exacerbation    Continue CPAP and O2 during sleep. Symbicort 2 puff twice daily. O2 to keep saturation 90% above. Continue present treatment plan. NOTE: This report was transcribed using voice recognition software. Every effort was made to ensure accuracy; however, inadvertent computerized transcription errors may be present.       Electronically signed by Roz Ventura MD, FCCP on 3/24/2021 at 6:56 PM

## 2021-03-24 NOTE — FLOWSHEET NOTE
Medicated with 400mg PO ibuprofen for complaints of 8/10 chest pain. No signs of any acute distress noted. Family at bedside. Call light remains in reach.  Electronically signed by Yajaira Bueno RN on 3/24/2021 at 5:20 PM

## 2021-03-24 NOTE — PROCEDURES
Pt needs to return to Nuclear Medicine 3/25/21 for REST portion of 2-Day nate Espinozas stress test. Dr Emani Terry aware. Pt was also made aware via .

## 2021-03-24 NOTE — FLOWSHEET NOTE
Patient returned from nuclear medicine. Diet orders resumed. Has no complaints at this time. Call light remains in reach.  Electronically signed by Nevaeh Redding RN on 3/24/2021 at 11:07 AM

## 2021-03-25 LAB
GLUCOSE BLD-MCNC: 183 MG/DL (ref 60–115)
GLUCOSE BLD-MCNC: 194 MG/DL (ref 60–115)
GLUCOSE BLD-MCNC: 272 MG/DL (ref 60–115)
GLUCOSE BLD-MCNC: 383 MG/DL (ref 60–115)
GLUCOSE BLD-MCNC: 403 MG/DL (ref 60–115)
GLUCOSE BLD-MCNC: 413 MG/DL (ref 60–115)
PERFORMED ON: ABNORMAL

## 2021-03-25 PROCEDURE — 3430000000 HC RX DIAGNOSTIC RADIOPHARMACEUTICAL: Performed by: INTERNAL MEDICINE

## 2021-03-25 PROCEDURE — 99232 SBSQ HOSP IP/OBS MODERATE 35: CPT | Performed by: INTERNAL MEDICINE

## 2021-03-25 PROCEDURE — 6370000000 HC RX 637 (ALT 250 FOR IP): Performed by: INTERNAL MEDICINE

## 2021-03-25 PROCEDURE — 2060000000 HC ICU INTERMEDIATE R&B

## 2021-03-25 PROCEDURE — G0378 HOSPITAL OBSERVATION PER HR: HCPCS

## 2021-03-25 PROCEDURE — A9502 TC99M TETROFOSMIN: HCPCS | Performed by: INTERNAL MEDICINE

## 2021-03-25 PROCEDURE — 94640 AIRWAY INHALATION TREATMENT: CPT

## 2021-03-25 PROCEDURE — 94761 N-INVAS EAR/PLS OXIMETRY MLT: CPT

## 2021-03-25 PROCEDURE — 6360000002 HC RX W HCPCS: Performed by: INTERNAL MEDICINE

## 2021-03-25 PROCEDURE — 2700000000 HC OXYGEN THERAPY PER DAY

## 2021-03-25 PROCEDURE — 96372 THER/PROPH/DIAG INJ SC/IM: CPT

## 2021-03-25 PROCEDURE — 2580000003 HC RX 258: Performed by: INTERNAL MEDICINE

## 2021-03-25 PROCEDURE — 96376 TX/PRO/DX INJ SAME DRUG ADON: CPT

## 2021-03-25 RX ORDER — LISINOPRIL 10 MG/1
10 TABLET ORAL DAILY
Status: DISCONTINUED | OUTPATIENT
Start: 2021-03-25 | End: 2021-03-26 | Stop reason: HOSPADM

## 2021-03-25 RX ORDER — LISINOPRIL 10 MG/1
10 TABLET ORAL DAILY
Qty: 30 TABLET | Refills: 3 | Status: SHIPPED | OUTPATIENT
Start: 2021-03-25

## 2021-03-25 RX ORDER — LANOLIN ALCOHOL/MO/W.PET/CERES
400 CREAM (GRAM) TOPICAL DAILY
Qty: 30 TABLET | Refills: 5 | Status: SHIPPED | OUTPATIENT
Start: 2021-03-26

## 2021-03-25 RX ORDER — INSULIN GLARGINE 100 [IU]/ML
INJECTION, SOLUTION SUBCUTANEOUS
Qty: 30 PEN | Refills: 3 | Status: SHIPPED | OUTPATIENT
Start: 2021-03-25 | End: 2021-08-18

## 2021-03-25 RX ORDER — METOPROLOL SUCCINATE 25 MG/1
25 TABLET, EXTENDED RELEASE ORAL DAILY
Qty: 30 TABLET | Refills: 3 | Status: SHIPPED | OUTPATIENT
Start: 2021-03-25

## 2021-03-25 RX ORDER — INSULIN LISPRO 200 [IU]/ML
INJECTION, SOLUTION SUBCUTANEOUS
Qty: 30 PEN | Refills: 3 | Status: SHIPPED | OUTPATIENT
Start: 2021-03-25 | End: 2021-08-18

## 2021-03-25 RX ORDER — LISINOPRIL 20 MG/1
10 TABLET ORAL DAILY
Qty: 30 TABLET | Refills: 3 | Status: ON HOLD | OUTPATIENT
Start: 2021-03-25 | End: 2021-08-19 | Stop reason: HOSPADM

## 2021-03-25 RX ORDER — NITROGLYCERIN 0.4 MG/1
0.4 TABLET SUBLINGUAL EVERY 5 MIN PRN
Qty: 25 TABLET | Refills: 3 | Status: SHIPPED | OUTPATIENT
Start: 2021-03-25

## 2021-03-25 RX ORDER — INSULIN GLARGINE 100 [IU]/ML
140 INJECTION, SOLUTION SUBCUTANEOUS NIGHTLY
Status: DISCONTINUED | OUTPATIENT
Start: 2021-03-25 | End: 2021-03-26

## 2021-03-25 RX ADMIN — LEVOTHYROXINE SODIUM 50 MCG: 0.05 TABLET ORAL at 06:20

## 2021-03-25 RX ADMIN — SUCRALFATE 1 G: 1 TABLET ORAL at 12:21

## 2021-03-25 RX ADMIN — MAGNESIUM OXIDE TAB 400 MG (240 MG ELEMENTAL MG) 400 MG: 400 (240 MG) TAB at 12:15

## 2021-03-25 RX ADMIN — BUDESONIDE AND FORMOTEROL FUMARATE DIHYDRATE 2 PUFF: 80; 4.5 AEROSOL RESPIRATORY (INHALATION) at 19:36

## 2021-03-25 RX ADMIN — LISINOPRIL 10 MG: 10 TABLET ORAL at 17:40

## 2021-03-25 RX ADMIN — SUCRALFATE 1 G: 1 TABLET ORAL at 17:40

## 2021-03-25 RX ADMIN — IBUPROFEN 400 MG: 200 TABLET, FILM COATED ORAL at 06:19

## 2021-03-25 RX ADMIN — SERTRALINE 100 MG: 100 TABLET, FILM COATED ORAL at 12:15

## 2021-03-25 RX ADMIN — IBUPROFEN 400 MG: 200 TABLET, FILM COATED ORAL at 23:28

## 2021-03-25 RX ADMIN — INSULIN LISPRO 9 UNITS: 100 INJECTION, SOLUTION INTRAVENOUS; SUBCUTANEOUS at 17:44

## 2021-03-25 RX ADMIN — PREGABALIN 75 MG: 75 CAPSULE ORAL at 12:16

## 2021-03-25 RX ADMIN — TRAZODONE HYDROCHLORIDE 50 MG: 50 TABLET ORAL at 21:18

## 2021-03-25 RX ADMIN — INSULIN GLARGINE 140 UNITS: 100 INJECTION, SOLUTION SUBCUTANEOUS at 21:19

## 2021-03-25 RX ADMIN — SODIUM CHLORIDE, PRESERVATIVE FREE 10 ML: 5 INJECTION INTRAVENOUS at 08:47

## 2021-03-25 RX ADMIN — TOPIRAMATE 25 MG: 25 TABLET, FILM COATED ORAL at 12:21

## 2021-03-25 RX ADMIN — SUCRALFATE 1 G: 1 TABLET ORAL at 23:28

## 2021-03-25 RX ADMIN — INSULIN LISPRO 18 UNITS: 100 INJECTION, SOLUTION INTRAVENOUS; SUBCUTANEOUS at 12:11

## 2021-03-25 RX ADMIN — TETROFOSMIN 36 MILLICURIE: 1.38 INJECTION, POWDER, LYOPHILIZED, FOR SOLUTION INTRAVENOUS at 08:46

## 2021-03-25 RX ADMIN — GABAPENTIN 800 MG: 400 CAPSULE ORAL at 12:16

## 2021-03-25 RX ADMIN — PREGABALIN 75 MG: 75 CAPSULE ORAL at 21:18

## 2021-03-25 RX ADMIN — ASPIRIN 81 MG: 81 TABLET, COATED ORAL at 12:16

## 2021-03-25 RX ADMIN — GABAPENTIN 800 MG: 400 CAPSULE ORAL at 21:18

## 2021-03-25 RX ADMIN — FAMOTIDINE 20 MG: 20 TABLET ORAL at 21:18

## 2021-03-25 RX ADMIN — KETOROLAC TROMETHAMINE 15 MG: 15 INJECTION, SOLUTION INTRAMUSCULAR; INTRAVENOUS at 17:40

## 2021-03-25 RX ADMIN — FAMOTIDINE 20 MG: 20 TABLET ORAL at 12:16

## 2021-03-25 RX ADMIN — ENOXAPARIN SODIUM 40 MG: 40 INJECTION SUBCUTANEOUS at 12:15

## 2021-03-25 ASSESSMENT — PAIN SCALES - GENERAL
PAINLEVEL_OUTOF10: 8
PAINLEVEL_OUTOF10: 6
PAINLEVEL_OUTOF10: 10

## 2021-03-25 NOTE — CONSULTS
[address]                      [address], [address] [facility]                                  CONSULTATION    PATIENT NAME: Stella Still         :        1975  MED REC NO:   [id number]                            ROOM:       W180  ACCOUNT NO:   [de-identified]                           ADMIT DATE: 2021  PROVIDER:     Ebonie Desouza MD    CONSULT DATE:  2021    ENDOCRINE CONSULTATION    REFERRING PROVIDER:  Bayron Mckeon MD    REASON FOR CONSULTATION:  Management of uncontrolled diabetes. CHIEF COMPLAINT AND HISTORY OF PRESENT ILLNESS:  The patient is a  42-year-old female with known history of poorly controlled diabetes,  admitted with chest pain and shortness of breath. Initial admitting  diagnoses were angina, dyspnea, morbid obesity, and uncontrolled  diabetes. The patient has been followed for her diabetes at the  [facility]. Her A1cs were reviewed, they have been staying  between 9-14 range. Had insulin antibodies tested, which were negative. The patient did have test for pancreatic elastase going along with  endocrine pancreatic insufficiency. Blood sugars here have been staying  between 300-400 range. Her hemoglobin A1c was 13.2. Prior A1cs in our  system has been between 9 to 40.5 range. Currently, she is on Lantus 50  units at bedtime plus Humalog coverage 20 units with each meals. Most  of the history is obtained through prior H and P and the patient's  family, predominantly Korean-speaking patient. Pulmonary and  Cardiology has been consulted. Reviewed pulmonary notes, impression was  obstructive sleep apnea and history of asthma. Cardiology consult,  atypical chest pain. PAST MEDICAL HISTORY:  Significant for type 2 diabetes, morbid obesity,  asthma, hypertension, hypercholesterolemia, sleep apnea, depression, and  schizophrenia. PAST SURGICAL HISTORY:  Upper GI endoscopy and colonoscopy.     FAMILY HISTORY: Reviewed, colon cancer. PERSONAL AND SOCIAL HISTORY:  Former smoker. Denies any alcohol or  substance abuse. CURRENT MEDICATIONS:  Here included Lantus, Humalog, Symbicort, Lovenox,  Pepcid, gabapentin, Ultram, Synthroid 50 mcg daily, Lyrica, Zoloft,  Carafate, and Desyrel. ALLERGIES:  SHELLFISH, AMOXICILLIN, MACROBID, REGLAN, SINGULAIR, and  NUBAIN. REVIEW OF SYSTEMS:  Other than hyperglycemia, shortness of breath, and  chest pain, 14-point review of systems is essentially unremarkable. PHYSICAL EXAMINATION:  GENERAL:  The patient is alert, awake, in no obvious distress, lying in  bed. VITAL SIGNS:  Blood pressure was 130/88, pulse rate was 82, respiratory  rate 16, and temperature 98.4. HEENT:  Normocephalic and atraumatic. Pupils equal, reactive to light. Oral mucosa was moist.  NECK:  Supple. Acanthosis nigricans was noted with some hirsutism. CHEST:  Lungs were showing occasional scattered wheezing. No basal  crackles. CARDIAC:  Heart sounds were normal.  No murmurs or thrills were present. ABDOMEN:  Soft, significantly obese. Bowel sounds are present. No  organomegaly or tenderness. LOWER EXTREMITIES:  Reveal 1+ pitting edema on both lower legs. MUSCULOSKELETAL:  No joint swelling. SKIN:  Intact. NEUROLOGIC:  _____ cranial nerves were normal.  PSYCHIATRIC:  Depressed affect. LABORATORY DATA:  As above. ASSESSMENT:  Uncontrolled diabetes, morbid obesity, sleep apnea, and  chest pain. PLAN:  Would increase Lantus to 120 units at night, Humalog 50 with each  meals with high dose Humalog coverage. Blood sugar goal here 150-100. Long-term A1c goal of 8 or lower. Thank you for the consult.         Stan Tejeda MD    D: 03/24/2021 22:04:02       T: 03/24/2021 22:10:28     [email or web address]  Job#: [id number]     Doc#: [id number]    CC:

## 2021-03-25 NOTE — PROGRESS NOTES
This  was called in to facilitate translation. Patient is Kazakh speaking only.  tablet was not working. Nurse and Cardiologist were trying to explain to the patient what needed to be done and why. This  was able to explain to the patient what the cardiologist was saying and facilitate the communication. Patient and cardiologist were satisfied with their mutual understanding.

## 2021-03-25 NOTE — PROGRESS NOTES
Progress Note  Date:3/25/2021       Room:[place] [address]  Patient Ivy Collins     YOB: 1975     Age:45 y.o. Chief complaint uncontrolled diabetes  Subjective    Subjective:  Symptoms:  Stable. Diet:  Adequate intake. Activity level: Returning to normal.       Review of Systems  Objective         Vitals Last 24 Hours:  TEMPERATURE:  Temp  Av.7 °F (36.5 °C)  Min: 97 °F (36.1 °C)  Max: 98.4 °F (36.9 °C)  RESPIRATIONS RANGE: Resp  Av.4  Min: 16  Max: 19  PULSE OXIMETRY RANGE: SpO2  Av.2 %  Min: 94 %  Max: 96 %  PULSE RANGE: Pulse  Av.1  Min: 71  Max: 90  BLOOD PRESSURE RANGE: Systolic (29PVP), JWA:930 , Min:95 , YGV:239   ; Diastolic (35UQT), ZPA:89, Min:49, Max:88    I/O (24Hr): Intake/Output Summary (Last 24 hours) at 3/25/2021 1612  Last data filed at 3/24/2021 2148  Gross per 24 hour   Intake 360 ml   Output --   Net 360 ml     Objective:  General Appearance:  Comfortable. Vital signs: (most recent): Blood pressure (!) 114/49, pulse 71, temperature 97.9 °F (36.6 °C), temperature source Oral, resp. rate 17, height 6' (1.829 m), weight (!) 311 lb 8.2 oz (141.3 kg), SpO2 96 %, unknown if currently breastfeeding. Vital signs are normal.    HEENT: Normal HEENT exam.    Lungs:  Normal effort and normal respiratory rate. Heart: Normal rate. Abdomen: Abdomen is soft. Extremities: Normal range of motion. There is dependent edema. Neurological: Patient is alert. Results for Nevaeh Deleon (MRN [id number]) as of 3/25/2021 16:12   Ref. Range 3/24/2021 15:47 3/24/2021 19:50 3/25/2021 06:17 3/25/2021 11:04 3/25/2021 11:55   POC Glucose Latest Ref Range: 60 - 115 mg/dl 386 (H) 386 (H) 403 (HH) 383 (H) 413 (HH)         Labs/Imaging/Diagnostics    Labs:  CBC:No results for input(s): WBC, RBC, HGB, HCT, MCV, RDW, PLT in the last 72 hours.   CHEMISTRIES:  Recent Labs     21  0604 21  0554    135   K 4.0 4.0   CL 97 99   CO2 24 24   BUN 18 21*   CREATININE 0.69 0.79   GLUCOSE 378* 382*   MG 2.3  --      PT/INR:No results for input(s): PROTIME, INR in the last 72 hours. APTT:No results for input(s): APTT in the last 72 hours. LIVER PROFILE:  Recent Labs     03/24/21  0554   ALT 73*       Imaging Last 24 Hours:  No results found. Assessment//Plan           Hospital Problems           Last Modified POA    Chest pain 3/20/2021 Yes        Assessment:    Condition: In stable condition. Unchanged. (Uncontrolled type 2 diabetes worsened with morbid obesity insulin resistance  Chest pain  Sleep apnea  ). Plan:   Discharge home. (Okay to discharge patient home on Lantus 140 units at bedtime plus Humalog 65 units with each meals patient to follow-up with endocrinology in 2 weeks long-term A1c goal of 7 or lower patient also increase physical activity spoke to patient family via phone with interpretation  Reviewed cardiology notes).        Electronically signed by León Philippe MD on 3/25/21 at 175 [facility] EDT

## 2021-03-25 NOTE — DISCHARGE INSTR - COC
Continuity of Care Form    Patient Name: Vicky Kyle   :  1975  MRN:  [id number]    Admit date:  3/20/2021  Discharge date:  3/26/21    Code Status Order: Full Code   Advance Directives:   Advance Care Flowsheet Documentation       Date/Time Healthcare Directive Type of Healthcare Directive Copy in [address] Agent's Name Healthcare Agent's Phone Number    21 [id number]  No, patient does not have an advance directive for healthcare treatment -- -- -- -- --            Admitting Physician:  Piotr Gates DO  PCP: Cammie Leblanc MD    Discharging Nurse: [facility] Unit/Room#: Y281/F542-72  Discharging Unit Phone Number: [phone]    Emergency Contact:   Extended Emergency Contact Information  Primary Emergency Contact: Russel Sow [facility] Phone: [phone]  Relation: Child  Preferred language: German   needed?  No  Secondary Emergency Contact: Yuridia Mcdonough [address] Phone: [phone]  Mobile Phone: [phone]  Relation: Brother/Sister    Past Surgical History:  Past Surgical History:   Procedure Laterality Date    COLONOSCOPY  14    jarmoszuk    UPPER GASTROINTESTINAL ENDOSCOPY  14    jarmoszuk    UPPER GASTROINTESTINAL ENDOSCOPY N/A 3/22/2021    EGD DIAGNOSTIC ONLY performed by Vince Burr MD at [facility]       Immunization History:   Immunization History   Administered Date(s) Administered    Pneumococcal Polysaccharide ([id number]) 2016    Tdap (Boostrix, Adacel) 2016       Active Problems:  Patient Active Problem List   Diagnosis Code    Anxiety F41.9    Asthma J45.909    Auditory hallucination R44.0    Depression F32.9    Uncontrolled type 2 diabetes mellitus ([address] E11.65    Diabetes mellitus ([address] E11.9    Family history of coronary arteriosclerosis Z82.49    Gastroesophageal reflux disease K21.9    Hyperlipidemia E78.5    Hypertension I10    Hypothyroidism due to Hashimoto's thyroiditis E03.8, E06.3    Ingrowing nail L60.0    Laryngeal spasm J38.5    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    Morbid obesity (HCC) E66.01    Obstructive sleep apnea syndrome G47.33    Pain of toe M79.676    Schizophrenia (HCC) F20.9    Type 2 diabetes mellitus with other diabetic neurological complication (HCC) L92.65    Type 1 diabetes mellitus (HCC) E10.9    Vitamin D deficiency E55.9    Pancreatitis, unspecified pancreatitis type K85.90    DKA, type 2, not at goal [facility]) E11.10    Chest pain R07.9       Isolation/Infection:   Isolation            No Isolation          Patient Infection Status       Infection Onset Added Last Indicated Last Indicated By Review Planned Expiration Resolved Resolved By    None active    Resolved    COVID-19 Rule Out 08/11/20 08/11/20 08/11/20 Covid-19 Ambulatory (Ordered)   08/14/20 Rule-Out Test Resulted            Nurse Assessment:  Last Vital Signs: BP (!) 114/49   Pulse 71   Temp 97.9 °F (36.6 °C) (Oral)   Resp 17   Ht 6' (1.829 m)   Wt (!) 311 lb 8.2 oz (141.3 kg)   SpO2 96%   BMI 42.25 kg/m²     Last documented pain score (0-10 scale): Pain Level: 8  Last Weight:   Wt Readings from Last 1 Encounters:   03/25/21 (!) 311 lb 8.2 oz (141.3 kg)     Mental Status:  oriented, alert, coherent, logical, thought processes intact and able to concentrate and follow conversation    IV Access:  - None    Nursing Mobility/ADLs:  Walking   Independent  Transfer  Independent  Bathing  Independent  Dressing  Independent  Toileting  Independent  Feeding  Independent  Med Admin  Assisted  Med Delivery   none    Wound Care Documentation and Therapy:        Elimination:  Continence:   · Bowel:  Yes  · Bladder: Yes  Urinary Catheter: None   Colostomy/Ileostomy/Ileal Conduit: No       Date of Last BM: 3/20/21    Intake/Output Summary (Last 24 hours) at 3/25/2021 1511  Last data filed at 3/24/2021 2148  Gross per 24 hour Intake 360 ml   Output --   Net 360 ml     I/O last 3 completed shifts: In: 360 [P.O.:360]  Out: -     Safety Concerns:     None    Impairments/Disabilities:      None    Nutrition Therapy:  Current Nutrition Therapy:   - Oral Diet:  Carb Control 4 carbs/meal (1800kcals/day)    Routes of Feeding: Oral  Liquids: Thin Liquids  Daily Fluid Restriction: no  Last Modified Barium Swallow with Video (Video Swallowing Test): not done    Treatments at the Time of Hospital Discharge:   Respiratory Treatments: ***  Oxygen Therapy:  is not on home oxygen therapy. Ventilator:    - No ventilator support    Rehab Therapies: {THERAPEUTIC INTERVENTION:[id number]}  Weight Bearing Status/Restrictions: No weight bearing restirctions  Other Medical Equipment (for information only, NOT a DME order):  {EQUIPMENT:[id number]}  Other Treatments: ***    Patient's personal belongings (please select all that are sent with patient):  {[facility] DME Belongings:[id number]:::0}    RN SIGNATURE:  Electronically signed by Tequila Torres RN on 3/26/21 at 3:31 PM EDT    CASE MANAGEMENT/SOCIAL WORK SECTION    Inpatient Status Date: ***    Readmission Risk Assessment Score:  Readmission Risk              Risk of Unplanned Readmission:        15           Discharging to Facility/ Agency   · Name:   · Address:  · Phone:  · Fax:    Dialysis Facility (if applicable)   · Name:  · Address:  · Dialysis Schedule:  · Phone:  · Fax:    / signature: {Esignature:[id number]:::0}    PHYSICIAN SECTION    Prognosis: Good    Condition at Discharge: Stable    Rehab Potential (if transferring to Rehab): Good    Recommended Labs or Other Treatments After Discharge:     Physician Certification: I certify the above information and transfer of Deb Alcazar  is necessary for the continuing treatment of the diagnosis listed and that she requires Home Care for greater 30 days.      Update Admission H&P: No change in H&P    PHYSICIAN SIGNATURE: Electronically signed by Emily Bennett MD on 3/25/21 at 3:12 PM EDT

## 2021-03-25 NOTE — PROGRESS NOTES
INPATIENT PROGRESS NOTES    PATIENT NAME: Lorelei Landau  MRN: [id number]  SERVICE DATE:  March 25, 2021   SERVICE TIME:  6:03 PM      PRIMARY SERVICE: Pulmonary Disease    CHIEF COMPLAIN: Sleep apnea      INTERVAL HPI: Patient seen and examined at bedside, Interval Notes, orders reviewed. Nursing notes noted  Patient was seen earlier. She Is using CPAP therapy during nighttime. She does not have oxygen at home. O2 saturation 94%. On room air. She is having shortness of breath with exertion. No chest pain or pleuritic pain. No fever or chills. She did not qualify for oxygen as per RN    OBJECTIVE    Body mass index is 42.25 kg/m². PHYSICAL EXAM:  Vitals:  BP (!) 114/49   Pulse 71   Temp 97.9 °F (36.6 °C) (Oral)   Resp 17   Ht 6' (1.829 m)   Wt (!) 311 lb 8.2 oz (141.3 kg)   SpO2 94%   BMI 42.25 kg/m²   General: Morbidly obese, alert, awake . comfortable in bed, No distress. Head: Atraumatic , Normocephalic   Eyes: PERRL. No sclera icterus. No conjunctival injection. No discharge   ENT: No nasal  discharge. Pharynx clear. Neck:  Trachea midline. No thyromegaly, no JVD, No cervical adenopathy. Chest : Bilaterally symmetrical ,Normal effort,  No accessory muscle use  Lung : . Fair BS bilateral, decreased BS at bases. No Rales. No wheezing. No rhonchi. Heart[de-identified] Normal  rate. Regular rhythm. No mumur ,  Rub or gallop  ABD: Non-tender. Non-distended. No masses. No organmegaly. Normal bowel sounds. No hernia. Ext : No Pitting both leg , No Cyanosis No clubbing  Neuro: no focal weakness. DATA:   No results for input(s): WBC, HGB, HCT, MCV, PLT in the last 72 hours.   Recent Labs     03/23/21  0604 03/24/21  0554    135   K 4.0 4.0   CL 97 99   CO2 24 24   BUN 18 21*   CREATININE 0.69 0.79   GLUCOSE 378* 382*   CALCIUM 8.8 9.0   ALT  --  73*   LABGLOM >60.0 >60.0   GFRAA >60.0 >60.0       MV Settings:          No results for input(s): PHART, RDE4QIR, PO2ART, RBY5UPE, BEART, D0UCSQQS in the last 72 hours. O2 Device: None (Room air)  O2 Flow Rate (L/min): 2 L/min    DIET CARB CONTROL; Carb Control: 4 carb choices (60 gms)/meal; No Added Salt (3-4 GM);  No Caffeine     MEDICATIONS during current hospitalization:    Continuous Infusions:   dextrose         Scheduled Meds:   insulin lispro  60 Units Subcutaneous TID WC    insulin glargine  140 Units Subcutaneous Nightly    insulin regular  15 Units Intravenous TID AC    lisinopril  10 mg Oral Daily    ketorolac  15 mg Intravenous Once    budesonide-formoterol  2 puff Inhalation BID    insulin lispro  0-18 Units Subcutaneous TID WC    insulin lispro  0-9 Units Subcutaneous Nightly    famotidine  20 mg Oral BID    [Held by provider] atorvastatin  40 mg Oral Nightly    enoxaparin  40 mg Subcutaneous Daily    aspirin  81 mg Oral Daily    sucralfate  1 g Oral 4 times per day    sertraline  100 mg Oral Daily    traZODone  50 mg Oral Nightly    levothyroxine  50 mcg Oral Daily    topiramate  25 mg Oral Daily    pregabalin  75 mg Oral BID    gabapentin  800 mg Oral TID    magnesium oxide  400 mg Oral Daily    [Held by provider] metoprolol succinate  25 mg Oral Daily       PRN Meds:ibuprofen, glucose, dextrose, glucagon (rDNA), dextrose, promethazine **OR** ondansetron, acetaminophen **OR** acetaminophen, potassium chloride **OR** potassium alternative oral replacement **OR** potassium chloride, ondansetron, calcium carbonate, ketorolac    Radiology  Echo Complete 2d W Doppler W Color    Result Date: 3/22/2021  Transthoracic Echocardiography Report (TTE)  Demographics  Patient Name  [address]      Gender             Female                [address]  Patient       [id number]                Race               Other  Number                                        Ethnicity           or                                                             Visit Number  [id number]               Room Number        J229  Corporate ID Date of Study      03/22/2021  Accession     [id number]              Referring  Number                                Physician  Date of Birth 1975              Sonographer        Patricia Yanes YSABEL  Age           50 year(s)              Interpreting       [address] [place] [address] Godwin BATISTA Procedure Type of Study  TTE procedure:ECHOCARDIOGRAM 2D DOPPLER W COLOR W CONTRAST. Procedure Date Date: 03/22/2021 Start: 10:49 AM Study Location: Portable Technical Quality: Poor visualization due to body habitus. Indications:LVF. Patient Status: Routine Contrast Medium: Definity. Amount - 2 ml Height: 72 inches Weight: 317 pounds BSA: 2.59 m^2 BMI: 42.99 kg/m^2 BP: 144/75 mmHg  Conclusions  Summary  Moderate concentric left ventricular hypertrophy. Normal left ventricular size and function. Left ventricular ejection fraction is visually estimated at 55-60%. Normal diastolic parameters. Mildly enlarged right ventricle cavity. Normal RV systolic function. Trivial mitral regurgitation. Aortic valve appears to be tricuspid. Trivial tricuspid regurgitation with estimated RVSP of 28 mm Hg. Small circumferential pericardial effusion without hemodynamic compromise. Signature  ----------------------------------------------------------------  Electronically signed by Godwin Cheney MD(Interpreting  physician) on 03/22/2021 11:51 AM  ----------------------------------------------------------------  Findings Left Ventricle Moderate concentric left ventricular hypertrophy. Normal left ventricular size and function. Left ventricular ejection fraction is visually estimated at 55-60%. Normal diastolic parameters. Right Ventricle Mildly enlarged right ventricle cavity. Normal RV systolic function. Left Atrium Normal left atrium. Right Atrium Normal right atrium. Mitral Valve Trivial mitral regurgitation.  Tricuspid Valve Trivial tricuspid regurgitation with estimated RVSP of 28 mm Hg. Aortic Valve Aortic valve appears to be tricuspid. Pulmonic Valve Normal pulmonic valve structure and function. Pericardial Effusion Small circumferential pericardial effusion without hemodynamic compromise. Pleural Effusion No evidence of pleural effusion. Aorta \ Miscellaneous Miscellaneous normal findings were found. M-Mode Measurements (cm)  LVIDd: 5.71 cm                         LVIDs: 4.22 cm  IVSd: 1.07 cm                          IVSs: 1.42 cm  LVPWd: 1.06 cm                         LVPWs: 1.27 cm  Rt. Vent.  Dimension: 3.08 cm           AO Root Dimension: 2.94 cm                                         ACS: 1.87 cm                                         LA: 4.6 cm                                         LVOT: 2.02 cm Doppler Measurements:  AV Velocity:0.02 m/s                    MV Peak E-Wave: 0.84 m/s  AV Peak Gradient: 5.42 mmHg             MV Peak A-Wave: 0.77 m/s  AV Mean Gradient: 3.11 mmHg  AV Area (Continuity):2.26 cm^2  TR Velocity:2.36 m/s                    Estimated RAP:5 mmHg  TR Gradient:22.25 mmHg                  RVSP:27.25 mmHg Valves  Mitral Valve  Peak E-Wave: 0.84 m/s                Peak A-Wave: 0.77 m/s                                       E/A Ratio: 1.1                                       Peak Gradient: 2.85 mmHg                                       Deceleration Time: 213.9 msec  Tissue Doppler  E' Septal Velocity: 0.12 m/s  E' Lateral Velocity: 0.1 m/s  Aortic Valve  Peak Velocity: 1.16 m/s                Mean Velocity: 0.83 m/s  Peak Gradient: 5.42 mmHg               Mean Gradient: 3.11 mmHg  Area (continuity): 2.26 cm^2           Area (2D): 2.2 cm^2  AV VTI: 24.93 cm  Cusp Separation: 1.87 cm  Tricuspid Valve  Estimated RVSP: 27.25 mmHg              Estimated RAP: 5 mmHg  TR Velocity: 2.36 m/s                   TR Gradient: 22.25 mmHg  Pulmonic Valve  Peak Velocity: 1 m/s           Peak Gradient: 4.03 mmHg                                 Estimated PASP: 27.25 mmHg  LVOT  Peak Velocity: 0.84 m/s             Mean Velocity: 0.69 m/s  Peak Gradient: 2.8 mmHg             Mean Gradient: 1.98 mmHg  LVOT Diameter: 2.02 cm              LVOT VTI: 17.58 cm Structures  Left Atrium  LA Dimension: 4.6 cm  LA/Aorta: 1.56  Left Ventricle  Diastolic Dimension: 9.51 cm          Systolic Dimension: 5.62 cm  Septum Diastolic: 9.77 cm             Septum Systolic: 8.25 cm  PW Diastolic: 5.02 cm                 PW Systolic: 5.77 cm                                        FS: 26.1 %  LV EDV/LV EDV Index: 160.99 ml/62 m^2 LV ESV/LV ESV Index: 79.28 ml/31 m^2  EF Calculated: 50.8 %  LVOT Diameter: 2.02 cm  Right Atrium  RA Systolic Pressure: 5 mmHg  Right Ventricle  Diastolic Dimension: 6.12 cm                                    RV Systolic Pressure: 53.60 mmHg Aorta/ Miscellaneous Aorta  Aortic Root: 2.94 cm  LVOT Diameter: 2.02 cm    Xr Chest (2 Vw)    Result Date: 3/21/2021  EXAMINATION: XR CHEST (2 VW) CLINICAL HISTORY: CHEST PAIN, SHORTNESS OF BREATH COMPARISONS: NOVEMBER 14, 2020 FINDINGS: Osseous structures are intact. Cardiopericardial silhouette is normal. Pulmonary vasculature is normal. Lungs are clear. NO ACUTE CARDIOPULMONARY DISEASE. Us Abdomen Limited    Result Date: 3/22/2021  US ABDOMEN LIMITED : 3/21/2021 CLINICAL HISTORY:  RUQ US for elevated LFTs. COMPARISON: CT abdomen pelvis 4/4/2020. TECHNIQUE: Transabdominal ultrasound of the right upper quadrant was performed. FINDINGS: The study is limited by the patient's body habitus and mild to moderately enlarged moderately echogenic fatty liver. The gallbladder, visualized pancreas, right kidney, and great vessels are unremarkable. There is no significant gallbladder distention, wall thickening, calculi, pericholecystic fluid, biliary dilatation, ascites, or other findings of concern identified. The common duct measures approximately 4 to 5 mm at the anand hepatis. MILD TO MODERATELY ENLARGED FATTY LIVER.  OTHERWISE, NEGATIVE LIMITED RIGHT UPPER QUADRANT ULTRASOUND. IMPRESSION AND SUGGESTION:  1. Obstructive sleep apnea on CPAP  2. Morbid obesity  3. Chest pain reproducible likely musculoskeletal, improved  4. History of asthma, no sign of exacerbation    Continue CPAP during sleep. Symbicort 2 puff twice daily. Possible discharge today. Follow-up with PCP as outpatient. Follow-up in office in 4 weeks for sleep apnea       NOTE: This report was transcribed using voice recognition software. Every effort was made to ensure accuracy; however, inadvertent computerized transcription errors may be present.       Electronically signed by Mitchell Olivarez MD, FCCP on 3/25/2021 at 6:03 PM

## 2021-03-25 NOTE — DISCHARGE SUMMARY
Hospital Medicine Discharge Summary    Franklin Babinski  :  1975  MRN:  [id number]    Admit date:  3/20/2021  Discharge date:  3/25/2021    Admitting Physician:  Carissa Jerez DO  Primary Care Physician:  Amy Valerio MD      Discharge Diagnoses:       Chest pain: Evaluated and addressed by cardiology team.    Obesity    Obstructive sleep apnea, evaluated and managed by pulmonary team.    Asthma, hypoxemia, probable hypoventilation syndrome, probable restrictive lung disease. Evaluated and managed by a pulmonary team.    Diabetes, uncontrolled. Evaluated and managed by endocrinologist.    Metabolic syndrome. Patient has multiple medical issues as listed above and others that are not listed. I do not have clear or strong clinical justification to extend inpatient hospitalization. Patient however would definitely need and require close and frequent monitoring as well as additional work-up, investigation and therapeutic intervention that potentially could take place in the outpatient setting by primary care doctor in collaboration with other specialists. That is to prevent relapse, decompensation, rehospitalization and other medical implications. To accomplish the aforementioned vascular I ordered home health care. Home health care nurse will visit the patient and the collaborate care between the primary care doctor, pulmonologist, cardiologist and endocrinologist.    Hospital Course:   Franklin Babinski is a 50 y.o. female that was admitted and treated at [facility] for the aforementioned medical issues. I deferred needed work-up, investigation and management of this patient to specialists given their expertise in specialty subject matter. Chest pain, negative D-dimer, negative chest x-ray. Negative but inconclusive stress test.  Patient may need to have cardiac catheterization. The patient is to follow-up with Dr. Sonia Castro next week to discuss and arrange. Patient cleared by cardiology team to go home. Shortness of breath, asthma, sleep apnea, hypoventilation syndrome are addressed by pulmonary team.  Pulmonary team cleared the patient to go home. The patient is to follow-up in the outpatient setting. Diabetes, uncontrolled managed by endocrinology team.  Patient will be discharged home if cleared by Dr. Celinda Bamberger. Dr. Celinda Bamberger to address outpatient discharge diabetes meds and insulin. Multiple other medical problems. Patient feels very well today. No alarming signs. Patient will be discharged home only if cleared by all specialists. Please refer to the body of the chart for details. Patient was seen by the following consultants while admitted to [facility]:   Consults:  [address] TO DIETITIAN  IP CONSULT TO GI  IP CONSULT TO PULMONOLOGY  IP CONSULT TO HOME CARE NEEDS  IP CONSULT TO ENDOCRINOLOGY  IP CONSULT TO CARDIOLOGY    Significant Diagnostic Studies:    Xr Chest (2 Vw)    Result Date: 3/21/2021  EXAMINATION: XR CHEST (2 VW) CLINICAL HISTORY: CHEST PAIN, SHORTNESS OF BREATH COMPARISONS: NOVEMBER 14, 2020 FINDINGS: Osseous structures are intact. Cardiopericardial silhouette is normal. Pulmonary vasculature is normal. Lungs are clear. NO ACUTE CARDIOPULMONARY DISEASE. Us Abdomen Limited    Result Date: 3/22/2021  US ABDOMEN LIMITED : 3/21/2021 CLINICAL HISTORY:  RUQ US for elevated LFTs. COMPARISON: CT abdomen pelvis 4/4/2020. TECHNIQUE: Transabdominal ultrasound of the right upper quadrant was performed. FINDINGS: The study is limited by the patient's body habitus and mild to moderately enlarged moderately echogenic fatty liver. The gallbladder, visualized pancreas, right kidney, and great vessels are unremarkable.  There is no significant gallbladder distention, wall thickening, calculi, pericholecystic fluid, biliary dilatation, ascites, or other findings of concern identified. The common duct measures approximately 4 to 5 mm at the anand hepatis. MILD TO MODERATELY ENLARGED FATTY LIVER. OTHERWISE, NEGATIVE LIMITED RIGHT UPPER QUADRANT ULTRASOUND. Discharge Medications:        Insulin and the diabetes meds will be addressed by Dr. Josefina Lnida before discharge if he decides to clear her to go. Janiya Ricketts   [place] Medication Instructions [id number]    Printed on:03/25/21 2029   Medication Information                      ARIPiprazole (ABILIFY PO)  Take  by mouth. BuPROPion HCl (WELLBUTRIN PO)  Take 150 mg by mouth              esomeprazole (NEXIUM) 40 MG delayed release capsule  Take 1 capsule by mouth every morning (before breakfast)             GLIPIZIDE PO  Take  by mouth.              insulin glargine (LANTUS SOLOSTAR) 100 UNIT/ML injection pen  Inject 50 Units into the skin nightly             insulin lispro (HUMALOG KWIKPEN) 200 UNIT/ML SOPN pen  Inject 45 Units into the skin 3 times daily (with meals)             insulin lispro (HUMALOG KWIKPEN) 200 UNIT/ML SOPN pen  Inject 45 Units into the skin 3 times daily (with meals)             INSULIN SYRINGE .5CC/29G 29G X 1/2\" 0.5 ML MISC  1 each by Does not apply route daily             levothyroxine (SYNTHROID) 50 MCG tablet  Take 50 mcg by mouth Daily             lisinopril (PRINIVIL;ZESTRIL) 10 MG tablet  Take 1 tablet by mouth daily             lisinopril (PRINIVIL;ZESTRIL) 20 MG tablet  Take 0.5 tablets by mouth daily             magnesium oxide (MAG-OX) 400 (240 Mg) MG tablet  Take 1 tablet by mouth daily             meloxicam (MOBIC) 15 MG tablet  Take 1 tablet by mouth daily as needed for Pain             METFORMIN HCL PO  Take 500 mg by mouth              metoprolol succinate (TOPROL XL) 25 MG extended release tablet  Take 1 tablet by mouth daily             montelukast (SINGULAIR) 10 MG tablet  Take 10 mg by mouth nightly             nitroGLYCERIN (NITROSTAT) 0.4 MG SL tablet  Place 1 tablet under the tongue every 5 minutes as needed for Chest pain up to max of 3 total doses. If no relief after 1 dose, call 911.             sertraline (ZOLOFT) 100 MG tablet  Take 100 mg by mouth daily             topiramate (TOPAMAX) 25 MG tablet  Take 25 mg by mouth daily             traZODone (DESYREL) 50 MG tablet  Take 50 mg by mouth nightly                 Disposition:   Discharged to [place] [address] at  Home. Any [facility] needs that were indicated and/or required as been addressed and set up by Social Work. Condition at discharge: Pt was medically stable at the time of discharge. Significant improvement in clinical condition compared to initial condition at presentation to hospital    Activity: activity as tolerated, fall precautions. Total time taken for discharging this patient: 40 minutes. Greater than 70% of time was spent focused exclusively on this patient. Time was taken to review chart, discuss plans with consultants, reconciling medications, discussing plan answering questions with patient. Jung López  3/25/2021, 3:14 PM  ----------------------------------------------------------------------------------------------------------------------    Laura Hadley,     Please return to ER or call 911 if you develop any significant signs or symptoms. I may not have addressed all of your medical illnesses or the abnormal blood work or imaging therefore please ask your PCP Wade Mcneal MD and other out patient specialists and providers  to obtain [facility] record entirely to follow up on all of the abnormal labs, imaging and findings that I have and have not addressed during your hospitalization. Discharging you from the hospital does not mean that your medical care ends here and now.  You may still need additional work up, investigation, monitoring, and treatment to be handled from this point on by outside providers including your PCP, Wade Mcneal MD , Specialists and other healthcare providers. Please review your list of discharge medications prior to resuming medications you might still have at home, as the medications you need to be taking, dosages or how often you must take them may have changed. For medication questions, contact your retail pharmacy and your PCP, Brooke Chiang MD .     ** I STRONGLY RECOMMEND that you follow up with Brooke Chiang MD within 3 to 5 days for a post hospitalization evaluation. This specific office visit is covered by your insurance, and is not the same as your annual doctor visit/ check up. This office visit is important, as it may prevent need for repeat and/or future hospitalizations. **    Your medical team at [facility]) appreciates the opportunity to work with you to get well! Sincerely,  Segundo Moreno Follow up with Dr. Brooke Chiang MD in the next 7 days or sooner if needed. Please return to ER or call 911 if you develop any significant signs or symptoms. I may or may not have addressed all of your symptoms, medical issues,  Illnesses, or all of the abnormal blood work or imaging therefore please ask your PCP and other specialists to obtain [facility] record entirely to follow up on all of your symptoms, illnesses, abnormal labs, imaging and findings that I have and have not addressed during your hospitalization. Discharging you from the hospital does not mean that your medical care ends here and now. You may still need to have additional work up, investigation, monitoring, surveillance, and treatment to be handled from this point on by in the out patient setting by  out patient providers including your PCP, Specialists and other healthcare providers. For medication questions, contact your retail pharmacy and your PCP. Your medical team at [facility]) appreciates the opportunity to work with you to get well!     Seugndo Moreno MD

## 2021-03-25 NOTE — FLOWSHEET NOTE
PT NPO after midnight for stress test. Pt's call light within reach. 2304 - Pt medicated with PRN ibuprofen for chest pain.

## 2021-03-25 NOTE — PROCEDURES
[address]                      [address], [address] [facility]                              CARDIAC STRESS TEST    PATIENT NAME: Madeleine Diaz         :        1975  MED REC NO:   [id number]                            ROOM:       [id number]  ACCOUNT NO:   [de-identified]                           ADMIT DATE: 2021  PROVIDER:     Daisy Celeste MD    CARDIOVASCULAR DIAGNOSTIC DEPARTMENT    DATE OF STUDY:  2021    LEXISCAN MYOCARDIAL PERFUSION IMAGING STUDY    INDICATION:  Chest discomfort. RESTING ECG:  Diffuse low voltage, normal sinus rhythm, minor  nonspecific T abnormality. LEXISCAN:  Lexiscan infused to a total dose of 0.4 mg in a rapid bolus  fashion, followed by 35.9 mCi of Myoview. Imaging performed thereafter. The following day, the patient underwent resting imaging where 36 mCi of  Myoview injected and imaging performed thereafter. ECG AND HEMODYNAMICS:  Chronotropic and blood pressure responses to  Lexiscan are normal.  She had no adverse effects. ECG monitoring  revealed no ST-T changes or dysrhythmias during infusion or recovery. LV FUNCTION DATA:  Overall ejection fraction 72% without segmental wall  motion abnormalities. TID is normal at 0.99. PERFUSION STUDY:  Unfortunately perfusion study is hampered by  significant breast attenuation artifact which is worse post stress as  opposed to rest.  This results in a mild defect in the anterolateral  wall and mid anterolateral wall with quantitative analysis suggesting a  6% of the myocardium involved. FINAL IMPRESSION:  1. Normal LV systolic function. 2.  Possible mid LAD distribution, mild ischemia; however, breast  attenuation artifact significantly reduce the specificity and  sensitivity of this finding.   Cannot exclude a mid LAD lesion, but with  less than 10% of the myocardium involved, would consider this a  low-moderate overall risk of cardiac events in the near future. Clinical correlation is advised.         Manju Lovelace MD    D: 03/25/2021 #10:59:12       T: 03/25/2021 11:08:23     [email or web address]  Job#: [id number]     Doc#: [id number]    CC:

## 2021-03-25 NOTE — CONSULTS
Consults                                                                         [facility] HEART CARDIOLOGY CONSULTATION NOTE    PATIENT NAME: Justen Pichardo  PATIENT MRN: [id number]  SERVICE DATE:  3/25/2021  SERVICE TIME: 12:11 PM    PRIMARY CARE PHYSICIAN: Veronique Flores MD  CONSULTING CARDIOLOGIST : Marysol Su MD [facility]  Referring  [address] MD [facility]  ================================================================    REASON FOR CONSULTATION: Dr. Hang Guy my cardiology colleague has requested that I see this patient from an interventional perspective to discuss further testing for chest discomfort, and suboptimal perfusion study imaging ,abnormal study. I have interviewed patient with the help of 's I have discussed with patient's daughter over the telephone I have reviewed history and physical and consultations that have been placed in patient's current medical record. ECHO:   Moderate concentric left ventricular hypertrophy.   Normal left ventricular size and function.   Left ventricular ejection fraction is visually estimated at 55-60%.   Normal diastolic parameters.   Mildly enlarged right ventricle cavity.   Normal RV systolic function.   Trivial mitral regurgitation.   Aortic valve appears to be tricuspid.   Trivial tricuspid regurgitation with estimated RVSP of 28 mm Hg.   Small circumferential pericardial effusion            Stress test :  1. Remarkably low functional capacity for age and inability to walk up  for any duration on the treadmill. 2.  Inadequate level exercise to make a diagnosis of coronary disease,  sensitivity markedly reduced achieving 71% of the target heart rate. 3.  Exertional dyspnea at a very low level of activity. 4.  Excessive chronotropic response at a very low level exercise  consistent with deconditioning. 5.  Need to consider differing imaging modality to assess for the  presence of coronary disease.   At this very low level exercise, she had  no chest pain or EKG changes to suggest ischemia. I have reviewed the history and physical in the cardiology consultation note and the perfusion study report. Patient does not speak any Ling Alexy. This interview with the patient was facilitated by partly the  that we got from the blue screen, partly by Bob Pena the  who speaks very good Niuean, and patient's daughter, as well as Alisa Horton patient's primary nurse. Prolonged process, I tried to explain to her the rationale for a cardiac catheterization, the procedure, the potential risks and the benefits, and that without coronary angiography there is just no way to further assess whether she has significant blockage in her arteries or not. Also discussed multiple risk factors that would contribute to CAD progression such as diabetes which is not controlled at all, with blood sugars running in the 400 range, and her increased BMI. Laboratory data and EKG was reviewed. There is no troponin elevation. EKG shows borderline QT prolongation. Patient recalls having a coronary event, for which she was treated at [facility]. Her presenting symptoms were left anterior chest discomfort, not really similar to her coronary event symptoms, but concerning enough that she sought medical attention. There was some radiation down the left arm. She has chronically decreased exercise tolerance which is partly related to her morbid obesity  She has obstructive sleep apnea. Her blood sugars are running in the 400 range endocrinology is on consult. She also reported palpitations along with her chest discomfort. Review of systems is per chart review, somewhat limited during this interview, however I elicit no lightheadedness presyncope or syncope. There were GI symptoms, upper endoscopy did reveal at least gastritis. Biopsy was not taken.     On physical examination she has a very short neck JVD unable to assess no carotid bruit breath sounds distant lung fields clear heart sounds regular distant no murmur rub or gallop no costochondral or epigastric tenderness abdomen obese extremities no edema distal pulses palpable. Neurologically she is alert has no gross cranial nerve deficits, moves all extremities, gait was not tested. Skin shows no petechia or rash. Assessment:  1. Chest discomfort-etiology unclear possibly multifactorial  2. Multiple cardiac risk factors including morbid obesity and uncontrolled diabetes  3. History of prior coronary event details not available. 4.  Preserved LV systolic function  5. Abnormal perfusion study suggesting possibility of anterior ischemia  6. Gastritis on EGD. 7.  Chronic nonsteroidal usage  8. Language barrier, patient purely Cuban-speaking. 9. She may have supraventricular tachydysrhythmia, she did have palpitations on presentation, she is prone to have atrial fibrillation atrial flutter or any type of supraventricular tachydysrhythmia. 10.  Chronotropic blunting  11. Extremely low functional capacity  12. Gastritis on EGD   13.  Hives to shellfish        Recommendations:  1. After extensive discussion with patient and daughter with the help of  and Sundeep Lee, patient will be scheduled for an office visit with me next week, at which time we will go over everything once again, daughter expressed a desire to be there for this. At that time I will reiterate again my thoughts about additional testing to include coronary angiography. Patient's clinical presentation is now excessive suggestive of acute coronary syndrome. Troponin is negative and EKG does not show any acute or diagnostic ST-T abnormality. Her blood sugars are also significantly elevated. Preferable to get this better prior to any type of invasive procedure requiring contrast.  2.  Continue beta-blockers and amlodipine.   3.  Prefer to keep patient on enteric-coated aspirin if okay from GI perspective. 4.  Minimize use of nonsteroidal agents  5. Calling the office to schedule an office visit next week at which time further recommendations can be made. 6.  Talked briefly about weight loss. 7.  Patient and family were told that if symptoms escalate they need to seek emergent medical attention while awaiting further testing. 8.  Keep potassium greater than 4 and magnesium greater than 2. Needs close follow-up with primary cardiology sleep medicine and endocrinology. 9.  Preferable to replace potassium supplementation if needed with Aldactone given her GI issues. 10.  Will need premedication for coronary angiography    Patient can be discharged home from cardiac perspective. I have contacted [facility] personally, the office will make a phone call and have patient scheduled for me to see next week in office. Discussed with Dr. David Simmons.   He is in agreement with the plan    I thank you Dr. David Simmons for allowing me to participate in this patient's care, please do not hesitate to call if further questions arise,  Sincerely,    Lucinda Al MD [address] [id number] HISTORY:    Past Medical History:   Diagnosis Date    Asthma     Chronic abdominal pain     Depression     Hyperlipidemia     Hypertension     NATALEE (obstructive sleep apnea)     Schizophrenia ([facility] [address]     Type II or unspecified type diabetes mellitus without mention of complication, not stated as uncontrolled        PAST SURGICAL HISTORY:  Past Surgical History:   Procedure Laterality Date    COLONOSCOPY  2/21/14    [email or web address]    UPPER GASTROINTESTINAL ENDOSCOPY  2/21/14    [email or web address]    UPPER GASTROINTESTINAL ENDOSCOPY N/A 3/22/2021    EGD DIAGNOSTIC ONLY performed by Megan Jay MD at [address] [facility]:    Family History   Problem Relation Age of Onset    Other Mother         Diverticulitis    Colon Cancer Paternal Uncle     Colon Cancer Maternal Grandfather        SOCIAL HISTORY:    Social History     Socioeconomic History    Marital status: Single     Spouse name: Not on file    Number of children: Not on file    Years of education: Not on file    Highest education level: Not on file   Occupational History    Not on file   Social Needs    Financial resource strain: Not on file    Food insecurity     Worry: Not on file     Inability: Not on file    Transportation needs     Medical: Not on file     Non-medical: Not on file   Tobacco Use    Smoking status: Former Smoker     Packs/day: 1.00     Types: Cigarettes    Smokeless tobacco: Never Used   Substance and Sexual Activity    Alcohol use: No    Drug use: No    Sexual activity: Not on file   Lifestyle    Physical activity     Days per week: Not on file     Minutes per session: Not on file    Stress: Not on file   Relationships    Social connections     Talks on phone: Not on file     Gets together: Not on file     Attends Mandaeism service: Not on file     Active member of club or organization: Not on file     Attends meetings of clubs or organizations: Not on file     Relationship status: Not on file    Intimate partner violence     Fear of current or ex partner: Not on file     Emotionally abused: Not on file     Physically abused: Not on file     Forced sexual activity: Not on file   Other Topics Concern    Not on file   Social History Narrative    Lives w brother       MEDICATIONS:  Current Facility-Administered Medications   Medication Dose Route Frequency Provider Last Rate Last Admin    insulin glargine (LANTUS) injection vial 120 Units  120 Units Subcutaneous Nightly Quinten Brown MD        insulin lispro (HUMALOG) injection vial 50 Units  50 Units Subcutaneous TID  Mathew Olsen MD   50 Units at 03/25/21 1210    ketorolac (TORADOL) injection 15 mg  15 mg Intravenous Once Preeti Luna MD        ibuprofen (ADVIL;MOTRIN) tablet 400 mg  400 mg Oral Q4H PRN Preeti Luna MD   400 mg at 03/25/21 3088    budesonide-formoterol (SYMBICORT) 80-4.5 MCG/ACT inhaler 2 puff  2 puff Inhalation BID Marilou Hannah MD   2 puff at 03/24/21 2042    glucose (GLUTOSE) 40 % oral gel 15 g  15 g Oral PRN Marlinda Loth Sedar, DO        dextrose 50 % IV solution  12.5 g Intravenous PRN Marlinda Loth Sedar, DO        glucagon (rDNA) injection 1 mg  1 mg Intramuscular PRN Marlinda Loth Sedar, DO        dextrose 5 % solution  100 mL/hr Intravenous PRN Marlinda Loth Sedar, DO        insulin lispro (HUMALOG) injection vial 0-18 Units  0-18 Units Subcutaneous TID  Parviz D Sedar, DO   15 Units at 03/24/21 1659    insulin lispro (HUMALOG) injection vial 0-9 Units  0-9 Units Subcutaneous Nightly Marlinda Loth Sedar, DO   7 Units at 03/24/21 1952    famotidine (PEPCID) tablet 20 mg  20 mg Oral BID Marlinda Loth Sedar, DO   20 mg at 03/24/21 1951    promethazine (PHENERGAN) tablet 12.5 mg  12.5 mg Oral Q6H PRN Marlinda Loth Sedar, DO        Or    ondansetron [facility]) injection 4 mg  4 mg Intravenous Q6H PRN Marlinda Loth Sedar, DO   4 mg at 03/21/21 0202    acetaminophen (TYLENOL) tablet 650 mg  650 mg Oral Q6H PRN Marlinda Loth Sedar, DO   650 mg at 03/22/21 0149    Or    acetaminophen (TYLENOL) suppository 650 mg  650 mg Rectal Q6H PRN Marlinda Loth Sedar, DO        potassium chloride (KLOR-CON M) extended release tablet 40 mEq  40 mEq Oral PRN Marlinda Loth Sedar, DO        Or    potassium bicarb-citric acid (EFFER-K) effervescent tablet 40 mEq  40 mEq Oral PRN Marlinda Loth Sedar, DO        Or    potassium chloride 10 mEq/100 mL IVPB (Peripheral Line)  10 mEq Intravenous PRN Marlinda Loth Sedar, DO        [Held by provider] atorvastatin (LIPITOR) tablet 40 mg  40 mg Oral Nightly Parviz D Sedar, DO   40 mg at 03/21/21 2030    enoxaparin (LOVENOX) injection 40 mg  40 mg Subcutaneous Daily [place] Sedar, DO   40 mg at 03/24/21 1151    aspirin EC tablet 81 mg  81 mg Oral Daily Parviz SOLANO Sedar, DO   81 mg at 03/24/21 0820    sucralfate (CARAFATE) tablet 1 g  1 g Oral 4 times per day Emmanuel Duncan, DO   1 g at 03/24/21 6235  sertraline (ZOLOFT) tablet 100 mg  100 mg Oral Daily Patrick Hortensia Sedar, DO   100 mg at 03/24/21 0820    traZODone (DESYREL) tablet 50 mg  50 mg Oral Nightly Patrick Peer Sedar, DO   50 mg at 03/24/21 1952    levothyroxine (SYNTHROID) tablet 50 mcg  50 mcg Oral Daily Patrick Peer Sedar, DO   50 mcg at 03/25/21 4051    topiramate (TOPAMAX) tablet 25 mg  25 mg Oral Daily Parviz SOLANO Sedar, DO   25 mg at 03/24/21 0827    ondansetron (ZOFRAN) tablet 4 mg  4 mg Oral Q8H PRN Patrick Hortensia Sedar, DO        pregabalin (LYRICA) capsule 75 mg  75 mg Oral BID Patrick Hortensia Sedar, DO   75 mg at 03/24/21 1951    calcium carbonate (TUMS) chewable tablet 1,000 mg  1,000 mg Oral TID PRN Patrick Duncan, DO   1,000 mg at 03/23/21 9151    gabapentin (NEURONTIN) capsule 800 mg  800 mg Oral TID Larry Jimenez MD   800 mg at 03/24/21 1952    magnesium oxide (MAG-OX) tablet 400 mg  400 mg Oral Daily Valentin Blanca MD   400 mg at 03/24/21 7039    [Held by provider] metoprolol succinate (TOPROL XL) extended release tablet 25 mg  25 mg Oral Daily Valentin Blanca MD   25 mg at 03/23/21 0935    ketorolac (TORADOL) injection 15 mg  15 mg Intravenous Q8H PRN Valentin Blanca MD   15 mg at 03/23/21 1611         ALLERGIES AND DRUG INTOLERANCES:   Allergies   Allergen Reactions    Shellfish-Derived Products Anaphylaxis    Amoxicillin Swelling    Macrobid [Nitrofurantoin Monohyd Macro]     Reglan [Metoclopramide]     Singulair [Montelukast]     Nubain [Nalbuphine Hcl] Swelling and Rash       ROS:   Review of Systems  Per H&P     BP 95/62   Pulse 72   Temp 97 °F (36.1 °C) (Axillary)   Resp 18   Ht 6' (1.829 m)   Wt (!) 311 lb 8.2 oz (141.3 kg)   SpO2 95%   BMI 42.25 kg/m²     Physical Exam  Per note above    EKG:  EKG: NSR,mild QT prolongation. No diagnostic ST/T abnormality. .    Imaging results:      Result Date: 3/21/2021  EXAMINATION: XR CHEST (2 VW) CLINICAL HISTORY: CHEST PAIN, SHORTNESS OF BREATH COMPARISONS: NOVEMBER 14, 2020 FINDINGS: Osseous structures are intact. Cardiopericardial silhouette is normal. Pulmonary vasculature is normal. Lungs are clear. NO ACUTE CARDIOPULMONARY DISEASE. Us Abdomen Limited    Result Date: 3/22/2021  US ABDOMEN LIMITED : 3/21/2021 CLINICAL HISTORY:  RUQ US for elevated LFTs. COMPARISON: CT abdomen pelvis 4/4/2020. TECHNIQUE: Transabdominal ultrasound of the right upper quadrant was performed. FINDINGS: The study is limited by the patient's body habitus and mild to moderately enlarged moderately echogenic fatty liver. The gallbladder, visualized pancreas, right kidney, and great vessels are unremarkable. There is no significant gallbladder distention, wall thickening, calculi, pericholecystic fluid, biliary dilatation, ascites, or other findings of concern identified. The common duct measures approximately 4 to 5 mm at the anand hepatis. MILD TO MODERATELY ENLARGED FATTY LIVER. OTHERWISE, NEGATIVE LIMITED RIGHT UPPER QUADRANT ULTRASOUND. LABS:  Recent Labs     03/23/21  0604 03/24/21  0554    135   K 4.0 4.0   CO2 24 24   BUN 18 21*   MG 2.3  --      CBC: No results for input(s): WBC, HGB, HCT, PLT in the last 72 hours. BMP:  Recent Labs     03/23/21  0604 03/24/21  0554    135   K 4.0 4.0   CL 97 99   CO2 24 24   BUN 18 21*   CREATININE 0.69 0.79   LABGLOM >60.0 >60.0     ABGs: No results found for: PH, PO2, PCO2  INR: No results for input(s): INR in the last 72 hours. PRO-BNP:   Lab Results   Component Value Date    PROBNP 51 03/20/2021    PROBNP 41 12/18/2015      TSH:   Lab Results   Component Value Date    TSH 3.490 03/20/2021      Cardiac Injury Profile: No results for input(s): CKTOTAL, CKMB, TROPONINI in the last 72 hours.    Lipid Profile:   Lab Results   Component Value Date    TRIG 171 03/22/2021    HDL 45 03/22/2021    LDLCALC 115 03/22/2021    CHOL 194 03/22/2021      Hemoglobin A1C: No components found for: HGBA1C       Intake/Output Summary (Last 24 hours) at 3/25/2021 [address] [facility] filed at 3/24/2021 2148  Gross per 24 hour   Intake 360 ml   Output --   Net 360 ml          ================================================================      Thank you for your consideration for this consultation.     SIGNATURE: Electronically signed by Radames Palma MD on 3/25/2021 at 12:11 PM

## 2021-03-26 VITALS
TEMPERATURE: 98.1 F | OXYGEN SATURATION: 97 % | RESPIRATION RATE: 16 BRPM | HEART RATE: 74 BPM | SYSTOLIC BLOOD PRESSURE: 130 MMHG | HEIGHT: 72 IN | DIASTOLIC BLOOD PRESSURE: 40 MMHG | BODY MASS INDEX: 39.68 KG/M2 | WEIGHT: 293 LBS

## 2021-03-26 LAB
GLUCOSE BLD-MCNC: 222 MG/DL (ref 60–115)
GLUCOSE BLD-MCNC: 297 MG/DL (ref 60–115)
GLUCOSE BLD-MCNC: 313 MG/DL (ref 60–115)
PERFORMED ON: ABNORMAL

## 2021-03-26 PROCEDURE — 6370000000 HC RX 637 (ALT 250 FOR IP): Performed by: INTERNAL MEDICINE

## 2021-03-26 PROCEDURE — 94640 AIRWAY INHALATION TREATMENT: CPT

## 2021-03-26 PROCEDURE — G0378 HOSPITAL OBSERVATION PER HR: HCPCS

## 2021-03-26 PROCEDURE — 99232 SBSQ HOSP IP/OBS MODERATE 35: CPT | Performed by: INTERNAL MEDICINE

## 2021-03-26 PROCEDURE — 2700000000 HC OXYGEN THERAPY PER DAY

## 2021-03-26 PROCEDURE — 96372 THER/PROPH/DIAG INJ SC/IM: CPT

## 2021-03-26 PROCEDURE — 6360000002 HC RX W HCPCS: Performed by: INTERNAL MEDICINE

## 2021-03-26 PROCEDURE — 94761 N-INVAS EAR/PLS OXIMETRY MLT: CPT

## 2021-03-26 RX ORDER — INSULIN GLARGINE 100 [IU]/ML
120 INJECTION, SOLUTION SUBCUTANEOUS NIGHTLY
Status: DISCONTINUED | OUTPATIENT
Start: 2021-03-26 | End: 2021-03-26 | Stop reason: HOSPADM

## 2021-03-26 RX ADMIN — INSULIN LISPRO 12 UNITS: 100 INJECTION, SOLUTION INTRAVENOUS; SUBCUTANEOUS at 09:33

## 2021-03-26 RX ADMIN — IBUPROFEN 400 MG: 200 TABLET, FILM COATED ORAL at 12:08

## 2021-03-26 RX ADMIN — MAGNESIUM OXIDE TAB 400 MG (240 MG ELEMENTAL MG) 400 MG: 400 (240 MG) TAB at 09:29

## 2021-03-26 RX ADMIN — MAGNESIUM HYDROXIDE 30 ML: 400 SUSPENSION ORAL at 11:11

## 2021-03-26 RX ADMIN — TOPIRAMATE 25 MG: 25 TABLET, FILM COATED ORAL at 09:29

## 2021-03-26 RX ADMIN — SERTRALINE 100 MG: 100 TABLET, FILM COATED ORAL at 09:29

## 2021-03-26 RX ADMIN — BUDESONIDE AND FORMOTEROL FUMARATE DIHYDRATE 2 PUFF: 80; 4.5 AEROSOL RESPIRATORY (INHALATION) at 07:55

## 2021-03-26 RX ADMIN — FAMOTIDINE 20 MG: 20 TABLET ORAL at 09:29

## 2021-03-26 RX ADMIN — PREGABALIN 75 MG: 75 CAPSULE ORAL at 09:29

## 2021-03-26 RX ADMIN — LEVOTHYROXINE SODIUM 50 MCG: 0.05 TABLET ORAL at 05:15

## 2021-03-26 RX ADMIN — GABAPENTIN 800 MG: 400 CAPSULE ORAL at 09:29

## 2021-03-26 RX ADMIN — ASPIRIN 81 MG: 81 TABLET, COATED ORAL at 09:29

## 2021-03-26 RX ADMIN — INSULIN LISPRO 9 UNITS: 100 INJECTION, SOLUTION INTRAVENOUS; SUBCUTANEOUS at 12:21

## 2021-03-26 RX ADMIN — SUCRALFATE 1 G: 1 TABLET ORAL at 12:08

## 2021-03-26 RX ADMIN — INSULIN HUMAN 15 UNITS: 100 INJECTION, SOLUTION PARENTERAL at 09:34

## 2021-03-26 RX ADMIN — ENOXAPARIN SODIUM 40 MG: 40 INJECTION SUBCUTANEOUS at 09:33

## 2021-03-26 RX ADMIN — SUCRALFATE 1 G: 1 TABLET ORAL at 05:15

## 2021-03-26 RX ADMIN — INSULIN LISPRO 6 UNITS: 100 INJECTION, SOLUTION INTRAVENOUS; SUBCUTANEOUS at 17:15

## 2021-03-26 ASSESSMENT — PAIN SCALES - GENERAL
PAINLEVEL_OUTOF10: 10
PAINLEVEL_OUTOF10: 0

## 2021-03-26 NOTE — FLOWSHEET NOTE
Medicated with 400mg PO ibuprofen for complaints of 10/10 chest pain/left arm pain. Dr Viri Martinez aware. No signs of any acute distress noted. Call light remains in reach.  Electronically signed by Daphne Ramirez RN on 3/26/2021 at 12:10 PM

## 2021-03-26 NOTE — FLOWSHEET NOTE
Reviewed discharge instructions with patient and her daughter. Discussed home going care, follow up appointments and new medications/medication changes. Printed information provided regarding new medications. All questions answered. Taken to main entrance via wheelchair for discharge to home.  Electronically signed by Karen Young RN on 3/26/2021 at 5:45 PM

## 2021-03-26 NOTE — DISCHARGE INSTR - DIET

## 2021-03-26 NOTE — FLOWSHEET NOTE
2328 - Pt medicated with ibuprofen for pain. Pt did complain of chest pain a few times during night. Pt's call light within reach. Pt also wore a Bipap for part of night.

## 2021-03-26 NOTE — PROGRESS NOTES
PT/INR:No results for input(s): PROTIME, INR in the last 72 hours. APTT:No results for input(s): APTT in the last 72 hours. LIVER PROFILE:  Recent Labs     03/24/21  0554   ALT 73*       Imaging Last 24 Hours:  No results found. Assessment//Plan           Hospital Problems           Last Modified POA    Chest pain 3/20/2021 Yes        Assessment:    Condition: In stable condition. Unchanged. (Uncontrolled diabetes   Morbid obesity   chest pain   Poor compliance). Plan:   Discharge home. ([id number] Maddie Lamb to d/c pt home on lantus 120 units at bedtime plus Humalog 50 units at each meals   F/u in 2 weeks at office   Diabetes  education done ).        Electronically signed by Monica Dickey MD on 3/26/21 at 5:39 PM EDT

## 2021-03-26 NOTE — PROGRESS NOTES
INPATIENT PROGRESS NOTES    PATIENT NAME: Alexandre Humphreys  MRN: [id number]  SERVICE DATE:  March 26, 2021   SERVICE TIME:  2:15 PM      PRIMARY SERVICE: Pulmonary Disease    CHIEF COMPLAIN: Sleep apnea      INTERVAL HPI: Patient seen and examined at bedside, Interval Notes, orders reviewed. Nursing notes noted  Patient did not go home yesterday due to chest pain. Probably musculoskeletal.  She is using CPAP therapy during nighttime. She does not have oxygen at home. O2 saturation 94%. On room air. She is having shortness of breath with exertion. No fever or chills. OBJECTIVE    Body mass index is 41.32 kg/m². PHYSICAL EXAM:  Vitals:  BP 99/63   Pulse 60   Temp 97.4 °F (36.3 °C) (Oral)   Resp 16   Ht 6' (1.829 m)   Wt (!) 304 lb 10.8 oz (138.2 kg)   SpO2 94%   BMI 41.32 kg/m²   General: Morbidly obese, alert, awake . comfortable in bed, No distress. Head: Atraumatic , Normocephalic   Eyes: PERRL. No sclera icterus. No conjunctival injection. No discharge   ENT: No nasal  discharge. Pharynx clear. Neck:  Trachea midline. No thyromegaly, no JVD, No cervical adenopathy. Chest : Bilaterally symmetrical ,Normal effort,  No accessory muscle use  Lung : . Fair BS bilateral, decreased BS at bases. No Rales. No wheezing. No rhonchi. Heart[de-identified] Normal  rate. Regular rhythm. No mumur ,  Rub or gallop  ABD: Non-tender. Non-distended. No masses. No organmegaly. Normal bowel sounds. No hernia. Ext : No Pitting both leg , No Cyanosis No clubbing  Neuro: no focal weakness. DATA:   No results for input(s): WBC, HGB, HCT, MCV, PLT in the last 72 hours. Recent Labs     03/24/21  0554      K 4.0   CL 99   CO2 24   BUN 21*   CREATININE 0.79   GLUCOSE 382*   CALCIUM 9.0   ALT 73*   LABGLOM >60.0   GFRAA >60.0       MV Settings:          No results for input(s): PHART, RRI9YLS, PO2ART, QQF0NIN, BEART, P6LJHENZ in the last 72 hours.     O2 Device: PAP (positive airway pressure)  O2 Flow Rate (L/min): 2 L/min    DIET CARB CONTROL; Carb Control: 4 carb choices (60 gms)/meal; No Added Salt (3-4 GM);  No Caffeine     MEDICATIONS during current hospitalization:    Continuous Infusions:   dextrose         Scheduled Meds:   insulin lispro  60 Units Subcutaneous TID WC    insulin glargine  140 Units Subcutaneous Nightly    insulin regular  15 Units Intravenous TID AC    lisinopril  10 mg Oral Daily    ketorolac  15 mg Intravenous Once    budesonide-formoterol  2 puff Inhalation BID    insulin lispro  0-18 Units Subcutaneous TID WC    insulin lispro  0-9 Units Subcutaneous Nightly    famotidine  20 mg Oral BID    [Held by provider] atorvastatin  40 mg Oral Nightly    enoxaparin  40 mg Subcutaneous Daily    aspirin  81 mg Oral Daily    sucralfate  1 g Oral 4 times per day    sertraline  100 mg Oral Daily    traZODone  50 mg Oral Nightly    levothyroxine  50 mcg Oral Daily    topiramate  25 mg Oral Daily    pregabalin  75 mg Oral BID    gabapentin  800 mg Oral TID    magnesium oxide  400 mg Oral Daily    [Held by provider] metoprolol succinate  25 mg Oral Daily       PRN Meds:magnesium hydroxide, ibuprofen, glucose, dextrose, glucagon (rDNA), dextrose, promethazine **OR** ondansetron, acetaminophen **OR** acetaminophen, potassium chloride **OR** potassium alternative oral replacement **OR** potassium chloride, ondansetron, calcium carbonate, ketorolac    Radiology  Echo Complete 2d W Doppler W Color    Result Date: 3/22/2021  Transthoracic Echocardiography Report (TTE)  Demographics  Patient Name  [address]      Gender             Female                [address] 84  Patient       [id number]                Race               Other  Number                                        Ethnicity           or                                                             Visit Number  [id number]               Room Number        V416  Corporate ID                          Date of Study 03/22/2021  Accession     [id number]              Referring  Number                                Physician  Date of Birth 1975              Sonographer        [facility] [address]  Age           50 year(s)              Interpreting       [address] [place] [address] Godwin BATISTA Procedure Type of Study  TTE procedure:ECHOCARDIOGRAM 2D DOPPLER W COLOR W CONTRAST. Procedure Date Date: 03/22/2021 Start: 10:49 AM Study Location: Portable Technical Quality: Poor visualization due to body habitus. Indications:LVF. Patient Status: Routine Contrast Medium: Definity. Amount - 2 ml Height: 72 inches Weight: 317 pounds BSA: 2.59 m^2 BMI: 42.99 kg/m^2 BP: 144/75 mmHg  Conclusions  Summary  Moderate concentric left ventricular hypertrophy. Normal left ventricular size and function. Left ventricular ejection fraction is visually estimated at 55-60%. Normal diastolic parameters. Mildly enlarged right ventricle cavity. Normal RV systolic function. Trivial mitral regurgitation. Aortic valve appears to be tricuspid. Trivial tricuspid regurgitation with estimated RVSP of 28 mm Hg. Small circumferential pericardial effusion without hemodynamic compromise. Signature  ----------------------------------------------------------------  Electronically signed by Godwin Kim MD(Interpreting  physician) on 03/22/2021 11:51 AM  ----------------------------------------------------------------  Findings Left Ventricle Moderate concentric left ventricular hypertrophy. Normal left ventricular size and function. Left ventricular ejection fraction is visually estimated at 55-60%. Normal diastolic parameters. Right Ventricle Mildly enlarged right ventricle cavity. Normal RV systolic function. Left Atrium Normal left atrium. Right Atrium Normal right atrium. Mitral Valve Trivial mitral regurgitation. Tricuspid Valve Trivial tricuspid regurgitation with estimated RVSP of 28 mm Hg.  Aortic Valve Aortic valve appears to be tricuspid. Pulmonic Valve Normal pulmonic valve structure and function. Pericardial Effusion Small circumferential pericardial effusion without hemodynamic compromise. Pleural Effusion No evidence of pleural effusion. Aorta \ Miscellaneous Miscellaneous normal findings were found. M-Mode Measurements (cm)  LVIDd: 5.71 cm                         LVIDs: 4.22 cm  IVSd: 1.07 cm                          IVSs: 1.42 cm  LVPWd: 1.06 cm                         LVPWs: 1.27 cm  Rt. Vent.  Dimension: 3.08 cm           AO Root Dimension: 2.94 cm                                         ACS: 1.87 cm                                         LA: 4.6 cm                                         LVOT: 2.02 cm Doppler Measurements:  AV Velocity:0.02 m/s                    MV Peak E-Wave: 0.84 m/s  AV Peak Gradient: 5.42 mmHg             MV Peak A-Wave: 0.77 m/s  AV Mean Gradient: 3.11 mmHg  AV Area (Continuity):2.26 cm^2  TR Velocity:2.36 m/s                    Estimated RAP:5 mmHg  TR Gradient:22.25 mmHg                  RVSP:27.25 mmHg Valves  Mitral Valve  Peak E-Wave: 0.84 m/s                Peak A-Wave: 0.77 m/s                                       E/A Ratio: 1.1                                       Peak Gradient: 2.85 mmHg                                       Deceleration Time: 213.9 msec  Tissue Doppler  E' Septal Velocity: 0.12 m/s  E' Lateral Velocity: 0.1 m/s  Aortic Valve  Peak Velocity: 1.16 m/s                Mean Velocity: 0.83 m/s  Peak Gradient: 5.42 mmHg               Mean Gradient: 3.11 mmHg  Area (continuity): 2.26 cm^2           Area (2D): 2.2 cm^2  AV VTI: 24.93 cm  Cusp Separation: 1.87 cm  Tricuspid Valve  Estimated RVSP: 27.25 mmHg              Estimated RAP: 5 mmHg  TR Velocity: 2.36 m/s                   TR Gradient: 22.25 mmHg  Pulmonic Valve  Peak Velocity: 1 m/s           Peak Gradient: 4.03 mmHg                                 Estimated PASP: 27.25 mmHg  LVOT  Peak Velocity: 0.84 m/s Mean Velocity: 0.69 m/s  Peak Gradient: 2.8 mmHg             Mean Gradient: 1.98 mmHg  LVOT Diameter: 2.02 cm              LVOT VTI: 17.58 cm Structures  Left Atrium  LA Dimension: 4.6 cm  LA/Aorta: 1.56  Left Ventricle  Diastolic Dimension: 4.66 cm          Systolic Dimension: 6.76 cm  Septum Diastolic: 1.97 cm             Septum Systolic: 0.95 cm  PW Diastolic: 2.05 cm                 PW Systolic: 5.48 cm                                        FS: 26.1 %  LV EDV/LV EDV Index: 160.99 ml/62 m^2 LV ESV/LV ESV Index: 79.28 ml/31 m^2  EF Calculated: 50.8 %  LVOT Diameter: 2.02 cm  Right Atrium  RA Systolic Pressure: 5 mmHg  Right Ventricle  Diastolic Dimension: 9.36 cm                                    RV Systolic Pressure: 07.98 mmHg Aorta/ Miscellaneous Aorta  Aortic Root: 2.94 cm  LVOT Diameter: 2.02 cm    Xr Chest (2 Vw)    Result Date: 3/21/2021  EXAMINATION: XR CHEST (2 VW) CLINICAL HISTORY: CHEST PAIN, SHORTNESS OF BREATH COMPARISONS: NOVEMBER 14, 2020 FINDINGS: Osseous structures are intact. Cardiopericardial silhouette is normal. Pulmonary vasculature is normal. Lungs are clear. NO ACUTE CARDIOPULMONARY DISEASE. Us Abdomen Limited    Result Date: 3/22/2021  US ABDOMEN LIMITED : 3/21/2021 CLINICAL HISTORY:  RUQ US for elevated LFTs. COMPARISON: CT abdomen pelvis 4/4/2020. TECHNIQUE: Transabdominal ultrasound of the right upper quadrant was performed. FINDINGS: The study is limited by the patient's body habitus and mild to moderately enlarged moderately echogenic fatty liver. The gallbladder, visualized pancreas, right kidney, and great vessels are unremarkable. There is no significant gallbladder distention, wall thickening, calculi, pericholecystic fluid, biliary dilatation, ascites, or other findings of concern identified. The common duct measures approximately 4 to 5 mm at the anand hepatis. MILD TO MODERATELY ENLARGED FATTY LIVER. OTHERWISE, NEGATIVE LIMITED RIGHT UPPER QUADRANT ULTRASOUND. IMPRESSION AND SUGGESTION:  1. Obstructive sleep apnea on CPAP  2. Morbid obesity  3. Chest pain reproducible likely musculoskeletal, improved  4. History of asthma, no sign of exacerbation    Continue CPAP during sleep. Symbicort 2 puff twice daily. Discharge as per cardiology and attending. Follow-up with PCP as outpatient. Follow-up in office with me in 4 weeks for sleep apnea after discharge      NOTE: This report was transcribed using voice recognition software. Every effort was made to ensure accuracy; however, inadvertent computerized transcription errors may be present.       Electronically signed by Angie Potter MD, FCCP on 3/26/2021 at 2:15 PM

## 2021-03-26 NOTE — FLOWSHEET NOTE
Patient medicated with 30ml PO milk of magnesia to assist with BM. She is also complaining of 10/10 chest pain and left arm pain. Message sent to Dr Enoch Ramirez because she does not want to go home. Awaiting response to see what he wants me to do.  Electronically signed by Dayana Santacruz RN on 3/26/2021 at 11:22 AM

## 2021-03-26 NOTE — CARE COORDINATION
KERI  [facility], PT FOR DISCHARGE TODAY. PER CARDIOLOGY PT TO FOLLOW UP WITH  [facility] AS OP. PT DID NOT QUALIFY FOR OXYGEN.

## 2021-03-26 NOTE — FLOWSHEET NOTE
AM assessment completed. Patient resting in bed at this time. She denies any chest pain at this time. Remains NSR on the monitor. +1 non-pitting edema noted to bilateral lower extremities. Pedal pulses palpable. Denies any shortness of breath at this time. Lungs are diminished bilaterally. SATS 94% on RA. She is A/Ox3 and can be up independently in the room. Denies any pain with elimination. Last BM 6 days ago. I will message primary for PRN medication to assist with BM. Skin remains warm, dry and intact. #20g SL to right hand, flushed and patent. Vital signs stable and AM medications provided. Call light remains in reach.  Electronically signed by Megan Wong RN on 3/26/2021 at 11:19 AM

## 2021-03-28 NOTE — PROGRESS NOTES
Hospitalist Progress Note      Date of Admission: 3/20/2021  Chief Complaint:    Chief Complaint   Patient presents with    Chest Pain    Shortness of Breath     Subjective:  Resting comfortably    Medications:    Infusion Medications   Scheduled Medications   PRN Meds:   No intake or output data in the 24 hours ending 03/27/21 2302  Exam:  BP (!) 130/40   Pulse 74   Temp 98.1 °F (36.7 °C) (Oral)   Resp 16   Ht 6' (1.829 m)   Wt (!) 304 lb 10.8 oz (138.2 kg)   SpO2 97%   BMI 41.32 kg/m²   Head: Normocephalic, atraumatic  Sclera clear  Neck JVD flat  Lungs: normal effort of breathing    Labs:   No results for input(s): WBC, HGB, HCT, PLT in the last 72 hours. No results for input(s): NA, K, CL, CO2, BUN, CREATININE, CALCIUM, PHOS, AST, ALT, BILIDIR, BILITOT, ALKPHOS in the last 72 hours. Invalid input(s): MAGNES  No results for input(s): INR in the last 72 hours. No results for input(s): Rosa Highman in the last 72 hours. Radiology:  US ABDOMEN LIMITED   Final Result      MILD TO MODERATELY ENLARGED FATTY LIVER. OTHERWISE, NEGATIVE LIMITED RIGHT UPPER QUADRANT ULTRASOUND. XR CHEST (2 VW)   Final Result   NO ACUTE CARDIOPULMONARY DISEASE. NM MYOCARDIAL SPECT REST EXERCISE OR RX    (Results Pending)     Assessment/Plan:    Chest pain, seen by cardiology    Unlikely of cardiac etiology. Patient to follow up outpt. Bedside rn updated. dc'd once cleared by call consultants.      25 minutes total care time, >1/2 in unit/floor time and care coordination      Felix Farley MD

## 2021-04-02 ENCOUNTER — TELEPHONE (OUTPATIENT)
Dept: DIABETES SERVICES | Age: 46
End: 2021-04-02

## 2021-04-02 NOTE — PROGRESS NOTES
Using [employer]  # [id number] left a vm regarding this is a follow up call post hospital referral for Diabetes Education. Informed patient we have Diabetes Education and left our call back phone number. Pt may follow with [facility] endocrine possibly.

## 2021-07-20 ENCOUNTER — HOSPITAL ENCOUNTER (EMERGENCY)
Age: 46
Discharge: HOME | End: 2021-07-20
Payer: COMMERCIAL

## 2021-07-20 ENCOUNTER — APPOINTMENT (OUTPATIENT)
Dept: GENERAL RADIOLOGY | Age: 46
End: 2021-07-20
Payer: COMMERCIAL

## 2021-07-20 VITALS
DIASTOLIC BLOOD PRESSURE: 86 MMHG | BODY MASS INDEX: 39.68 KG/M2 | WEIGHT: 293 LBS | RESPIRATION RATE: 16 BRPM | OXYGEN SATURATION: 94 % | HEART RATE: 97 BPM | TEMPERATURE: 97.9 F | SYSTOLIC BLOOD PRESSURE: 142 MMHG | HEIGHT: 72 IN

## 2021-07-20 DIAGNOSIS — R73.9 HYPERGLYCEMIA: Primary | ICD-10-CM

## 2021-07-20 LAB
ALBUMIN SERPL-MCNC: 4 G/DL (ref 3.5–4.6)
ALP BLD-CCNC: 165 U/L (ref 40–130)
ALT SERPL-CCNC: 99 U/L (ref 0–33)
ANION GAP SERPL CALCULATED.3IONS-SCNC: 19 MEQ/L (ref 9–15)
AST SERPL-CCNC: 89 U/L (ref 0–35)
BASOPHILS ABSOLUTE: 0.1 K/UL (ref 0–0.2)
BASOPHILS RELATIVE PERCENT: 1.3 %
BILIRUB SERPL-MCNC: 0.5 MG/DL (ref 0.2–0.7)
BUN BLDV-MCNC: 20 MG/DL (ref 6–20)
CALCIUM SERPL-MCNC: 9.1 MG/DL (ref 8.5–9.9)
CHLORIDE BLD-SCNC: 93 MEQ/L (ref 95–107)
CHP ED QC CHECK: NORMAL
CHP ED QC CHECK: NORMAL
CO2: 20 MEQ/L (ref 20–31)
CREAT SERPL-MCNC: 0.89 MG/DL (ref 0.5–0.9)
EOSINOPHILS ABSOLUTE: 0.7 K/UL (ref 0–0.7)
EOSINOPHILS RELATIVE PERCENT: 7.5 %
GFR AFRICAN AMERICAN: >60
GFR NON-AFRICAN AMERICAN: >60
GLOBULIN: 3.7 G/DL (ref 2.3–3.5)
GLUCOSE BLD-MCNC: 316 MG/DL (ref 60–115)
GLUCOSE BLD-MCNC: 439 MG/DL
GLUCOSE BLD-MCNC: 439 MG/DL (ref 60–115)
GLUCOSE BLD-MCNC: 454 MG/DL
GLUCOSE BLD-MCNC: 454 MG/DL (ref 60–115)
GLUCOSE BLD-MCNC: 492 MG/DL (ref 70–99)
HCT VFR BLD CALC: 43 % (ref 37–47)
HEMOGLOBIN: 14.3 G/DL (ref 12–16)
LYMPHOCYTES ABSOLUTE: 2.4 K/UL (ref 1–4.8)
LYMPHOCYTES RELATIVE PERCENT: 25.7 %
MAGNESIUM: 1.5 MG/DL (ref 1.7–2.4)
MCH RBC QN AUTO: 31.4 PG (ref 27–31.3)
MCHC RBC AUTO-ENTMCNC: 33.4 % (ref 33–37)
MCV RBC AUTO: 94.2 FL (ref 82–100)
MONOCYTES ABSOLUTE: 0.5 K/UL (ref 0.2–0.8)
MONOCYTES RELATIVE PERCENT: 5.2 %
NEUTROPHILS ABSOLUTE: 5.5 K/UL (ref 1.4–6.5)
NEUTROPHILS RELATIVE PERCENT: 60.3 %
PDW BLD-RTO: 13.7 % (ref 11.5–14.5)
PERFORMED ON: ABNORMAL
PLATELET # BLD: 161 K/UL (ref 130–400)
PLATELET SLIDE REVIEW: ADEQUATE
POTASSIUM SERPL-SCNC: 4.8 MEQ/L (ref 3.4–4.9)
RBC # BLD: 4.56 M/UL (ref 4.2–5.4)
SODIUM BLD-SCNC: 132 MEQ/L (ref 135–144)
TOTAL PROTEIN: 7.7 G/DL (ref 6.3–8)
TROPONIN: <0.01 NG/ML (ref 0–0.01)
WBC # BLD: 9.2 K/UL (ref 4.8–10.8)

## 2021-07-20 PROCEDURE — 85025 COMPLETE CBC W/AUTO DIFF WBC: CPT

## 2021-07-20 PROCEDURE — 2580000003 HC RX 258: Performed by: PHYSICIAN ASSISTANT

## 2021-07-20 PROCEDURE — 84484 ASSAY OF TROPONIN QUANT: CPT

## 2021-07-20 PROCEDURE — 83735 ASSAY OF MAGNESIUM: CPT

## 2021-07-20 PROCEDURE — 6360000002 HC RX W HCPCS: Performed by: EMERGENCY MEDICINE

## 2021-07-20 PROCEDURE — 71045 X-RAY EXAM CHEST 1 VIEW: CPT

## 2021-07-20 PROCEDURE — 80053 COMPREHEN METABOLIC PANEL: CPT

## 2021-07-20 PROCEDURE — 96372 THER/PROPH/DIAG INJ SC/IM: CPT

## 2021-07-20 PROCEDURE — 6370000000 HC RX 637 (ALT 250 FOR IP): Performed by: PHYSICIAN ASSISTANT

## 2021-07-20 PROCEDURE — 99285 EMERGENCY DEPT VISIT HI MDM: CPT

## 2021-07-20 PROCEDURE — 96375 TX/PRO/DX INJ NEW DRUG ADDON: CPT

## 2021-07-20 PROCEDURE — 96374 THER/PROPH/DIAG INJ IV PUSH: CPT

## 2021-07-20 PROCEDURE — 96376 TX/PRO/DX INJ SAME DRUG ADON: CPT

## 2021-07-20 PROCEDURE — 93005 ELECTROCARDIOGRAM TRACING: CPT | Performed by: PHYSICIAN ASSISTANT

## 2021-07-20 PROCEDURE — 36415 COLL VENOUS BLD VENIPUNCTURE: CPT

## 2021-07-20 PROCEDURE — 6360000002 HC RX W HCPCS: Performed by: PHYSICIAN ASSISTANT

## 2021-07-20 RX ORDER — ONDANSETRON 2 MG/ML
4 INJECTION INTRAMUSCULAR; INTRAVENOUS ONCE
Status: COMPLETED | OUTPATIENT
Start: 2021-07-20 | End: 2021-07-20

## 2021-07-20 RX ORDER — 0.9 % SODIUM CHLORIDE 0.9 %
1000 INTRAVENOUS SOLUTION INTRAVENOUS ONCE
Status: COMPLETED | OUTPATIENT
Start: 2021-07-20 | End: 2021-07-20

## 2021-07-20 RX ORDER — OXYCODONE HYDROCHLORIDE AND ACETAMINOPHEN 5; 325 MG/1; MG/1
1 TABLET ORAL ONCE
Status: COMPLETED | OUTPATIENT
Start: 2021-07-20 | End: 2021-07-20

## 2021-07-20 RX ORDER — INSULIN GLARGINE 100 [IU]/ML
140 INJECTION, SOLUTION SUBCUTANEOUS ONCE
Status: COMPLETED | OUTPATIENT
Start: 2021-07-20 | End: 2021-07-20

## 2021-07-20 RX ORDER — ASPIRIN 81 MG/1
324 TABLET, CHEWABLE ORAL ONCE
Status: COMPLETED | OUTPATIENT
Start: 2021-07-20 | End: 2021-07-20

## 2021-07-20 RX ORDER — INSULIN GLARGINE 100 [IU]/ML
140 INJECTION, SOLUTION SUBCUTANEOUS NIGHTLY
Qty: 5 PEN | Refills: 3 | Status: SHIPPED | OUTPATIENT
Start: 2021-07-20 | End: 2021-08-18 | Stop reason: SDUPTHER

## 2021-07-20 RX ORDER — MORPHINE SULFATE 4 MG/ML
4 INJECTION, SOLUTION INTRAMUSCULAR; INTRAVENOUS
Status: COMPLETED | OUTPATIENT
Start: 2021-07-20 | End: 2021-07-20

## 2021-07-20 RX ORDER — NITROGLYCERIN 0.4 MG/1
0.4 TABLET SUBLINGUAL EVERY 5 MIN PRN
Status: COMPLETED | OUTPATIENT
Start: 2021-07-20 | End: 2021-07-20

## 2021-07-20 RX ADMIN — NITROGLYCERIN 0.4 MG: 0.4 TABLET SUBLINGUAL at 18:02

## 2021-07-20 RX ADMIN — MORPHINE SULFATE 4 MG: 4 INJECTION, SOLUTION INTRAMUSCULAR; INTRAVENOUS at 20:00

## 2021-07-20 RX ADMIN — ONDANSETRON 4 MG: 2 INJECTION INTRAMUSCULAR; INTRAVENOUS at 18:03

## 2021-07-20 RX ADMIN — OXYCODONE HYDROCHLORIDE AND ACETAMINOPHEN 1 TABLET: 5; 325 TABLET ORAL at 21:47

## 2021-07-20 RX ADMIN — SODIUM CHLORIDE 1000 ML: 9 INJECTION, SOLUTION INTRAVENOUS at 20:17

## 2021-07-20 RX ADMIN — NITROGLYCERIN 0.4 MG: 0.4 TABLET SUBLINGUAL at 17:54

## 2021-07-20 RX ADMIN — INSULIN GLARGINE 140 UNITS: 100 INJECTION, SOLUTION SUBCUTANEOUS at 20:58

## 2021-07-20 RX ADMIN — SODIUM CHLORIDE 1000 ML: 9 INJECTION, SOLUTION INTRAVENOUS at 17:47

## 2021-07-20 RX ADMIN — NITROGLYCERIN 0.4 MG: 0.4 TABLET SUBLINGUAL at 17:58

## 2021-07-20 RX ADMIN — MORPHINE SULFATE 4 MG: 4 INJECTION, SOLUTION INTRAMUSCULAR; INTRAVENOUS at 18:52

## 2021-07-20 RX ADMIN — ONDANSETRON 4 MG: 2 INJECTION INTRAMUSCULAR; INTRAVENOUS at 20:18

## 2021-07-20 RX ADMIN — ASPIRIN 324 MG: 81 TABLET, CHEWABLE ORAL at 17:53

## 2021-07-20 ASSESSMENT — ENCOUNTER SYMPTOMS
DIARRHEA: 0
EYE DISCHARGE: 0
SINUS PAIN: 0
VOMITING: 0
BACK PAIN: 0
CHEST TIGHTNESS: 0
ABDOMINAL PAIN: 0
COLOR CHANGE: 0
COUGH: 0
SHORTNESS OF BREATH: 0
NAUSEA: 0
EYE REDNESS: 0
RHINORRHEA: 0

## 2021-07-20 ASSESSMENT — PAIN DESCRIPTION - ORIENTATION: ORIENTATION: LEFT;ANTERIOR

## 2021-07-20 ASSESSMENT — PAIN SCALES - GENERAL
PAINLEVEL_OUTOF10: 4
PAINLEVEL_OUTOF10: 8
PAINLEVEL_OUTOF10: 10
PAINLEVEL_OUTOF10: 8
PAINLEVEL_OUTOF10: 7
PAINLEVEL_OUTOF10: 8
PAINLEVEL_OUTOF10: 6

## 2021-07-20 ASSESSMENT — PAIN DESCRIPTION - FREQUENCY
FREQUENCY: CONTINUOUS

## 2021-07-20 ASSESSMENT — PAIN DESCRIPTION - LOCATION
LOCATION: CHEST

## 2021-07-20 ASSESSMENT — PAIN DESCRIPTION - DESCRIPTORS
DESCRIPTORS: CONSTANT;ACHING
DESCRIPTORS: CONSTANT;ACHING
DESCRIPTORS: ACHING

## 2021-07-20 ASSESSMENT — PAIN DESCRIPTION - PAIN TYPE
TYPE: ACUTE PAIN

## 2021-07-20 NOTE — ED PROVIDER NOTES
[address] [facility] ED  EMERGENCY DEPARTMENT ENCOUNTER      Pt Name: Breanna Kenney  MRN: [id number]  [place] 1975  Date of evaluation: 7/20/2021  Provider: Eloy Luna PA-C    CHIEF COMPLAINT       Chief Complaint   Patient presents with    Hyperglycemia         HISTORY OF PRESENT ILLNESS   (Location/Symptom, Timing/Onset, Context/Setting, Quality, Duration, Modifying Factors, Severity)  Note limiting factors. Breanna Kenney is a 48 y.o. female who presents to the emergency department for sudden onset, constant, sharp, midsternal chest pain that began last night was intermittent last night has become constant today. Patient states walking makes the pain worse. Nothing makes the pain better. Patient states that she ran out of her insulin and did not receive any since yesterday. Patient states she does have a history of hypertension denies history of hyperlipidemia or smoking. Patient states that the chest pain is reproducible on palpation. HPI    Nursing Notes were reviewed. REVIEW OF SYSTEMS    (2-9 systems for level 4, 10 or more for level 5)     Review of Systems   Constitutional: Negative for activity change, chills, fatigue and fever. HENT: Negative for congestion, hearing loss, rhinorrhea, sinus pain, sneezing and tinnitus. Eyes: Negative for discharge, redness and visual disturbance. Respiratory: Negative for cough, chest tightness and shortness of breath. Cardiovascular: Positive for chest pain. Negative for leg swelling. Gastrointestinal: Negative for abdominal pain, diarrhea, nausea and vomiting. Endocrine: Negative for heat intolerance, polydipsia and polyuria. Hyperglycemia. Genitourinary: Negative for dysuria, flank pain, hematuria and urgency. Musculoskeletal: Negative for back pain, joint swelling and myalgias. Skin: Negative for color change, rash and wound. Allergic/Immunologic: Negative for immunocompromised state. Neurological: Negative for tremors, seizures, syncope, light-headedness and headaches. Hematological: Does not bruise/bleed easily. Psychiatric/Behavioral: Negative for agitation and behavioral problems. The patient is not nervous/anxious. Except as noted above the remainder of the review of systems was reviewed and negative. PAST MEDICAL HISTORY     Past Medical History:   Diagnosis Date    Asthma     Chronic abdominal pain     Depression     Hyperlipidemia     Hypertension     NATALEE (obstructive sleep apnea)     Schizophrenia (HCC)     Type II or unspecified type diabetes mellitus without mention of complication, not stated as uncontrolled          SURGICAL HISTORY       Past Surgical History:   Procedure Laterality Date    COLONOSCOPY  2/21/14    [email or web address]    [facility]    UPPER GASTROINTESTINAL ENDOSCOPY N/A 3/22/2021    EGD DIAGNOSTIC ONLY performed by Ty Guillory MD at [address] [place] [address]       Previous Medications    ARIPIPRAZOLE (ABILIFY PO)    Take  by mouth.     BUPROPION HCL (WELLBUTRIN PO)    Take 150 mg by mouth     ESOMEPRAZOLE (NEXIUM) 40 MG DELAYED RELEASE CAPSULE    Take 1 capsule by mouth every morning (before breakfast)    INSULIN GLARGINE (LANTUS SOLOSTAR) 100 UNIT/ML INJECTION PEN    140 units at bedtime    INSULIN LISPRO (HUMALOG KWIKPEN) 200 UNIT/ML SOPN PEN    60 units with each meals    INSULIN SYRINGE .5CC/29G 29G X 1/2\" 0.5 ML MISC    1 each by Does not apply route daily    LEVOTHYROXINE (SYNTHROID) 50 MCG TABLET    Take 50 mcg by mouth Daily    LISINOPRIL (PRINIVIL;ZESTRIL) 10 MG TABLET    Take 1 tablet by mouth daily    LISINOPRIL (PRINIVIL;ZESTRIL) 20 MG TABLET    Take 0.5 tablets by mouth daily    MAGNESIUM OXIDE (MAG-OX) 400 (240 MG) MG TABLET    Take 1 tablet by mouth daily    MELOXICAM (MOBIC) 15 MG TABLET    Take 1 tablet by mouth daily as needed for Pain    METFORMIN HCL PO    Take 500 mg by mouth     METOPROLOL SUCCINATE (TOPROL XL) 25 MG EXTENDED RELEASE TABLET    Take 1 tablet by mouth daily    MONTELUKAST (SINGULAIR) 10 MG TABLET    Take 10 mg by mouth nightly    NITROGLYCERIN (NITROSTAT) 0.4 MG SL TABLET    Place 1 tablet under the tongue every 5 minutes as needed for Chest pain up to max of 3 total doses. If no relief after 1 dose, call 911. SERTRALINE (ZOLOFT) 100 MG TABLET    Take 100 mg by mouth daily    TOPIRAMATE (TOPAMAX) 25 MG TABLET    Take 25 mg by mouth daily    TRAZODONE (DESYREL) 50 MG TABLET    Take 50 mg by mouth nightly       ALLERGIES     Shellfish-derived products, Amoxicillin, Macrobid [nitrofurantoin monohyd macro], Reglan [metoclopramide], Singulair [montelukast], and Nubain [nalbuphine hcl]    FAMILY HISTORY       Family History   Problem Relation Age of Onset    Other Mother         Diverticulitis    Colon Cancer Paternal Uncle     Colon Cancer Maternal Grandfather           SOCIAL HISTORY       Social History     Socioeconomic History    Marital status: Single     Spouse name: None    Number of children: None    Years of education: None    Highest education level: None   Occupational History    None   Tobacco Use    Smoking status: Former Smoker     Packs/day: 1.00     Types: Cigarettes    Smokeless tobacco: Never Used   Vaping Use    Vaping Use: Never used   Substance and Sexual Activity    Alcohol use: No    Drug use: No    Sexual activity: None   Other Topics Concern    None   Social History Narrative    Lives w brother     Social Determinants of Health     Financial Resource Strain:     Difficulty of Paying Living Expenses:    Food Insecurity:     Worried About Running Out of Food in the Last Year:     Ran Out of Food in the Last Year:    Transportation Needs:     Lack of Transportation (Medical):      Lack of Transportation (Non-Medical):    Physical Activity:     Days of Exercise per Week:     Minutes of Exercise per Session: Stress:     Feeling of Stress :    Social Connections:     Frequency of Communication with Friends and Family:     Frequency of Social Gatherings with Friends and Family:     Attends Congregation Services:     Active Member of Clubs or Organizations:     Attends Club or Organization Meetings:     Marital Status:    Intimate Partner Violence:     Fear of Current or Ex-Partner:     Emotionally Abused:     Physically Abused:     Sexually Abused:        SCREENINGS                        PHYSICAL EXAM    (up to 7 for level 4, 8 or more for level 5)     ED Triage Vitals [07/20/21 1704]   BP Temp Temp Source Pulse Resp SpO2 Height Weight   (!) 141/78 97.9 °F (36.6 °C) Temporal 101 18 99 % 6' (1.829 m) (!) 340 lb (154.2 kg)       Physical Exam  Vitals and nursing note reviewed. Constitutional:       General: She is not in acute distress. Appearance: Normal appearance. She is normal weight. HENT:      Head: Normocephalic. Right Ear: Tympanic membrane, ear canal and external ear normal.      Left Ear: Tympanic membrane, ear canal and external ear normal.      Nose: Nose normal.      Mouth/Throat:      Mouth: Mucous membranes are moist.      Pharynx: Oropharynx is clear. No oropharyngeal exudate or posterior oropharyngeal erythema. Eyes:      Conjunctiva/sclera: Conjunctivae normal.      Pupils: Pupils are equal, round, and reactive to light. Cardiovascular:      Rate and Rhythm: Normal rate and regular rhythm. Pulses: Normal pulses. Heart sounds: Normal heart sounds. No murmur heard. No friction rub. Pulmonary:      Effort: Pulmonary effort is normal. No respiratory distress. Breath sounds: Normal breath sounds. No stridor. Abdominal:      General: Abdomen is flat. Bowel sounds are normal.      Palpations: Abdomen is soft. Genitourinary:     Vagina: No vaginal discharge. Musculoskeletal:         General: No swelling or tenderness. Normal range of motion.       Cervical back: Normal range of motion. No rigidity. Neurological:      General: No focal deficit present. Mental Status: She is alert. Psychiatric:         Mood and Affect: Mood normal.         Behavior: Behavior normal.         DIAGNOSTIC RESULTS     EKG: All EKG's are interpreted by the Emergency Department Physician who either signs or Co-signs this chart in the absence of a cardiologist.    Normal sinus rhythm with a ventricular rate of 98 bpm.  No ST segment elevation or depression. No signs of ischemia. QTC of 477 ms. RADIOLOGY:   Non-plain film images such as CT, Ultrasound and MRI are read by the radiologist. Plain radiographic images are visualized and preliminarily interpreted by the emergency physician with the below findings:    No acute cardiopulmonary process.     Interpretation per the Radiologist below, if available at the time of this note:    XR CHEST PORTABLE    (Results Pending)         ED BEDSIDE ULTRASOUND:   Performed by ED Physician - none    LABS:  Labs Reviewed   COMPREHENSIVE METABOLIC PANEL - Abnormal; Notable for the following components:       Result Value    Sodium 132 (*)     Chloride 93 (*)     Anion Gap 19 (*)     Glucose 492 (*)     Alkaline Phosphatase 165 (*)     ALT 99 (*)     AST 89 (*)     Globulin 3.7 (*)     All other components within normal limits    Narrative:     CALL  [facility] tel. [phone],  Glucose called to and read back by Zachery Parham RN, 07/20/2021 18:43, by  Saritha Inman   CBC WITH AUTO DIFFERENTIAL - Abnormal; Notable for the following components:    MCH 31.4 (*)     All other components within normal limits   MAGNESIUM - Abnormal; Notable for the following components:    Magnesium 1.5 (*)     All other components within normal limits    Narrative:     CALL  [facility] tel. [phone],  Glucose called to and read back by Zachery Parham RN, 07/20/2021 18:43, by  Saritha Inman   POCT GLUCOSE - Abnormal; Notable for the following components:    POC Glucose 439 (*)     All other components within normal limits   POCT GLUCOSE - Abnormal; Notable for the following components:    POC Glucose 454 (*)     All other components within normal limits   POCT GLUCOSE - Abnormal; Notable for the following components:    POC Glucose 316 (*)     All other components within normal limits   POCT GLUCOSE - Normal   POCT GLUCOSE - Normal   TROPONIN       All other labs were within normal range or not returned as of this dictation. EMERGENCY DEPARTMENT COURSE and DIFFERENTIAL DIAGNOSIS/MDM:   Vitals:    Vitals:    07/20/21 2000 07/20/21 2015 07/20/21 2030 07/20/21 2100   BP: (!) 141/82 132/80  (!) 142/86   Pulse: 94 97 109 95   Resp: 23 19 18 21   Temp:       TempSrc:       SpO2: 96% 94% 94% 94%   Weight:       Height:           44-year of age female who presents with chest pain and complaints of being out of insulin and being hyperglycemic. CBC, CMP, mag, troponin will be obtained. Patient was given fluid bolus. Blood sugar will be obtained as well. Along with EKG and chest x-ray. Patient will be reassessed. MDM        REASSESSMENT      On reassessment patient blood sugar actually went up after 20 units of Humalog. Patient was given another 40 units of Humalog along with her normal Lantus dose of 540 units. On recheck patient's blood sugar was then 316. Patient's chest x-ray, troponin, EKG were all normal.  As patient's pain has been going on since last night reproducible and unchanged with nitro I do not believe her pain is cardiac in nature. Patient remained hemodynamically stable pain well controlled throughout the rest of her entire ED course. Patient discharged home in stable condition with close follow-up with her endocrinologist.  Patient's questions were answered. Patient understands and is agreeable to this plan. I reviewed the chart. I did not see the patient. I agree with the evaluation, treatment and disposition of the patient as documented by the midlevel provider. Electronically signed by Oksana Erickson MD on 7/20/2021 at 9:49 PM     CONSULTS:  None    PROCEDURES:  Unless otherwise noted below, none     Procedures        FINAL IMPRESSION      1. Hyperglycemia          DISPOSITION/PLAN   DISPOSITION Decision To Discharge 07/20/2021 09:31:33 PM      PATIENT REFERRED TO:  Filipe Hood MD  [address]  [phone]    Call in 1 day        DISCHARGE MEDICATIONS:  New Prescriptions    No medications on file     Controlled Substances Monitoring:     RX Monitoring 12/9/2017   Attestation The Prescription Monitoring Report for this patient was reviewed today.        (Please note that portions of this note were completed with a voice recognition program.  Efforts were made to edit the dictations but occasionally words are mis-transcribed.)    Raghu Cedeno PA-C (electronically signed)             Raghu Cedeno PA-C  07/20/21 8469       Oksana Erickson MD  07/20/21 6461

## 2021-07-20 NOTE — ED NOTES
Narciso GILL currently in with patient and communicating via translation services. Pt states that she has had diarrhea, vomiting, \"my heart hurts\" chest pain. Started last night, was intermittent, this morning it became constant. C/o abdominal pain, and bilateral foot pain when she walks.      Yelena Olivarez RN  07/20/21 0858

## 2021-07-21 LAB
EKG ATRIAL RATE: 98 BPM
EKG P AXIS: 38 DEGREES
EKG P-R INTERVAL: 144 MS
EKG Q-T INTERVAL: 374 MS
EKG QRS DURATION: 82 MS
EKG QTC CALCULATION (BAZETT): 477 MS
EKG R AXIS: 267 DEGREES
EKG T AXIS: 30 DEGREES
EKG VENTRICULAR RATE: 98 BPM

## 2021-07-21 PROCEDURE — 93010 ELECTROCARDIOGRAM REPORT: CPT | Performed by: INTERNAL MEDICINE

## 2021-07-21 NOTE — ED NOTES
Upon discharging patient, patient request pain medication, given percocet, then requesting scripts for insulin, prescriptions obtained and given to patient.      [facility]  07/20/21 4288

## 2021-08-17 ENCOUNTER — APPOINTMENT (OUTPATIENT)
Dept: GENERAL RADIOLOGY | Age: 46
End: 2021-08-17
Payer: COMMERCIAL

## 2021-08-17 ENCOUNTER — HOSPITAL ENCOUNTER (OUTPATIENT)
Age: 46
Setting detail: OBSERVATION
Discharge: HOME | End: 2021-08-19
Attending: INTERNAL MEDICINE | Admitting: INTERNAL MEDICINE
Payer: COMMERCIAL

## 2021-08-17 DIAGNOSIS — R79.89 ELEVATED LACTIC ACID LEVEL: ICD-10-CM

## 2021-08-17 DIAGNOSIS — E11.65 TYPE 2 DIABETES MELLITUS WITH HYPERGLYCEMIA, WITH LONG-TERM CURRENT USE OF INSULIN (HCC): ICD-10-CM

## 2021-08-17 DIAGNOSIS — Z79.4 TYPE 2 DIABETES MELLITUS WITH HYPERGLYCEMIA, WITH LONG-TERM CURRENT USE OF INSULIN (HCC): ICD-10-CM

## 2021-08-17 DIAGNOSIS — R07.89 CHEST WALL PAIN: Primary | ICD-10-CM

## 2021-08-17 DIAGNOSIS — E11.65 UNCONTROLLED TYPE 2 DIABETES MELLITUS WITH HYPERGLYCEMIA (HCC): ICD-10-CM

## 2021-08-17 LAB
ALBUMIN SERPL-MCNC: 4 G/DL (ref 3.5–4.6)
ALP BLD-CCNC: 200 U/L (ref 40–130)
ALT SERPL-CCNC: 102 U/L (ref 0–33)
ANION GAP SERPL CALCULATED.3IONS-SCNC: 16 MEQ/L (ref 9–15)
AST SERPL-CCNC: 94 U/L (ref 0–35)
BASE EXCESS VENOUS: 0 (ref -3–3)
BASOPHILS ABSOLUTE: 0 K/UL (ref 0–0.2)
BASOPHILS RELATIVE PERCENT: 0.7 %
BILIRUB SERPL-MCNC: 0.6 MG/DL (ref 0.2–0.7)
BILIRUBIN URINE: NEGATIVE
BLOOD, URINE: NEGATIVE
BUN BLDV-MCNC: 16 MG/DL (ref 6–20)
CALCIUM IONIZED: 1.14 MMOL/L (ref 1.12–1.32)
CALCIUM SERPL-MCNC: 9.9 MG/DL (ref 8.5–9.9)
CHLORIDE BLD-SCNC: 94 MEQ/L (ref 95–107)
CHP ED QC CHECK: YES
CLARITY: CLEAR
CO2: 23 MEQ/L (ref 20–31)
COLOR: YELLOW
CREAT SERPL-MCNC: 1.09 MG/DL (ref 0.5–0.9)
D DIMER: 0.27 MG/L FEU (ref 0–0.5)
EOSINOPHILS ABSOLUTE: 0.2 K/UL (ref 0–0.7)
EOSINOPHILS RELATIVE PERCENT: 2.9 %
GFR AFRICAN AMERICAN: 53
GFR AFRICAN AMERICAN: >60
GFR NON-AFRICAN AMERICAN: 44
GFR NON-AFRICAN AMERICAN: 53.5
GLOBULIN: 4.2 G/DL (ref 2.3–3.5)
GLUCOSE BLD-MCNC: 475 MG/DL (ref 60–115)
GLUCOSE BLD-MCNC: 507 MG/DL (ref 60–115)
GLUCOSE BLD-MCNC: 534 MG/DL
GLUCOSE BLD-MCNC: 539 MG/DL (ref 60–115)
GLUCOSE BLD-MCNC: 567 MG/DL (ref 70–99)
GLUCOSE BLD-MCNC: 569 MG/DL (ref 60–115)
GLUCOSE URINE: >=1000 MG/DL
HCO3 VENOUS: 25.4 MMOL/L (ref 23–29)
HCT VFR BLD CALC: 40.1 % (ref 37–47)
HEMOGLOBIN: 13.7 G/DL (ref 12–16)
HEMOGLOBIN: 14.6 GM/DL (ref 12–16)
KETONES, URINE: ABNORMAL MG/DL
LACTATE: 4.7 MMOL/L (ref 0.4–2)
LACTIC ACID: 3.2 MMOL/L (ref 0.5–2.2)
LEUKOCYTE ESTERASE, URINE: NEGATIVE
LYMPHOCYTES ABSOLUTE: 1.7 K/UL (ref 1–4.8)
LYMPHOCYTES RELATIVE PERCENT: 25.1 %
MAGNESIUM: 2.1 MG/DL (ref 1.7–2.4)
MCH RBC QN AUTO: 31.2 PG (ref 27–31.3)
MCHC RBC AUTO-ENTMCNC: 34.2 % (ref 33–37)
MCV RBC AUTO: 91.1 FL (ref 82–100)
MONOCYTES ABSOLUTE: 0.4 K/UL (ref 0.2–0.8)
MONOCYTES RELATIVE PERCENT: 6.3 %
NEUTROPHILS ABSOLUTE: 4.4 K/UL (ref 1.4–6.5)
NEUTROPHILS RELATIVE PERCENT: 65 %
NITRITE, URINE: NEGATIVE
O2 SAT, VEN: 76 %
PCO2, VEN: 42.1 MM HG (ref 40–50)
PDW BLD-RTO: 13.9 % (ref 11.5–14.5)
PERFORMED ON: ABNORMAL
PH UA: 5.5 (ref 5–9)
PH VENOUS: 7.39 (ref 7.35–7.45)
PLATELET # BLD: 166 K/UL (ref 130–400)
PO2, VEN: 41 MM HG
POC CHLORIDE: 100 MEQ/L (ref 99–110)
POC CREATININE: 1.3 MG/DL (ref 0.6–1.1)
POC HEMATOCRIT: 43 % (ref 36–48)
POC POTASSIUM: 4.3 MEQ/L (ref 3.5–5.1)
POC SAMPLE TYPE: ABNORMAL
POC SODIUM: 137 MEQ/L (ref 136–145)
POTASSIUM SERPL-SCNC: 4.5 MEQ/L (ref 3.4–4.9)
PROTEIN UA: ABNORMAL MG/DL
RBC # BLD: 4.4 M/UL (ref 4.2–5.4)
SODIUM BLD-SCNC: 133 MEQ/L (ref 135–144)
SPECIFIC GRAVITY UA: 1.03 (ref 1–1.03)
TCO2 CALC VENOUS: 27 MMOL/L
TOTAL PROTEIN: 8.2 G/DL (ref 6.3–8)
TROPONIN: <0.01 NG/ML (ref 0–0.01)
URINE REFLEX TO CULTURE: ABNORMAL
UROBILINOGEN, URINE: 0.2 E.U./DL
WBC # BLD: 6.8 K/UL (ref 4.8–10.8)

## 2021-08-17 PROCEDURE — 82948 REAGENT STRIP/BLOOD GLUCOSE: CPT

## 2021-08-17 PROCEDURE — 81003 URINALYSIS AUTO W/O SCOPE: CPT

## 2021-08-17 PROCEDURE — 85379 FIBRIN DEGRADATION QUANT: CPT

## 2021-08-17 PROCEDURE — 99285 EMERGENCY DEPT VISIT HI MDM: CPT

## 2021-08-17 PROCEDURE — 83735 ASSAY OF MAGNESIUM: CPT

## 2021-08-17 PROCEDURE — 36600 WITHDRAWAL OF ARTERIAL BLOOD: CPT

## 2021-08-17 PROCEDURE — 96374 THER/PROPH/DIAG INJ IV PUSH: CPT

## 2021-08-17 PROCEDURE — 80053 COMPREHEN METABOLIC PANEL: CPT

## 2021-08-17 PROCEDURE — 84295 ASSAY OF SERUM SODIUM: CPT

## 2021-08-17 PROCEDURE — 84132 ASSAY OF SERUM POTASSIUM: CPT

## 2021-08-17 PROCEDURE — 85014 HEMATOCRIT: CPT

## 2021-08-17 PROCEDURE — 96375 TX/PRO/DX INJ NEW DRUG ADDON: CPT

## 2021-08-17 PROCEDURE — 6370000000 HC RX 637 (ALT 250 FOR IP): Performed by: PHYSICIAN ASSISTANT

## 2021-08-17 PROCEDURE — 82565 ASSAY OF CREATININE: CPT

## 2021-08-17 PROCEDURE — 71045 X-RAY EXAM CHEST 1 VIEW: CPT

## 2021-08-17 PROCEDURE — 82435 ASSAY OF BLOOD CHLORIDE: CPT

## 2021-08-17 PROCEDURE — 36415 COLL VENOUS BLD VENIPUNCTURE: CPT

## 2021-08-17 PROCEDURE — 96376 TX/PRO/DX INJ SAME DRUG ADON: CPT

## 2021-08-17 PROCEDURE — 82330 ASSAY OF CALCIUM: CPT

## 2021-08-17 PROCEDURE — 96361 HYDRATE IV INFUSION ADD-ON: CPT

## 2021-08-17 PROCEDURE — 82803 BLOOD GASES ANY COMBINATION: CPT

## 2021-08-17 PROCEDURE — 2580000003 HC RX 258: Performed by: PHYSICIAN ASSISTANT

## 2021-08-17 PROCEDURE — 96372 THER/PROPH/DIAG INJ SC/IM: CPT

## 2021-08-17 PROCEDURE — 6360000002 HC RX W HCPCS: Performed by: PHYSICIAN ASSISTANT

## 2021-08-17 PROCEDURE — 93005 ELECTROCARDIOGRAM TRACING: CPT | Performed by: INTERNAL MEDICINE

## 2021-08-17 PROCEDURE — 84484 ASSAY OF TROPONIN QUANT: CPT

## 2021-08-17 PROCEDURE — 83605 ASSAY OF LACTIC ACID: CPT

## 2021-08-17 PROCEDURE — 85025 COMPLETE CBC W/AUTO DIFF WBC: CPT

## 2021-08-17 RX ORDER — HYDROCODONE BITARTRATE AND ACETAMINOPHEN 5; 325 MG/1; MG/1
1 TABLET ORAL ONCE
Status: COMPLETED | OUTPATIENT
Start: 2021-08-17 | End: 2021-08-17

## 2021-08-17 RX ORDER — 0.9 % SODIUM CHLORIDE 0.9 %
1000 INTRAVENOUS SOLUTION INTRAVENOUS ONCE
Status: COMPLETED | OUTPATIENT
Start: 2021-08-17 | End: 2021-08-17

## 2021-08-17 RX ORDER — ONDANSETRON 2 MG/ML
4 INJECTION INTRAMUSCULAR; INTRAVENOUS ONCE
Status: COMPLETED | OUTPATIENT
Start: 2021-08-17 | End: 2021-08-17

## 2021-08-17 RX ORDER — ASPIRIN 81 MG/1
324 TABLET, CHEWABLE ORAL ONCE
Status: COMPLETED | OUTPATIENT
Start: 2021-08-17 | End: 2021-08-17

## 2021-08-17 RX ORDER — KETOROLAC TROMETHAMINE 30 MG/ML
30 INJECTION, SOLUTION INTRAMUSCULAR; INTRAVENOUS ONCE
Status: COMPLETED | OUTPATIENT
Start: 2021-08-17 | End: 2021-08-17

## 2021-08-17 RX ORDER — 0.9 % SODIUM CHLORIDE 0.9 %
1000 INTRAVENOUS SOLUTION INTRAVENOUS ONCE
Status: COMPLETED | OUTPATIENT
Start: 2021-08-17 | End: 2021-08-18

## 2021-08-17 RX ORDER — MORPHINE SULFATE 4 MG/ML
4 INJECTION, SOLUTION INTRAMUSCULAR; INTRAVENOUS ONCE
Status: COMPLETED | OUTPATIENT
Start: 2021-08-17 | End: 2021-08-17

## 2021-08-17 RX ADMIN — SODIUM CHLORIDE 1000 ML: 9 INJECTION, SOLUTION INTRAVENOUS at 20:50

## 2021-08-17 RX ADMIN — ASPIRIN 324 MG: 81 TABLET, CHEWABLE ORAL at 20:49

## 2021-08-17 RX ADMIN — SODIUM CHLORIDE 1000 ML: 9 INJECTION, SOLUTION INTRAVENOUS at 21:22

## 2021-08-17 RX ADMIN — MORPHINE SULFATE 4 MG: 4 INJECTION, SOLUTION INTRAMUSCULAR; INTRAVENOUS at 23:33

## 2021-08-17 RX ADMIN — ONDANSETRON 4 MG: 2 INJECTION INTRAMUSCULAR; INTRAVENOUS at 20:54

## 2021-08-17 RX ADMIN — INSULIN HUMAN 6 UNITS: 100 INJECTION, SOLUTION PARENTERAL at 20:51

## 2021-08-17 RX ADMIN — SODIUM CHLORIDE 1000 ML: 9 INJECTION, SOLUTION INTRAVENOUS at 23:33

## 2021-08-17 RX ADMIN — KETOROLAC TROMETHAMINE 30 MG: 30 INJECTION, SOLUTION INTRAMUSCULAR; INTRAVENOUS at 20:50

## 2021-08-17 RX ADMIN — HYDROCODONE BITARTRATE AND ACETAMINOPHEN 1 TABLET: 5; 325 TABLET ORAL at 21:42

## 2021-08-17 RX ADMIN — ONDANSETRON 4 MG: 2 INJECTION INTRAMUSCULAR; INTRAVENOUS at 23:33

## 2021-08-17 RX ADMIN — INSULIN HUMAN 10 UNITS: 100 INJECTION, SOLUTION PARENTERAL at 22:36

## 2021-08-17 ASSESSMENT — ENCOUNTER SYMPTOMS
ABDOMINAL DISTENTION: 0
VOICE CHANGE: 0
NAUSEA: 1
ANAL BLEEDING: 0
APNEA: 0
VOMITING: 0
PHOTOPHOBIA: 0
EYE DISCHARGE: 0
BACK PAIN: 0
SHORTNESS OF BREATH: 0
COUGH: 0

## 2021-08-17 ASSESSMENT — PAIN DESCRIPTION - LOCATION: LOCATION: CHEST

## 2021-08-17 ASSESSMENT — PAIN SCALES - GENERAL
PAINLEVEL_OUTOF10: 10
PAINLEVEL_OUTOF10: 8

## 2021-08-17 ASSESSMENT — PAIN DESCRIPTION - DESCRIPTORS: DESCRIPTORS: PRESSURE;SQUEEZING

## 2021-08-18 VITALS
DIASTOLIC BLOOD PRESSURE: 94 MMHG | SYSTOLIC BLOOD PRESSURE: 136 MMHG | TEMPERATURE: 99 F | WEIGHT: 293 LBS | OXYGEN SATURATION: 98 % | RESPIRATION RATE: 18 BRPM | BODY MASS INDEX: 39.68 KG/M2 | HEIGHT: 72 IN | HEART RATE: 82 BPM

## 2021-08-18 PROBLEM — Z91.199 NONCOMPLIANCE: Status: ACTIVE | Noted: 2021-08-18

## 2021-08-18 PROBLEM — R73.9 HYPERGLYCEMIA: Status: ACTIVE | Noted: 2021-08-18

## 2021-08-18 LAB
ALBUMIN SERPL-MCNC: 3.7 G/DL (ref 3.5–4.6)
ALP BLD-CCNC: 169 U/L (ref 40–130)
ALT SERPL-CCNC: 85 U/L (ref 0–33)
ANION GAP SERPL CALCULATED.3IONS-SCNC: 13 MEQ/L (ref 9–15)
AST SERPL-CCNC: 67 U/L (ref 0–35)
BASOPHILS ABSOLUTE: 0.1 K/UL (ref 0–0.2)
BASOPHILS RELATIVE PERCENT: 1.3 %
BILIRUB SERPL-MCNC: 0.5 MG/DL (ref 0.2–0.7)
BUN BLDV-MCNC: 18 MG/DL (ref 6–20)
CALCIUM SERPL-MCNC: 9 MG/DL (ref 8.5–9.9)
CHLORIDE BLD-SCNC: 100 MEQ/L (ref 95–107)
CHP ED QC CHECK: YES
CO2: 23 MEQ/L (ref 20–31)
CREAT SERPL-MCNC: 0.95 MG/DL (ref 0.5–0.9)
EKG ATRIAL RATE: 100 BPM
EKG P AXIS: 44 DEGREES
EKG P-R INTERVAL: 144 MS
EKG Q-T INTERVAL: 374 MS
EKG QRS DURATION: 90 MS
EKG QTC CALCULATION (BAZETT): 482 MS
EKG R AXIS: 245 DEGREES
EKG T AXIS: 16 DEGREES
EKG VENTRICULAR RATE: 100 BPM
EOSINOPHILS ABSOLUTE: 0.4 K/UL (ref 0–0.7)
EOSINOPHILS RELATIVE PERCENT: 5.3 %
GFR AFRICAN AMERICAN: >60
GFR NON-AFRICAN AMERICAN: >60
GLOBULIN: 3.7 G/DL (ref 2.3–3.5)
GLUCOSE BLD-MCNC: 247 MG/DL (ref 60–115)
GLUCOSE BLD-MCNC: 254 MG/DL (ref 60–115)
GLUCOSE BLD-MCNC: 397 MG/DL (ref 60–115)
GLUCOSE BLD-MCNC: 422 MG/DL (ref 60–115)
GLUCOSE BLD-MCNC: 445 MG/DL (ref 70–99)
GLUCOSE BLD-MCNC: 460 MG/DL
GLUCOSE BLD-MCNC: 460 MG/DL (ref 60–115)
GLUCOSE BLD-MCNC: 472 MG/DL (ref 60–115)
HBA1C MFR BLD: 9.9 % (ref 4.8–5.9)
HCT VFR BLD CALC: 37.2 % (ref 37–47)
HEMOGLOBIN: 12.5 G/DL (ref 12–16)
LACTIC ACID: 3.4 MMOL/L (ref 0.5–2.2)
LYMPHOCYTES ABSOLUTE: 2.3 K/UL (ref 1–4.8)
LYMPHOCYTES RELATIVE PERCENT: 33.4 %
MAGNESIUM: 2.1 MG/DL (ref 1.7–2.4)
MCH RBC QN AUTO: 31.5 PG (ref 27–31.3)
MCHC RBC AUTO-ENTMCNC: 33.7 % (ref 33–37)
MCV RBC AUTO: 93.5 FL (ref 82–100)
MONOCYTES ABSOLUTE: 0.5 K/UL (ref 0.2–0.8)
MONOCYTES RELATIVE PERCENT: 7.6 %
NEUTROPHILS ABSOLUTE: 3.6 K/UL (ref 1.4–6.5)
NEUTROPHILS RELATIVE PERCENT: 52.4 %
PDW BLD-RTO: 13.8 % (ref 11.5–14.5)
PERFORMED ON: ABNORMAL
PLATELET # BLD: 153 K/UL (ref 130–400)
POTASSIUM SERPL-SCNC: 4.4 MEQ/L (ref 3.4–4.9)
RBC # BLD: 3.98 M/UL (ref 4.2–5.4)
SODIUM BLD-SCNC: 136 MEQ/L (ref 135–144)
TOTAL PROTEIN: 7.4 G/DL (ref 6.3–8)
TROPONIN: <0.01 NG/ML (ref 0–0.01)
TROPONIN: <0.01 NG/ML (ref 0–0.01)
WBC # BLD: 6.8 K/UL (ref 4.8–10.8)

## 2021-08-18 PROCEDURE — 2580000003 HC RX 258: Performed by: INTERNAL MEDICINE

## 2021-08-18 PROCEDURE — G0378 HOSPITAL OBSERVATION PER HR: HCPCS

## 2021-08-18 PROCEDURE — 99222 1ST HOSP IP/OBS MODERATE 55: CPT | Performed by: INTERNAL MEDICINE

## 2021-08-18 PROCEDURE — 6370000000 HC RX 637 (ALT 250 FOR IP): Performed by: INTERNAL MEDICINE

## 2021-08-18 PROCEDURE — 83036 HEMOGLOBIN GLYCOSYLATED A1C: CPT

## 2021-08-18 PROCEDURE — 6370000000 HC RX 637 (ALT 250 FOR IP): Performed by: PHYSICIAN ASSISTANT

## 2021-08-18 PROCEDURE — 84484 ASSAY OF TROPONIN QUANT: CPT

## 2021-08-18 PROCEDURE — 36415 COLL VENOUS BLD VENIPUNCTURE: CPT

## 2021-08-18 PROCEDURE — 94660 CPAP INITIATION&MGMT: CPT

## 2021-08-18 PROCEDURE — 6360000002 HC RX W HCPCS: Performed by: INTERNAL MEDICINE

## 2021-08-18 PROCEDURE — 96376 TX/PRO/DX INJ SAME DRUG ADON: CPT

## 2021-08-18 PROCEDURE — 83735 ASSAY OF MAGNESIUM: CPT

## 2021-08-18 PROCEDURE — 96372 THER/PROPH/DIAG INJ SC/IM: CPT

## 2021-08-18 PROCEDURE — 93010 ELECTROCARDIOGRAM REPORT: CPT | Performed by: INTERNAL MEDICINE

## 2021-08-18 PROCEDURE — 80053 COMPREHEN METABOLIC PANEL: CPT

## 2021-08-18 PROCEDURE — 85025 COMPLETE CBC W/AUTO DIFF WBC: CPT

## 2021-08-18 RX ORDER — INSULIN GLARGINE 100 [IU]/ML
60 INJECTION, SOLUTION SUBCUTANEOUS
Status: DISCONTINUED | OUTPATIENT
Start: 2021-08-19 | End: 2021-08-19 | Stop reason: HOSPADM

## 2021-08-18 RX ORDER — ACETAMINOPHEN 325 MG/1
650 TABLET ORAL EVERY 6 HOURS PRN
Status: DISCONTINUED | OUTPATIENT
Start: 2021-08-18 | End: 2021-08-19 | Stop reason: HOSPADM

## 2021-08-18 RX ORDER — SODIUM CHLORIDE 9 MG/ML
25 INJECTION, SOLUTION INTRAVENOUS PRN
Status: DISCONTINUED | OUTPATIENT
Start: 2021-08-18 | End: 2021-08-19 | Stop reason: HOSPADM

## 2021-08-18 RX ORDER — LANOLIN ALCOHOL/MO/W.PET/CERES
400 CREAM (GRAM) TOPICAL DAILY
Status: DISCONTINUED | OUTPATIENT
Start: 2021-08-18 | End: 2021-08-19 | Stop reason: HOSPADM

## 2021-08-18 RX ORDER — GABAPENTIN 800 MG/1
800 TABLET ORAL 3 TIMES DAILY
COMMUNITY
Start: 2021-08-09

## 2021-08-18 RX ORDER — POLYETHYLENE GLYCOL 3350 17 G/17G
17 POWDER, FOR SOLUTION ORAL DAILY PRN
Status: DISCONTINUED | OUTPATIENT
Start: 2021-08-18 | End: 2021-08-19 | Stop reason: HOSPADM

## 2021-08-18 RX ORDER — INSULIN GLARGINE 100 [IU]/ML
140 INJECTION, SOLUTION SUBCUTANEOUS NIGHTLY
Qty: 5 PEN | Refills: 0 | Status: SHIPPED | OUTPATIENT
Start: 2021-08-18 | End: 2021-08-19 | Stop reason: SDUPTHER

## 2021-08-18 RX ORDER — INSULIN GLARGINE 100 [IU]/ML
140 INJECTION, SOLUTION SUBCUTANEOUS NIGHTLY
Status: DISCONTINUED | OUTPATIENT
Start: 2021-08-18 | End: 2021-08-18

## 2021-08-18 RX ORDER — DEXTROSE MONOHYDRATE 50 MG/ML
100 INJECTION, SOLUTION INTRAVENOUS PRN
Status: DISCONTINUED | OUTPATIENT
Start: 2021-08-18 | End: 2021-08-19 | Stop reason: HOSPADM

## 2021-08-18 RX ORDER — ACETAMINOPHEN 650 MG/1
650 SUPPOSITORY RECTAL EVERY 6 HOURS PRN
Status: DISCONTINUED | OUTPATIENT
Start: 2021-08-18 | End: 2021-08-19 | Stop reason: HOSPADM

## 2021-08-18 RX ORDER — LIDOCAINE 4 G/G
1 PATCH TOPICAL DAILY
Status: DISCONTINUED | OUTPATIENT
Start: 2021-08-18 | End: 2021-08-19 | Stop reason: HOSPADM

## 2021-08-18 RX ORDER — ONDANSETRON 4 MG/1
4 TABLET, ORALLY DISINTEGRATING ORAL EVERY 8 HOURS PRN
Status: DISCONTINUED | OUTPATIENT
Start: 2021-08-18 | End: 2021-08-19 | Stop reason: HOSPADM

## 2021-08-18 RX ORDER — NICOTINE POLACRILEX 4 MG
15 LOZENGE BUCCAL PRN
Status: DISCONTINUED | OUTPATIENT
Start: 2021-08-18 | End: 2021-08-19 | Stop reason: HOSPADM

## 2021-08-18 RX ORDER — INSULIN GLARGINE 100 [IU]/ML
160 INJECTION, SOLUTION SUBCUTANEOUS NIGHTLY
Status: DISCONTINUED | OUTPATIENT
Start: 2021-08-18 | End: 2021-08-19 | Stop reason: HOSPADM

## 2021-08-18 RX ORDER — SODIUM CHLORIDE 0.9 % (FLUSH) 0.9 %
5-40 SYRINGE (ML) INJECTION PRN
Status: DISCONTINUED | OUTPATIENT
Start: 2021-08-18 | End: 2021-08-19 | Stop reason: HOSPADM

## 2021-08-18 RX ORDER — KETOROLAC TROMETHAMINE 15 MG/ML
15 INJECTION, SOLUTION INTRAMUSCULAR; INTRAVENOUS EVERY 6 HOURS PRN
Status: DISCONTINUED | OUTPATIENT
Start: 2021-08-18 | End: 2021-08-19 | Stop reason: HOSPADM

## 2021-08-18 RX ORDER — ONDANSETRON 2 MG/ML
4 INJECTION INTRAMUSCULAR; INTRAVENOUS EVERY 6 HOURS PRN
Status: DISCONTINUED | OUTPATIENT
Start: 2021-08-18 | End: 2021-08-19 | Stop reason: HOSPADM

## 2021-08-18 RX ORDER — DEXTROSE MONOHYDRATE 25 G/50ML
12.5 INJECTION, SOLUTION INTRAVENOUS PRN
Status: DISCONTINUED | OUTPATIENT
Start: 2021-08-18 | End: 2021-08-19 | Stop reason: HOSPADM

## 2021-08-18 RX ORDER — LANOLIN ALCOHOL/MO/W.PET/CERES
400 CREAM (GRAM) TOPICAL DAILY
Status: DISCONTINUED | OUTPATIENT
Start: 2021-08-18 | End: 2021-08-18

## 2021-08-18 RX ORDER — PROMETHAZINE HYDROCHLORIDE 25 MG/1
25 TABLET ORAL EVERY 6 HOURS PRN
Qty: 20 TABLET | Refills: 0 | Status: SHIPPED | OUTPATIENT
Start: 2021-08-18 | End: 2021-08-25

## 2021-08-18 RX ORDER — SODIUM CHLORIDE 0.9 % (FLUSH) 0.9 %
5-40 SYRINGE (ML) INJECTION EVERY 12 HOURS SCHEDULED
Status: DISCONTINUED | OUTPATIENT
Start: 2021-08-18 | End: 2021-08-19 | Stop reason: HOSPADM

## 2021-08-18 RX ADMIN — INSULIN GLARGINE 160 UNITS: 100 INJECTION, SOLUTION SUBCUTANEOUS at 22:07

## 2021-08-18 RX ADMIN — INSULIN LISPRO 50 UNITS: 100 INJECTION, SOLUTION INTRAVENOUS; SUBCUTANEOUS at 11:35

## 2021-08-18 RX ADMIN — SODIUM CHLORIDE, PRESERVATIVE FREE 10 ML: 5 INJECTION INTRAVENOUS at 08:30

## 2021-08-18 RX ADMIN — ONDANSETRON 4 MG: 4 TABLET, ORALLY DISINTEGRATING ORAL at 05:33

## 2021-08-18 RX ADMIN — ENOXAPARIN SODIUM 40 MG: 40 INJECTION SUBCUTANEOUS at 08:29

## 2021-08-18 RX ADMIN — INSULIN LISPRO 50 UNITS: 100 INJECTION, SOLUTION INTRAVENOUS; SUBCUTANEOUS at 08:28

## 2021-08-18 RX ADMIN — Medication 400 MG: at 01:25

## 2021-08-18 RX ADMIN — POTASSIUM BICARBONATE 40 MEQ: 782 TABLET, EFFERVESCENT ORAL at 01:26

## 2021-08-18 RX ADMIN — ACETAMINOPHEN 650 MG: 325 TABLET ORAL at 05:33

## 2021-08-18 RX ADMIN — KETOROLAC TROMETHAMINE 15 MG: 15 INJECTION, SOLUTION INTRAMUSCULAR; INTRAVENOUS at 11:34

## 2021-08-18 ASSESSMENT — PAIN SCALES - GENERAL
PAINLEVEL_OUTOF10: 4
PAINLEVEL_OUTOF10: 3
PAINLEVEL_OUTOF10: 10
PAINLEVEL_OUTOF10: 8

## 2021-08-18 NOTE — ED PROVIDER NOTES
[address] [facility] ED  eMERGENCY dEPARTMENT eNCOUnter      Pt Name: Grover Mosley  MRN: [id number]  [place] 1975  Date of evaluation: 8/17/2021  Provider: Kevyn Daniel Dr       Chief Complaint   Patient presents with    Chest Pain    Hyperglycemia         HISTORY OF PRESENT ILLNESS   (Location/Symptom, Timing/Onset,Context/Setting, Quality, Duration, Modifying Factors, Severity)  Note limiting factors. Grover Mosley is a 48 y.o. female who presents to the emergency department presents with elevated glucose that she has been using more insulin. Family states patient running out of insulin. She is also had chest pain that started yesterday she took nitroglycerin without any relief. Patient is nauseous without vomiting. Denies pain burning frequent urination. Family translates for patient where needed. [facility]    NursingNotes were reviewed. REVIEW OF SYSTEMS    (2-9 systems for level 4, 10 or more for level 5)     Review of Systems   Constitutional: Negative for activity change, appetite change, fever and unexpected weight change. HENT: Negative for ear discharge, nosebleeds and voice change. Eyes: Negative for photophobia and discharge. Respiratory: Negative for apnea, cough and shortness of breath. Cardiovascular: Positive for chest pain. Negative for leg swelling. Gastrointestinal: Positive for nausea. Negative for abdominal distention, anal bleeding and vomiting. Endocrine: Negative for cold intolerance, heat intolerance and polyphagia. Genitourinary: Positive for frequency. Negative for dysuria and genital sores. Musculoskeletal: Negative for back pain, joint swelling and neck pain. Skin: Negative for pallor. Allergic/Immunologic: Negative for immunocompromised state. Neurological: Negative for seizures and facial asymmetry. Hematological: Does not bruise/bleed easily.    Psychiatric/Behavioral: Negative for behavioral problems, self-injury and sleep disturbance. All other systems reviewed and are negative. Except as noted above the remainder of the review of systems was reviewed and negative. PAST MEDICAL HISTORY     Past Medical History:   Diagnosis Date    Asthma     Chronic abdominal pain     Depression     Hyperlipidemia     Hypertension     NATALEE (obstructive sleep apnea)     Schizophrenia (HCC)     Type II or unspecified type diabetes mellitus without mention of complication, not stated as uncontrolled          SURGICALHISTORY       Past Surgical History:   Procedure Laterality Date    COLONOSCOPY  2/21/14    [email or web address]    UPPER GASTROINTESTINAL ENDOSCOPY  2/21/14    [facility]    UPPER GASTROINTESTINAL ENDOSCOPY N/A 3/22/2021    EGD DIAGNOSTIC ONLY performed by Elsa Sahu MD at [address] [place] [address]       Previous Medications    ARIPIPRAZOLE (ABILIFY PO)    Take  by mouth.     BUPROPION HCL (WELLBUTRIN PO)    Take 150 mg by mouth     ESOMEPRAZOLE (NEXIUM) 40 MG DELAYED RELEASE CAPSULE    Take 1 capsule by mouth every morning (before breakfast)    INSULIN SYRINGE .5CC/29G 29G X 1/2\" 0.5 ML MISC    1 each by Does not apply route daily    LEVOTHYROXINE (SYNTHROID) 50 MCG TABLET    Take 50 mcg by mouth Daily    LISINOPRIL (PRINIVIL;ZESTRIL) 10 MG TABLET    Take 1 tablet by mouth daily    LISINOPRIL (PRINIVIL;ZESTRIL) 20 MG TABLET    Take 0.5 tablets by mouth daily    MAGNESIUM OXIDE (MAG-OX) 400 (240 MG) MG TABLET    Take 1 tablet by mouth daily    MELOXICAM (MOBIC) 15 MG TABLET    Take 1 tablet by mouth daily as needed for Pain    METFORMIN HCL PO    Take 500 mg by mouth     METOPROLOL SUCCINATE (TOPROL XL) 25 MG EXTENDED RELEASE TABLET    Take 1 tablet by mouth daily    MONTELUKAST (SINGULAIR) 10 MG TABLET    Take 10 mg by mouth nightly    NITROGLYCERIN (NITROSTAT) 0.4 MG SL TABLET    Place 1 tablet under the tongue every 5 minutes as needed for Chest pain up to max of 3 total doses. If no relief after 1 dose, call 911. SERTRALINE (ZOLOFT) 100 MG TABLET    Take 100 mg by mouth daily    TOPIRAMATE (TOPAMAX) 25 MG TABLET    Take 25 mg by mouth daily    TRAZODONE (DESYREL) 50 MG TABLET    Take 50 mg by mouth nightly       ALLERGIES     Shellfish-derived products, Amoxicillin, Macrobid [nitrofurantoin monohyd macro], Reglan [metoclopramide], Singulair [montelukast], and Nubain [nalbuphine hcl]    FAMILY HISTORY       Family History   Problem Relation Age of Onset    Other Mother         Diverticulitis    Colon Cancer Paternal Uncle     Colon Cancer Maternal Grandfather           SOCIAL HISTORY       Social History     Socioeconomic History    Marital status: Single     Spouse name: Not on file    Number of children: Not on file    Years of education: Not on file    Highest education level: Not on file   Occupational History    Not on file   Tobacco Use    Smoking status: Former Smoker     Packs/day: 1.00     Types: Cigarettes    Smokeless tobacco: Never Used   Vaping Use    Vaping Use: Never used   Substance and Sexual Activity    Alcohol use: No    Drug use: No    Sexual activity: Not on file   Other Topics Concern    Not on file   Social History Narrative    Lives w brother     Social Determinants of Health     Financial Resource Strain:     Difficulty of Paying Living Expenses:    Food Insecurity:     Worried About Running Out of Food in the Last Year:     Ran Out of Food in the Last Year:    Transportation Needs:     Lack of Transportation (Medical):      Lack of Transportation (Non-Medical):    Physical Activity:     Days of Exercise per Week:     Minutes of Exercise per Session:    Stress:     Feeling of Stress :    Social Connections:     Frequency of Communication with Friends and Family:     Frequency of Social Gatherings with Friends and Family:     Attends Confucianism Services:     Active Member of Clubs or Organizations:     Attends Club or Organization Co-signsthis chart in the absence of a cardiologist.    EKG normal sinus rhythm rate 100 - ST segment elevation. Ventricular rate 100 MD interval 144 ms QRS 90 ms  ms.     RADIOLOGY:   Non-plain filmimages such as CT, Ultrasound and MRI are read by the radiologist. Plain radiographic images are visualized and preliminarily interpreted by the emergency physician with the below findings:         Interpretation per the Radiologist below, if available at the time ofthis note:    XR CHEST PORTABLE    (Results Pending)         ED BEDSIDE ULTRASOUND:   Performed by ED Physician - none    LABS:  Labs Reviewed   COMPREHENSIVE METABOLIC PANEL - Abnormal; Notable for the following components:       Result Value    Sodium 133 (*)     Chloride 94 (*)     Anion Gap 16 (*)     Glucose 567 (*)     CREATININE 1.09 (*)     GFR Non- 53.5 (*)     Total Protein 8.2 (*)     Alkaline Phosphatase 200 (*)      (*)     AST 94 (*)     Globulin 4.2 (*)     All other components within normal limits    Narrative:     CALL  [facility]  ED tel. [phone],  Glucose results called to and read back by alok BAEZ RN, 08/17/2021 21:07, by  MELISSA   URINE RT REFLEX TO CULTURE - Abnormal; Notable for the following components:    Glucose, Ur >=1000 (*)     Ketones, Urine TRACE (*)     Protein, UA TRACE (*)     All other components within normal limits   LACTIC ACID, PLASMA - Abnormal; Notable for the following components:    Lactic Acid 3.2 (*)     All other components within normal limits    Narrative:     CALL  [facility]  ED tel. [phone],  Lactic Acid results called to and read back by Stephanie BAEZ RN, 08/17/2021 23:06,  by Elizabeth Priest   LACTIC ACID, PLASMA - Abnormal; Notable for the following components:    Lactic Acid 3.4 (*)     All other components within normal limits    Narrative:     CALL  [facility]  ED tel. [phone],  Lactic Acid results called to and read back by Frankie GILL, 08/18/2021  01:33, by MELISSA   POCT GLUCOSE - Abnormal; Notable for the following components:    POC Glucose 507 (*)     All other components within normal limits   POCT VENOUS - Abnormal; Notable for the following components:    POC Glucose 569 (*)     POC Creatinine 1.3 (*)     GFR Non- 44 (*)     GFR  53 (*)     Lactate 4.70 (*)     All other components within normal limits   POCT GLUCOSE - Abnormal; Notable for the following components:    POC Glucose 539 (*)     All other components within normal limits   POCT GLUCOSE - Abnormal; Notable for the following components:    POC Glucose 475 (*)     All other components within normal limits   POCT GLUCOSE - Abnormal; Notable for the following components:    POC Glucose 460 (*)     All other components within normal limits   POCT GLUCOSE - Normal   POCT GLUCOSE - Normal   CBC WITH AUTO DIFFERENTIAL   MAGNESIUM    Narrative:     CALL  [facility] tel. [phone],  Glucose results called to and read back by alok BAEZ RN, 08/17/2021 21:07, by  MELISSA   TROPONIN   D-DIMER, QUANTITATIVE   POCT GLUCOSE       All other labs were within normal range or not returned as of this dictation. EMERGENCY DEPARTMENT COURSE and DIFFERENTIAL DIAGNOSIS/MDM:   Vitals:    Vitals:    08/17/21 2200 08/18/21 0000 08/18/21 0128 08/18/21 0200   BP: (!) 145/85 (!) 149/85 (!) 148/75 132/80   Pulse: 99  96 97   Resp:   20    Temp:       SpO2: 95% 93% 98% 95%   Weight:                MDM  Number of Diagnoses or Management Options  Chest wall pain  Encounter for medication refill  Nausea  Type 2 diabetes mellitus with hyperglycemia, with long-term current use of insulin (HCC)  Diagnosis management comments: With Dr. Beth Mcdonald and Dr. Mei Epperson he recommends 20years Q insulin energy requests potassium and magnesium will sign out to [facility] for appropriate disposition medication refills.        Amount and/or Complexity of Data Reviewed  Clinical lab tests: reviewed and ordered  Discuss the patient with other providers: yes      After additional insulin bolus, repeat sugar increased from 460 to 472. Lactic increased from 3.2 to 3.4. Dr. Gissel Arana recommends admission. Dr. Maria Teresa Thomas accepts. Pt stable for admission. CRITICAL CARE TIME     CONSULTS:  None    PROCEDURES:  Unless otherwise noted below, none     Procedures    FINAL IMPRESSION      1. Chest wall pain    2. Type 2 diabetes mellitus with hyperglycemia, with long-term current use of insulin (HCC)    3. Elevated lactic acid level          DISPOSITION/PLAN   DISPOSITION Ed Observation 08/17/2021 11:06:55 PM      PATIENT REFERRED TO:  No follow-up provider specified. DISCHARGE MEDICATIONS:  New Prescriptions    PROMETHAZINE (PHENERGAN) 25 MG TABLET    Take 1 tablet by mouth every 6 hours as needed for Nausea WARNING:  May cause drowsiness. May impair ability to operate vehicles or machinery. Do not use in combination with alcohol.           (Please note that portions of this note were completed with a voice recognition program.  Efforts were made to edit the dictations but occasionally words are mis-transcribed.)    [employer], MARCIA (electronically signed)         [employer] InsuranceMARCIA  08/18/21 [address] MARCIA  08/18/21 0089

## 2021-08-18 NOTE — CARE COORDINATION
[facility] Case Management Initial Discharge Assessment    Met with Patient to discuss discharge plan. PCP: Mary Ann Moreno MD                                Date of Last Visit: TUESDAY    If no PCP, list provided? N/A    Discharge Planning    Living Arrangements: independently at home    Who do you live with? DTR    Who helps you with your care:  self    If lives at home:     Do you have any barriers navigating in your home? no    Patient can perform ADL? Yes    Current Services (outpatient and in home) :  [facility] , [facility] DM EDUCATION CLASSES    Dialysis: No    Is transportation available to get to your appointments? Yes- FAMILY OR [facility]     DME Equipment:  YES- ROLATOR    Respiratory equipment: CPAP without O2    Respiratory provider: [address]:  yes -  [address] [place] [address] with Medication Assistance Program?  No      Patient agreeable to [facility] [address]? Declined    Patient agreeable to SNF/Rehab? Declined    Other discharge needs identified? Other DECLINED DM ED CLASSESES     Initial Discharge Plan? (Note: please see concurrent daily documentation for any updates after initial note). SPOKE WITH PATIENT AT [facility] AND USED  TABLET #[id number]. PT [address]. SHE GETS RIDES FROM HER BROTHER AND [facility] CASEMANAGER. PT STATES THAT SHE GETS DM ED CLASSES AT [facility] AND THAT SHE DOES NOT NEED OUTPT DM ED HERE. PT STATES SHE COVERS HERSELF AT NIGHT WITH TRESIBA WHEN HER SUGARS ARE HIGH. PT STATES SHE RUNS OUT BECAUSE SHE COVERS HERSELF. CALLED [facility] AND THEY STATE SHE CALLS EVERYDAY BECAUSE SHE RUNS OUT AND FEELS SHE GIVES HERSELF TOO MUCH. PT STATES IF SHE DOESN'T DO THIS THAN SHE GETS SYMPTOMS LIKE CHEST PAIN. UPDATED MD AND ORDER FOR INPT DM ED OBTAINED. PT WILL NEED EDUCATION FOR HOW TO COVER HERSELF. PT DECLINES HHC AT DISCHARGE MULTIPLE TIMES DURING CONVERSATION AND THAT SHE HAS HER DAUGHTERS TO HELP HER. Readmission Risk              Risk of Unplanned Readmission:  0         Electronically signed by Shavonne Vazquez RN on 8/18/2021 at 2:24 PM

## 2021-08-18 NOTE — CONSULTS
[facility] Cardiology Consult Note        Date of Consult:   2021    Patient:    Romel Tao    :    1975  CSN:    [id number]    Consulting Cardiologist: May Ferguson MD     Primary Cardiologist: TIFFANIE Belcher MD [facility]    Requesting Physician:  Ayla Khan DO       Reason for Consult:  Chest Pain      Assessment:    1. Chest pain, atypical, reproducible, probable musculoskeletal etiology. 2. Negative troponins to date. 3. Shortness of breath  4. Vomiting  5. Hyperglycemia, ran out of insulin recently. 6. No history of coronary artery disease, offered cardiac catheterization and has declined. 7. Normal LV systolic function, LVEF 16% by echocardiogram 3/2021  8. Hypertension  9. Hyperlipidemia  10. Diabetes  11. Asthma  12. Obstructive sleep apnea  13. Schizophrenia  14. GERD / gastritis. 15. Depression  16. Morbid obesity   17. Allergy to shellfish  18. Past smoker  19. Medical noncompliance    Plan:    1. Cardiac Supportive Care  2. Patient says symptoms are clearly noncardiac and reproducible due to musculoskeletal discomfort. She has declined cardiac catheterization and has not followed up for her last 3 office visits. Would suggest conservative treatment. Reassured. 3. No further cardiac evaluation or follow-up needed at this time from a cardiovascular standpoint. 4. Continue current  medications. 5. Okay to discharge home from CV standpoint once okay with others. 6. See Orders        HISTORY OF PRESENT ILLNESS:      Romel Tao is a pleasant 48 y.o. female with past cardiac history as noted above including hypertension, hyperlipidemia, diabetes, normal echocardiogram and LV function, no evidence of prior coronary disease who presented with chest pain symptoms which were on and off for the last 3days. They are nonexertional.  Reproducible with palpation. She had an episode of shortness of breath. She did have one episode of vomiting.   She apparently ran out of her insulin several days ago. In the emergency room she was noted to have hyperglycemia. Initial troponin was negative. Cardiology was asked to see in consultation regarding the chest discomfort symptoms. Patient Follows with Juanito Celeste MD [address] [place] [address]. Records indicate last 3 follow-up visits canceled with no-shows. Patient History and Records, EMR reviewed. Patient Interviewed and examined. Patient states that she ran out of her insulin 3 days ago. Since she has had chest pain with 1 episode of vomiting and some shortness of breath. Her chest pain is clearly noncardiac with reproducible chest pain on mild palpation. She has had this before. Troponin is negative. EKG is unremarkable. She feels well otherwise. Denies current CP, SOB, LH, Dizziness, TIA or CVA Symptoms. No Orthopnea, Edema or CHF symptoms. No Palpitations. No Syncope. No Fever, Chills or Cold symptoms. No GI,  or Bleeding complaints. Cardiac and general ROS otherwise negative. [address] otherwise negative other than noted. Past Medical History:   Diagnosis Date    Asthma     Chronic abdominal pain     Depression     Hyperlipidemia     Hypertension     NATALEE (obstructive sleep apnea)     Schizophrenia (HCC)     Type II or unspecified type diabetes mellitus without mention of complication, not stated as uncontrolled        Past Surgical History:   Procedure Laterality Date    COLONOSCOPY  2/21/14    [email or web address]    UPPER GASTROINTESTINAL ENDOSCOPY  2/21/14    [email or web address]    UPPER GASTROINTESTINAL ENDOSCOPY N/A 3/22/2021    EGD DIAGNOSTIC ONLY performed by Phyllis Philippe MD at [facility]       Prior to Admission medications    Medication Sig Start Date End Date Taking?  Authorizing Provider   insulin glargine (LANTUS SOLOSTAR) 100 UNIT/ML injection pen Inject 140 Units into the skin nightly 8/18/21  Yes Kelsey Waters PA-C   insulin lispro (Richrd Anis U-200) 200 UNIT/ML SOPN pen Inject 60 Units into the skin 3 times daily (with meals) 8/18/21  Yes Karlie Mckinney PA-C   promethazine (PHENERGAN) 25 MG tablet Take 1 tablet by mouth every 6 hours as needed for Nausea WARNING:  May cause drowsiness. May impair ability to operate vehicles or machinery. Do not use in combination with alcohol. 8/18/21 8/25/21 Yes Karlie Mckinney PA-C   magnesium oxide (MAG-OX) 400 (240 Mg) MG tablet Take 1 tablet by mouth daily 3/26/21  Yes Clifford Burgos MD   nitroGLYCERIN (NITROSTAT) 0.4 MG SL tablet Place 1 tablet under the tongue every 5 minutes as needed for Chest pain up to max of 3 total doses.  If no relief after 1 dose, call 911. 3/25/21  Yes Clifford Burgos MD   metoprolol succinate (TOPROL XL) 25 MG extended release tablet Take 1 tablet by mouth daily 3/25/21  Yes Clifford Burgos MD   lisinopril (PRINIVIL;ZESTRIL) 20 MG tablet Take 0.5 tablets by mouth daily 3/25/21  Yes Clifford Burgos MD   lisinopril (PRINIVIL;ZESTRIL) 10 MG tablet Take 1 tablet by mouth daily 3/25/21  Yes Clifford Burgos MD   INSULIN SYRINGE .5CC/29G 29G X 1/2\" 0.5 ML MISC 1 each by Does not apply route daily 8/11/20  Yes Karlie Mckinney PA-C   meloxicam (MOBIC) 15 MG tablet Take 1 tablet by mouth daily as needed for Pain 4/10/20  Yes Rebecca Leung MD   esomeprazole ([email or web address]) 40 MG delayed release capsule Take 1 capsule by mouth every morning (before breakfast)  Patient taking differently: Take 40 mg by mouth 2 times daily  1/11/19  Yes Ernestine Hoffman MD   sertraline (ZOLOFT) 100 MG tablet Take 100 mg by mouth daily   Yes Historical Provider, MD   traZODone (DESYREL) 50 MG tablet Take 50 mg by mouth nightly   Yes Historical Provider, MD   levothyroxine (SYNTHROID) 50 MCG tablet Take 50 mcg by mouth Daily   Yes Historical Provider, MD   topiramate (TOPAMAX) 25 MG tablet Take 25 mg by mouth daily   Yes Historical Provider, MD   montelukast (SINGULAIR) 10 MG tablet Take 10 mg by mouth nightly   Yes Historical Provider, MD   METFORMIN HCL PO Take 1,000 mg by mouth    Yes Historical Provider, MD   BuPROPion HCl (WELLBUTRIN PO) Take 150 mg by mouth    Yes Historical Provider, MD   ARIPiprazole (ABILIFY PO) Take 5 mg by mouth    Yes Historical Provider, MD   gabapentin (NEURONTIN) 800 MG tablet Take 800 mg by mouth 3 times daily.  For leg pain 8/9/21   Historical Provider, MD       Scheduled Meds:   magnesium oxide  400 mg Oral Daily    potassium bicarb-citric acid  40 mEq Oral Daily    sodium chloride flush  5-40 mL Intravenous 2 times per day    enoxaparin  40 mg Subcutaneous Daily    insulin lispro  0-18 Units Subcutaneous TID WC    insulin lispro  0-9 Units Subcutaneous Nightly    insulin glargine  140 Units Subcutaneous Nightly    insulin lispro  50 Units Subcutaneous TID WC    lidocaine  1 patch Transdermal Daily     Continuous Infusions:   sodium chloride      dextrose       PRN Meds:sodium chloride flush, sodium chloride, ondansetron **OR** ondansetron, polyethylene glycol, acetaminophen **OR** acetaminophen, glucose, dextrose, glucagon (rDNA), dextrose, ketorolac    Allergies   Allergen Reactions    Shellfish-Derived Products Anaphylaxis    Amoxicillin Swelling    Macrobid [Nitrofurantoin Monohyd Macro]     Reglan [Metoclopramide]     Singulair [Montelukast]     Nubain [Nalbuphine Hcl] Swelling and Rash       Social History     Socioeconomic History    Marital status: Single     Spouse name: Not on file    Number of children: Not on file    Years of education: Not on file    Highest education level: Not on file   Occupational History    Not on file   Tobacco Use    Smoking status: Former Smoker     Packs/day: 1.00     Types: Cigarettes    Smokeless tobacco: Never Used   Vaping Use    Vaping Use: Never used   Substance and Sexual Activity    Alcohol use: No    Drug use: No    Sexual activity: Not on file   Other Topics Concern    Not on file   Social History Narrative    Lives w brother     Social Determinants of Health     Financial Resource Strain:     Difficulty of Paying Living Expenses:    Food Insecurity:     Worried About Running Out of Food in the Last Year:     [address] in the Last Year:    Transportation Needs:     Lack of Transportation (Medical):  Lack of Transportation (Non-Medical):    Physical Activity:     Days of Exercise per Week:     Minutes of Exercise per Session:    Stress:     Feeling of Stress :    Social Connections:     Frequency of Communication with Friends and Family:     Frequency of Social Gatherings with Friends and Family:     Attends Sikh Services:     Active Member of Clubs or Organizations:     Attends Club or Organization Meetings:     Marital Status:    Intimate Partner Violence:     Fear of Current or Ex-Partner:     Emotionally Abused:     Physically Abused:     Sexually Abused:        Family History   Problem Relation Age of Onset    Other Mother         Diverticulitis    Colon Cancer Paternal Uncle     Colon Cancer Maternal Grandfather        Review Of Systems:    14 point ROS negative other than mentioned. Physical Exam:    CURRENT VITALS: /66   Pulse 95   Temp 98.1 °F (36.7 °C) (Oral)   Resp 17   Ht 6' (1.829 m)   Wt (!) 340 lb (154.2 kg)   LMP  (LMP Unknown)   SpO2 94%   BMI 46.11 kg/m²     CONSTITUTIONAL:  awake, alert, cooperative, no apparent distress,   ENT:  Normocephalic, without obvious abnormality, atraumatic, sinuses nontender on palpation, external ears without lesions,  NECK:  Supple, symmetrical, trachea midline, no adenopathy, thyroid symmetric, not enlarged and no tenderness, skin normal, No bruits. LUNGS:  No increased work of breathing, good air exchange, clear to auscultation bilaterally, no crackles, no wheezing  CARDIOVASCULAR: Decreased apical impulse, regular rate and rhythm, normal S1 and S2,  1/6 Systolic murmur noted.   Midsternal reproducible chest pain with minimal effort. ABDOMEN: Morbidly obese, normal bowel sounds, soft, non-distended, non-tender, no masses palpated, no hepatosplenomegally  EXTREMETIES: No edema, Pulses Strong Thruout. No ulcers. NEUROLOGIC:  Awake, alert, oriented to name, place and time. Following all commands and moving all extremties.   SKIN:  no bruising or bleeding, normal skin color, texture, turgor and no rashes    Labs:  Recent Results (from the past 24 hour(s))   EKG 12 Lead    Collection Time: 08/17/21  7:50 PM   Result Value Ref Range    Ventricular Rate 100 BPM    Atrial Rate 100 BPM    P-R Interval 144 ms    QRS Duration 90 ms    Q-T Interval 374 ms    QTc Calculation (Bazett) 482 ms    P Axis 44 degrees    R Axis 245 degrees    T Axis 16 degrees   POCT Glucose    Collection Time: 08/17/21  7:56 PM   Result Value Ref Range    POC Glucose 507 (HH) 60 - 115 mg/dl    Performed on ACCU-CHEK    Comprehensive Metabolic Panel    Collection Time: 08/17/21  8:15 PM   Result Value Ref Range    Sodium 133 (L) 135 - 144 mEq/L    Potassium 4.5 3.4 - 4.9 mEq/L    Chloride 94 (L) 95 - 107 mEq/L    CO2 23 20 - 31 mEq/L    Anion Gap 16 (H) 9 - 15 mEq/L    Glucose 567 (HH) 70 - 99 mg/dL    BUN 16 6 - 20 mg/dL    CREATININE 1.09 (H) 0.50 - 0.90 mg/dL    GFR Non-African American 53.5 (L) >60    GFR  >60.0 >60    Calcium 9.9 8.5 - 9.9 mg/dL    Total Protein 8.2 (H) 6.3 - 8.0 g/dL    Albumin 4.0 3.5 - 4.6 g/dL    Total Bilirubin 0.6 0.2 - 0.7 mg/dL    Alkaline Phosphatase 200 (H) 40 - 130 U/L     (H) 0 - 33 U/L    AST 94 (H) 0 - 35 U/L    Globulin 4.2 (H) 2.3 - 3.5 g/dL   CBC Auto Differential    Collection Time: 08/17/21  8:15 PM   Result Value Ref Range    WBC 6.8 4.8 - 10.8 K/uL    RBC 4.40 4.20 - 5.40 M/uL    Hemoglobin 13.7 12.0 - 16.0 g/dL    Hematocrit 40.1 37.0 - 47.0 %    MCV 91.1 82.0 - 100.0 fL    MCH 31.2 27.0 - 31.3 pg    MCHC 34.2 33.0 - 37.0 %    RDW 13.9 11.5 - 14.5 %    Platelets 547 719 - 056 K/uL Neutrophils % 65.0 %    Lymphocytes % 25.1 %    Monocytes % 6.3 %    Eosinophils % 2.9 %    Basophils % 0.7 %    Neutrophils Absolute 4.4 1.4 - 6.5 K/uL    Lymphocytes Absolute 1.7 1.0 - 4.8 K/uL    Monocytes Absolute 0.4 0.2 - 0.8 K/uL    Eosinophils Absolute 0.2 0.0 - 0.7 K/uL    Basophils Absolute 0.0 0.0 - 0.2 K/uL   Magnesium    Collection Time: 08/17/21  8:15 PM   Result Value Ref Range    Magnesium 2.1 1.7 - 2.4 mg/dL   Troponin    Collection Time: 08/17/21  8:15 PM   Result Value Ref Range    Troponin <0.010 0.000 - 0.010 ng/mL   Urine Reflex to Culture    Collection Time: 08/17/21  8:15 PM    Specimen: Urine, clean catch   Result Value Ref Range    Color, UA Yellow Straw/Yellow    Clarity, UA Clear Clear    Glucose, Ur >=1000 (A) Negative mg/dL    Bilirubin Urine Negative Negative    Ketones, Urine TRACE (A) Negative mg/dL    Specific Gravity, UA 1.026 1.005 - 1.030    Blood, Urine Negative Negative    pH, UA 5.5 5.0 - 9.0    Protein, UA TRACE (A) Negative mg/dL    Urobilinogen, Urine 0.2 <2.0 E.U./dL    Nitrite, Urine Negative Negative    Leukocyte Esterase, Urine Negative Negative    Urine Reflex to Culture Not Indicated    D-Dimer, Quantitative    Collection Time: 08/17/21  8:15 PM   Result Value Ref Range    D-Dimer, Quant 0.27 0.00 - 0.50 mg/L FEU   POCT Venous    Collection Time: 08/17/21  9:14 PM   Result Value Ref Range    POC Sodium 137 136 - 145 mEq/L    POC Potassium 4.3 3.5 - 5.1 mEq/L    POC Chloride 100 99 - 110 mEq/L    POC Glucose 569 (HH) 60 - 115 mg/dl    POC Creatinine 1.3 (H) 0.6 - 1.1 mg/dL    GFR Non- 44 (A) >60    GFR  53 (A) >60    Calcium, Ion 1.14 1.12 - 1.32 mmol/L    pH, Greg 7.389 7.350 - 7.450    pCO2, Greg 42.1 40.0 - 50.0 mm Hg    pO2, Greg 41 Not Established mm Hg    HCO3, Venous 25.4 23.0 - 29.0 mmol/L    Base Excess, Greg 0 -3 - 3    O2 Sat, Greg 76 Not Established %    TC02 (Calc), Greg 27 Not Established mmol/L    Lactate 4.70 (HH) 0.40 - 2.00 mmol/L    Hemoglobin 14.6 12.0 - 16.0 gm/dL    POC Hematocrit 43 36 - 48 %    Sample Type [place]     Performed on SEE BELOW    Lactic Acid, Plasma    Collection Time: 08/17/21 10:00 PM   Result Value Ref Range    Lactic Acid 3.2 (HH) 0.5 - 2.2 mmol/L   POCT Glucose    Collection Time: 08/17/21 10:11 PM   Result Value Ref Range    POC Glucose 539 (HH) 60 - 115 mg/dl    Performed on ACCU-CHEK    POCT Glucose    Collection Time: 08/17/21 10:17 PM   Result Value Ref Range    Glucose 534 mg/dL    QC OK? yes    POCT Glucose    Collection Time: 08/17/21 11:13 PM   Result Value Ref Range    POC Glucose 475 (HH) 60 - 115 mg/dl    Performed on ACCU-CHEK    Lactic Acid, Plasma    Collection Time: 08/17/21 11:45 PM   Result Value Ref Range    Lactic Acid 3.4 (HH) 0.5 - 2.2 mmol/L   POCT Glucose    Collection Time: 08/18/21 12:29 AM   Result Value Ref Range    POC Glucose 460 (HH) 60 - 115 mg/dl    Performed on ACCU-CHEK    POCT Glucose    Collection Time: 08/18/21 12:30 AM   Result Value Ref Range    Glucose 460 mg/dL    QC OK?  yes    POCT Glucose    Collection Time: 08/18/21  2:23 AM   Result Value Ref Range    POC Glucose 472 (HH) 60 - 115 mg/dl    Performed on ACCU-CHEK    CBC Auto Differential    Collection Time: 08/18/21  6:02 AM   Result Value Ref Range    WBC 6.8 4.8 - 10.8 K/uL    RBC 3.98 (L) 4.20 - 5.40 M/uL    Hemoglobin 12.5 12.0 - 16.0 g/dL    Hematocrit 37.2 37.0 - 47.0 %    MCV 93.5 82.0 - 100.0 fL    MCH 31.5 (H) 27.0 - 31.3 pg    MCHC 33.7 33.0 - 37.0 %    RDW 13.8 11.5 - 14.5 %    Platelets 393 535 - 188 K/uL    Neutrophils % 52.4 %    Lymphocytes % 33.4 %    Monocytes % 7.6 %    Eosinophils % 5.3 %    Basophils % 1.3 %    Neutrophils Absolute 3.6 1.4 - 6.5 K/uL    Lymphocytes Absolute 2.3 1.0 - 4.8 K/uL    Monocytes Absolute 0.5 0.2 - 0.8 K/uL    Eosinophils Absolute 0.4 0.0 - 0.7 K/uL    Basophils Absolute 0.1 0.0 - 0.2 K/uL   Comprehensive Metabolic Panel    Collection Time: 08/18/21  6:02 AM   Result Value Ref Range    Sodium 136 135 - 144 mEq/L    Potassium 4.4 3.4 - 4.9 mEq/L    Chloride 100 95 - 107 mEq/L    CO2 23 20 - 31 mEq/L    Anion Gap 13 9 - 15 mEq/L    Glucose 445 (HH) 70 - 99 mg/dL    BUN 18 6 - 20 mg/dL    CREATININE 0.95 (H) 0.50 - 0.90 mg/dL    GFR Non-African American >60.0 >60    GFR  >60.0 >60    Calcium 9.0 8.5 - 9.9 mg/dL    Total Protein 7.4 6.3 - 8.0 g/dL    Albumin 3.7 3.5 - 4.6 g/dL    Total Bilirubin 0.5 0.2 - 0.7 mg/dL    Alkaline Phosphatase 169 (H) 40 - 130 U/L    ALT 85 (H) 0 - 33 U/L    AST 67 (H) 0 - 35 U/L    Globulin 3.7 (H) 2.3 - 3.5 g/dL   Magnesium    Collection Time: 08/18/21  6:02 AM   Result Value Ref Range    Magnesium 2.1 1.7 - 2.4 mg/dL   Troponin    Collection Time: 08/18/21  6:02 AM   Result Value Ref Range    Troponin <0.010 0.000 - 0.010 ng/mL   POCT Glucose    Collection Time: 08/18/21  6:11 AM   Result Value Ref Range    POC Glucose 422 (HH) 60 - 115 mg/dl    Performed on ACCU-CHEK        ECG:    Sinus rhythm, rate 100  Poor R wave anterior progression. Nonspecific ST-T wave changes.             Cr Haines MD  [id number] Cardiologist      Electronically signed on 8/18/21 at 10:25 AM EDT      -----

## 2021-08-18 NOTE — H&P
Hospitalist Group   History and Physical      CHIEF COMPLAINT: Chest pain, Hyperglycemia    History of Present Illness:  48 y.o. female with a history of hyperlipidemia, hypertension, diabetes presents with chest pain and hyperglycemia. Patient started having chest pain 3 days ago. She said that the pain has been intermittent and occurs at rest.  She denies any trauma to her chest.  It is associate with some shortness of breath and some nausea. She mentioned that she vomited yesterday. Patient also mentioned that the pain radiates to her left arm. She had similar pain in the past and she was seen by cardiology. She said that she never had stents and she is supposed to be on aspirin. Patient is diabetic and supposed to be on insulin. She said that when she was on insulin her blood sugar was better controlled but sometimes can run to the 400s. She ran out of her insulin few days ago. She has been trying to eat less but still her sugar continue to climb up. She said that she contacted the pharmacy and she was told that her insurance will stop paying for her insulin. She currently denies any other complaints. REVIEW OF SYSTEMS:  no fevers, chills, has cp, with mild sob, and associated n/v, no ha, vision/hearing changes, wt changes, hot/cold flashes, other open skin lesions, diarrhea, constipation, dysuria/hematuria unless noted in HPI. Complete ROS performed with the patient and is otherwise negative.       PMH:  Past Medical History:   Diagnosis Date    Asthma     Chronic abdominal pain     Depression     Hyperlipidemia     Hypertension     NATALEE (obstructive sleep apnea)     Schizophrenia (HCC)     Type II or unspecified type diabetes mellitus without mention of complication, not stated as uncontrolled        Surgical History:  Past Surgical History:   Procedure Laterality Date    COL[facility]  2/21/14    [facility]    UPPER GASTROINTESTINAL ENDOSCOPY  2/21/14    [email or web address]    UPPER sertraline (ZOLOFT) 100 MG tablet Take 100 mg by mouth daily   Yes Historical Provider, MD   traZODone (DESYREL) 50 MG tablet Take 50 mg by mouth nightly   Yes Historical Provider, MD   levothyroxine (SYNTHROID) 50 MCG tablet Take 50 mcg by mouth Daily   Yes Historical Provider, MD   topiramate (TOPAMAX) 25 MG tablet Take 25 mg by mouth daily   Yes Historical Provider, MD   montelukast (SINGULAIR) 10 MG tablet Take 10 mg by mouth nightly   Yes Historical Provider, MD   METFORMIN HCL PO Take 1,000 mg by mouth    Yes Historical Provider, MD   BuPROPion HCl (WELLBUTRIN PO) Take 150 mg by mouth    Yes Historical Provider, MD   ARIPiprazole (ABILIFY PO) Take 5 mg by mouth    Yes Historical Provider, MD   gabapentin (NEURONTIN) 800 MG tablet Take 800 mg by mouth 3 times daily. For leg pain 8/9/21   Historical Provider, MD       Allergies:    Shellfish-derived products, Amoxicillin, Macrobid [nitrofurantoin monohyd macro], Reglan [metoclopramide], Singulair [montelukast], and Nubain [nalbuphine hcl]    Social History:    reports that she has quit smoking. Her smoking use included cigarettes. She smoked 1.00 pack per day. She has never used smokeless tobacco. She reports that she does not drink alcohol and does not use drugs.     Family History:       Problem Relation Age of Onset    Other Mother         Diverticulitis    Colon Cancer Paternal Uncle     Colon Cancer Maternal Grandfather        PHYSICAL EXAM:  Vitals:  BP (!) 149/86   Pulse 91   Temp 98.1 °F (36.7 °C) (Oral)   Resp 18   Ht 6' (1.829 m)   Wt (!) 340 lb (154.2 kg)   LMP  (LMP Unknown)   SpO2 98%   BMI 46.11 kg/m²   General Appearance: alert and oriented to person, place and time, well developed and well- nourished, in no acute distress  Skin: warm and dry, no rash or erythema  Head: normocephalic and atraumatic  Eyes: pupils equal, round, and reactive to light, extraocular eye movements intact, conjunctivae normal  ENT: tympanic membrane, external ear and ear canal normal bilaterally, nose without deformity, nasal mucosa and turbinates normal without polyps  Neck: supple and non-tender without mass, no thyromegaly or thyroid nodules, no cervical lymphadenopathy  Pulmonary/Chest: clear to auscultation bilaterally- no wheezes, tenderness to palpation over the left side chest  Cardiovascular: normal rate, regular rhythm, normal S1 and S2, no murmurs, 1+ pitting edema in the lower extremities bilaterally  Abdomen: soft, non-tender, non-distended, normal bowel sounds, no masses or organomegaly  Extremities: no cyanosis, clubbing   Musculoskeletal: normal range of motion, no joint swelling, deformity or tenderness  Neurologic: reflexes normal and symmetric, no cranial nerve deficit,  coordination and speech normal      LABS:  Recent Labs     08/17/21 2015 08/17/21 2114 08/17/21 2217 08/18/21  0030   *  --   --   --    K 4.5  --   --   --    CL 94*  --   --   --    CO2 23  --   --   --    BUN 16  --   --   --    CREATININE 1.09* 1.3*  --   --    GLUCOSE 567*  --  534 460   CALCIUM 9.9  --   --   --        Recent Labs     08/17/21 2015 08/17/21 2114   WBC 6.8  --    RBC 4.40  --    HGB 13.7 14.6   HCT 40.1  --    MCV 91.1  --    MCH 31.2  --    MCHC 34.2  --    RDW 13.9  --      --        Recent Labs     08/17/21 2211 08/17/21  2313 08/18/21  0029 08/18/21  0223   POCGLU 539* 475* 460* 472*       CBC with Differential:    Lab Results   Component Value Date    WBC 6.8 08/17/2021    RBC 4.40 08/17/2021    HGB 14.6 08/17/2021    HCT 40.1 08/17/2021     08/17/2021    MCV 91.1 08/17/2021    MCH 31.2 08/17/2021    MCHC 34.2 08/17/2021    RDW 13.9 08/17/2021    LYMPHOPCT 25.1 08/17/2021    MONOPCT 6.3 08/17/2021    BASOPCT 0.7 08/17/2021    MONOSABS 0.4 08/17/2021    LYMPHSABS 1.7 08/17/2021    EOSABS 0.2 08/17/2021    BASOSABS 0.0 08/17/2021     CMP:    Lab Results   Component Value Date     08/17/2021    K 4.5 08/17/2021    K 4.5 03/22/2021 CL 94 08/17/2021    CO2 23 08/17/2021    BUN 16 08/17/2021    CREATININE 1.3 08/17/2021    CREATININE 1.09 08/17/2021    GFRAA 53 08/17/2021    LABGLOM 44 08/17/2021    GLUCOSE 460 08/18/2021    PROT 8.2 08/17/2021    LABALBU 4.0 08/17/2021    CALCIUM 9.9 08/17/2021    BILITOT 0.6 08/17/2021    ALKPHOS 200 08/17/2021    AST 94 08/17/2021     08/17/2021       Radiology: No results found. EKG: Normal sinus rhythm    ASSESSMENT/ PLAN[de-identified]      Active Problems:    Hyperglycemia  Resolved Problems:    * No resolved hospital problems. *      1. Chest pain   History of CAD   Left-sided chest pain occurred at rest and radiate to the left arm and associated with nausea, shortness of breath   Tenderness palpation over the chest-likely chest pain is musculoskeletal   However, given her history, will consult cardiology   Cycle troponin and repeat EKG  2. Diabetes/hyperglycemia   not compliant with her medication   Ran out of her insulin few days ago   Blood glucose on presentation over 500   She received total of 36 units of insulin in the ER with minimal improvement   Patient supposed to be on 60 units of short acting insulin and her on 4 units of long-acting insulin   Will continue home insulin and high-dose insulin sliding scale   Consult endocrinology  3. Lactic acidosis   Patient is on Metformin   She has chronic lactic acidosis   Most likely due to medication and it is around baseline -no need to continue checking  4. Hypertension   Resume home medication after verification    Code Status: Full  DVT prophylaxis: Lovenox        Electronically signed by Zayda Loyola MD on 8/18/2021 at 4:44 AM      NOTE: This report was transcribed using voice recognition software. Every effort was made to ensure accuracy; however, inadvertent computerized transcription errors may be present.

## 2021-08-18 NOTE — PROGRESS NOTES
Nutrition Assessment     Type and Reason for Visit: Positive Nutrition Screen    Nutrition Recommendations/Plan: Continue current plan of care    Nutrition Assessment:  No malnutritino identified. continue current plan of care    Malnutrition Assessment:  Malnutrition Status: No malnutrition    Nutrition Related Findings: \"history of hyperlipidemia, hypertension, diabetes presents with chest pain and hyperglycemia,Patient is diabetic and supposed to be on insulin. She said that when she was on insulin her blood sugar was better controlled but sometimes can run to the 400s. She ran out of her insulin few days ago. \"      Current Nutrition Therapies:    ADULT DIET;  Regular; 3 carb choices (45 gm/meal)    Anthropometric Measures:  · Height: 6' (182.9 cm)  · Current Body Wt: 340 lb (154.2 kg)   · BMI: 46.1    Nutrition Diagnosis:   · Altered nutrition-related lab values related to endocrine dysfuntion, other (comment) (non-compliance) as evidenced by lab values    Nutrition Interventions:   Food and/or Nutrient Delivery:  Continue Current Diet  Nutrition Education/Counseling:  No recommendation at this time   Coordination of Nutrition Care:  Continue to monitor while inpatient    Goals:  gluc < 180       Nutrition Monitoring and Evaluation:   Behavioral-Environmental Outcomes:  None Identified   Food/Nutrient Intake Outcomes:  Food and Nutrient Intake  Physical Signs/Symptoms Outcomes:  Biochemical Data, Meal Time Behavior       Electronically signed by Sara Burrows RD, LD on 8/18/21 at 1:52 PM EDT

## 2021-08-18 NOTE — ED NOTES
[facility] aware of elevated lactic acid of 3.4     Erika Mcqueen, [address] [facility]  08/18/21 1643

## 2021-08-18 NOTE — PLAN OF CARE
Chest pain is reproducible on exam and therefore musculoskeletal in nature. Will treat with lidocaine patch and as needed Tylenol, Toradol. Hyperglycemia secondary to noncompliance of medication. We will consult with endocrinology to optimize regimen and change to more affordable insulin if necessary. Class III obesity-encourage weight loss. Anticipate discharge home 8/19.

## 2021-08-19 LAB
ALBUMIN SERPL-MCNC: 3.9 G/DL (ref 3.5–4.6)
ALP BLD-CCNC: 168 U/L (ref 40–130)
ALT SERPL-CCNC: 104 U/L (ref 0–33)
ANION GAP SERPL CALCULATED.3IONS-SCNC: 14 MEQ/L (ref 9–15)
AST SERPL-CCNC: 136 U/L (ref 0–35)
BASOPHILS ABSOLUTE: 0.1 K/UL (ref 0–0.2)
BASOPHILS RELATIVE PERCENT: 1.4 %
BILIRUB SERPL-MCNC: 0.7 MG/DL (ref 0.2–0.7)
BUN BLDV-MCNC: 10 MG/DL (ref 6–20)
CALCIUM SERPL-MCNC: 9.2 MG/DL (ref 8.5–9.9)
CHLORIDE BLD-SCNC: 99 MEQ/L (ref 95–107)
CO2: 23 MEQ/L (ref 20–31)
CREAT SERPL-MCNC: 0.69 MG/DL (ref 0.5–0.9)
EKG ATRIAL RATE: 76 BPM
EKG P AXIS: 50 DEGREES
EKG P-R INTERVAL: 156 MS
EKG Q-T INTERVAL: 418 MS
EKG QRS DURATION: 90 MS
EKG QTC CALCULATION (BAZETT): 470 MS
EKG R AXIS: 178 DEGREES
EKG T AXIS: 33 DEGREES
EKG VENTRICULAR RATE: 76 BPM
EOSINOPHILS ABSOLUTE: 0.4 K/UL (ref 0–0.7)
EOSINOPHILS RELATIVE PERCENT: 5.9 %
GFR AFRICAN AMERICAN: >60
GFR NON-AFRICAN AMERICAN: >60
GLOBULIN: 3.7 G/DL (ref 2.3–3.5)
GLUCOSE BLD-MCNC: 291 MG/DL (ref 60–115)
GLUCOSE BLD-MCNC: 318 MG/DL (ref 60–115)
GLUCOSE BLD-MCNC: 328 MG/DL (ref 70–99)
GLUCOSE BLD-MCNC: 439 MG/DL (ref 60–115)
HCT VFR BLD CALC: 38.5 % (ref 37–47)
HEMOGLOBIN: 13.1 G/DL (ref 12–16)
LYMPHOCYTES ABSOLUTE: 2.1 K/UL (ref 1–4.8)
LYMPHOCYTES RELATIVE PERCENT: 30.2 %
MAGNESIUM: 1.9 MG/DL (ref 1.7–2.4)
MCH RBC QN AUTO: 31.4 PG (ref 27–31.3)
MCHC RBC AUTO-ENTMCNC: 33.9 % (ref 33–37)
MCV RBC AUTO: 92.5 FL (ref 82–100)
MONOCYTES ABSOLUTE: 0.4 K/UL (ref 0.2–0.8)
MONOCYTES RELATIVE PERCENT: 5.4 %
NEUTROPHILS ABSOLUTE: 4 K/UL (ref 1.4–6.5)
NEUTROPHILS RELATIVE PERCENT: 57.1 %
PDW BLD-RTO: 13.4 % (ref 11.5–14.5)
PERFORMED ON: ABNORMAL
PLATELET # BLD: 166 K/UL (ref 130–400)
POTASSIUM SERPL-SCNC: 4 MEQ/L (ref 3.4–4.9)
RBC # BLD: 4.16 M/UL (ref 4.2–5.4)
SODIUM BLD-SCNC: 136 MEQ/L (ref 135–144)
T4 FREE: 1.29 NG/DL (ref 0.84–1.68)
TOTAL PROTEIN: 7.6 G/DL (ref 6.3–8)
TSH SERPL DL<=0.05 MIU/L-ACNC: 4.26 UIU/ML (ref 0.44–3.86)
WBC # BLD: 7.1 K/UL (ref 4.8–10.8)

## 2021-08-19 PROCEDURE — 99232 SBSQ HOSP IP/OBS MODERATE 35: CPT | Performed by: INTERNAL MEDICINE

## 2021-08-19 PROCEDURE — 6360000002 HC RX W HCPCS: Performed by: INTERNAL MEDICINE

## 2021-08-19 PROCEDURE — 84443 ASSAY THYROID STIM HORMONE: CPT

## 2021-08-19 PROCEDURE — 93010 ELECTROCARDIOGRAM REPORT: CPT | Performed by: INTERNAL MEDICINE

## 2021-08-19 PROCEDURE — 93005 ELECTROCARDIOGRAM TRACING: CPT | Performed by: INTERNAL MEDICINE

## 2021-08-19 PROCEDURE — 96372 THER/PROPH/DIAG INJ SC/IM: CPT

## 2021-08-19 PROCEDURE — 2580000003 HC RX 258: Performed by: INTERNAL MEDICINE

## 2021-08-19 PROCEDURE — G0378 HOSPITAL OBSERVATION PER HR: HCPCS

## 2021-08-19 PROCEDURE — 85025 COMPLETE CBC W/AUTO DIFF WBC: CPT

## 2021-08-19 PROCEDURE — 80053 COMPREHEN METABOLIC PANEL: CPT

## 2021-08-19 PROCEDURE — 83735 ASSAY OF MAGNESIUM: CPT

## 2021-08-19 PROCEDURE — 84439 ASSAY OF FREE THYROXINE: CPT

## 2021-08-19 PROCEDURE — 6370000000 HC RX 637 (ALT 250 FOR IP): Performed by: INTERNAL MEDICINE

## 2021-08-19 PROCEDURE — G0108 DIAB MANAGE TRN  PER INDIV: HCPCS

## 2021-08-19 PROCEDURE — 6370000000 HC RX 637 (ALT 250 FOR IP): Performed by: PHYSICIAN ASSISTANT

## 2021-08-19 PROCEDURE — 36415 COLL VENOUS BLD VENIPUNCTURE: CPT

## 2021-08-19 RX ORDER — INSULIN GLARGINE 100 [IU]/ML
INJECTION, SOLUTION SUBCUTANEOUS
Qty: 5 PEN | Refills: 0 | Status: SHIPPED | OUTPATIENT
Start: 2021-08-19 | End: 2022-03-16 | Stop reason: SDUPTHER

## 2021-08-19 RX ADMIN — INSULIN GLARGINE 60 UNITS: 100 INJECTION, SOLUTION SUBCUTANEOUS at 09:06

## 2021-08-19 RX ADMIN — SODIUM CHLORIDE, PRESERVATIVE FREE 10 ML: 5 INJECTION INTRAVENOUS at 09:08

## 2021-08-19 RX ADMIN — Medication 400 MG: at 09:06

## 2021-08-19 RX ADMIN — POTASSIUM BICARBONATE 40 MEQ: 782 TABLET, EFFERVESCENT ORAL at 09:06

## 2021-08-19 RX ADMIN — ENOXAPARIN SODIUM 40 MG: 40 INJECTION SUBCUTANEOUS at 09:06

## 2021-08-19 NOTE — DISCHARGE SUMMARY
[facility] HOSPITAL Medicine Discharge Summary    Annye Dancer  :  1975  MRN:  [id number]    Admit date:  2021  Discharge date:  2021    Admitting Physician: Amy Barlow MD  Primary Care Physician:  Donovan Dooley MD    Discharge Diagnoses: Active Problems:    Class 3 severe obesity due to excess calories with serious comorbidity and body mass index (BMI) of 45.0 to 49.9 in adult [facility])    Type 2 diabetes mellitus with other diabetic neurological complication (HCC)    Hyperglycemia    Noncompliance  Resolved Problems:    * No resolved hospital problems. *    Chief Complaint   Patient presents with    Chest Pain    Hyperglycemia       Condition: improved   Activity: no restrictions   Diet: diabetic  Disposition: home  Functional Status: ambulatory    Significant Findings:     Labs Renal Latest Ref Rng & Units 2021 2021 2021 2021 3/24/2021   BUN 6 - 20 mg/dL 10 18 16 20 21(H)   Cr 0.50 - 0.90 mg/dL 0.69 0.95(H) 1.09(H) 0.89 0.79   K 3.4 - 4.9 mEq/L 4.0 4.4 4.5 4.8 4.0   Na 135 - 144 mEq/L 136 136 133(L) 132(L) 135       Hospital Course:   57-year-old female with class III obesity, hypertension, type 2 diabetes, NATALEE presented from home with 3-day history of chest pain and she was noted to have hyperglycemia to the 400s. She admits she ran out of her insulin a few days ago and was trying to eat less to control her blood sugar. Her chest pain was reproducible on exam and musculoskeletal in nature. Her insulin regimen was adjusted by endocrinology as reflected below. She will follow up with endocrinology BRIDGET Neal is in 2 weeks. Exam on Discharge:   BP (!) 136/94   Pulse 82   Temp 99 °F (37.2 °C) (Oral)   Resp 18   Ht 6' (1.829 m)   Wt (!) 340 lb (154.2 kg)   LMP  (LMP Unknown)   SpO2 98%   BMI 46.11 kg/m²   General appearance: alert, cooperative and no distress.  obese  Mental Status: oriented to person, place and time and normal affect  Lungs: clear to auscultation bilaterally, normal effort  Heart: regular rate and rhythm, no murmur  Abdomen: soft, nontender, nondistended, bowel sounds present, no masses  Extremities: no edema, redness, tenderness in the calves  Skin: no gross lesions, rashes    Discharge Medication List:   [place]   [place] Medication Instructions [id number]    Printed on:08/19/21 8416   Medication Information                      ARIPiprazole (ABILIFY PO)  Take 5 mg by mouth              BuPROPion HCl (WELLBUTRIN PO)  Take 150 mg by mouth              esomeprazole (NEXIUM) 40 MG delayed release capsule  Take 1 capsule by mouth every morning (before breakfast)             gabapentin (NEURONTIN) 800 MG tablet  Take 800 mg by mouth 3 times daily. For leg pain             insulin glargine (LANTUS SOLOSTAR) 100 UNIT/ML injection pen  60 units every morning. 160 units nightly             insulin lispro (HUMALOG KWIKPEN U-200) 200 UNIT/ML SOPN pen  Inject 70 Units into the skin 3 times daily (with meals)             INSULIN SYRINGE .5CC/29G 29G X 1/2\" 0.5 ML MISC  1 each by Does not apply route daily             levothyroxine (SYNTHROID) 50 MCG tablet  Take 50 mcg by mouth Daily             lisinopril (PRINIVIL;ZESTRIL) 10 MG tablet  Take 1 tablet by mouth daily             magnesium oxide (MAG-OX) 400 (240 Mg) MG tablet  Take 1 tablet by mouth daily             meloxicam (MOBIC) 15 MG tablet  Take 1 tablet by mouth daily as needed for Pain             METFORMIN HCL PO  Take 1,000 mg by mouth              metoprolol succinate (TOPROL XL) 25 MG extended release tablet  Take 1 tablet by mouth daily             montelukast (SINGULAIR) 10 MG tablet  Take 10 mg by mouth nightly             nitroGLYCERIN (NITROSTAT) 0.4 MG SL tablet  Place 1 tablet under the tongue every 5 minutes as needed for Chest pain up to max of 3 total doses. If no relief after 1 dose, call 911.              promethazine (PHENERGAN) 25 MG tablet  Take 1 tablet by mouth every 6 hours as needed for Nausea WARNING:  May cause drowsiness. May impair ability to operate vehicles or machinery.   Do not use in combination with alcohol.             sertraline (ZOLOFT) 100 MG tablet  Take 100 mg by mouth daily             topiramate (TOPAMAX) 25 MG tablet  Take 25 mg by mouth daily             traZODone (DESYREL) 50 MG tablet  Take 50 mg by mouth nightly                 DC time 37 minutes    Signed:  Deidre Messina DO  8/19/2021, 3:03 PM

## 2021-08-19 NOTE — PROGRESS NOTES
Progress Note  Date:2021       Room:[place] [id number]  Patient Carley Ferrer     YOB: 1975     Age:46 y.o. Chief complaint uncontrolled type 2 diabetes    Subjective    Subjective:  Symptoms:  Stable. Diet:  Adequate intake. Activity level: Returning to normal.    Pain:  She reports no pain. Review of Systems  Objective         Vitals Last 24 Hours:  TEMPERATURE:  Temp  Av.8 °F (37.1 °C)  Min: 98.6 °F (37 °C)  Max: 99 °F (37.2 °C)  RESPIRATIONS RANGE: Resp  Av.5  Min: 17  Max: 18  PULSE OXIMETRY RANGE: SpO2  Av %  Min: 94 %  Max: 98 %  PULSE RANGE: Pulse  Av  Min: 82  Max: 86  BLOOD PRESSURE RANGE: Systolic (91XUP), LUX:118 , Min:111 , ZHX:854   ; Diastolic (67BMH), ZGA:30, Min:67, Max:94    I/O (24Hr): No intake or output data in the 24 hours ending 21 1346  Objective:  General Appearance:  Comfortable. Vital signs: (most recent): Blood pressure (!) 136/94, pulse 82, temperature 99 °F (37.2 °C), temperature source Oral, resp. rate 18, height 6' (1.829 m), weight (!) 340 lb (154.2 kg), SpO2 98 %, unknown if currently breastfeeding. Vital signs are normal.    HEENT: Normal HEENT exam.    Lungs:  Normal effort and normal respiratory rate. Heart: Normal rate. Abdomen: Abdomen is soft. Extremities: Normal range of motion. Neurological: Patient is alert. Skin:  No rash. Labs/Imaging/Diagnostics    Labs:  CBC:Recent Labs     21  2114 21  0602 21  0723   WBC 6.8  --   --  6.8 7.1   RBC 4.40  --   --  3.98* 4.16*   HGB 13.7   < > 14.6 12.5 13.1   HCT 40.1  --   --  37.2 38.5   MCV 91.1  --   --  93.5 92.5   RDW 13.9  --   --  13.8 13.4     --   --  153 166    < > = values in this interval not displayed.      CHEMISTRIES:  Recent Labs     21  2114 21  2217 21  0030 21  0602 21  0723   *  --   --   --  136 136   K 4.5  --   -- --  4.4 4.0   CL 94*  --   --   --  100 99   CO2 23  --   --   --  23 23   BUN 16  --   --   --  18 10   CREATININE 1.09* 1.3*  --   --  0.95* 0.69   GLUCOSE 567*  --    < > 460 445* 328*   MG 2.1  --   --   --  2.1 1.9    < > = values in this interval not displayed. PT/INR:No results for input(s): PROTIME, INR in the last 72 hours. APTT:No results for input(s): APTT in the last 72 hours. LIVER PROFILE:  Recent Labs     08/17/21 2015 08/18/21  0602 08/19/21  0723   AST 94* 67* 136*   * 85* 104*   BILITOT 0.6 0.5 0.7   ALKPHOS 200* 169* 168*       Results for Jose Carroll (MRN [id number]) as of 8/19/2021 13:47   Ref. Range 4/3/2020 15:03 7/27/2020 19:58 3/21/2021 05:56 8/18/2021 10:49   Hemoglobin A1C Latest Ref Range: 4.8 - 5.9 % 14.5 (H) 11.0 (H) 13.2 (H) 9.9 (H)       Imaging Last 24 Hours:  XR CHEST PORTABLE    Result Date: 8/18/2021  EXAMINATION: CHEST PORTABLE VIEW  CLINICAL HISTORY: Hyperglycemia COMPARISONS: July 20, 2021  FINDINGS: Single  views of the chest is submitted. The cardiac silhouette is unchanged size configuration. . Pulmonary vascular unremarkable. Right sided trachea. No focal infiltrates. No Pneumothoraces. NO ACUTE ACTIVE CARDIOPULMONARY PROCESS    Assessment//Plan           Hospital Problems         Last Modified POA    Class 3 severe obesity due to excess calories with serious comorbidity and body mass index (BMI) of 45.0 to 49.9 in adult [facility]) 8/18/2021 Yes    Type 2 diabetes mellitus with other diabetic neurological complication ([facility] [address] 1975 Yes    Hyperglycemia 8/18/2021 Yes    Noncompliance 8/18/2021 Yes    Overview Signed 8/18/2021  1:08 PM by Michelle Lujan, DO     Did not take insulin because out of medication and did not refill due to cost of medication             Assessment:    Condition: In stable condition. Unchanged.    (Uncontrolled type 2 diabetes  A1c down to 9.9 from 14.5 before  Morbid obesity  Insulin resistance  Sedentary lifestyle). Plan:   Discharge home. (Okay to discharge patient on Lantus 160 units at bedtime 60 units in the morning plus Humalog 70 units with each meals patient to follow-up with me of Carlin Barker in 2 weeks for diabetes follow-up).        Electronically signed by Dimitris Dc MD on 8/19/21 at 1:46 PM EDT

## 2021-08-19 NOTE — CONSULTS
Mirela Bustos [address]                      [address], [address] [facility]                                  CONSULTATION    PATIENT NAME: Rashida England         :        1975  MED REC NO:   [id number]                            ROOM:       V356  ACCOUNT NO:   [de-identified]                           ADMIT DATE: 2021  PROVIDER:     Kristina Evans MD    CONSULT DATE:  2021    ENDOCRINE CONSULTATION    REFERRING PROVIDER:  Azul Pichardo MD    REASON FOR CONSULTATION:  Management of uncontrolled type 2 diabetes. CHIEF COMPLAINT AND HISTORY OF PRESENT ILLNESS:  The patient is a  42-year-old predominately Canadian-speaking patient admitted to [facility]  because of chest pain and hyperglycemia. History and physical  examination done with the . Apparently, the patient  is also having difficulty getting insulin on time. Has been calling  pharmacy on a regular basis and runs out by  each month on her  insulin. Her blood sugars have been staying in the 400 plus range. Chemistries were reviewed from today. Sodium 136, potassium 4.4,  chloride was 100, CO2 was 23, BUN was 18, creatinine 0.95, glucose was  445. The patient's hemoglobin A1c was 9.9 today down from 13 in  2021. She was seen in the hospital in 2021 for her diabetes. Insulin dose was adjusted at that time. The patient is not very  physically active at home and has not been eating on a regular basis. Home insulin regimen included Lantus 140 units at night, Humalog 60 with  each meals. History of hypothyroidism on Synthroid 50 mcg daily. The  patient has been seen here by Cardiology for chest pain. The patient is  morbidly obese. BMI is 46. PAST MEDICAL HISTORY:  Significant for type 2 diabetes, asthma,  hypertension, abdominal pain, sleep apnea, depression, schizophrenia. PAST SURGICAL HISTORY:  Upper GI endoscopy, colonoscopy.     FAMILY HISTORY:  Significant for colon cancers. PERSONAL AND SOCIAL HISTORY:  Former smoker. ALLERGIES:  Include SHELLFISH, AMOXICILLIN, MACROBID, REGLAN, SINGULAIR,  NUBAIN. REVIEW OF SYSTEMS:  Other than chest pain, 14-point review of systems is  negative. PHYSICAL EXAMINATION:  GENERAL:  The patient is alert, awake, in no obvious distress, lying in  bed. VITAL SIGNS:  Blood pressure was 111/67, pulse rate was 86, respiratory  rate 17, temperature 98. 6. HEENT:  Normocephalic, atraumatic. NECK:  Acanthosis nigricans was noted. Neck was supple. Trachea in  midline. Thyroid gland was difficult to palpate. CHEST:  Lungs were clear to auscultation bilaterally. No wheezing or  crackles were heard. CARDIOVASCULAR:  Heart sounds were normal.  No murmurs or thrills were  present. ABDOMEN:  Soft, significantly obese. Bowel sounds were present. No  organomegaly or tenderness. EXTREMITIES:  Lower extremities reveal no edema. SKIN:  Intact. Increased _____ body hair, lower extremities. Fungal infection,  bilateral toenails. MUSCULOSKELETAL:  No joint swelling. NEUROLOGIC:  Cranial nerves I through XII were intact. PSYCHIATRIC:  Appears to have flat to depressed affect. LABORATORY DATA:  As above. ASSESSMENT:  Type 2 diabetes, uncontrolled. Overall, control has  improved. A1c down from 14 to 9.9, morbid obesity, chest pain, insulin  resistance. PLAN:  Change her Lantus dose here to 160 at night, 60 before breakfast,  Humalog 70 with each meals. Continue other supportive measures in the  meantime. A1c goal of 7 or lower. We will also get thyroid function  test including free T4, TSH. We will resume her Synthroid based on the  labs. Continue other supportive measures. Okay to discharge the  patient from Endocrine if cleared by Cardiology. Total time spent was 55 minutes. Thank you for the consult.         Loulou Feliciano MD    D: 08/18/2021 21:58:44       T: 08/18/2021 22:01:18     [email or web address]  Job#: [id number] Doc#: [id number]    CC:

## 2021-08-19 NOTE — CONSULTS
Renae Goodell,  Seen for evaluation and education on Type 2 uncontrolled using German interpretor via ipad in room. Self-care    Lab Results   Component Value Date    LABA1C 9.9 (H) 08/18/2021     No results found for: EAG    Discussed with Renae Goodell, their current diabetes self-care routines prior to this admission. medication compliance: probably noncompliant though I cannot elicit that specific history, diabetic diet compliance: probably noncompliant though I cannot elicit that specific history, home glucose monitoring: is performed sporadically    Reviewed insulin administration. Previously on insulin - reviewed the following:  Type of insulin - onset, peak and duration of each type  Injection site - currently uses: abdomen  Inspected site and has no bruising, redness, infection, lipoatrophy or lipohypertrophy. Not Injecting near umbilicus or scars/tattoos  Rotates sites appropriately  No history of hypoglycemia within last month  Patient takes 70 U of humolog before meals, sometimes forgets and takes after meal, usually 1/2 hour after eating. Takes 160 U lantus at night and when she wakes up during night with nausea she checks her BG and is high, she will take an additional dose of 160 U of lantus. Reviewed actions of duration of each. Lenghty discussion about role of each type of insulin and why taking extra lantus not advised. Home-going instructions given as provider now orders 2 doses of Lantus. 160 U at night and 70 U in morning. Reviewed this with her via . Stressed importance of checking BG 4 times a day AC and HS to evaluate effectiveness of additional medication. Briefly discussed role of diet, physical activity, daily use of medications and self-monitoring for good diabetes control, provided diabetes education survival packet. Discussed signs and symptoms of hypo/hyperglycemia, BG guidelines, and Rule of 15 - handouts provided.     Provided

## 2021-08-19 NOTE — PROGRESS NOTES
1700 pt given discharge instructions via the Swedish ipad interpretor. DM educator in room going over insulin education. Spoke with [employer] and the are delivering pts meds. Pt given dinner and insulin coverage. Verbalizes understanding via ipad interpretor. Pt discharged home with family.    Electronically signed by Sweta Mitchell RN on 8/19/2021 at 5:47 PM

## 2021-09-03 LAB
ANION GAP SERPL CALCULATED.3IONS-SCNC: 12 MEQ/L (ref 9–15)
BASOPHILS ABSOLUTE: 0.1 K/UL (ref 0–0.2)
BASOPHILS RELATIVE PERCENT: 1 %
BUN BLDV-MCNC: 16 MG/DL (ref 6–20)
CALCIUM SERPL-MCNC: 9.9 MG/DL (ref 8.5–9.9)
CHLORIDE BLD-SCNC: 102 MEQ/L (ref 95–107)
CO2: 28 MEQ/L (ref 20–31)
CREAT SERPL-MCNC: 0.74 MG/DL (ref 0.5–0.9)
EOSINOPHILS ABSOLUTE: 0.4 K/UL (ref 0–0.7)
EOSINOPHILS RELATIVE PERCENT: 5.4 %
GFR AFRICAN AMERICAN: >60
GFR NON-AFRICAN AMERICAN: >60
GLOBULIN: 3.5 G/DL (ref 2.3–3.5)
GLUCOSE BLD-MCNC: 196 MG/DL (ref 70–99)
HCT VFR BLD CALC: 39 % (ref 37–47)
HEMOGLOBIN: 13.3 G/DL (ref 12–16)
LYMPHOCYTES ABSOLUTE: 1.8 K/UL (ref 1–4.8)
LYMPHOCYTES RELATIVE PERCENT: 26.9 %
MCH RBC QN AUTO: 31.1 PG (ref 27–31.3)
MCHC RBC AUTO-ENTMCNC: 34 % (ref 33–37)
MCV RBC AUTO: 91.4 FL (ref 82–100)
MONOCYTES ABSOLUTE: 0.4 K/UL (ref 0.2–0.8)
MONOCYTES RELATIVE PERCENT: 6.4 %
NEUTROPHILS ABSOLUTE: 4 K/UL (ref 1.4–6.5)
NEUTROPHILS RELATIVE PERCENT: 60.3 %
PDW BLD-RTO: 14.1 % (ref 11.5–14.5)
PLATELET # BLD: 163 K/UL (ref 130–400)
POTASSIUM SERPL-SCNC: 4.1 MEQ/L (ref 3.4–4.9)
RBC # BLD: 4.27 M/UL (ref 4.2–5.4)
SODIUM BLD-SCNC: 142 MEQ/L (ref 135–144)
WBC # BLD: 6.6 K/UL (ref 4.8–10.8)

## 2021-10-01 ENCOUNTER — APPOINTMENT (OUTPATIENT)
Dept: GENERAL RADIOLOGY | Age: 46
End: 2021-10-01
Payer: COMMERCIAL

## 2021-10-01 ENCOUNTER — HOSPITAL ENCOUNTER (EMERGENCY)
Age: 46
Discharge: HOME | End: 2021-10-02
Payer: COMMERCIAL

## 2021-10-01 DIAGNOSIS — Z92.29 STATUS POST ADMINISTRATION OF ALL DOSES OF COVID-19 VACCINE SERIES: Primary | ICD-10-CM

## 2021-10-01 LAB
ALBUMIN SERPL-MCNC: 4 G/DL (ref 3.5–4.6)
ALP BLD-CCNC: 131 U/L (ref 40–130)
ALT SERPL-CCNC: 51 U/L (ref 0–33)
ANION GAP SERPL CALCULATED.3IONS-SCNC: 11 MEQ/L (ref 9–15)
APTT: 31.2 SEC (ref 24.4–36.8)
AST SERPL-CCNC: 58 U/L (ref 0–35)
BACTERIA: ABNORMAL /HPF
BILIRUB SERPL-MCNC: 0.5 MG/DL (ref 0.2–0.7)
BILIRUBIN URINE: NEGATIVE
BLOOD, URINE: ABNORMAL
BUN BLDV-MCNC: 11 MG/DL (ref 6–20)
CALCIUM SERPL-MCNC: 9.2 MG/DL (ref 8.5–9.9)
CHLORIDE BLD-SCNC: 99 MEQ/L (ref 95–107)
CLARITY: CLEAR
CO2: 27 MEQ/L (ref 20–31)
COLOR: YELLOW
CREAT SERPL-MCNC: 1.19 MG/DL (ref 0.5–0.9)
D DIMER: 0.33 MG/L FEU (ref 0–0.5)
EPITHELIAL CELLS, UA: ABNORMAL /HPF (ref 0–5)
GFR AFRICAN AMERICAN: 58.5
GFR NON-AFRICAN AMERICAN: 48.3
GLOBULIN: 3.3 G/DL (ref 2.3–3.5)
GLUCOSE BLD-MCNC: 115 MG/DL (ref 70–99)
GLUCOSE URINE: NEGATIVE MG/DL
HCT VFR BLD CALC: 38 % (ref 37–47)
HEMOGLOBIN: 12.9 G/DL (ref 12–16)
HYALINE CASTS: ABNORMAL /HPF (ref 0–5)
INR BLD: 1.1
KETONES, URINE: NEGATIVE MG/DL
LACTIC ACID: 2.7 MMOL/L (ref 0.5–2.2)
LEUKOCYTE ESTERASE, URINE: NEGATIVE
MCH RBC QN AUTO: 30.7 PG (ref 27–31.3)
MCHC RBC AUTO-ENTMCNC: 34 % (ref 33–37)
MCV RBC AUTO: 90.5 FL (ref 82–100)
NITRITE, URINE: NEGATIVE
PDW BLD-RTO: 14 % (ref 11.5–14.5)
PH UA: 5.5 (ref 5–9)
PLATELET # BLD: 143 K/UL (ref 130–400)
POTASSIUM SERPL-SCNC: 3.7 MEQ/L (ref 3.4–4.9)
PRO-BNP: 54 PG/ML
PROTEIN UA: ABNORMAL MG/DL
PROTHROMBIN TIME: 14.2 SEC (ref 12.3–14.9)
RBC # BLD: 4.2 M/UL (ref 4.2–5.4)
RBC UA: ABNORMAL /HPF (ref 0–5)
SARS-COV-2, NAAT: NOT DETECTED
SODIUM BLD-SCNC: 137 MEQ/L (ref 135–144)
SPECIFIC GRAVITY UA: 1.01 (ref 1–1.03)
TOTAL CK: 80 U/L (ref 0–170)
TOTAL PROTEIN: 7.3 G/DL (ref 6.3–8)
TROPONIN: <0.01 NG/ML (ref 0–0.01)
URINE REFLEX TO CULTURE: ABNORMAL
UROBILINOGEN, URINE: 0.2 E.U./DL
WBC # BLD: 6.6 K/UL (ref 4.8–10.8)
WBC UA: ABNORMAL /HPF (ref 0–5)

## 2021-10-01 PROCEDURE — 82550 ASSAY OF CK (CPK): CPT

## 2021-10-01 PROCEDURE — 80053 COMPREHEN METABOLIC PANEL: CPT

## 2021-10-01 PROCEDURE — 84145 PROCALCITONIN (PCT): CPT

## 2021-10-01 PROCEDURE — 6360000002 HC RX W HCPCS: Performed by: PERSONAL EMERGENCY RESPONSE ATTENDANT

## 2021-10-01 PROCEDURE — 87635 SARS-COV-2 COVID-19 AMP PRB: CPT

## 2021-10-01 PROCEDURE — 85379 FIBRIN DEGRADATION QUANT: CPT

## 2021-10-01 PROCEDURE — 96374 THER/PROPH/DIAG INJ IV PUSH: CPT

## 2021-10-01 PROCEDURE — 36415 COLL VENOUS BLD VENIPUNCTURE: CPT

## 2021-10-01 PROCEDURE — 2580000003 HC RX 258: Performed by: PERSONAL EMERGENCY RESPONSE ATTENDANT

## 2021-10-01 PROCEDURE — 85610 PROTHROMBIN TIME: CPT

## 2021-10-01 PROCEDURE — 85730 THROMBOPLASTIN TIME PARTIAL: CPT

## 2021-10-01 PROCEDURE — 96375 TX/PRO/DX INJ NEW DRUG ADDON: CPT

## 2021-10-01 PROCEDURE — 81001 URINALYSIS AUTO W/SCOPE: CPT

## 2021-10-01 PROCEDURE — 71045 X-RAY EXAM CHEST 1 VIEW: CPT

## 2021-10-01 PROCEDURE — 99283 EMERGENCY DEPT VISIT LOW MDM: CPT

## 2021-10-01 PROCEDURE — 84484 ASSAY OF TROPONIN QUANT: CPT

## 2021-10-01 PROCEDURE — 93005 ELECTROCARDIOGRAM TRACING: CPT | Performed by: EMERGENCY MEDICINE

## 2021-10-01 PROCEDURE — 83880 ASSAY OF NATRIURETIC PEPTIDE: CPT

## 2021-10-01 PROCEDURE — 85027 COMPLETE CBC AUTOMATED: CPT

## 2021-10-01 PROCEDURE — 83605 ASSAY OF LACTIC ACID: CPT

## 2021-10-01 RX ORDER — 0.9 % SODIUM CHLORIDE 0.9 %
1000 INTRAVENOUS SOLUTION INTRAVENOUS ONCE
Status: COMPLETED | OUTPATIENT
Start: 2021-10-01 | End: 2021-10-02

## 2021-10-01 RX ORDER — MORPHINE SULFATE 2 MG/ML
4 INJECTION, SOLUTION INTRAMUSCULAR; INTRAVENOUS ONCE
Status: COMPLETED | OUTPATIENT
Start: 2021-10-01 | End: 2021-10-01

## 2021-10-01 RX ORDER — ONDANSETRON 2 MG/ML
4 INJECTION INTRAMUSCULAR; INTRAVENOUS ONCE
Status: COMPLETED | OUTPATIENT
Start: 2021-10-01 | End: 2021-10-01

## 2021-10-01 RX ADMIN — ONDANSETRON 4 MG: 2 INJECTION INTRAMUSCULAR; INTRAVENOUS at 22:27

## 2021-10-01 RX ADMIN — MORPHINE SULFATE 4 MG: 2 INJECTION, SOLUTION INTRAMUSCULAR; INTRAVENOUS at 22:27

## 2021-10-01 RX ADMIN — SODIUM CHLORIDE 1000 ML: 9 INJECTION, SOLUTION INTRAVENOUS at 22:27

## 2021-10-01 ASSESSMENT — ENCOUNTER SYMPTOMS
COUGH: 1
VOMITING: 1
DIARRHEA: 0
NAUSEA: 1
BLOOD IN STOOL: 0
SHORTNESS OF BREATH: 0
COLOR CHANGE: 0
ABDOMINAL PAIN: 0
RHINORRHEA: 0
SORE THROAT: 0

## 2021-10-01 ASSESSMENT — PAIN SCALES - GENERAL
PAINLEVEL_OUTOF10: 10
PAINLEVEL_OUTOF10: 10

## 2021-10-01 ASSESSMENT — PAIN DESCRIPTION - LOCATION: LOCATION: CHEST;GENERALIZED;HEAD

## 2021-10-01 ASSESSMENT — PAIN DESCRIPTION - PAIN TYPE: TYPE: ACUTE PAIN

## 2021-10-02 VITALS
DIASTOLIC BLOOD PRESSURE: 73 MMHG | OXYGEN SATURATION: 96 % | HEIGHT: 72 IN | RESPIRATION RATE: 18 BRPM | HEART RATE: 99 BPM | SYSTOLIC BLOOD PRESSURE: 144 MMHG | TEMPERATURE: 98.4 F | BODY MASS INDEX: 39.68 KG/M2 | WEIGHT: 293 LBS

## 2021-10-02 LAB
LACTIC ACID: 2.7 MMOL/L (ref 0.5–2.2)
PROCALCITONIN: 0.23 NG/ML (ref 0–0.15)

## 2021-10-02 PROCEDURE — 83605 ASSAY OF LACTIC ACID: CPT

## 2021-10-02 PROCEDURE — 6370000000 HC RX 637 (ALT 250 FOR IP): Performed by: PERSONAL EMERGENCY RESPONSE ATTENDANT

## 2021-10-02 RX ORDER — ACETAMINOPHEN 500 MG
1000 TABLET ORAL ONCE
Status: COMPLETED | OUTPATIENT
Start: 2021-10-02 | End: 2021-10-02

## 2021-10-02 RX ADMIN — ACETAMINOPHEN 1000 MG: 500 TABLET ORAL at 02:00

## 2021-10-02 ASSESSMENT — PAIN SCALES - GENERAL: PAINLEVEL_OUTOF10: 5

## 2021-10-02 NOTE — ED PROVIDER NOTES
[facility] ED  eMERGENCY dEPARTMENT eNCOUnter      Pt Name: Franklin Babinski  MRN: [id number]  [place] 1975  Date of evaluation: 10/1/2021  Provider: BRIDGET Leon      HISTORY OF PRESENT ILLNESS    Franklin Babinski is a 48 y.o. female with PMHx of asthma, chronic abdominal pain, depression, hyperlipidemia, hypertension, NATALEE, schizophrenia, DM 2 presents to the emergency department with generalized illness. Patient got her second Covid vaccine yesterday and since then has been having generalized illness. She has had vomiting, midsternal chest pain that is constant and worse with \"everything \", chills, headache, body aches, dry cough, shortness of breath. She denies fevers, wheezing, abdominal pain, diarrhea, dysuria. Took Tylenol at home without improvement. HPI    Nursing Notes were reviewed. REVIEW OF SYSTEMS       Review of Systems   Constitutional: Negative for appetite change, chills and fever. HENT: Negative for congestion, rhinorrhea and sore throat. Respiratory: Positive for cough. Negative for shortness of breath. Cardiovascular: Positive for chest pain. Gastrointestinal: Positive for nausea and vomiting. Negative for abdominal pain, blood in stool and diarrhea. Genitourinary: Negative for difficulty urinating. Musculoskeletal: Positive for myalgias. Negative for neck stiffness. Skin: Negative for color change and rash. Neurological: Positive for headaches. Negative for dizziness, syncope, weakness, light-headedness and numbness. All other systems reviewed and are negative.             PAST MEDICAL HISTORY     Past Medical History:   Diagnosis Date    Asthma     Chronic abdominal pain     Depression     Hyperlipidemia     Hypertension     NATALEE (obstructive sleep apnea)     Schizophrenia (HCC)     Type II or unspecified type diabetes mellitus without mention of complication, not stated as uncontrolled          SURGICAL HISTORY Past Surgical History:   Procedure Laterality Date    COLONOSCOPY  2/21/14    Donnie  UPPER GASTROINTESTINAL ENDOSCOPY  2/21/14    hardy    UPPER GASTROINTESTINAL ENDOSCOPY N/A 3/22/2021    EGD DIAGNOSTIC ONLY performed by Kasia Ferrera MD at [address] [place] [address]       Previous Medications    ARIPIPRAZOLE (ABILIFY PO)    Take 5 mg by mouth     BUPROPION HCL (WELLBUTRIN PO)    Take 150 mg by mouth     ESOMEPRAZOLE (NEXIUM) 40 MG DELAYED RELEASE CAPSULE    Take 1 capsule by mouth every morning (before breakfast)    GABAPENTIN (NEURONTIN) 800 MG TABLET    Take 800 mg by mouth 3 times daily. For leg pain    INSULIN GLARGINE (LANTUS SOLOSTAR) 100 UNIT/ML INJECTION PEN    60 units every morning. 160 units nightly    INSULIN LISPRO (HUMALOG KWIKPEN U-200) 200 UNIT/ML SOPN PEN    Inject 70 Units into the skin 3 times daily (with meals)    INSULIN SYRINGE .5CC/29G 29G X 1/2\" 0.5 ML MISC    1 each by Does not apply route daily    LEVOTHYROXINE (SYNTHROID) 50 MCG TABLET    Take 50 mcg by mouth Daily    LISINOPRIL (PRINIVIL;ZESTRIL) 10 MG TABLET    Take 1 tablet by mouth daily    MAGNESIUM OXIDE (MAG-OX) 400 (240 MG) MG TABLET    Take 1 tablet by mouth daily    MELOXICAM (MOBIC) 15 MG TABLET    Take 1 tablet by mouth daily as needed for Pain    METFORMIN HCL PO    Take 1,000 mg by mouth     METOPROLOL SUCCINATE (TOPROL XL) 25 MG EXTENDED RELEASE TABLET    Take 1 tablet by mouth daily    MONTELUKAST (SINGULAIR) 10 MG TABLET    Take 10 mg by mouth nightly    NITROGLYCERIN (NITROSTAT) 0.4 MG SL TABLET    Place 1 tablet under the tongue every 5 minutes as needed for Chest pain up to max of 3 total doses. If no relief after 1 dose, call 911.     SERTRALINE (ZOLOFT) 100 MG TABLET    Take 100 mg by mouth daily    TOPIRAMATE (TOPAMAX) 25 MG TABLET    Take 25 mg by mouth daily    TRAZODONE (DESYREL) 50 MG TABLET    Take 50 mg by mouth nightly       ALLERGIES     Shellfish-derived products, Amoxicillin, Macrobid [nitrofurantoin monohyd macro], Reglan [metoclopramide], Singulair [montelukast], and Nubain [nalbuphine hcl]    FAMILY HISTORY       Family History   Problem Relation Age of Onset    Other Mother         Diverticulitis    Colon Cancer Paternal Uncle     Colon Cancer Maternal Grandfather           SOCIAL HISTORY       Social History     Socioeconomic History    Marital status: Single     Spouse name: None    Number of children: None    Years of education: None    Highest education level: None   Occupational History    None   Tobacco Use    Smoking status: Former Smoker     Packs/day: 1.00     Types: Cigarettes    Smokeless tobacco: Never Used   Vaping Use    Vaping Use: Never used   Substance and Sexual Activity    Alcohol use: No    Drug use: No    Sexual activity: None   Other Topics Concern    None   Social History Narrative    Lives w brother     Social Determinants of Health     Financial Resource Strain:     Difficulty of Paying Living Expenses:    Food Insecurity:     Worried About Running Out of Food in the Last Year:     Ran Out of Food in the Last Year:    Transportation Needs:     Lack of Transportation (Medical):      Lack of Transportation (Non-Medical):    Physical Activity:     Days of Exercise per Week:     Minutes of Exercise per Session:    Stress:     Feeling of Stress :    Social Connections:     Frequency of Communication with Friends and Family:     Frequency of Social Gatherings with Friends and Family:     Attends [employer] Services:     Active Member of Clubs or Organizations:     Attends Club or Organization Meetings:     Marital Status:    Intimate Partner Violence:     Fear of Current or Ex-Partner:     Emotionally Abused:     Physically Abused:     Sexually Abused:          PHYSICAL EXAM         ED Triage Vitals [10/01/21 2022]   BP Temp Temp Source Pulse Resp SpO2 Height Weight   138/75 99.9 °F (37.7 °C) Oral 111 20 94 % 6' (1.829 m) (!) 340 lb (154.2 kg)       Physical Exam  Constitutional:       Appearance: She is well-developed. HENT:      Head: Normocephalic and atraumatic. Right Ear: Tympanic membrane normal.      Left Ear: Tympanic membrane normal.      Mouth/Throat:      Pharynx: No oropharyngeal exudate or posterior oropharyngeal erythema. Eyes:      Conjunctiva/sclera: Conjunctivae normal.      Pupils: Pupils are equal, round, and reactive to light. Neck:      Trachea: No tracheal deviation. Cardiovascular:      Heart sounds: Normal heart sounds. Pulmonary:      Effort: Pulmonary effort is normal. No respiratory distress. Breath sounds: Normal breath sounds. No stridor. Abdominal:      General: Bowel sounds are normal. There is no distension. Palpations: Abdomen is soft. There is no mass. Tenderness: There is no abdominal tenderness. There is no guarding or rebound. Musculoskeletal:         General: Normal range of motion. Cervical back: Normal range of motion and neck supple. Skin:     General: Skin is warm and dry. Capillary Refill: Capillary refill takes less than 2 seconds. Findings: No rash. Neurological:      Mental Status: She is alert and oriented to person, place, and time. Deep Tendon Reflexes: Reflexes are normal and symmetric. Psychiatric:         Behavior: Behavior normal.         Thought Content:  Thought content normal.         Judgment: Judgment normal.         DIAGNOSTIC RESULTS     EKG:All EKG's are interpreted by the Emergency Department Physician who either signs or Co-signs this chart in the absence of a cardiologist.    Sinus tachycardia, rate 111, normal intervals, no ST segment changes    RADIOLOGY:   Non-plain film images such as CT, Ultrasound and MRI are read by theradiologist. Plain radiographic images are visualized and preliminarily interpreted by the emergency physician with the below findings:    Interpretation per theRadiologist below, if available at the time of this note:    XR CHEST PORTABLE    (Results Pending)           LABS:  Labs Reviewed   COMPREHENSIVE METABOLIC PANEL - Abnormal; Notable for the following components:       Result Value    Glucose 115 (*)     CREATININE 1.19 (*)     GFR Non- 48.3 (*)     GFR  58.5 (*)     Alkaline Phosphatase 131 (*)     ALT 51 (*)     AST 58 (*)     All other components within normal limits   LACTIC ACID, PLASMA - Abnormal; Notable for the following components:    Lactic Acid 2.7 (*)     All other components within normal limits   URINE RT REFLEX TO CULTURE - Abnormal; Notable for the following components:    Blood, Urine MODERATE (*)     Protein, UA TRACE (*)     All other components within normal limits   LACTIC ACID, PLASMA - Abnormal; Notable for the following components:    Lactic Acid 2.7 (*)     All other components within normal limits   MICROSCOPIC URINALYSIS - Abnormal; Notable for the following components:    Bacteria, UA FEW (*)     All other components within normal limits   COVID-19, RAPID   CBC   TROPONIN   CK   BRAIN NATRIURETIC PEPTIDE   PROTIME-INR   APTT   D-DIMER, QUANTITATIVE   PROCALCITONIN       All other labs were within normal range or not returned as of this dictation. EMERGENCY DEPARTMENT COURSE and DIFFERENTIAL DIAGNOSIS/MDM:   Vitals:    Vitals:    10/01/21 2022 10/01/21 2145 10/02/21 0008   BP: 138/75 136/64    Pulse: 111 104 104   Resp: 20 18 18   Temp: 99.9 °F (37.7 °C) 99.6 °F (37.6 °C)    TempSrc: Oral Oral    SpO2: 94% 94% 95%   Weight: (!) 340 lb (154.2 kg)     Height: 6' (1.829 m)           MDM    Chest x-ray shows no acute process. Creatinine 1.19, lactic acid 2.7, ALT 51, AST 58. Patient given 1 L IV fluid, Zofran, morphine with repeat lactic acid repeated at 1.7 still. She is sleeping on re-exam.  Patient symptoms are to be expected after Covid vaccine. Standard anticipatory guidance given to patient upon discharge.  Have given them a specific time frame in which to follow-up and who to follow-up with. I have also advised them that they should return to the emergency department if they get worse, or not getting better or develop any new or concerning symptoms. Patient demonstrates understanding. CRITICAL CARE TIME   Total Critical Caretime was 0 minutes, excluding separately reportable procedures. There was a high probability of clinically significant/life threatening deterioration in the patient's condition which required my urgent intervention. Procedures    FINAL IMPRESSION      1. Status post administration of all doses of COVID-19 vaccine series          DISPOSITION/PLAN   DISPOSITION        PATIENT REFERRED TO:  Rose Marie Rojas, [address] [facility] [address]  [phone]    In 3 days        DISCHARGE MEDICATIONS:  New Prescriptions    No medications on file          (Please notethat portions of this note were completed with a voice recognition program.  Efforts were made to edit the dictations but occasionally words are mis-transcribed. )    BRIDGET Rao (electronically signed)  Emergency Physician Assistant         Miriam Piedra, [address]  10/02/21 8735

## 2021-10-04 LAB
EKG ATRIAL RATE: 111 BPM
EKG P AXIS: 40 DEGREES
EKG P-R INTERVAL: 144 MS
EKG Q-T INTERVAL: 348 MS
EKG QRS DURATION: 84 MS
EKG QTC CALCULATION (BAZETT): 473 MS
EKG R AXIS: 261 DEGREES
EKG T AXIS: 19 DEGREES
EKG VENTRICULAR RATE: 111 BPM

## 2021-10-04 PROCEDURE — 93010 ELECTROCARDIOGRAM REPORT: CPT | Performed by: INTERNAL MEDICINE

## 2021-12-31 ENCOUNTER — HOSPITAL ENCOUNTER (EMERGENCY)
Age: 46
Discharge: HOME | End: 2021-12-31
Attending: EMERGENCY MEDICINE
Payer: COMMERCIAL

## 2021-12-31 ENCOUNTER — APPOINTMENT (OUTPATIENT)
Dept: GENERAL RADIOLOGY | Age: 46
End: 2021-12-31
Payer: COMMERCIAL

## 2021-12-31 VITALS
RESPIRATION RATE: 18 BRPM | SYSTOLIC BLOOD PRESSURE: 93 MMHG | WEIGHT: 293 LBS | TEMPERATURE: 97.8 F | OXYGEN SATURATION: 93 % | BODY MASS INDEX: 39.68 KG/M2 | DIASTOLIC BLOOD PRESSURE: 55 MMHG | HEART RATE: 81 BPM | HEIGHT: 72 IN

## 2021-12-31 DIAGNOSIS — R73.9 HYPERGLYCEMIA: Primary | ICD-10-CM

## 2021-12-31 LAB
ALBUMIN SERPL-MCNC: 4.3 G/DL (ref 3.5–4.6)
ALP BLD-CCNC: 120 U/L (ref 40–130)
ALT SERPL-CCNC: 60 U/L (ref 0–33)
ANION GAP SERPL CALCULATED.3IONS-SCNC: 17 MEQ/L (ref 9–15)
AST SERPL-CCNC: 39 U/L (ref 0–35)
BACTERIA: ABNORMAL /HPF
BASE EXCESS VENOUS: -2 (ref -3–3)
BASOPHILS ABSOLUTE: 0.1 K/UL (ref 0–0.2)
BASOPHILS RELATIVE PERCENT: 1.3 %
BETA-HYDROXYBUTYRATE: 11.5 MG/DL (ref 0.2–2.8)
BILIRUB SERPL-MCNC: 0.6 MG/DL (ref 0.2–0.7)
BILIRUBIN URINE: NEGATIVE
BLOOD, URINE: NEGATIVE
BUN BLDV-MCNC: 18 MG/DL (ref 6–20)
CALCIUM IONIZED: 1.14 MMOL/L (ref 1.12–1.32)
CALCIUM SERPL-MCNC: 9.8 MG/DL (ref 8.5–9.9)
CHLORIDE BLD-SCNC: 90 MEQ/L (ref 95–107)
CLARITY: CLEAR
CO2: 20 MEQ/L (ref 20–31)
COLOR: YELLOW
CREAT SERPL-MCNC: 0.88 MG/DL (ref 0.5–0.9)
EOSINOPHILS ABSOLUTE: 0.4 K/UL (ref 0–0.7)
EOSINOPHILS RELATIVE PERCENT: 6.1 %
EPITHELIAL CELLS, UA: ABNORMAL /HPF (ref 0–5)
GFR AFRICAN AMERICAN: >60
GFR AFRICAN AMERICAN: >60
GFR NON-AFRICAN AMERICAN: 53
GFR NON-AFRICAN AMERICAN: >60
GLOBULIN: 4.1 G/DL (ref 2.3–3.5)
GLUCOSE BLD-MCNC: 493 MG/DL (ref 60–115)
GLUCOSE BLD-MCNC: 523 MG/DL
GLUCOSE BLD-MCNC: 523 MG/DL (ref 60–115)
GLUCOSE BLD-MCNC: 607 MG/DL (ref 60–115)
GLUCOSE BLD-MCNC: 623 MG/DL (ref 70–99)
GLUCOSE URINE: >=1000 MG/DL
HCO3 VENOUS: 23.3 MMOL/L (ref 23–29)
HCT VFR BLD CALC: 39.2 % (ref 37–47)
HEMOGLOBIN: 13.2 G/DL (ref 12–16)
HEMOGLOBIN: 13.9 GM/DL (ref 12–16)
HYALINE CASTS: ABNORMAL /HPF (ref 0–5)
KETONES, URINE: ABNORMAL MG/DL
LACTATE: 2.27 MMOL/L (ref 0.4–2)
LEUKOCYTE ESTERASE, URINE: ABNORMAL
LYMPHOCYTES ABSOLUTE: 1.9 K/UL (ref 1–4.8)
LYMPHOCYTES RELATIVE PERCENT: 28.3 %
MAGNESIUM: 1.8 MG/DL (ref 1.7–2.4)
MCH RBC QN AUTO: 30.7 PG (ref 27–31.3)
MCHC RBC AUTO-ENTMCNC: 33.8 % (ref 33–37)
MCV RBC AUTO: 90.8 FL (ref 82–100)
MONOCYTES ABSOLUTE: 0.4 K/UL (ref 0.2–0.8)
MONOCYTES RELATIVE PERCENT: 6.2 %
NEUTROPHILS ABSOLUTE: 3.9 K/UL (ref 1.4–6.5)
NEUTROPHILS RELATIVE PERCENT: 58.1 %
NITRITE, URINE: NEGATIVE
O2 SAT, VEN: 92 %
PCO2, VEN: 39.7 MM HG (ref 40–50)
PDW BLD-RTO: 14.5 % (ref 11.5–14.5)
PERFORMED ON: ABNORMAL
PH UA: 5.5 (ref 5–9)
PH VENOUS: 7.38 (ref 7.35–7.45)
PLATELET # BLD: 116 K/UL (ref 130–400)
PO2, VEN: 64 MM HG
POC CHLORIDE: 100 MEQ/L (ref 99–110)
POC CREATININE: 1.1 MG/DL (ref 0.6–1.1)
POC HEMATOCRIT: 41 % (ref 36–48)
POC POTASSIUM: 4.4 MEQ/L (ref 3.5–5.1)
POC SAMPLE TYPE: ABNORMAL
POC SODIUM: 134 MEQ/L (ref 136–145)
POTASSIUM SERPL-SCNC: 4.1 MEQ/L (ref 3.4–4.9)
PROTEIN UA: 30 MG/DL
RBC # BLD: 4.31 M/UL (ref 4.2–5.4)
RBC UA: ABNORMAL /HPF (ref 0–5)
SARS-COV-2, NAAT: NOT DETECTED
SODIUM BLD-SCNC: 127 MEQ/L (ref 135–144)
SPECIFIC GRAVITY UA: 1.02 (ref 1–1.03)
TCO2 CALC VENOUS: 25 MMOL/L
TOTAL PROTEIN: 8.4 G/DL (ref 6.3–8)
TROPONIN: <0.01 NG/ML (ref 0–0.01)
URINE REFLEX TO CULTURE: YES
UROBILINOGEN, URINE: 0.2 E.U./DL
WBC # BLD: 6.8 K/UL (ref 4.8–10.8)
WBC UA: ABNORMAL /HPF (ref 0–5)

## 2021-12-31 PROCEDURE — 85025 COMPLETE CBC W/AUTO DIFF WBC: CPT

## 2021-12-31 PROCEDURE — 83605 ASSAY OF LACTIC ACID: CPT

## 2021-12-31 PROCEDURE — 82010 KETONE BODYS QUAN: CPT

## 2021-12-31 PROCEDURE — 71046 X-RAY EXAM CHEST 2 VIEWS: CPT

## 2021-12-31 PROCEDURE — 6360000002 HC RX W HCPCS: Performed by: EMERGENCY MEDICINE

## 2021-12-31 PROCEDURE — 36600 WITHDRAWAL OF ARTERIAL BLOOD: CPT

## 2021-12-31 PROCEDURE — 2580000003 HC RX 258: Performed by: EMERGENCY MEDICINE

## 2021-12-31 PROCEDURE — 87086 URINE CULTURE/COLONY COUNT: CPT

## 2021-12-31 PROCEDURE — 82803 BLOOD GASES ANY COMBINATION: CPT

## 2021-12-31 PROCEDURE — 87186 SC STD MICRODIL/AGAR DIL: CPT

## 2021-12-31 PROCEDURE — 87635 SARS-COV-2 COVID-19 AMP PRB: CPT

## 2021-12-31 PROCEDURE — 82330 ASSAY OF CALCIUM: CPT

## 2021-12-31 PROCEDURE — 73630 X-RAY EXAM OF FOOT: CPT

## 2021-12-31 PROCEDURE — 82435 ASSAY OF BLOOD CHLORIDE: CPT

## 2021-12-31 PROCEDURE — 84484 ASSAY OF TROPONIN QUANT: CPT

## 2021-12-31 PROCEDURE — 82948 REAGENT STRIP/BLOOD GLUCOSE: CPT

## 2021-12-31 PROCEDURE — 96375 TX/PRO/DX INJ NEW DRUG ADDON: CPT

## 2021-12-31 PROCEDURE — 81001 URINALYSIS AUTO W/SCOPE: CPT

## 2021-12-31 PROCEDURE — 6370000000 HC RX 637 (ALT 250 FOR IP): Performed by: EMERGENCY MEDICINE

## 2021-12-31 PROCEDURE — 80053 COMPREHEN METABOLIC PANEL: CPT

## 2021-12-31 PROCEDURE — 96376 TX/PRO/DX INJ SAME DRUG ADON: CPT

## 2021-12-31 PROCEDURE — 84132 ASSAY OF SERUM POTASSIUM: CPT

## 2021-12-31 PROCEDURE — 82565 ASSAY OF CREATININE: CPT

## 2021-12-31 PROCEDURE — 96374 THER/PROPH/DIAG INJ IV PUSH: CPT

## 2021-12-31 PROCEDURE — 87077 CULTURE AEROBIC IDENTIFY: CPT

## 2021-12-31 PROCEDURE — 84295 ASSAY OF SERUM SODIUM: CPT

## 2021-12-31 PROCEDURE — 85014 HEMATOCRIT: CPT

## 2021-12-31 PROCEDURE — 93005 ELECTROCARDIOGRAM TRACING: CPT | Performed by: EMERGENCY MEDICINE

## 2021-12-31 PROCEDURE — 99284 EMERGENCY DEPT VISIT MOD MDM: CPT

## 2021-12-31 PROCEDURE — 36415 COLL VENOUS BLD VENIPUNCTURE: CPT

## 2021-12-31 PROCEDURE — 83735 ASSAY OF MAGNESIUM: CPT

## 2021-12-31 RX ORDER — KETOROLAC TROMETHAMINE 30 MG/ML
30 INJECTION, SOLUTION INTRAMUSCULAR; INTRAVENOUS ONCE
Status: COMPLETED | OUTPATIENT
Start: 2021-12-31 | End: 2021-12-31

## 2021-12-31 RX ORDER — 0.9 % SODIUM CHLORIDE 0.9 %
1000 INTRAVENOUS SOLUTION INTRAVENOUS ONCE
Status: COMPLETED | OUTPATIENT
Start: 2021-12-31 | End: 2021-12-31

## 2021-12-31 RX ORDER — ONDANSETRON 2 MG/ML
4 INJECTION INTRAMUSCULAR; INTRAVENOUS ONCE
Status: COMPLETED | OUTPATIENT
Start: 2021-12-31 | End: 2021-12-31

## 2021-12-31 RX ORDER — FENTANYL CITRATE 50 UG/ML
50 INJECTION, SOLUTION INTRAMUSCULAR; INTRAVENOUS ONCE
Status: COMPLETED | OUTPATIENT
Start: 2021-12-31 | End: 2021-12-31

## 2021-12-31 RX ADMIN — ONDANSETRON 4 MG: 2 INJECTION INTRAMUSCULAR; INTRAVENOUS at 11:43

## 2021-12-31 RX ADMIN — SODIUM CHLORIDE 1000 ML: 9 INJECTION, SOLUTION INTRAVENOUS at 11:43

## 2021-12-31 RX ADMIN — INSULIN HUMAN 10 UNITS: 100 INJECTION, SOLUTION PARENTERAL at 15:10

## 2021-12-31 RX ADMIN — INSULIN HUMAN 10 UNITS: 100 INJECTION, SOLUTION PARENTERAL at 13:00

## 2021-12-31 RX ADMIN — FENTANYL CITRATE 50 MCG: 50 INJECTION INTRAMUSCULAR; INTRAVENOUS at 13:33

## 2021-12-31 RX ADMIN — KETOROLAC TROMETHAMINE 30 MG: 30 INJECTION, SOLUTION INTRAMUSCULAR at 11:43

## 2021-12-31 RX ADMIN — SODIUM CHLORIDE 1000 ML: 9 INJECTION, SOLUTION INTRAVENOUS at 15:16

## 2021-12-31 ASSESSMENT — PAIN SCALES - GENERAL
PAINLEVEL_OUTOF10: 10
PAINLEVEL_OUTOF10: 9

## 2021-12-31 ASSESSMENT — PAIN DESCRIPTION - PAIN TYPE: TYPE: ACUTE PAIN

## 2021-12-31 ASSESSMENT — PAIN DESCRIPTION - LOCATION
LOCATION: LEG;CHEST
LOCATION: CHEST

## 2021-12-31 ASSESSMENT — PAIN DESCRIPTION - DESCRIPTORS
DESCRIPTORS: DISCOMFORT
DESCRIPTORS: DISCOMFORT;ACHING

## 2021-12-31 ASSESSMENT — PAIN DESCRIPTION - FREQUENCY: FREQUENCY: INTERMITTENT

## 2021-12-31 NOTE — ED PROVIDER NOTES
[address] [facility] ED  EMERGENCY MEDICINE     Pt Name: Latisha Epperson  MRN: [id number]  [place] 1975  Date of evaluation: 12/31/2021  PCP:    Lizette Yañez MD  Provider: [place], [address] [place] [address]       Chief Complaint   Patient presents with    Shortness of Breath     hyperglycemia, body aches        HISTORY OF PRESENT ILLNESS    HPI     44yo comes in with vague complaints including high glucose, chest pain, right leg/foot pain, fatigue. States she's been taking her insulin but her glucose stays in the 500s. Has not eaten today because shes worried her sugar will go up. Vomited 5 times today. Feels nauseous. No cough or SOB. Triage notes and Nursing notes were reviewed by myself. Any discrepancies are addressed above. PAST MEDICAL HISTORY     Past Medical History:   Diagnosis Date    Asthma     Chronic abdominal pain     Depression     Hyperlipidemia     Hypertension     NATALEE (obstructive sleep apnea)     Schizophrenia (HCC)     Type II or unspecified type diabetes mellitus without mention of complication, not stated as uncontrolled        SURGICAL HISTORY       Past Surgical History:   Procedure Laterality Date    COLONOSCOPY  2/21/14    [email or web address]    UPPER GASTROINTESTINAL ENDOSCOPY  2/21/14    [email or web address]    UPPER GASTROINTESTINAL ENDOSCOPY N/A 3/22/2021    EGD DIAGNOSTIC ONLY performed by Lemuel Clarke MD at [address] [place] [address]       Discharge Medication List as of 12/31/2021  4:31 PM      CONTINUE these medications which have NOT CHANGED    Details   insulin glargine (LANTUS SOLOSTAR) 100 UNIT/ML injection pen 60 units every morning. 160 units nightly, Disp-5 pen, R-0Normal      gabapentin (NEURONTIN) 800 MG tablet Take 800 mg by mouth 3 times daily.  For leg painHistorical Med      magnesium oxide (MAG-OX) 400 (240 Mg) MG tablet Take 1 tablet by mouth daily, Disp-30 tablet, R-5Normal      nitroGLYCERIN (NITROSTAT) 0.4 MG SL tablet Place 1 tablet under the tongue every 5 minutes as needed for Chest pain up to max of 3 total doses.  If no relief after 1 dose, call 911., Disp-25 tablet, R-3Normal      metoprolol succinate (TOPROL XL) 25 MG extended release tablet Take 1 tablet by mouth daily, Disp-30 tablet, R-3Normal      lisinopril (PRINIVIL;ZESTRIL) 10 MG tablet Take 1 tablet by mouth daily, Disp-30 tablet, R-3Normal      INSULIN SYRINGE .5CC/29G 29G X 1/2\" 0.5 ML MISC DAILY Starting Tue 8/11/2020, Disp-100 each,R-0, Print      meloxicam (MOBIC) 15 MG tablet Take 1 tablet by mouth daily as needed for Pain, Disp-90 tablet, R-1Print      esomeprazole (NEXIUM) 40 MG delayed release capsule Take 1 capsule by mouth every morning (before breakfast), Disp-30 capsule, R-3Print      sertraline (ZOLOFT) 100 MG tablet Take 100 mg by mouth dailyHistorical Med      traZODone (DESYREL) 50 MG tablet Take 50 mg by mouth nightlyHistorical Med      levothyroxine (SYNTHROID) 50 MCG tablet Take 50 mcg by mouth DailyHistorical Med      topiramate (TOPAMAX) 25 MG tablet Take 25 mg by mouth dailyHistorical Med      montelukast (SINGULAIR) 10 MG tablet Take 10 mg by mouth nightlyHistorical Med      METFORMIN HCL PO Take 1,000 mg by mouth Historical Med      BuPROPion HCl (WELLBUTRIN PO) Take 150 mg by mouth Historical Med      ARIPiprazole (ABILIFY PO) Take 5 mg by mouth Historical Med             ALLERGIES       Allergies   Allergen Reactions    Shellfish-Derived Products Anaphylaxis    Amoxicillin Swelling    Macrobid [Nitrofurantoin Monohyd Macro]     Reglan [Metoclopramide]     Singulair [Montelukast]     Nubain [Nalbuphine Hcl] Swelling and Rash       FAMILY HISTORY       Family History   Problem Relation Age of Onset    Other Mother         Diverticulitis    Colon Cancer Paternal Uncle     Colon Cancer Maternal Grandfather         SOCIAL HISTORY       Social History     Socioeconomic History    Marital status: Single     Spouse name: Not on file    Number of children: Not on file    Years of education: Not on file    Highest education level: Not on file   Occupational History    Not on file   Tobacco Use    Smoking status: Former Smoker     Packs/day: 1.00     Types: Cigarettes    Smokeless tobacco: Never Used   Vaping Use    Vaping Use: Never used   Substance and Sexual Activity    Alcohol use: No    Drug use: No    Sexual activity: Not on file   Other Topics Concern    Not on file   Social History Narrative    Lives w brother     Social Determinants of Health     Financial Resource Strain:     Difficulty of Paying Living Expenses: Not on file   Food Insecurity:     Worried About [address] in the Last Year: Not on file    [address] in the Last Year: Not on file   Transportation Needs:     Lack of Transportation (Medical): Not on file    Lack of Transportation (Non-Medical): Not on file   Physical Activity:     Days of Exercise per Week: Not on file    Minutes of Exercise per Session: Not on file   Stress:     Feeling of Stress : Not on file   Social Connections:     Frequency of Communication with Friends and Family: Not on file    Frequency of Social Gatherings with Friends and Family: Not on file    Attends [employer] Services: Not on file    Active Member of [address] [place] [address] [employer] or Organizations: Not on file    Attends Club or Organization Meetings: Not on file    Marital Status: Not on file   Intimate Partner Violence:     Fear of Current or Ex-Partner: Not on file    Emotionally Abused: Not on file    Physically Abused: Not on file    Sexually Abused: Not on file   Housing Stability:     Unable to Pay for Housing in the Last Year: Not on file    Number of [place] in the Last Year: Not on file    Unstable Housing in the Last Year: Not on file       REVIEW OF SYSTEMS     Review of Systems   Constitutional: Positive for appetite change and fatigue. Cardiovascular: Positive for chest pain.    Endocrine:        High sugar Musculoskeletal: Positive for arthralgias and myalgias. Except as noted above the remainder of the review of systems was reviewed and is negative. SCREENINGS                        PHYSICAL EXAM    (up to 7 for level 4, 8 or more for level 5)     ED Triage Vitals [12/31/21 1044]   BP Temp Temp Source Pulse Resp SpO2 Height Weight   (!) 141/88 98 °F (36.7 °C) Temporal 99 22 95 % 6' (1.829 m) (!) 340 lb (154.2 kg)       Physical Exam  Constitutional:       General: She is not in acute distress. Appearance: Normal appearance. She is obese. She is not ill-appearing. Comments: No acute distress, nontoxic appearing     HENT:      Head: Normocephalic and atraumatic. Right Ear: External ear normal.      Left Ear: External ear normal.      Nose: Nose normal. No congestion or rhinorrhea. Mouth/Throat:      Mouth: Mucous membranes are moist.      Pharynx: No oropharyngeal exudate or posterior oropharyngeal erythema. Eyes:      General:         Right eye: No discharge. Left eye: No discharge. Extraocular Movements: Extraocular movements intact. Pupils: Pupils are equal, round, and reactive to light. Cardiovascular:      Rate and Rhythm: Normal rate and regular rhythm. Pulses: Normal pulses. Pulmonary:      Effort: Pulmonary effort is normal.      Breath sounds: Normal breath sounds. No decreased breath sounds, wheezing, rhonchi or rales. Chest:      Chest wall: Tenderness (mid chest, reproducible) present. Abdominal:      General: Abdomen is flat. Bowel sounds are normal. There is no distension. Tenderness: There is no abdominal tenderness. There is no right CVA tenderness, left CVA tenderness, guarding or rebound. Musculoskeletal:         General: No swelling or tenderness. Normal range of motion. Cervical back: Normal range of motion and neck supple. Right lower leg: No edema. Left lower leg: No edema.         Legs:       Comments: No open wounds on foot, abrasion to right dorsal aspect of foot near great toe. No drainage. Skin:     General: Skin is warm and dry. Capillary Refill: Capillary refill takes less than 2 seconds. Neurological:      General: No focal deficit present. Mental Status: She is alert. Psychiatric:         Mood and Affect: Mood normal.           DIAGNOSTIC RESULTS     EKG:(none if blank)  All EKGs are interpreted by the Emergency Department Physician who either signs or Co-signs this chart in the absence of a cardiologist.    EKG done at 12:01P, NSR, HR 90bpm, normal intervals, LAD. No significant ST-T wave abnormalities. RADIOLOGY: (none if blank)   I directly visualized the following images and reviewed the radiologist interpretations. Interpretation per the Radiologist below, if available at the time of this note:  XR FOOT RIGHT (MIN 3 VIEWS)   Final Result                                                                                      No acute osseous abnormality. XR CHEST (2 VW)   Final Result      No radiographic evidence of acute intrathoracic process.                 LABS:  Labs Reviewed   COMPREHENSIVE METABOLIC PANEL - Abnormal; Notable for the following components:       Result Value    Sodium 127 (*)     Chloride 90 (*)     Anion Gap 17 (*)     Glucose 623 (*)     Total Protein 8.4 (*)     ALT 60 (*)     AST 39 (*)     Globulin 4.1 (*)     All other components within normal limits    Narrative:     CALL  [facility]  [facility] tel. [phone],  Glucose results called to and read back by Juan ROLDAN RN, 12/31/2021  12:26, by CASSIE   CBC WITH AUTO DIFFERENTIAL - Abnormal; Notable for the following components:    Platelets 444 (*)     All other components within normal limits   URINE RT REFLEX TO CULTURE - Abnormal; Notable for the following components:    Glucose, Ur >=1000 (*)     Ketones, Urine TRACE (*)     Protein, UA 30 (*)     Leukocyte Esterase, Urine TRACE (*)     All other components within normal limits   MICROSCOPIC URINALYSIS - Abnormal; Notable for the following components:    Bacteria, UA RARE (*)     WBC, UA 10-20 (*)     All other components within normal limits   BETA-HYDROXYBUTYRATE - Abnormal; Notable for the following components:    Beta-Hydroxybutyrate 11.5 (*)     All other components within normal limits   POCT VENOUS - Abnormal; Notable for the following components:    POC Sodium 134 (*)     POC Glucose 607 (*)     GFR Non-African American 53 (*)     pCO2, Greg 39.7 (*)     Lactate 2.27 (*)     All other components within normal limits   POCT GLUCOSE - Abnormal; Notable for the following components:    POC Glucose 523 (*)     All other components within normal limits   POCT GLUCOSE - Abnormal; Notable for the following components:    POC Glucose 493 (*)     All other components within normal limits   POCT GLUCOSE - Normal   COVID-19, RAPID   CULTURE, URINE   MAGNESIUM    Narrative:     CALL  [facility]  [facility] tel. [phone],  Glucose results called to and read back by Akanksha Serrato RN, 12/31/2021  12:26, by CASSIE   TROPONIN       All other labs were within normal range or not returned as of this dictation. Please note, any cultures that may have been sent were not resulted at the time of this patient visit. EMERGENCY DEPARTMENT COURSE and Medical Decision Making:     Vitals:    Vitals:    12/31/21 1044 12/31/21 1324 12/31/21 1541   BP: (!) 141/88  (!) 93/55   Pulse: 99  81   Resp: 22  18   Temp: 98 °F (36.7 °C)  97.8 °F (36.6 °C)   TempSrc: Temporal  Oral   SpO2: 95% (!) 9% 93%   Weight: (!) 340 lb (154.2 kg)     Height: 6' (1.829 m)         PROCEDURES: (None if blank)  Procedures       MDM     Patient is not currently in DKA. She has a normal pH and a normal bicarb. Otherwise she does have hypo-pseudonatremia secondary to her elevated glucose. Was able to give her 2 L of fluid in 20 units of insulin while in the department and trended her glucose down to below 500.   She is not

## 2021-12-31 NOTE — ED NOTES
IV obtained and labs and clear yellow urine obtained and sent to lab with stickers.  EKG complete     Sesar 93, RN  12/31/21 9070

## 2021-12-31 NOTE — DISCHARGE INSTR - COC
Continuity of Care Form    Patient Name: John Pierce   :  1975  MRN:  [id number]    Admit date:  2021  Discharge date:  ***    Code Status Order: Prior   Advance Directives:      Admitting Physician:  No admitting provider for patient encounter. PCP: Yvette Tirado MD    Discharging Nurse: [facility] Unit/Room#: [address] Unit Phone Number: ***    Emergency Contact:   Extended Emergency Contact Information  Primary Emergency Contact: [address], [facility] Phone: [phone]  Relation: Child  Preferred language: Romanian   needed? No  Secondary Emergency Contact: [address] Phone: [phone]  Mobile Phone: [phone]  Relation: Brother/Sister    Past Surgical History:  Past Surgical History:   Procedure Laterality Date    COLONOSCOPY  14    [email or web address]    UPPER GASTROINTESTINAL ENDOSCOPY  14    jarmoszuk    UPPER GASTROINTESTINAL ENDOSCOPY N/A 3/22/2021    EGD DIAGNOSTIC ONLY performed by Lubna Gil MD at [facility]       Immunization History:   Immunization History   Administered Date(s) Administered    Pneumococcal Polysaccharide (Cbtnizsya06) 2016    Tdap (Boostrix, Adacel) 2016       Active Problems:  Patient Active Problem List   Diagnosis Code    Anxiety F41.9    Asthma J45.909    Auditory hallucination R44.0    Depression F32. A    Uncontrolled type 2 diabetes mellitus (HCC) E11.65    Diabetes mellitus ([facility] E11.9    Family history of coronary arteriosclerosis Z82.49    Gastroesophageal reflux disease K21.9    Hyperlipidemia E78.5    Hypertension I10    Hypothyroidism due to Hashimoto's thyroiditis E03.8, E06.3    Ingrowing nail L60.0    Laryngeal spasm J38.5    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    Class 3 severe obesity due to excess calories with serious comorbidity and body mass index (BMI) of 45.0 to 49.9 in adult ([facility] E66.01, S93.03 Obstructive sleep apnea syndrome G47.33    Pain of toe M79.676    Schizophrenia (HCC) F20.9    Type 2 diabetes mellitus with other diabetic neurological complication (HCC) H87.72    Type 1 diabetes mellitus (HCC) E10.9    Vitamin D deficiency E55.9    Pancreatitis, unspecified pancreatitis type K85.90    DKA, type 2, not at goal [facility]) E11.10    Chest pain R07.9    Hyperglycemia R73.9    Noncompliance Z91.19       Isolation/Infection:   Isolation            No Isolation          Patient Infection Status       Infection Onset Added Last Indicated Last Indicated By Review Planned Expiration Resolved Resolved By    None active    Resolved    COVID-19 (Rule Out) 21 COVID-19, Rapid (Ordered)   21 Rule-Out Test Resulted    COVID-19 (Rule Out) 10/01/21 10/01/21 10/01/21 COVID-19, Rapid (Ordered)   10/01/21 Rule-Out Test Resulted    COVID-19 (Rule Out) 20 Covid-19 Ambulatory (Ordered)   20 Rule-Out Test Resulted            Nurse Assessment:  Last Vital Signs: BP (!) 93/55   Pulse 81   Temp 97.8 °F (36.6 °C) (Oral)   Resp 18   Ht 6' (1.829 m)   Wt (!) 340 lb (154.2 kg)   LMP  (LMP Unknown)   SpO2 93%   BMI 46.11 kg/m²     Last documented pain score (0-10 scale): Pain Level: 9  Last Weight:   Wt Readings from Last 1 Encounters:   21 (!) 340 lb (154.2 kg)     Mental Status:  {IP PT MENTAL STATUS:}    IV Access:  { SAMMY IV ACCESS:302299423}    Nursing Mobility/ADLs:  Walking   {[facility] DME ECLA:392703351}  Transfer  {[facility] DME OFAS:640526476}  Bathing  {P DME VUZL:910215859}  Dressing  {P DME EDUZ:910080409}  Toileting  {P DME ESTW:434514446}  Feeding  {[facility] DME TUA}  Med Admin  {[facility] DME SZJZ:366258553}  Med Delivery   {[facility] SAMMY MED Delivery:415499062}    Wound Care Documentation and Therapy:        Elimination:  Continence:    Bowel: {YES / XV:37851}  Bladder: {YES / MS:72311}  Urinary Catheter: {Urinary Catheter:685908730} Colostomy/Ileostomy/Ileal Conduit: {YES / BW:59134}       Date of Last BM: ***    Intake/Output Summary (Last 24 hours) at 2021 1651  Last data filed at 2021 1513  Gross per 24 hour   Intake 1000 ml   Output --   Net 1000 ml     No intake/output data recorded.     Safety Concerns:     [address] Safety Concerns:695978916}    Impairments/Disabilities:      [address] SAMMY Impairments/Disabilities:798710323}    Nutrition Therapy:  Current Nutrition Therapy:   [address] [facility] Diet List:800423583}    Routes of Feeding: {CHP DME Other Feedings:134630518}  Liquids: {Slp liquid thickness:52274}  Daily Fluid Restriction: {CHP DME Yes amt example:209080738}  Last Modified Barium Swallow with Video (Video Swallowing Test): {Done Not Done VYUF:178915401}    Treatments at the Time of Hospital Discharge:   Respiratory Treatments: ***  Oxygen Therapy:  {Therapy; copd oxygen:98646}  Ventilator:    {[facility] Vent VGUK:486718922}    Rehab Therapies: {THERAPEUTIC INTERVENTION:9105636866}  Weight Bearing Status/Restrictions: [address]  Weight Bearin}  Other Medical Equipment (for information only, NOT a DME order):  {EQUIPMENT:392651885}  Other Treatments: ***    Patient's personal belongings (please select all that are sent with patient):  {[facility] DME Belongings:323125180}    RN SIGNATURE:  {Esignature:968871148}    CASE MANAGEMENT/SOCIAL WORK SECTION    Inpatient Status Date: ***    Readmission Risk Assessment Score:  Readmission Risk              Risk of Unplanned Readmission:  0           Discharging to Facility/ Agency   Name:   Address:  Phone:  Fax:    Dialysis Facility (if applicable)   Name:  Address:  Dialysis Schedule:  Phone:  Fax:    / signature: {Esignature:201114869}    PHYSICIAN SECTION    Prognosis: {Prognosis:6560233863}    Condition at Discharge: [address] Patient Condition:393583054}    Rehab Potential (if transferring to Rehab): {Prognosis:4297645381}    Recommended Labs or Other Treatments After Discharge: ***    Physician Certification: I certify the above information and transfer of Ed Church  is necessary for the continuing treatment of the diagnosis listed and that she requires {Admit to Appropriate Level of Care:86096} for {GREATER/LESS:677893502} 30 days.      Update Admission H&P: {CHP DME Changes in JPOPV:375925183}    PHYSICIAN SIGNATURE:  {Esignature:781464456}

## 2021-12-31 NOTE — ED NOTES
Patient resting with eyes closed, resps even and unlabored. Patient states \"no change in pain after medication\" while awake.      Celina Zapata RN  12/31/21 1993

## 2022-01-01 ENCOUNTER — HOSPITAL ENCOUNTER (EMERGENCY)
Age: 47
Discharge: HOME | End: 2022-01-01
Attending: STUDENT IN AN ORGANIZED HEALTH CARE EDUCATION/TRAINING PROGRAM
Payer: COMMERCIAL

## 2022-01-01 ENCOUNTER — APPOINTMENT (OUTPATIENT)
Dept: GENERAL RADIOLOGY | Age: 47
End: 2022-01-01
Payer: COMMERCIAL

## 2022-01-01 VITALS
HEIGHT: 72 IN | TEMPERATURE: 97.8 F | BODY MASS INDEX: 39.68 KG/M2 | HEART RATE: 85 BPM | SYSTOLIC BLOOD PRESSURE: 138 MMHG | RESPIRATION RATE: 18 BRPM | OXYGEN SATURATION: 95 % | WEIGHT: 293 LBS | DIASTOLIC BLOOD PRESSURE: 86 MMHG

## 2022-01-01 DIAGNOSIS — R10.84 GENERALIZED ABDOMINAL PAIN: Primary | ICD-10-CM

## 2022-01-01 DIAGNOSIS — R11.2 NAUSEA AND VOMITING, INTRACTABILITY OF VOMITING NOT SPECIFIED, UNSPECIFIED VOMITING TYPE: ICD-10-CM

## 2022-01-01 DIAGNOSIS — E11.65 HYPERGLYCEMIA DUE TO DIABETES MELLITUS (HCC): ICD-10-CM

## 2022-01-01 LAB
ALBUMIN SERPL-MCNC: 4 G/DL (ref 3.5–4.6)
ALP BLD-CCNC: 111 U/L (ref 40–130)
ALT SERPL-CCNC: 59 U/L (ref 0–33)
ANION GAP SERPL CALCULATED.3IONS-SCNC: 15 MEQ/L (ref 9–15)
AST SERPL-CCNC: 46 U/L (ref 0–35)
BASE EXCESS VENOUS: -2 (ref -3–3)
BASOPHILS ABSOLUTE: 0.1 K/UL (ref 0–0.2)
BASOPHILS RELATIVE PERCENT: 1.1 %
BETA-HYDROXYBUTYRATE: 8.3 MG/DL (ref 0.2–2.8)
BILIRUB SERPL-MCNC: 0.4 MG/DL (ref 0.2–0.7)
BUN BLDV-MCNC: 24 MG/DL (ref 6–20)
CALCIUM IONIZED: 1.09 MMOL/L (ref 1.12–1.32)
CALCIUM SERPL-MCNC: 9 MG/DL (ref 8.5–9.9)
CHLORIDE BLD-SCNC: 95 MEQ/L (ref 95–107)
CO2: 23 MEQ/L (ref 20–31)
CREAT SERPL-MCNC: 1.77 MG/DL (ref 0.5–0.9)
EOSINOPHILS ABSOLUTE: 0.4 K/UL (ref 0–0.7)
EOSINOPHILS RELATIVE PERCENT: 5.7 %
GFR AFRICAN AMERICAN: 36.9
GFR AFRICAN AMERICAN: 39
GFR NON-AFRICAN AMERICAN: 30.5
GFR NON-AFRICAN AMERICAN: 32
GLOBULIN: 3.8 G/DL (ref 2.3–3.5)
GLUCOSE BLD-MCNC: 285 MG/DL (ref 60–115)
GLUCOSE BLD-MCNC: 338 MG/DL (ref 60–115)
GLUCOSE BLD-MCNC: 439 MG/DL (ref 60–115)
GLUCOSE BLD-MCNC: 515 MG/DL (ref 60–115)
GLUCOSE BLD-MCNC: 524 MG/DL (ref 60–115)
GLUCOSE BLD-MCNC: 569 MG/DL (ref 60–115)
GLUCOSE BLD-MCNC: 580 MG/DL (ref 70–99)
HCO3 VENOUS: 23.5 MMOL/L (ref 23–29)
HCT VFR BLD CALC: 36.5 % (ref 37–47)
HEMOGLOBIN: 12.1 GM/DL (ref 12–16)
HEMOGLOBIN: 12.2 G/DL (ref 12–16)
LACTATE: 2.61 MMOL/L (ref 0.4–2)
LACTIC ACID: 2.6 MMOL/L (ref 0.5–2.2)
LYMPHOCYTES ABSOLUTE: 1.3 K/UL (ref 1–4.8)
LYMPHOCYTES RELATIVE PERCENT: 21.6 %
MAGNESIUM: 1.9 MG/DL (ref 1.7–2.4)
MCH RBC QN AUTO: 30.3 PG (ref 27–31.3)
MCHC RBC AUTO-ENTMCNC: 33.6 % (ref 33–37)
MCV RBC AUTO: 90.3 FL (ref 82–100)
MONOCYTES ABSOLUTE: 0.4 K/UL (ref 0.2–0.8)
MONOCYTES RELATIVE PERCENT: 5.9 %
NEUTROPHILS ABSOLUTE: 4 K/UL (ref 1.4–6.5)
NEUTROPHILS RELATIVE PERCENT: 65.7 %
O2 SAT, VEN: 52 %
PCO2, VEN: 43.5 MM HG (ref 40–50)
PDW BLD-RTO: 15 % (ref 11.5–14.5)
PERFORMED ON: ABNORMAL
PH VENOUS: 7.34 (ref 7.35–7.45)
PLATELET # BLD: 103 K/UL (ref 130–400)
PO2, VEN: 29 MM HG
POC CHLORIDE: 99 MEQ/L (ref 99–110)
POC CREATININE: 1.7 MG/DL (ref 0.6–1.1)
POC HEMATOCRIT: 36 % (ref 36–48)
POC POTASSIUM: 5.2 MEQ/L (ref 3.5–5.1)
POC SAMPLE TYPE: ABNORMAL
POC SODIUM: 132 MEQ/L (ref 136–145)
POTASSIUM SERPL-SCNC: 5.1 MEQ/L (ref 3.4–4.9)
RBC # BLD: 4.04 M/UL (ref 4.2–5.4)
SODIUM BLD-SCNC: 133 MEQ/L (ref 135–144)
TCO2 CALC VENOUS: 25 MMOL/L
TOTAL PROTEIN: 7.8 G/DL (ref 6.3–8)
TROPONIN: <0.01 NG/ML (ref 0–0.01)
WBC # BLD: 6.2 K/UL (ref 4.8–10.8)

## 2022-01-01 PROCEDURE — 93005 ELECTROCARDIOGRAM TRACING: CPT | Performed by: STUDENT IN AN ORGANIZED HEALTH CARE EDUCATION/TRAINING PROGRAM

## 2022-01-01 PROCEDURE — 85014 HEMATOCRIT: CPT

## 2022-01-01 PROCEDURE — 82565 ASSAY OF CREATININE: CPT

## 2022-01-01 PROCEDURE — 6370000000 HC RX 637 (ALT 250 FOR IP): Performed by: EMERGENCY MEDICINE

## 2022-01-01 PROCEDURE — 71045 X-RAY EXAM CHEST 1 VIEW: CPT

## 2022-01-01 PROCEDURE — 2580000003 HC RX 258: Performed by: STUDENT IN AN ORGANIZED HEALTH CARE EDUCATION/TRAINING PROGRAM

## 2022-01-01 PROCEDURE — 80053 COMPREHEN METABOLIC PANEL: CPT

## 2022-01-01 PROCEDURE — 84484 ASSAY OF TROPONIN QUANT: CPT

## 2022-01-01 PROCEDURE — 82330 ASSAY OF CALCIUM: CPT

## 2022-01-01 PROCEDURE — 6360000002 HC RX W HCPCS: Performed by: EMERGENCY MEDICINE

## 2022-01-01 PROCEDURE — 84132 ASSAY OF SERUM POTASSIUM: CPT

## 2022-01-01 PROCEDURE — 96374 THER/PROPH/DIAG INJ IV PUSH: CPT

## 2022-01-01 PROCEDURE — 2500000003 HC RX 250 WO HCPCS: Performed by: STUDENT IN AN ORGANIZED HEALTH CARE EDUCATION/TRAINING PROGRAM

## 2022-01-01 PROCEDURE — 82010 KETONE BODYS QUAN: CPT

## 2022-01-01 PROCEDURE — 96372 THER/PROPH/DIAG INJ SC/IM: CPT

## 2022-01-01 PROCEDURE — 2580000003 HC RX 258: Performed by: EMERGENCY MEDICINE

## 2022-01-01 PROCEDURE — 96376 TX/PRO/DX INJ SAME DRUG ADON: CPT

## 2022-01-01 PROCEDURE — 96375 TX/PRO/DX INJ NEW DRUG ADDON: CPT

## 2022-01-01 PROCEDURE — 82948 REAGENT STRIP/BLOOD GLUCOSE: CPT

## 2022-01-01 PROCEDURE — 82435 ASSAY OF BLOOD CHLORIDE: CPT

## 2022-01-01 PROCEDURE — 84295 ASSAY OF SERUM SODIUM: CPT

## 2022-01-01 PROCEDURE — 83605 ASSAY OF LACTIC ACID: CPT

## 2022-01-01 PROCEDURE — 99285 EMERGENCY DEPT VISIT HI MDM: CPT

## 2022-01-01 PROCEDURE — 82803 BLOOD GASES ANY COMBINATION: CPT

## 2022-01-01 PROCEDURE — 6360000002 HC RX W HCPCS: Performed by: STUDENT IN AN ORGANIZED HEALTH CARE EDUCATION/TRAINING PROGRAM

## 2022-01-01 PROCEDURE — 36415 COLL VENOUS BLD VENIPUNCTURE: CPT

## 2022-01-01 PROCEDURE — 6370000000 HC RX 637 (ALT 250 FOR IP): Performed by: STUDENT IN AN ORGANIZED HEALTH CARE EDUCATION/TRAINING PROGRAM

## 2022-01-01 PROCEDURE — 36600 WITHDRAWAL OF ARTERIAL BLOOD: CPT

## 2022-01-01 PROCEDURE — 85025 COMPLETE CBC W/AUTO DIFF WBC: CPT

## 2022-01-01 PROCEDURE — 83735 ASSAY OF MAGNESIUM: CPT

## 2022-01-01 RX ORDER — TRAMADOL HYDROCHLORIDE 50 MG/1
50 TABLET ORAL EVERY 4 HOURS PRN
Qty: 18 TABLET | Refills: 0 | Status: SHIPPED | OUTPATIENT
Start: 2022-01-01 | End: 2022-01-04

## 2022-01-01 RX ORDER — ONDANSETRON 4 MG/1
4 TABLET, ORALLY DISINTEGRATING ORAL 3 TIMES DAILY PRN
Qty: 21 TABLET | Refills: 0 | Status: SHIPPED | OUTPATIENT
Start: 2022-01-01 | End: 2022-09-10

## 2022-01-01 RX ORDER — ONDANSETRON 2 MG/ML
4 INJECTION INTRAMUSCULAR; INTRAVENOUS ONCE
Status: COMPLETED | OUTPATIENT
Start: 2022-01-01 | End: 2022-01-01

## 2022-01-01 RX ORDER — MORPHINE SULFATE 4 MG/ML
4 INJECTION, SOLUTION INTRAMUSCULAR; INTRAVENOUS ONCE
Status: COMPLETED | OUTPATIENT
Start: 2022-01-01 | End: 2022-01-01

## 2022-01-01 RX ORDER — 0.9 % SODIUM CHLORIDE 0.9 %
1000 INTRAVENOUS SOLUTION INTRAVENOUS ONCE
Status: COMPLETED | OUTPATIENT
Start: 2022-01-01 | End: 2022-01-01

## 2022-01-01 RX ORDER — PROMETHAZINE HYDROCHLORIDE 25 MG/ML
25 INJECTION, SOLUTION INTRAMUSCULAR; INTRAVENOUS ONCE
Status: COMPLETED | OUTPATIENT
Start: 2022-01-01 | End: 2022-01-01

## 2022-01-01 RX ADMIN — SODIUM CHLORIDE 1000 ML: 9 INJECTION, SOLUTION INTRAVENOUS at 06:11

## 2022-01-01 RX ADMIN — SODIUM CHLORIDE 1000 ML: 9 INJECTION, SOLUTION INTRAVENOUS at 08:52

## 2022-01-01 RX ADMIN — MORPHINE SULFATE 4 MG: 4 INJECTION, SOLUTION INTRAMUSCULAR; INTRAVENOUS at 09:00

## 2022-01-01 RX ADMIN — MORPHINE SULFATE 4 MG: 4 INJECTION, SOLUTION INTRAMUSCULAR; INTRAVENOUS at 06:32

## 2022-01-01 RX ADMIN — INSULIN HUMAN 15 UNITS: 100 INJECTION, SOLUTION PARENTERAL at 13:09

## 2022-01-01 RX ADMIN — PROMETHAZINE HYDROCHLORIDE 25 MG: 25 INJECTION, SOLUTION INTRAMUSCULAR; INTRAVENOUS at 09:00

## 2022-01-01 RX ADMIN — INSULIN HUMAN 20 UNITS: 100 INJECTION, SOLUTION PARENTERAL at 11:26

## 2022-01-01 RX ADMIN — SODIUM CHLORIDE 1000 ML: 9 INJECTION, SOLUTION INTRAVENOUS at 11:27

## 2022-01-01 RX ADMIN — INSULIN HUMAN 10 UNITS: 100 INJECTION, SOLUTION PARENTERAL at 07:22

## 2022-01-01 RX ADMIN — INSULIN HUMAN 15 UNITS: 100 INJECTION, SOLUTION PARENTERAL at 08:49

## 2022-01-01 RX ADMIN — FAMOTIDINE 20 MG: 10 INJECTION, SOLUTION INTRAVENOUS at 06:12

## 2022-01-01 RX ADMIN — ONDANSETRON 4 MG: 2 INJECTION INTRAMUSCULAR; INTRAVENOUS at 06:12

## 2022-01-01 RX ADMIN — ONDANSETRON 4 MG: 2 INJECTION INTRAMUSCULAR; INTRAVENOUS at 09:00

## 2022-01-01 ASSESSMENT — PAIN SCALES - GENERAL
PAINLEVEL_OUTOF10: 10
PAINLEVEL_OUTOF10: 6
PAINLEVEL_OUTOF10: 10

## 2022-01-01 ASSESSMENT — ENCOUNTER SYMPTOMS
CHEST TIGHTNESS: 1
RHINORRHEA: 0
EYE PAIN: 0
SORE THROAT: 0
DIARRHEA: 0
COUGH: 0
ABDOMINAL PAIN: 1
VOMITING: 1
SHORTNESS OF BREATH: 1
NAUSEA: 1
CONSTIPATION: 0
BACK PAIN: 0

## 2022-01-01 ASSESSMENT — PAIN DESCRIPTION - DESCRIPTORS: DESCRIPTORS: ACHING

## 2022-01-01 ASSESSMENT — PAIN DESCRIPTION - FREQUENCY: FREQUENCY: CONTINUOUS

## 2022-01-01 ASSESSMENT — PAIN DESCRIPTION - PROGRESSION: CLINICAL_PROGRESSION: GRADUALLY IMPROVING

## 2022-01-01 ASSESSMENT — PAIN DESCRIPTION - PAIN TYPE: TYPE: ACUTE PAIN

## 2022-01-01 ASSESSMENT — PAIN DESCRIPTION - LOCATION: LOCATION: CHEST

## 2022-01-01 NOTE — ED PROVIDER NOTES
[facility] [facility]  eMERGENCY dEPARTMENT eNCOUnter      Pt Name: Partha Chopra  MRN: [id number]  [place] 1975  Date of evaluation: 1/1/2022  Provider: Lina Harrison MD      HISTORY OF PRESENT ILLNESS      Chief Complaint   Patient presents with    Hyperglycemia       The history is provided by the Patient and Daughter. Patient's preferred language is [place] Offered video , but patient declined, electing to use her daughter as . Partha Chopra is a 48 y.o. female with a PMH clinically significant for HTN, HLD, NATALEE, DMII, Chronic abdominal pain, Schizophrenia, Anxiety, Depression presenting to the ED via EMS c/o chest pain, abdominal pain, nausea, multiple episodes of nonbloody nonbilious emesis, fatigue, decreased p.o. intake, generalized myalgias worsening over the past 3 days. States she was initially feeling improved after being evaluated earlier in the day, but had recurrence of her symptoms again and recurrence of elevated blood glucose after going home. States that she does not have insulin at home and was unable to go to the pharmacy because her only transportation is also sick. Characterizes pain as aching, constant and over the diffuse chest wall and abdomen. Denies any associated cough, F/C/D, sore throat, diarrhea, constipation, urinary symptoms or wounds. Does states she does feel short of breath with the chest pain. Chest pain worse with movement, palpation and deep inspiration. States no trauma prior to onset of symptoms. Per Chart Review: Evaluation earlier in the ED appreciated. Complained of hyperglycemia and body aches with vomiting and nausea. Labs appreciated. Initially with significant hyperglycemia with blood glucose over 600. Also noted to have elevated lactic acid, elevated anion gap, hyperosmolar hyponatremia, thrombocytopenia and elevated ketones. Negative troponin. Also negative Covid.   Blood glucose improved and patient was discharged with insulin. REVIEW OF SYSTEMS       Review of Systems   Constitutional: Positive for activity change, appetite change and fatigue. Negative for chills and fever. HENT: Negative for rhinorrhea and sore throat. Eyes: Negative for pain and visual disturbance. Respiratory: Positive for chest tightness and shortness of breath. Negative for cough. Cardiovascular: Positive for chest pain. Negative for palpitations and leg swelling. Gastrointestinal: Positive for abdominal pain, nausea and vomiting. Negative for constipation and diarrhea. Genitourinary: Negative for difficulty urinating and dysuria. Musculoskeletal: Positive for myalgias. Negative for back pain and neck pain. Skin: Negative for rash. Neurological: Negative for weakness, numbness and headaches.        PAST MEDICAL HISTORY     Past Medical History:   Diagnosis Date    Asthma     Chronic abdominal pain     Depression     Hyperlipidemia     Hypertension     NATALEE (obstructive sleep apnea)     Schizophrenia (HCC)     Type II or unspecified type diabetes mellitus without mention of complication, not stated as uncontrolled        SURGICAL HISTORY       Past Surgical History:   Procedure Laterality Date    COLONOSCOPY  2/21/14    [email or web address]    [facility]    UPPER GASTROINTESTINAL ENDOSCOPY N/A 3/22/2021    EGD DIAGNOSTIC ONLY performed by Krys Peace MD at [facility]       Family History   Problem Relation Age of Onset    Other Mother         Diverticulitis    Colon Cancer Paternal Uncle     Colon Cancer Maternal Grandfather        SOCIAL HISTORY       Social History     Socioeconomic History    Marital status: Single     Spouse name: None    Number of children: None    Years of education: None    Highest education level: None   Occupational History    None   Tobacco Use    Smoking status: Former Smoker     Packs/day: 1.00 Types: Cigarettes    Smokeless tobacco: Never Used   Vaping Use    Vaping Use: Never used   Substance and Sexual Activity    Alcohol use: No    Drug use: No    Sexual activity: None   Other Topics Concern    None   Social History Narrative    Lives w brother     Social Determinants of Health     Financial Resource Strain:     Difficulty of Paying Living Expenses: Not on file   Food Insecurity:     Worried About Running Out of Food in the Last Year: Not on file    Nacho of Food in the Last Year: Not on file   Transportation Needs:     Lack of Transportation (Medical): Not on file    Lack of Transportation (Non-Medical): Not on file   Physical Activity:     Days of Exercise per Week: Not on file    Minutes of Exercise per Session: Not on file   Stress:     Feeling of Stress : Not on file   Social Connections:     Frequency of Communication with Friends and Family: Not on file    Frequency of Social Gatherings with Friends and Family: Not on file    Attends Oriental orthodox Services: Not on file    Active Member of [address] [place] [address] or Organizations: Not on file    Attends Club or Organization Meetings: Not on file    Marital Status: Not on file   Intimate Partner Violence:     Fear of Current or Ex-Partner: Not on file    Emotionally Abused: Not on file    Physically Abused: Not on file    Sexually Abused: Not on file   Housing Stability:     Unable to Pay for Housing in the Last Year: Not on file    Number of [place] in the Last Year: Not on file    Unstable Housing in the Last Year: Not on file       CURRENT MEDICATIONS       Previous Medications    ARIPIPRAZOLE (ABILIFY PO)    Take 5 mg by mouth     BUPROPION HCL (WELLBUTRIN PO)    Take 150 mg by mouth     ESOMEPRAZOLE (NEXIUM) 40 MG DELAYED RELEASE CAPSULE    Take 1 capsule by mouth every morning (before breakfast)    GABAPENTIN (NEURONTIN) 800 MG TABLET    Take 800 mg by mouth 3 times daily.  For leg pain    INSULIN GLARGINE (LANTUS SOLOSTAR) 100 UNIT/ML INJECTION PEN    60 units every morning. 160 units nightly    INSULIN LISPRO (HUMALOG KWIKPEN U-200) 200 UNIT/ML SOPN PEN    Inject 70 Units into the skin 3 times daily (with meals) for 4 days    INSULIN SYRINGE .5CC/29G 29G X 1/2\" 0.5 ML MISC    1 each by Does not apply route daily    LEVOTHYROXINE (SYNTHROID) 50 MCG TABLET    Take 50 mcg by mouth Daily    LISINOPRIL (PRINIVIL;ZESTRIL) 10 MG TABLET    Take 1 tablet by mouth daily    MAGNESIUM OXIDE (MAG-OX) 400 (240 MG) MG TABLET    Take 1 tablet by mouth daily    MELOXICAM (MOBIC) 15 MG TABLET    Take 1 tablet by mouth daily as needed for Pain    METFORMIN HCL PO    Take 1,000 mg by mouth     METOPROLOL SUCCINATE (TOPROL XL) 25 MG EXTENDED RELEASE TABLET    Take 1 tablet by mouth daily    MONTELUKAST (SINGULAIR) 10 MG TABLET    Take 10 mg by mouth nightly    NITROGLYCERIN (NITROSTAT) 0.4 MG SL TABLET    Place 1 tablet under the tongue every 5 minutes as needed for Chest pain up to max of 3 total doses. If no relief after 1 dose, call 911. SERTRALINE (ZOLOFT) 100 MG TABLET    Take 100 mg by mouth daily    TOPIRAMATE (TOPAMAX) 25 MG TABLET    Take 25 mg by mouth daily    TRAZODONE (DESYREL) 50 MG TABLET    Take 50 mg by mouth nightly       ALLERGIES     Shellfish-derived products, Amoxicillin, Macrobid [nitrofurantoin monohyd macro], Reglan [metoclopramide], Singulair [montelukast], and Nubain [nalbuphine hcl]      PHYSICAL EXAM       ED Triage Vitals [01/01/22 0457]   BP Temp Temp Source Pulse Resp SpO2 Height Weight   (!) 149/78 97.8 °F (36.6 °C) Oral 92 18 95 % 6' (1.829 m) (!) 340 lb (154.2 kg)       Physical Exam  Vitals and nursing note reviewed. Constitutional:       General: She is awake. She is not in acute distress. Appearance: She is morbidly obese. She is ill-appearing. She is not toxic-appearing or diaphoretic. HENT:      Head: Normocephalic and atraumatic. Mouth/Throat:      Mouth: Mucous membranes are dry.       Pharynx: Oropharynx is clear. Eyes:      Extraocular Movements: Extraocular movements intact. Pupils: Pupils are equal, round, and reactive to light. Cardiovascular:      Rate and Rhythm: Normal rate and regular rhythm. Pulses: Normal pulses. Heart sounds: Normal heart sounds. Pulmonary:      Effort: No tachypnea or respiratory distress. Breath sounds: Wheezing (mild diffuse.) present. Abdominal:      General: There is no distension. Palpations: Abdomen is soft. Tenderness: There is abdominal tenderness. There is no right CVA tenderness, left CVA tenderness, guarding or rebound. Musculoskeletal:         General: No deformity or signs of injury. Cervical back: Normal range of motion and neck supple. Right lower leg: No edema. Left lower leg: No edema. Skin:     General: Skin is warm and dry. Capillary Refill: Capillary refill takes less than 2 seconds. Neurological:      Mental Status: She is alert and oriented to person, place, and time. Mental status is at baseline. Psychiatric:         Mood and Affect: Mood is anxious. Behavior: Behavior is cooperative.          MDM:   Chart Reviewed: PMH and additional information as noted in HPI obtained from chart review    Vitals:    Vitals:    01/01/22 0457 01/01/22 0634 01/01/22 1130   BP: (!) 149/78 138/83 136/83   Pulse: 92 90 89   Resp: 18 18 18   Temp: 97.8 °F (36.6 °C)     TempSrc: Oral     SpO2: 95% 97% 95%   Weight: (!) 340 lb (154.2 kg)     Height: 6' (1.829 m)         PROCEDURES:  Unless otherwise noted below, none  Procedures    LABS:  Labs Reviewed   LACTIC ACID, PLASMA - Abnormal; Notable for the following components:       Result Value    Lactic Acid 2.6 (*)     All other components within normal limits   COMPREHENSIVE METABOLIC PANEL - Abnormal; Notable for the following components:    Sodium 133 (*)     Potassium 5.1 (*)     Glucose 580 (*)     BUN 24 (*)     CREATININE 1.77 (*)     GFR Non- 30.5 (*)     GFR  36.9 (*)     ALT 59 (*)     AST 46 (*)     Globulin 3.8 (*)     All other components within normal limits    Narrative:     [phone]  Glucose called to and read back by [facility] ->Dr Merline Ragsdale, 01/01/2022  07:27, by Marleen Torres   CBC WITH AUTO DIFFERENTIAL - Abnormal; Notable for the following components:    RBC 4.04 (*)     Hematocrit 36.5 (*)     RDW 15.0 (*)     Platelets 982 (*)     All other components within normal limits   BETA-HYDROXYBUTYRATE - Abnormal; Notable for the following components:    Beta-Hydroxybutyrate 8.3 (*)     All other components within normal limits   POCT GLUCOSE - Abnormal; Notable for the following components:    POC Glucose 515 (*)     All other components within normal limits   POCT VENOUS - Abnormal; Notable for the following components:    POC Sodium 132 (*)     POC Potassium 5.2 (*)     POC Glucose 569 (*)     POC Creatinine 1.7 (*)     GFR Non- 32 (*)     GFR  39 (*)     Calcium, Ion 1.09 (*)     pH, Greg 7.341 (*)     Lactate 2.61 (*)     All other components within normal limits   POCT GLUCOSE - Abnormal; Notable for the following components:    POC Glucose 524 (*)     All other components within normal limits   POCT GLUCOSE - Abnormal; Notable for the following components:    POC Glucose 439 (*)     All other components within normal limits   POCT GLUCOSE - Abnormal; Notable for the following components:    POC Glucose 338 (*)     All other components within normal limits   MAGNESIUM    Narrative:     CALL  [facility] tel. [phone],  Glucose called to and read back by [facility] ->Dr Merline Ragsdale, 01/01/2022  07:27, by Marleen Torres   TROPONIN   POCT EPOC BLOOD GAS, LACTIC ACID, ICA       XR CHEST PORTABLE   Final Result   NO ACUTE CARDIOPULMONARY DISEASE.           ED Course as of 01/01/22 1251   Sat Jan 01, 2022   6659 EKG 12 Lead  EKG showing normal sinus rhythm, rate of 86 bpm.  Left axis deviation, normal intervals. Low QRS. Nonspecific ST-T wave abnormalities. [NA]   0703 Lactic Acid(!): 2.6  Mildly elevated [NA]   0703 Platelet Count(!): 117  Already decreased from most recent baseline. CBC otherwise unremarkable [NA]   0703 POC Glucose(!!): 515 [NA]   0703 Troponin: <0.010  Lower suspicion for atypical ACS given negative delta troponin over the past 24 hours. [NA]      ED Course User Index  [NA] Wilma Arreola MD       48 y.o. female with a PMH clinically significant for HTN, HLD, NATALEE, DMII, Chronic abdominal pain, Schizophrenia, Anxiety, Depression presenting to the ED via EMS c/o chest pain, abdominal pain, nausea, multiple episodes of nonbloody nonbilious emesis, fatigue, decreased p.o. intake, generalized myalgias worsening over the past 3 days. Upon initial evaluation, Pt uncomfortable appearing but otherwise Afebrile, HDS and in NAD. PE as noted above. Labs, , EKG, and Imaging as noted above. Several labs still pending at time of signout. Given findings, clinical presentation most likely consistent w/ hyperglycemia secondary to medication noncompliance with inability to obtain insulin at home due to transportation difficulties with myalgias likely 2/2 hyperglycemia vs nonspecific infectious processes. No evidence of PNA, UTI or covid however on recent labs. Will obtain labs to r/o DKA. Lower suspicion for ACS given negative delta. CP and Abdominal pain likely 2/2 MSK strains with frequent vomiting. Administered multiple meds as noted below for symptomatic relief.   Pt was administered   Medications   0.9 % sodium chloride bolus (1,000 mLs IntraVENous New Bag 1/1/22 1127)   insulin regular (HUMULIN R;NOVOLIN R) injection 15 Units (has no administration in time range)   0.9 % sodium chloride bolus (0 mLs IntraVENous Stopped 1/1/22 0730)   ondansetron (ZOFRAN) injection 4 mg (4 mg IntraVENous Given 1/1/22 0612)   famotidine (PEPCID) injection 20 mg (20 mg IntraVENous Given 1/1/22 0612)   morphine sulfate (PF) injection 4 mg (4 mg IntraVENous Given 1/1/22 0632)   insulin regular (HUMULIN R;NOVOLIN R) injection 10 Units (10 Units IntraVENous Given 1/1/22 0722)   0.9 % sodium chloride bolus (0 mLs IntraVENous Stopped 1/1/22 1126)   insulin regular (HUMULIN R;NOVOLIN R) injection 15 Units (15 Units IntraVENous Given 1/1/22 0849)   morphine sulfate (PF) injection 4 mg (4 mg IntraVENous Given 1/1/22 0900)   ondansetron (ZOFRAN) injection 4 mg (4 mg IntraVENous Given 1/1/22 0900)   promethazine (PHENERGAN) injection 25 mg (25 mg IntraMUSCular Given 1/1/22 0900)   insulin regular (HUMULIN R;NOVOLIN R) injection 20 Units (20 Units IntraVENous Given 1/1/22 1126)       Pt signed out to me by Dr. Brandon Pope. Pt is being evaluated for hyperglycemia. Pt also with ab pain, n/v after she eats. Pt's labs remarkable for glucose 580. Pt given 1 L NS, IV insulin per Dr. Brandon Pope. Pt given a second L NS, IV insulin in the ED. Pt's BS noted to be 439. Pt given 3rd L NS, another dose of IV insulin. Pt's BS noted to be 338. Pt states that she eats a lot of and then has ab pain and n/v. Suspect pt with gastritis/gastroparesis. Pt is worried because every time that she eats her sugar goes higher. Pt and family informed that this was a normal part of DM II and high sugar. Pt given another dose of IV insulin. Pt tolerating PO in the ED. Pt and family educated about ab pain, n/v, hyperglycemia. Pt given DKA warning signs and will f/uw with pcp. Pt understands plan. Gonzalez Barry MD      CRITICAL CARE TIME   Total CriticalCare time was 0 minutes, excluding separately reportable procedures. There was a high probability of clinically significant/life threatening deterioration in the patient's condition which required my urgent intervention. FINAL IMPRESSION      1. Generalized abdominal pain    2.  Nausea and vomiting, intractability of vomiting not specified, unspecified vomiting type

## 2022-01-01 NOTE — ED TRIAGE NOTES
Presents to the ED via EMS for a complaint of hyperglycemia. Patient was seen earlier in the ED and discharged home with a prescription for insulin. Patient and family states they were unable to get to the pharmacy to get the prescription filled.

## 2022-01-01 NOTE — ED NOTES
Glucose was checked, 524. Pt now C/O chest and head pain, Dr Peter Tate advised.      Juan Hart RN  01/01/22 1622

## 2022-01-02 LAB
ORGANISM: ABNORMAL
URINE CULTURE, ROUTINE: ABNORMAL

## 2022-01-03 LAB
EKG ATRIAL RATE: 86 BPM
EKG ATRIAL RATE: 90 BPM
EKG P AXIS: 34 DEGREES
EKG P AXIS: 55 DEGREES
EKG P-R INTERVAL: 166 MS
EKG P-R INTERVAL: 174 MS
EKG Q-T INTERVAL: 390 MS
EKG Q-T INTERVAL: 392 MS
EKG QRS DURATION: 84 MS
EKG QRS DURATION: 86 MS
EKG QTC CALCULATION (BAZETT): 466 MS
EKG QTC CALCULATION (BAZETT): 479 MS
EKG R AXIS: -44 DEGREES
EKG R AXIS: -78 DEGREES
EKG T AXIS: 11 DEGREES
EKG T AXIS: 30 DEGREES
EKG VENTRICULAR RATE: 86 BPM
EKG VENTRICULAR RATE: 90 BPM

## 2022-01-03 PROCEDURE — 93010 ELECTROCARDIOGRAM REPORT: CPT | Performed by: INTERNAL MEDICINE

## 2022-03-16 ENCOUNTER — HOSPITAL ENCOUNTER (EMERGENCY)
Age: 47
Discharge: HOME | End: 2022-03-16
Payer: COMMERCIAL

## 2022-03-16 VITALS
SYSTOLIC BLOOD PRESSURE: 155 MMHG | OXYGEN SATURATION: 97 % | HEIGHT: 72 IN | DIASTOLIC BLOOD PRESSURE: 91 MMHG | HEART RATE: 102 BPM | BODY MASS INDEX: 39.68 KG/M2 | RESPIRATION RATE: 18 BRPM | TEMPERATURE: 97.2 F | WEIGHT: 293 LBS

## 2022-03-16 DIAGNOSIS — B35.3 TINEA PEDIS OF BOTH FEET: Primary | ICD-10-CM

## 2022-03-16 DIAGNOSIS — R73.9 HYPERGLYCEMIA: ICD-10-CM

## 2022-03-16 LAB
ANION GAP SERPL CALCULATED.3IONS-SCNC: 13 MEQ/L (ref 9–15)
BASOPHILS ABSOLUTE: 0.1 K/UL (ref 0–0.2)
BASOPHILS RELATIVE PERCENT: 1.1 %
BUN BLDV-MCNC: 18 MG/DL (ref 6–20)
CALCIUM SERPL-MCNC: 9.8 MG/DL (ref 8.5–9.9)
CHLORIDE BLD-SCNC: 96 MEQ/L (ref 95–107)
CO2: 26 MEQ/L (ref 20–31)
CREAT SERPL-MCNC: 0.87 MG/DL (ref 0.5–0.9)
EOSINOPHILS ABSOLUTE: 0.4 K/UL (ref 0–0.7)
EOSINOPHILS RELATIVE PERCENT: 6.4 %
GFR AFRICAN AMERICAN: >60
GFR NON-AFRICAN AMERICAN: >60
GLUCOSE BLD-MCNC: 416 MG/DL (ref 70–99)
HCT VFR BLD CALC: 37.4 % (ref 37–47)
HEMOGLOBIN: 12.5 G/DL (ref 12–16)
LYMPHOCYTES ABSOLUTE: 1.7 K/UL (ref 1–4.8)
LYMPHOCYTES RELATIVE PERCENT: 27.9 %
MCH RBC QN AUTO: 30.6 PG (ref 27–31.3)
MCHC RBC AUTO-ENTMCNC: 33.5 % (ref 33–37)
MCV RBC AUTO: 91.4 FL (ref 82–100)
MONOCYTES ABSOLUTE: 0.3 K/UL (ref 0.2–0.8)
MONOCYTES RELATIVE PERCENT: 5.1 %
NEUTROPHILS ABSOLUTE: 3.7 K/UL (ref 1.4–6.5)
NEUTROPHILS RELATIVE PERCENT: 59.5 %
PDW BLD-RTO: 14.8 % (ref 11.5–14.5)
PLATELET # BLD: 116 K/UL (ref 130–400)
POTASSIUM SERPL-SCNC: 4.8 MEQ/L (ref 3.4–4.9)
RBC # BLD: 4.09 M/UL (ref 4.2–5.4)
SODIUM BLD-SCNC: 135 MEQ/L (ref 135–144)
WBC # BLD: 6.3 K/UL (ref 4.8–10.8)

## 2022-03-16 PROCEDURE — 36415 COLL VENOUS BLD VENIPUNCTURE: CPT

## 2022-03-16 PROCEDURE — 6370000000 HC RX 637 (ALT 250 FOR IP): Performed by: PERSONAL EMERGENCY RESPONSE ATTENDANT

## 2022-03-16 PROCEDURE — 85025 COMPLETE CBC W/AUTO DIFF WBC: CPT

## 2022-03-16 PROCEDURE — 99283 EMERGENCY DEPT VISIT LOW MDM: CPT

## 2022-03-16 PROCEDURE — 80048 BASIC METABOLIC PNL TOTAL CA: CPT

## 2022-03-16 PROCEDURE — 96372 THER/PROPH/DIAG INJ SC/IM: CPT

## 2022-03-16 RX ORDER — TRAMADOL HYDROCHLORIDE 50 MG/1
50 TABLET ORAL EVERY 4 HOURS PRN
Qty: 10 TABLET | Refills: 0 | Status: SHIPPED | OUTPATIENT
Start: 2022-03-16 | End: 2022-03-19

## 2022-03-16 RX ORDER — TRAMADOL HYDROCHLORIDE 50 MG/1
50 TABLET ORAL ONCE
Status: COMPLETED | OUTPATIENT
Start: 2022-03-16 | End: 2022-03-16

## 2022-03-16 RX ORDER — INSULIN GLARGINE 100 [IU]/ML
INJECTION, SOLUTION SUBCUTANEOUS
Qty: 5 PEN | Refills: 0 | Status: SHIPPED | OUTPATIENT
Start: 2022-03-16

## 2022-03-16 RX ORDER — CLOTRIMAZOLE 1 %
CREAM (GRAM) TOPICAL
Qty: 12 G | Refills: 0 | Status: SHIPPED | OUTPATIENT
Start: 2022-03-16

## 2022-03-16 RX ADMIN — TRAMADOL HYDROCHLORIDE 50 MG: 50 TABLET, COATED ORAL at 21:27

## 2022-03-16 ASSESSMENT — ENCOUNTER SYMPTOMS
RHINORRHEA: 0
VOMITING: 0
COUGH: 0
SORE THROAT: 0
DIARRHEA: 0
SHORTNESS OF BREATH: 0
BLOOD IN STOOL: 0
ABDOMINAL PAIN: 0
COLOR CHANGE: 0
NAUSEA: 0

## 2022-03-16 ASSESSMENT — PAIN - FUNCTIONAL ASSESSMENT: PAIN_FUNCTIONAL_ASSESSMENT: 0-10

## 2022-03-16 ASSESSMENT — PAIN DESCRIPTION - ORIENTATION: ORIENTATION: RIGHT;LEFT

## 2022-03-16 ASSESSMENT — PAIN SCALES - GENERAL
PAINLEVEL_OUTOF10: 10
PAINLEVEL_OUTOF10: 10

## 2022-03-16 ASSESSMENT — PAIN DESCRIPTION - PAIN TYPE: TYPE: ACUTE PAIN

## 2022-03-16 ASSESSMENT — PAIN DESCRIPTION - LOCATION: LOCATION: LEG

## 2022-03-16 ASSESSMENT — PAIN DESCRIPTION - DESCRIPTORS: DESCRIPTORS: BURNING

## 2022-03-17 NOTE — ED PROVIDER NOTES
[address] [facility] ED  eMERGENCY dEPARTMENT eNCOUnter      Pt Name: Claudene Folds  MRN: [id number]  [place] 1975  Date of evaluation: 3/16/2022  Provider: BRIDGET Koch      HISTORY OF PRESENT ILLNESS    Claudene Folds is a 48 y.o. female with PMHx of asthma, chronic abdominal pain, depression, hyperlipidemia, hypertension, NATALEE, schizophrenia, DM 2 presents to the emergency department with foot pain. Patient states for 1 week she has been having bilateral foot pain, numbness and paresthesias. She denies fevers, chills, nausea, vomiting. She states she does have peripheral neuropathy but feels like this is different. Denies any injuries or rubbing to the area. She does have skin sloughing to the top of her feet and feels this is new as well. No significant erythema. She does appear to be on gabapentin. HPI    Nursing Notes were reviewed. REVIEW OF SYSTEMS       Review of Systems   Constitutional: Negative for appetite change, chills and fever. HENT: Negative for congestion, rhinorrhea and sore throat. Respiratory: Negative for cough and shortness of breath. Cardiovascular: Negative for chest pain. Gastrointestinal: Negative for abdominal pain, blood in stool, diarrhea, nausea and vomiting. Genitourinary: Negative for difficulty urinating. Musculoskeletal: Positive for arthralgias. Negative for neck stiffness. Skin: Negative for color change and rash. Neurological: Negative for dizziness, syncope, weakness, light-headedness, numbness and headaches. All other systems reviewed and are negative.             PAST MEDICAL HISTORY     Past Medical History:   Diagnosis Date    Asthma     Chronic abdominal pain     Depression     Hyperlipidemia     Hypertension     NATALEE (obstructive sleep apnea)     Schizophrenia (HCC)     Type II or unspecified type diabetes mellitus without mention of complication, not stated as uncontrolled          SURGICAL HISTORY       Past Surgical History:   Procedure Laterality Date    COLONOSCOPY  2/21/14    Rebecca Mejia UPPER GASTROINTESTINAL ENDOSCOPY  2/21/14    hardy    UPPER GASTROINTESTINAL ENDOSCOPY N/A 3/22/2021    EGD DIAGNOSTIC ONLY performed by Iwona Howell MD at [address] [place] [address]       Previous Medications    ARIPIPRAZOLE (ABILIFY PO)    Take 5 mg by mouth     BUPROPION HCL (WELLBUTRIN PO)    Take 150 mg by mouth     ESOMEPRAZOLE (NEXIUM) 40 MG DELAYED RELEASE CAPSULE    Take 1 capsule by mouth every morning (before breakfast)    GABAPENTIN (NEURONTIN) 800 MG TABLET    Take 800 mg by mouth 3 times daily. For leg pain    INSULIN GLARGINE (LANTUS SOLOSTAR) 100 UNIT/ML INJECTION PEN    60 units every morning. 160 units nightly    INSULIN LISPRO (HUMALOG KWIKPEN U-200) 200 UNIT/ML SOPN PEN    Inject 70 Units into the skin 3 times daily (with meals) for 4 days    INSULIN SYRINGE .5CC/29G 29G X 1/2\" 0.5 ML MISC    1 each by Does not apply route daily    LEVOTHYROXINE (SYNTHROID) 50 MCG TABLET    Take 50 mcg by mouth Daily    LISINOPRIL (PRINIVIL;ZESTRIL) 10 MG TABLET    Take 1 tablet by mouth daily    MAGNESIUM OXIDE (MAG-OX) 400 (240 MG) MG TABLET    Take 1 tablet by mouth daily    MELOXICAM (MOBIC) 15 MG TABLET    Take 1 tablet by mouth daily as needed for Pain    METFORMIN HCL PO    Take 1,000 mg by mouth     METOPROLOL SUCCINATE (TOPROL XL) 25 MG EXTENDED RELEASE TABLET    Take 1 tablet by mouth daily    MONTELUKAST (SINGULAIR) 10 MG TABLET    Take 10 mg by mouth nightly    NITROGLYCERIN (NITROSTAT) 0.4 MG SL TABLET    Place 1 tablet under the tongue every 5 minutes as needed for Chest pain up to max of 3 total doses. If no relief after 1 dose, call 911.     ONDANSETRON (ZOFRAN-ODT) 4 MG DISINTEGRATING TABLET    Take 1 tablet by mouth 3 times daily as needed for Nausea or Vomiting    SERTRALINE (ZOLOFT) 100 MG TABLET    Take 100 mg by mouth daily    TOPIRAMATE (TOPAMAX) 25 MG TABLET Take 25 mg by mouth daily    TRAZODONE (DESYREL) 50 MG TABLET    Take 50 mg by mouth nightly       ALLERGIES     Metoclopramide, Nalbuphine, Nalbuphine hcl, Nitrofurantoin, Other, Shellfish-derived products, Amoxicillin, Macrobid [nitrofurantoin monohyd macro], Montelukast, Montelukast sodium, Seasonal, Neomycin-bacitracin zn-polymyx, and Silver sulfadiazine    FAMILY HISTORY       Family History   Problem Relation Age of Onset    Other Mother         Diverticulitis    Colon Cancer Paternal Uncle     Colon Cancer Maternal Grandfather           SOCIAL HISTORY       Social History     Socioeconomic History    Marital status: Single     Spouse name: Not on file    Number of children: Not on file    Years of education: Not on file    Highest education level: Not on file   Occupational History    Not on file   Tobacco Use    Smoking status: Former Smoker     Packs/day: 1.00     Types: Cigarettes    Smokeless tobacco: Never Used    Tobacco comment: Quit 4yrs   Vaping Use    Vaping Use: Never used   Substance and Sexual Activity    Alcohol use: No    Drug use: No    Sexual activity: Not Currently   Other Topics Concern    Not on file   Social History Narrative    Lives w brother     Social Determinants of Health     Financial Resource Strain:     Difficulty of Paying Living Expenses: Not on file   Food Insecurity:     Worried About Running Out of Food in the Last Year: Not on file    Nacho of Food in the Last Year: Not on file   Transportation Needs:     Lack of Transportation (Medical): Not on file    Lack of Transportation (Non-Medical):  Not on file   Physical Activity:     Days of Exercise per Week: Not on file    Minutes of Exercise per Session: Not on file   Stress:     Feeling of Stress : Not on file   Social Connections:     Frequency of Communication with Friends and Family: Not on file    Frequency of Social Gatherings with Friends and Family: Not on file    Attends Adventism Services: Not on file    Active Member of Clubs or Organizations: Not on file    Attends Club or Organization Meetings: Not on file    Marital Status: Not on file   Intimate Partner Violence:     Fear of Current or Ex-Partner: Not on file    Emotionally Abused: Not on file    Physically Abused: Not on file    Sexually Abused: Not on file   Housing Stability:     Unable to Pay for Housing in the Last Year: Not on file    Number of [place] in the Last Year: Not on file    Unstable Housing in the Last Year: Not on file         PHYSICAL EXAM         ED Triage Vitals [03/16/22 2016]   BP Temp Temp Source Pulse Resp SpO2 Height Weight   (!) 155/91 97.2 °F (36.2 °C) Temporal 102 18 97 % 6' (1.829 m) (!) 340 lb (154.2 kg)       Physical Exam  Constitutional:       Appearance: She is well-developed. HENT:      Head: Normocephalic and atraumatic. Eyes:      Conjunctiva/sclera: Conjunctivae normal.      Pupils: Pupils are equal, round, and reactive to light. Neck:      Trachea: No tracheal deviation. Cardiovascular:      Heart sounds: Normal heart sounds. Pulmonary:      Effort: Pulmonary effort is normal. No respiratory distress. Breath sounds: Normal breath sounds. No stridor. Abdominal:      General: Bowel sounds are normal. There is no distension. Palpations: Abdomen is soft. There is no mass. Tenderness: There is no abdominal tenderness. There is no guarding or rebound. Musculoskeletal:         General: Normal range of motion. Cervical back: Normal range of motion and neck supple. Skin:     General: Skin is warm and dry. Capillary Refill: Capillary refill takes less than 2 seconds. Findings: Rash present. Comments: Patient does have petechiae to bilateral shins with inconsistent tenderness with minimal touch to feet and shins. Cap refill less than 2 seconds, +2 dorsalis pedis pulses bilaterally, no signs of infection.   Patient does have skin sloughing/drying skin to top of foot near base of toes. Neurological:      Mental Status: She is alert and oriented to person, place, and time. Deep Tendon Reflexes: Reflexes are normal and symmetric. Psychiatric:         Behavior: Behavior normal.         Thought Content: Thought content normal.         Judgment: Judgment normal.         DIAGNOSTIC RESULTS     EKG:All EKG's are interpreted by the Emergency Department Physician who either signs or Co-signs this chart in the absence of a cardiologist.      RADIOLOGY:   Non-plain film images such as CT, Ultrasound and MRI are read by theradiologist. Plain radiographic images are visualized and preliminarily interpreted by the emergency physician with the below findings:    Interpretation per theRadiologist below, if available at the time of this note:    No orders to display           LABS:  Labs Reviewed   CBC WITH AUTO DIFFERENTIAL - Abnormal; Notable for the following components:       Result Value    RBC 4.09 (*)     RDW 14.8 (*)     Platelets 093 (*)     All other components within normal limits   BASIC METABOLIC PANEL - Abnormal; Notable for the following components:    Glucose 416 (*)     All other components within normal limits    Narrative:     CALL  [facility]  [facility] tel. [phone],  Glu results called to and read back by Flower Ramires, 03/16/2022 21:39, by [facility]       All other labs were within normal range or not returned as of this dictation. EMERGENCY DEPARTMENT COURSE and DIFFERENTIAL DIAGNOSIS/MDM:   Vitals:    Vitals:    03/16/22 2016   BP: (!) 155/91   Pulse: 102   Resp: 18   Temp: 97.2 °F (36.2 °C)   TempSrc: Temporal   SpO2: 97%   Weight: (!) 340 lb (154.2 kg)   Height: 6' (1.829 m)         MDM    Platelet 520 (similar to December but newly low since October 2021). Glucose 416. States out of Lantus and Humalog. Patient will be placed on antifungal cream for possible tinea pedis and aware to follow-up with family doctor concerning lower platelets.   Standard anticipatory guidance given to patient upon discharge. Have given them a specific time frame in which to follow-up and who to follow-up with. I have also advised them that they should return to the emergency department if they get worse, or not getting better or develop any new or concerning symptoms. Patient demonstrates understanding. CRITICAL CARE TIME   Total Critical Caretime was 0 minutes, excluding separately reportable procedures. There was a high probability of clinically significant/life threatening deterioration in the patient's condition which required my urgent intervention. Procedures    FINAL IMPRESSION      1. Tinea pedis of both feet    2. Hyperglycemia          DISPOSITION/PLAN   DISPOSITION        PATIENT REFERRED TO:  DANIEL Hope NP [address] [place] [id number] [phone]            DISCHARGE MEDICATIONS:  New Prescriptions    CLOTRIMAZOLE (LOTRIMIN) 1 % CREAM    Apply topically 2 times daily to lesions on skin for 3-4 weeks or for 1 week after lesion clears    TRAMADOL (ULTRAM) 50 MG TABLET    Take 1 tablet by mouth every 4 hours as needed for Pain for up to 3 days. Intended supply: 3 days. Take lowest dose possible to manage pain          (Please notethat portions of this note were completed with a voice recognition program.  Efforts were made to edit the dictations but occasionally words are mis-transcribed. )    BRIDGET Huff (electronically signed)  Emergency Physician Assistant         Mickie Ervinma  03/16/22 2142       Delbert Ken [facility]  03/16/22 2154

## 2022-09-09 ENCOUNTER — APPOINTMENT (OUTPATIENT)
Dept: CT IMAGING | Age: 47
End: 2022-09-09
Payer: COMMERCIAL

## 2022-09-09 ENCOUNTER — HOSPITAL ENCOUNTER (EMERGENCY)
Age: 47
Discharge: HOME | End: 2022-09-10
Attending: EMERGENCY MEDICINE
Payer: COMMERCIAL

## 2022-09-09 DIAGNOSIS — R73.9 HYPERGLYCEMIA: ICD-10-CM

## 2022-09-09 DIAGNOSIS — R10.84 GENERALIZED ABDOMINAL PAIN: ICD-10-CM

## 2022-09-09 DIAGNOSIS — R11.2 NON-INTRACTABLE VOMITING WITH NAUSEA, UNSPECIFIED VOMITING TYPE: Primary | ICD-10-CM

## 2022-09-09 LAB
ALBUMIN SERPL-MCNC: 4.9 G/DL (ref 3.5–4.6)
ALP BLD-CCNC: 135 U/L (ref 40–130)
ALT SERPL-CCNC: 69 U/L (ref 0–33)
ANION GAP SERPL CALCULATED.3IONS-SCNC: 9 MEQ/L (ref 9–15)
AST SERPL-CCNC: 68 U/L (ref 0–35)
BACTERIA: ABNORMAL /HPF
BASOPHILS ABSOLUTE: 0.1 K/UL (ref 0–0.2)
BASOPHILS RELATIVE PERCENT: 1 %
BILIRUB SERPL-MCNC: 0.9 MG/DL (ref 0.2–0.7)
BILIRUBIN URINE: NEGATIVE
BLOOD, URINE: NEGATIVE
BUN BLDV-MCNC: 22 MG/DL (ref 6–20)
CALCIUM SERPL-MCNC: 10 MG/DL (ref 8.5–9.9)
CHLORIDE BLD-SCNC: 94 MEQ/L (ref 95–107)
CLARITY: CLEAR
CO2: 31 MEQ/L (ref 20–31)
COLOR: YELLOW
CREAT SERPL-MCNC: 0.89 MG/DL (ref 0.5–0.9)
EOSINOPHILS ABSOLUTE: 0.4 K/UL (ref 0–0.7)
EOSINOPHILS RELATIVE PERCENT: 5.9 %
EPITHELIAL CELLS, UA: ABNORMAL /HPF (ref 0–5)
GFR AFRICAN AMERICAN: >60
GFR NON-AFRICAN AMERICAN: >60
GLOBULIN: 3.9 G/DL (ref 2.3–3.5)
GLUCOSE BLD-MCNC: 438 MG/DL (ref 70–99)
GLUCOSE URINE: >=1000 MG/DL
HCT VFR BLD CALC: 40.2 % (ref 37–47)
HEMOGLOBIN: 13.6 G/DL (ref 12–16)
HYALINE CASTS: ABNORMAL /HPF (ref 0–5)
KETONES, URINE: NEGATIVE MG/DL
LEUKOCYTE ESTERASE, URINE: NEGATIVE
LYMPHOCYTES ABSOLUTE: 2 K/UL (ref 1–4.8)
LYMPHOCYTES RELATIVE PERCENT: 26.7 %
MCH RBC QN AUTO: 30.2 PG (ref 27–31.3)
MCHC RBC AUTO-ENTMCNC: 33.8 % (ref 33–37)
MCV RBC AUTO: 89.3 FL (ref 82–100)
MONOCYTES ABSOLUTE: 0.4 K/UL (ref 0.2–0.8)
MONOCYTES RELATIVE PERCENT: 5.9 %
NEUTROPHILS ABSOLUTE: 4.6 K/UL (ref 1.4–6.5)
NEUTROPHILS RELATIVE PERCENT: 60.5 %
NITRITE, URINE: NEGATIVE
PDW BLD-RTO: 15.1 % (ref 11.5–14.5)
PH UA: 5.5 (ref 5–9)
PLATELET # BLD: 94 K/UL (ref 130–400)
POTASSIUM SERPL-SCNC: 4.2 MEQ/L (ref 3.4–4.9)
PROTEIN UA: 30 MG/DL
RBC # BLD: 4.51 M/UL (ref 4.2–5.4)
RBC UA: ABNORMAL /HPF (ref 0–5)
SODIUM BLD-SCNC: 134 MEQ/L (ref 135–144)
SPECIFIC GRAVITY UA: 1.02 (ref 1–1.03)
TOTAL PROTEIN: 8.8 G/DL (ref 6.3–8)
URINE REFLEX TO CULTURE: ABNORMAL
UROBILINOGEN, URINE: 0.2 E.U./DL
WBC # BLD: 7.5 K/UL (ref 4.8–10.8)
WBC UA: ABNORMAL /HPF (ref 0–5)

## 2022-09-09 PROCEDURE — 2580000003 HC RX 258: Performed by: EMERGENCY MEDICINE

## 2022-09-09 PROCEDURE — 6360000002 HC RX W HCPCS: Performed by: EMERGENCY MEDICINE

## 2022-09-09 PROCEDURE — 6370000000 HC RX 637 (ALT 250 FOR IP): Performed by: EMERGENCY MEDICINE

## 2022-09-09 PROCEDURE — 96374 THER/PROPH/DIAG INJ IV PUSH: CPT

## 2022-09-09 PROCEDURE — 36415 COLL VENOUS BLD VENIPUNCTURE: CPT

## 2022-09-09 PROCEDURE — 80053 COMPREHEN METABOLIC PANEL: CPT

## 2022-09-09 PROCEDURE — 85025 COMPLETE CBC W/AUTO DIFF WBC: CPT

## 2022-09-09 PROCEDURE — 74176 CT ABD & PELVIS W/O CONTRAST: CPT

## 2022-09-09 PROCEDURE — 81001 URINALYSIS AUTO W/SCOPE: CPT

## 2022-09-09 PROCEDURE — 96375 TX/PRO/DX INJ NEW DRUG ADDON: CPT

## 2022-09-09 RX ORDER — ONDANSETRON 2 MG/ML
4 INJECTION INTRAMUSCULAR; INTRAVENOUS ONCE
Status: COMPLETED | OUTPATIENT
Start: 2022-09-09 | End: 2022-09-09

## 2022-09-09 RX ORDER — MORPHINE SULFATE 4 MG/ML
4 INJECTION, SOLUTION INTRAMUSCULAR; INTRAVENOUS ONCE
Status: COMPLETED | OUTPATIENT
Start: 2022-09-09 | End: 2022-09-10

## 2022-09-09 RX ORDER — 0.9 % SODIUM CHLORIDE 0.9 %
500 INTRAVENOUS SOLUTION INTRAVENOUS ONCE
Status: COMPLETED | OUTPATIENT
Start: 2022-09-09 | End: 2022-09-10

## 2022-09-09 RX ORDER — 0.9 % SODIUM CHLORIDE 0.9 %
1000 INTRAVENOUS SOLUTION INTRAVENOUS ONCE
Status: COMPLETED | OUTPATIENT
Start: 2022-09-09 | End: 2022-09-10

## 2022-09-09 RX ADMIN — INSULIN HUMAN 10 UNITS: 100 INJECTION, SOLUTION PARENTERAL at 23:20

## 2022-09-09 RX ADMIN — HYDROMORPHONE HYDROCHLORIDE 1 MG: 1 INJECTION, SOLUTION INTRAMUSCULAR; INTRAVENOUS; SUBCUTANEOUS at 22:01

## 2022-09-09 RX ADMIN — ONDANSETRON 4 MG: 2 INJECTION INTRAMUSCULAR; INTRAVENOUS at 22:01

## 2022-09-09 RX ADMIN — SODIUM CHLORIDE 500 ML: 900 INJECTION, SOLUTION INTRAVENOUS at 23:19

## 2022-09-09 RX ADMIN — SODIUM CHLORIDE 1000 ML: 9 INJECTION, SOLUTION INTRAVENOUS at 22:00

## 2022-09-09 ASSESSMENT — ENCOUNTER SYMPTOMS
COUGH: 0
EYE DISCHARGE: 0
WHEEZING: 0
ABDOMINAL DISTENTION: 0
SHORTNESS OF BREATH: 0
VOMITING: 1
SORE THROAT: 0
NAUSEA: 1
ABDOMINAL PAIN: 1
CHEST TIGHTNESS: 0
PHOTOPHOBIA: 0

## 2022-09-09 ASSESSMENT — PAIN SCALES - GENERAL: PAINLEVEL_OUTOF10: 10

## 2022-09-10 VITALS
WEIGHT: 293 LBS | OXYGEN SATURATION: 93 % | DIASTOLIC BLOOD PRESSURE: 76 MMHG | BODY MASS INDEX: 39.68 KG/M2 | RESPIRATION RATE: 22 BRPM | SYSTOLIC BLOOD PRESSURE: 124 MMHG | HEIGHT: 72 IN | TEMPERATURE: 98 F | HEART RATE: 100 BPM

## 2022-09-10 PROCEDURE — 6360000002 HC RX W HCPCS: Performed by: EMERGENCY MEDICINE

## 2022-09-10 PROCEDURE — 99284 EMERGENCY DEPT VISIT MOD MDM: CPT

## 2022-09-10 RX ORDER — ONDANSETRON 4 MG/1
4 TABLET, ORALLY DISINTEGRATING ORAL EVERY 8 HOURS PRN
Qty: 12 TABLET | Refills: 0 | Status: SHIPPED | OUTPATIENT
Start: 2022-09-10

## 2022-09-10 RX ORDER — HYDROCODONE BITARTRATE AND ACETAMINOPHEN 5; 325 MG/1; MG/1
1 TABLET ORAL EVERY 6 HOURS PRN
Qty: 12 TABLET | Refills: 0 | Status: SHIPPED | OUTPATIENT
Start: 2022-09-10 | End: 2022-09-13

## 2022-09-10 RX ADMIN — MORPHINE SULFATE 4 MG: 4 INJECTION, SOLUTION INTRAMUSCULAR; INTRAVENOUS at 00:09

## 2022-09-10 ASSESSMENT — PAIN SCALES - GENERAL: PAINLEVEL_OUTOF10: 10

## 2022-09-10 NOTE — ED PROVIDER NOTES
[address] [facility] ED  eMERGENCY dEPARTMENT eNCOUnter      Pt Name: Lula Krabbe  MRN: [id number]  [place] 1975  Date of evaluation: 9/9/2022  Provider: Olga Navarrete MD    CHIEF COMPLAINT       Chief Complaint   Patient presents with    Abdominal Pain     Pt c/o n/v, abd pain for 2 days          HISTORY OF PRESENT ILLNESS   (Location/Symptom, Timing/Onset,Context/Setting, Quality, Duration, Modifying Factors, Severity)  Note limiting factors. Lula Krabbe is a 50 y.o. female who presents to the emergency department evaluation of abdominal pain. Patient describes a 2-day history of left lower side abdominal pain. He has nausea with vomiting with eating any food. No chest pain or shortness of breath. No back pain. Prior history of diverticulitis with surgery 10 years ago. Pain level is moderate to severe. No urinary complaints    HPI    NursingNotes were reviewed. REVIEW OF SYSTEMS    (2-9 systems for level 4, 10 or more for level 5)     Review of Systems   Constitutional:  Negative for chills and diaphoresis. HENT:  Negative for congestion, ear pain, mouth sores and sore throat. Eyes:  Negative for photophobia and discharge. Respiratory:  Negative for cough, chest tightness, shortness of breath and wheezing. Cardiovascular:  Negative for chest pain and palpitations. Gastrointestinal:  Positive for abdominal pain, nausea and vomiting. Negative for abdominal distention. Endocrine: Negative for cold intolerance. Genitourinary:  Negative for difficulty urinating. Musculoskeletal:  Negative for arthralgias. Skin:  Negative for pallor and rash. Allergic/Immunologic: Negative for immunocompromised state. Neurological:  Negative for dizziness and syncope. Hematological:  Negative for adenopathy. Psychiatric/Behavioral:  Negative for agitation and hallucinations. All other systems reviewed and are negative.     Except as noted above the remainder of the review of systems was reviewed and negative. PAST MEDICAL HISTORY     Past Medical History:   Diagnosis Date    Asthma     Chronic abdominal pain     Depression     Hyperlipidemia     Hypertension     NATALEE (obstructive sleep apnea)     Schizophrenia ([facility] [address]     Type II or unspecified type diabetes mellitus without mention of complication, not stated as uncontrolled          SURGICALHISTORY       Past Surgical History:   Procedure Laterality Date    [facility]    [facility]  [facility]    UPPER GASTROINTESTINAL ENDOSCOPY N/A 3/22/2021    EGD DIAGNOSTIC ONLY performed by Bartolome Delarosa MD at [address] [place] [address]       Previous Medications    ARIPIPRAZOLE (ABILIFY PO)    Take 5 mg by mouth     BUPROPION HCL (WELLBUTRIN PO)    Take 150 mg by mouth     CLOTRIMAZOLE (LOTRIMIN) 1 % CREAM    Apply topically 2 times daily to lesions on skin for 3-4 weeks or for 1 week after lesion clears    ESOMEPRAZOLE (NEXIUM) 40 MG DELAYED RELEASE CAPSULE    Take 1 capsule by mouth every morning (before breakfast)    GABAPENTIN (NEURONTIN) 800 MG TABLET    Take 800 mg by mouth 3 times daily. For leg pain    INSULIN GLARGINE (LANTUS SOLOSTAR) 100 UNIT/ML INJECTION PEN    60 units every morning.  160 units nightly    INSULIN LISPRO (HUMALOG KWIKPEN U-200) 200 UNIT/ML SOPN PEN    Inject 70 Units into the skin 3 times daily (with meals) for 4 days    INSULIN SYRINGE .5CC/29G 29G X 1/2\" 0.5 ML MISC    1 each by Does not apply route daily    LEVOTHYROXINE (SYNTHROID) 50 MCG TABLET    Take 50 mcg by mouth Daily    LISINOPRIL (PRINIVIL;ZESTRIL) 10 MG TABLET    Take 1 tablet by mouth daily    MAGNESIUM OXIDE (MAG-OX) 400 (240 MG) MG TABLET    Take 1 tablet by mouth daily    MELOXICAM (MOBIC) 15 MG TABLET    Take 1 tablet by mouth daily as needed for Pain    METFORMIN HCL PO    Take 1,000 mg by mouth     METOPROLOL SUCCINATE (TOPROL XL) 25 MG EXTENDED RELEASE TABLET    Take 1 tablet by mouth daily    MONTELUKAST (SINGULAIR) 10 MG TABLET    Take 10 mg by mouth nightly    NITROGLYCERIN (NITROSTAT) 0.4 MG SL TABLET    Place 1 tablet under the tongue every 5 minutes as needed for Chest pain up to max of 3 total doses. If no relief after 1 dose, call 911. SERTRALINE (ZOLOFT) 100 MG TABLET    Take 100 mg by mouth daily    TOPIRAMATE (TOPAMAX) 25 MG TABLET    Take 25 mg by mouth daily    TRAZODONE (DESYREL) 50 MG TABLET    Take 50 mg by mouth nightly       ALLERGIES     Metoclopramide, Nalbuphine, Nalbuphine hcl, Nitrofurantoin, Other, Shellfish-derived products, Amoxicillin, Macrobid [nitrofurantoin monohyd macro], Montelukast, Montelukast sodium, Seasonal, Neomycin-bacitracin zn-polymyx, and Silver sulfadiazine    FAMILY HISTORY       Family History   Problem Relation Age of Onset    Other Mother         Diverticulitis    Colon Cancer Paternal Uncle     Colon Cancer Maternal Grandfather           SOCIAL HISTORY       Social History     Socioeconomic History    Marital status: Single   Tobacco Use    Smoking status: Former     Packs/day: 1.00     Types: Cigarettes    Smokeless tobacco: Never    Tobacco comments:     Quit 4yrs   Vaping Use    Vaping Use: Never used   Substance and Sexual Activity    Alcohol use: No    Drug use: No    Sexual activity: Not Currently   Social History Narrative    Lives w brother       SCREENINGS    [place] Coma Scale  Eye Opening: Spontaneous  Best Verbal Response: Oriented  Best Motor Response: Obeys commands  Emily Coma Scale Score: 15 @FLOW([id number])@      PHYSICAL EXAM    (up to 7 for level 4, 8 or more for level 5)     ED Triage Vitals [09/09/22 2113]   BP Temp Temp src Heart Rate Resp SpO2 Height Weight   132/74 98 °F (36.7 °C) -- 100 22 97 % 6' (1.829 m) (!) 340 lb (154.2 kg)       Physical Exam  Vitals and nursing note reviewed. Constitutional:       Appearance: She is well-developed. HENT:      Head: Normocephalic. Nose: Nose normal.   Eyes:      Conjunctiva/sclera: Conjunctivae normal.      Pupils: Pupils are equal, round, and reactive to light. Cardiovascular:      Rate and Rhythm: Normal rate and regular rhythm. Heart sounds: Normal heart sounds. Pulmonary:      Effort: Pulmonary effort is normal.      Breath sounds: Normal breath sounds. Abdominal:      General: Bowel sounds are normal.      Palpations: Abdomen is soft. Tenderness: There is abdominal tenderness in the left lower quadrant. There is rebound. There is no guarding. Hernia: No hernia is present. Musculoskeletal:         General: Normal range of motion. Cervical back: Normal range of motion and neck supple. Skin:     General: Skin is warm and dry. Capillary Refill: Capillary refill takes less than 2 seconds. Neurological:      Mental Status: She is alert and oriented to person, place, and time.    Psychiatric:         Mood and Affect: Mood normal.       DIAGNOSTIC RESULTS     EKG: All EKG's are interpreted by the Emergency Department Physician who either signs or Co-signsthis chart in the absence of a cardiologist.      RADIOLOGY:   Ayaka Lust such as CT, Ultrasound and MRI are read by the radiologist. Plain radiographic images are visualized and preliminarily interpreted by the emergency physician with the below findings:      Interpretation per the Radiologist below, if available at the time ofthis note:    CT ABDOMEN PELVIS WO CONTRAST Additional Contrast? None    (Results Pending)         ED BEDSIDE ULTRASOUND:   Performed by ED Physician - none    LABS:  Labs Reviewed   CBC WITH AUTO DIFFERENTIAL - Abnormal; Notable for the following components:       Result Value    RDW 15.1 (*)     Platelets 94 (*)     All other components within normal limits   COMPREHENSIVE METABOLIC PANEL - Abnormal; Notable for the following components:    Sodium 134 (*)     Chloride 94 (*)     Glucose 438 (*)     BUN 22 (*)     Calcium 10.0 (*)     Total Protein 8.8 (*)     Albumin 4.9 (*)     Total Bilirubin 0.9 (*)     Alkaline Phosphatase 135 (*)     ALT 69 (*)     AST 68 (*)     Globulin 3.9 (*)     All other components within normal limits    Narrative:     Jesse Crawford  [facility] tel. [phone],  glu results called to and read back by patrick lawson, 09/09/2022 23:08, by [address] - Abnormal; Notable for the following components:    Glucose, Ur >=1000 (*)     Protein, UA 30 (*)     All other components within normal limits   MICROSCOPIC URINALYSIS - Abnormal; Notable for the following components:    Bacteria, UA FEW (*)     WBC, UA 6-9 (*)     All other components within normal limits       All other labs were within normal range or not returned as of this dictation. EMERGENCY DEPARTMENT COURSE and DIFFERENTIAL DIAGNOSIS/MDM:   Vitals:    Vitals:    09/09/22 2113   BP: 132/74   Pulse: 100   Resp: 22   Temp: 98 °F (36.7 °C)   SpO2: 97%   Weight: (!) 340 lb (154.2 kg)   Height: 6' (1.829 m)          MDM  Abdominal CT shows no acute pathology. There is diverticulosis but no evidence of diverticulitis. Patient's symptoms are suggestive of viral gastroenteritis. She is treated with limited pain medication and nausea medicine at home. Blood sugar was elevated because she had not been taking her insulin because of the vomiting. I explained that if she does have further vomiting she at least needs to take half her insulin dosing be very close with monitoring. CONSULTS:  None    PROCEDURES:  Unless otherwise noted below, none     Procedures    FINAL IMPRESSION      1. Non-intractable vomiting with nausea, unspecified vomiting type    2. Hyperglycemia    3.  Generalized abdominal pain          DISPOSITION/PLAN   DISPOSITION        PATIENT REFERRED TO:  Trice Zeng, [address] [facility] [id number] [phone]    In 3 days      DISCHARGE MEDICATIONS:  New Prescriptions HYDROCODONE-ACETAMINOPHEN (NORCO) 5-325 MG PER TABLET    Take 1 tablet by mouth every 6 hours as needed for Pain for up to 3 days. Intended supply: 3 days.  Take lowest dose possible to manage pain    ONDANSETRON (ZOFRAN ODT) 4 MG DISINTEGRATING TABLET    Take 1 tablet by mouth every 8 hours as needed for Nausea          (Please note that portions of this note were completed with a voice recognition program.  Efforts were made to edit the dictations but occasionally words are mis-transcribed.)    Sandra Mcarthur MD (electronically signed)  Attending Emergency Physician         Sandra Mcarthur MD  09/14/22 9680

## 2022-09-10 NOTE — ED TRIAGE NOTES
Pt c/o n/v abd pain for 2 days. Pt states she has h/o diverticulitis. Pt states the pain is moving into her chest. Pt is a/o x 4 calm, skin p/w/d resp even and non-labored. No sob or acute distress noted.

## 2022-09-10 NOTE — ED NOTES
Discharge instructions reviewed with patient. Verbalized understanding. A&O x4. Skin p/w/d. Respirations even and unlabored. No acute distress noted at this time. Patient amb with steady gait on discharge.          Angela Guidry RN  09/10/22 3000

## 2022-10-22 ENCOUNTER — HOSPITAL ENCOUNTER (EMERGENCY)
Age: 47
Discharge: HOME | End: 2022-10-23
Payer: COMMERCIAL

## 2022-10-22 DIAGNOSIS — R07.89 CHEST WALL PAIN: Primary | ICD-10-CM

## 2022-10-22 PROCEDURE — 99285 EMERGENCY DEPT VISIT HI MDM: CPT

## 2022-10-22 PROCEDURE — 96374 THER/PROPH/DIAG INJ IV PUSH: CPT

## 2022-10-22 PROCEDURE — 96375 TX/PRO/DX INJ NEW DRUG ADDON: CPT

## 2022-10-22 ASSESSMENT — PAIN DESCRIPTION - LOCATION: LOCATION: CHEST

## 2022-10-22 ASSESSMENT — PAIN - FUNCTIONAL ASSESSMENT: PAIN_FUNCTIONAL_ASSESSMENT: 0-10

## 2022-10-22 ASSESSMENT — PAIN SCALES - GENERAL: PAINLEVEL_OUTOF10: 6

## 2022-10-23 ENCOUNTER — APPOINTMENT (OUTPATIENT)
Dept: GENERAL RADIOLOGY | Age: 47
End: 2022-10-23
Payer: COMMERCIAL

## 2022-10-23 VITALS
OXYGEN SATURATION: 98 % | WEIGHT: 293 LBS | TEMPERATURE: 98.7 F | RESPIRATION RATE: 23 BRPM | HEIGHT: 72 IN | SYSTOLIC BLOOD PRESSURE: 142 MMHG | BODY MASS INDEX: 39.68 KG/M2 | HEART RATE: 82 BPM | DIASTOLIC BLOOD PRESSURE: 80 MMHG

## 2022-10-23 LAB
ALBUMIN SERPL-MCNC: 4.3 G/DL (ref 3.5–4.6)
ALP BLD-CCNC: 109 U/L (ref 40–130)
ALT SERPL-CCNC: 42 U/L (ref 0–33)
ANION GAP SERPL CALCULATED.3IONS-SCNC: 14 MEQ/L (ref 9–15)
AST SERPL-CCNC: 39 U/L (ref 0–35)
BASOPHILS ABSOLUTE: 0.1 K/UL (ref 0–0.2)
BASOPHILS RELATIVE PERCENT: 1 %
BILIRUB SERPL-MCNC: 0.5 MG/DL (ref 0.2–0.7)
BUN BLDV-MCNC: 14 MG/DL (ref 6–20)
CALCIUM SERPL-MCNC: 10.3 MG/DL (ref 8.5–9.9)
CHLORIDE BLD-SCNC: 97 MEQ/L (ref 95–107)
CO2: 26 MEQ/L (ref 20–31)
CREAT SERPL-MCNC: 0.99 MG/DL (ref 0.5–0.9)
EOSINOPHILS ABSOLUTE: 0.5 K/UL (ref 0–0.7)
EOSINOPHILS RELATIVE PERCENT: 6.6 %
GFR SERPL CREATININE-BSD FRML MDRD: >60 ML/MIN/{1.73_M2}
GLOBULIN: 3.8 G/DL (ref 2.3–3.5)
GLUCOSE BLD-MCNC: 284 MG/DL (ref 70–99)
HCT VFR BLD CALC: 38.1 % (ref 37–47)
HEMOGLOBIN: 12.6 G/DL (ref 12–16)
LYMPHOCYTES ABSOLUTE: 2.4 K/UL (ref 1–4.8)
LYMPHOCYTES RELATIVE PERCENT: 34.4 %
MCH RBC QN AUTO: 29.6 PG (ref 27–31.3)
MCHC RBC AUTO-ENTMCNC: 33.2 % (ref 33–37)
MCV RBC AUTO: 89.2 FL (ref 79.4–94.8)
MONOCYTES ABSOLUTE: 0.4 K/UL (ref 0.2–0.8)
MONOCYTES RELATIVE PERCENT: 6 %
NEUTROPHILS ABSOLUTE: 3.7 K/UL (ref 1.4–6.5)
NEUTROPHILS RELATIVE PERCENT: 52 %
PDW BLD-RTO: 15.7 % (ref 11.5–14.5)
PLATELET # BLD: 87 K/UL (ref 130–400)
PLATELET SLIDE REVIEW: ABNORMAL
POTASSIUM SERPL-SCNC: 3.7 MEQ/L (ref 3.4–4.9)
RBC # BLD: 4.27 M/UL (ref 4.2–5.4)
SODIUM BLD-SCNC: 137 MEQ/L (ref 135–144)
TOTAL PROTEIN: 8.1 G/DL (ref 6.3–8)
TROPONIN: <0.01 NG/ML (ref 0–0.01)
WBC # BLD: 7.1 K/UL (ref 4.8–10.8)

## 2022-10-23 PROCEDURE — 93005 ELECTROCARDIOGRAM TRACING: CPT | Performed by: PERSONAL EMERGENCY RESPONSE ATTENDANT

## 2022-10-23 PROCEDURE — 6360000002 HC RX W HCPCS: Performed by: PERSONAL EMERGENCY RESPONSE ATTENDANT

## 2022-10-23 PROCEDURE — 85025 COMPLETE CBC W/AUTO DIFF WBC: CPT

## 2022-10-23 PROCEDURE — 96375 TX/PRO/DX INJ NEW DRUG ADDON: CPT

## 2022-10-23 PROCEDURE — 84484 ASSAY OF TROPONIN QUANT: CPT

## 2022-10-23 PROCEDURE — 80053 COMPREHEN METABOLIC PANEL: CPT

## 2022-10-23 PROCEDURE — 71045 X-RAY EXAM CHEST 1 VIEW: CPT

## 2022-10-23 PROCEDURE — 36415 COLL VENOUS BLD VENIPUNCTURE: CPT

## 2022-10-23 PROCEDURE — 96374 THER/PROPH/DIAG INJ IV PUSH: CPT

## 2022-10-23 RX ORDER — ONDANSETRON 2 MG/ML
4 INJECTION INTRAMUSCULAR; INTRAVENOUS ONCE
Status: COMPLETED | OUTPATIENT
Start: 2022-10-23 | End: 2022-10-23

## 2022-10-23 RX ORDER — NITROGLYCERIN 0.4 MG/1
0.4 TABLET SUBLINGUAL EVERY 5 MIN PRN
Qty: 25 TABLET | Refills: 0 | Status: SHIPPED | OUTPATIENT
Start: 2022-10-23

## 2022-10-23 RX ORDER — ORPHENADRINE CITRATE 30 MG/ML
30 INJECTION INTRAMUSCULAR; INTRAVENOUS ONCE
Status: COMPLETED | OUTPATIENT
Start: 2022-10-23 | End: 2022-10-23

## 2022-10-23 RX ORDER — ONDANSETRON 4 MG/1
4 TABLET, ORALLY DISINTEGRATING ORAL EVERY 8 HOURS PRN
Qty: 20 TABLET | Refills: 0 | Status: SHIPPED | OUTPATIENT
Start: 2022-10-23

## 2022-10-23 RX ORDER — KETOROLAC TROMETHAMINE 30 MG/ML
30 INJECTION, SOLUTION INTRAMUSCULAR; INTRAVENOUS ONCE
Status: COMPLETED | OUTPATIENT
Start: 2022-10-23 | End: 2022-10-23

## 2022-10-23 RX ADMIN — KETOROLAC TROMETHAMINE 30 MG: 30 INJECTION, SOLUTION INTRAMUSCULAR at 00:21

## 2022-10-23 RX ADMIN — ONDANSETRON 4 MG: 2 INJECTION INTRAMUSCULAR; INTRAVENOUS at 01:46

## 2022-10-23 RX ADMIN — ORPHENADRINE CITRATE 30 MG: 30 INJECTION INTRAMUSCULAR; INTRAVENOUS at 01:46

## 2022-10-23 ASSESSMENT — ENCOUNTER SYMPTOMS
COUGH: 0
SORE THROAT: 0
DIARRHEA: 0
ABDOMINAL PAIN: 0
RHINORRHEA: 0
SHORTNESS OF BREATH: 1
BLOOD IN STOOL: 0
COLOR CHANGE: 0
NAUSEA: 0
VOMITING: 0

## 2022-10-23 ASSESSMENT — PAIN DESCRIPTION - DESCRIPTORS: DESCRIPTORS: BURNING

## 2022-10-23 ASSESSMENT — PAIN DESCRIPTION - LOCATION: LOCATION: ABDOMEN

## 2022-10-23 ASSESSMENT — PAIN SCALES - GENERAL: PAINLEVEL_OUTOF10: 8

## 2022-10-23 ASSESSMENT — PAIN DESCRIPTION - ORIENTATION: ORIENTATION: RIGHT;LEFT

## 2022-10-23 NOTE — ED PROVIDER NOTES
[facility] ED  eMERGENCY dEPARTMENT eNCOUnter      Pt Name: Frank Redmond  MRN: [id number]  [place] 1975  Date of evaluation: 10/22/2022  Provider: BRIDGET Bose      HISTORY OF PRESENT ILLNESS    Frank Redmond is a 50 y.o. female with PMHx of asthma, chronic abdominal pain, depression, hyperlipidemia, hypertension, NATALEE, schizophrenia, DM2 presents to the emergency department with chest pain. At 4 AM yesterday morning, patient was already awake when she started with gradual onset of midsternal chest pain radiating to her left chest and down her left arm. She has had this pain in the past with unknown etiology. Took nitroglycerin at home without improvement. Pain is worse with any truncal rotation, breathing. She is also short of breath without wheezing. She did have 4 episodes of emesis today. she denies fevers, coughing, abdominal pain, diarrhea, constipation, urinary symptoms, leg swelling. HPI    Nursing Notes were reviewed. REVIEW OF SYSTEMS       Review of Systems   Constitutional:  Negative for appetite change, chills and fever. HENT:  Negative for congestion, rhinorrhea and sore throat. Respiratory:  Positive for shortness of breath. Negative for cough. Cardiovascular:  Positive for chest pain. Gastrointestinal:  Negative for abdominal pain, blood in stool, diarrhea, nausea and vomiting. Genitourinary:  Negative for difficulty urinating. Musculoskeletal:  Negative for neck stiffness. Skin:  Negative for color change and rash. Neurological:  Negative for dizziness, syncope, weakness, light-headedness, numbness and headaches. All other systems reviewed and are negative.           PAST MEDICAL HISTORY     Past Medical History:   Diagnosis Date    Asthma     Chronic abdominal pain     Depression     Hyperlipidemia     Hypertension     NATALEE (obstructive sleep apnea)     Schizophrenia (HCC)     Type II or unspecified type diabetes mellitus without mention of complication, not stated as uncontrolled          SURGICAL HISTORY       Past Surgical History:   Procedure Laterality Date    COLONOSCOPY  2/21/14    [facility]    UPPER GASTROINTESTINAL ENDOSCOPY  2/21/14    [facility]    UPPER GASTROINTESTINAL ENDOSCOPY N/A 3/22/2021    EGD DIAGNOSTIC ONLY performed by Rachael Davidson MD at [address] [place] [address]       Previous Medications    ARIPIPRAZOLE (ABILIFY PO)    Take 5 mg by mouth     BUPROPION HCL (WELLBUTRIN PO)    Take 150 mg by mouth     CLOTRIMAZOLE (LOTRIMIN) 1 % CREAM    Apply topically 2 times daily to lesions on skin for 3-4 weeks or for 1 week after lesion clears    ESOMEPRAZOLE (NEXIUM) 40 MG DELAYED RELEASE CAPSULE    Take 1 capsule by mouth every morning (before breakfast)    GABAPENTIN (NEURONTIN) 800 MG TABLET    Take 800 mg by mouth 3 times daily. For leg pain    INSULIN GLARGINE (LANTUS SOLOSTAR) 100 UNIT/ML INJECTION PEN    60 units every morning. 160 units nightly    INSULIN LISPRO (HUMALOG KWIKPEN U-200) 200 UNIT/ML SOPN PEN    Inject 70 Units into the skin 3 times daily (with meals) for 4 days    INSULIN SYRINGE .5CC/29G 29G X 1/2\" 0.5 ML MISC    1 each by Does not apply route daily    LEVOTHYROXINE (SYNTHROID) 50 MCG TABLET    Take 50 mcg by mouth Daily    LISINOPRIL (PRINIVIL;ZESTRIL) 10 MG TABLET    Take 1 tablet by mouth daily    MAGNESIUM OXIDE (MAG-OX) 400 (240 MG) MG TABLET    Take 1 tablet by mouth daily    MELOXICAM (MOBIC) 15 MG TABLET    Take 1 tablet by mouth daily as needed for Pain    METFORMIN HCL PO    Take 1,000 mg by mouth     METOPROLOL SUCCINATE (TOPROL XL) 25 MG EXTENDED RELEASE TABLET    Take 1 tablet by mouth daily    MONTELUKAST (SINGULAIR) 10 MG TABLET    Take 10 mg by mouth nightly    NITROGLYCERIN (NITROSTAT) 0.4 MG SL TABLET    Place 1 tablet under the tongue every 5 minutes as needed for Chest pain up to max of 3 total doses. If no relief after 1 dose, call 911. sounds. No stridor. Comments: Moderately TTP throughout chest without signs of trauma   Chest:      Chest wall: Tenderness present. Abdominal:      General: Bowel sounds are normal. There is no distension. Palpations: Abdomen is soft. There is no mass. Tenderness: There is no abdominal tenderness. There is no guarding or rebound. Musculoskeletal:         General: Normal range of motion. Cervical back: Normal range of motion and neck supple. Skin:     General: Skin is warm and dry. Capillary Refill: Capillary refill takes less than 2 seconds. Findings: No rash. Neurological:      Mental Status: She is alert and oriented to person, place, and time. Deep Tendon Reflexes: Reflexes are normal and symmetric. Psychiatric:         Behavior: Behavior normal.         Thought Content: Thought content normal.         Judgment: Judgment normal.       DIAGNOSTIC RESULTS     EKG:All EKG's are interpreted by the Emergency Department Physician who either signs or Co-signs this chart in the absence of a cardiologist.    Normal sinus rhythm, rate 84, normal intervals, no ST segment changes    RADIOLOGY:   Non-plain film images such as CT, Ultrasound and MRI are read by theradiologist. Plain radiographic images are visualized and preliminarily interpreted by the emergency physician with the below findings:    Interpretation per theRadiologist below, if available at the time of this note:    XR CHEST PORTABLE   Final Result   No acute process.                  LABS:  Labs Reviewed   COMPREHENSIVE METABOLIC PANEL - Abnormal; Notable for the following components:       Result Value    Glucose 284 (*)     Creatinine 0.99 (*)     Calcium 10.3 (*)     Total Protein 8.1 (*)     ALT 42 (*)     AST 39 (*)     Globulin 3.8 (*)     All other components within normal limits   CBC WITH AUTO DIFFERENTIAL - Abnormal; Notable for the following components:    RDW 15.7 (*)     Platelets 87 (*)     All other components within normal limits   TROPONIN       All other labs were within normal range or not returned as of this dictation. EMERGENCY DEPARTMENT COURSE and DIFFERENTIAL DIAGNOSIS/MDM:   Vitals:    Vitals:    10/22/22 2356 10/22/22 2359   BP:  130/79   Pulse: 87    Resp: 18    Temp: 98.7 °F (37.1 °C)    TempSrc: Oral    SpO2: 95%    Weight: (!) 340 lb (154.2 kg)    Height: 6' (1.829 m)          MDM    Chest x-ray shows no acute process. Blood work unremarkable. Patient given Toradol for pain with temporary improvement. She will be given Norflex at this time and Zofran. She is requesting Zofran and nitro sublingual refill for home. Standard anticipatory guidance given to patient upon discharge. Have given them a specific time frame in which to follow-up and who to follow-up with. I have also advised them that they should return to the emergency department if they get worse, or not getting better or develop any new or concerning symptoms. Patient demonstrates understanding. CRITICAL CARE TIME   Total Critical Caretime was 0 minutes, excluding separately reportable procedures. There was a high probability of clinically significant/life threatening deterioration in the patient's condition which required my urgent intervention. Procedures    FINAL IMPRESSION      1. Chest wall pain          DISPOSITION/PLAN   DISPOSITION Decision To Discharge 10/23/2022 01:32:42 AM      PATIENT REFERRED TO:  DANIEL Stafford NP [address] [place] [address]  [phone]          DISCHARGE MEDICATIONS:  New Prescriptions    NITROGLYCERIN (NITROSTAT) 0.4 MG SL TABLET    Place 1 tablet under the tongue every 5 minutes as needed for Chest pain up to max of 3 total doses. If no relief after 1 dose, call 911.     ONDANSETRON (ZOFRAN ODT) 4 MG DISINTEGRATING TABLET    Take 1 tablet by mouth every 8 hours as needed for Nausea          (Please notethat portions of this note were completed with a voice recognition program.  Efforts were made to edit the dictations but occasionally words are mis-transcribed. )    BRIDGET Aamya (electronically signed)  Emergency Physician Assistant         Sigrid Councilman, [facility]  10/23/22 9738

## 2022-10-24 LAB
EKG ATRIAL RATE: 84 BPM
EKG P AXIS: 23 DEGREES
EKG P-R INTERVAL: 150 MS
EKG Q-T INTERVAL: 372 MS
EKG QRS DURATION: 88 MS
EKG QTC CALCULATION (BAZETT): 439 MS
EKG R AXIS: -54 DEGREES
EKG T AXIS: 0 DEGREES
EKG VENTRICULAR RATE: 84 BPM

## 2022-12-03 ENCOUNTER — HOSPITAL ENCOUNTER (EMERGENCY)
Age: 47
Discharge: HOME | End: 2022-12-03
Payer: COMMERCIAL

## 2022-12-03 ENCOUNTER — APPOINTMENT (OUTPATIENT)
Dept: CT IMAGING | Age: 47
End: 2022-12-03
Payer: COMMERCIAL

## 2022-12-03 VITALS
HEIGHT: 72 IN | OXYGEN SATURATION: 97 % | HEART RATE: 89 BPM | SYSTOLIC BLOOD PRESSURE: 145 MMHG | RESPIRATION RATE: 18 BRPM | TEMPERATURE: 98.8 F | WEIGHT: 293 LBS | BODY MASS INDEX: 39.68 KG/M2 | DIASTOLIC BLOOD PRESSURE: 93 MMHG

## 2022-12-03 DIAGNOSIS — K57.32 DIVERTICULITIS OF COLON: Primary | ICD-10-CM

## 2022-12-03 LAB
ALBUMIN SERPL-MCNC: 4.2 G/DL (ref 3.5–4.6)
ALP BLD-CCNC: 99 U/L (ref 40–130)
ALT SERPL-CCNC: 34 U/L (ref 0–33)
ANION GAP SERPL CALCULATED.3IONS-SCNC: 12 MEQ/L (ref 9–15)
AST SERPL-CCNC: 34 U/L (ref 0–35)
BASOPHILS ABSOLUTE: 0.1 K/UL (ref 0–0.2)
BASOPHILS RELATIVE PERCENT: 0.8 %
BILIRUB SERPL-MCNC: 0.6 MG/DL (ref 0.2–0.7)
BILIRUBIN URINE: NEGATIVE
BLOOD, URINE: NEGATIVE
BUN BLDV-MCNC: 12 MG/DL (ref 6–20)
CALCIUM SERPL-MCNC: 9 MG/DL (ref 8.5–9.9)
CHLORIDE BLD-SCNC: 98 MEQ/L (ref 95–107)
CLARITY: CLEAR
CO2: 28 MEQ/L (ref 20–31)
COLOR: YELLOW
CREAT SERPL-MCNC: 0.73 MG/DL (ref 0.5–0.9)
EOSINOPHILS ABSOLUTE: 1.1 K/UL (ref 0–0.7)
EOSINOPHILS RELATIVE PERCENT: 15.4 %
GFR SERPL CREATININE-BSD FRML MDRD: >60 ML/MIN/{1.73_M2}
GLOBULIN: 3.4 G/DL (ref 2.3–3.5)
GLUCOSE BLD-MCNC: 232 MG/DL (ref 70–99)
GLUCOSE URINE: NEGATIVE MG/DL
HCT VFR BLD CALC: 37.7 % (ref 37–47)
HEMOGLOBIN: 12.4 G/DL (ref 12–16)
KETONES, URINE: NEGATIVE MG/DL
LACTIC ACID: 2.4 MMOL/L (ref 0.5–2.2)
LEUKOCYTE ESTERASE, URINE: NEGATIVE
LIPASE: 9 U/L (ref 12–95)
LYMPHOCYTES ABSOLUTE: 2 K/UL (ref 1–4.8)
LYMPHOCYTES RELATIVE PERCENT: 27.1 %
MCH RBC QN AUTO: 29.8 PG (ref 27–31.3)
MCHC RBC AUTO-ENTMCNC: 32.8 % (ref 33–37)
MCV RBC AUTO: 90.6 FL (ref 79.4–94.8)
MONOCYTES ABSOLUTE: 0.4 K/UL (ref 0.2–0.8)
MONOCYTES RELATIVE PERCENT: 6 %
NEUTROPHILS ABSOLUTE: 3.7 K/UL (ref 1.4–6.5)
NEUTROPHILS RELATIVE PERCENT: 50.7 %
NITRITE, URINE: NEGATIVE
PDW BLD-RTO: 15 % (ref 11.5–14.5)
PH UA: 6 (ref 5–9)
PLATELET # BLD: 88 K/UL (ref 130–400)
POC CREATININE WHOLE BLOOD: 0.7
POTASSIUM SERPL-SCNC: 3.5 MEQ/L (ref 3.4–4.9)
PROTEIN UA: ABNORMAL MG/DL
RBC # BLD: 4.16 M/UL (ref 4.2–5.4)
SODIUM BLD-SCNC: 138 MEQ/L (ref 135–144)
SPECIFIC GRAVITY UA: 1.01 (ref 1–1.03)
TOTAL PROTEIN: 7.6 G/DL (ref 6.3–8)
URINE REFLEX TO CULTURE: ABNORMAL
UROBILINOGEN, URINE: 0.2 E.U./DL
WBC # BLD: 7.3 K/UL (ref 4.8–10.8)

## 2022-12-03 PROCEDURE — 96372 THER/PROPH/DIAG INJ SC/IM: CPT

## 2022-12-03 PROCEDURE — 83605 ASSAY OF LACTIC ACID: CPT

## 2022-12-03 PROCEDURE — 85025 COMPLETE CBC W/AUTO DIFF WBC: CPT

## 2022-12-03 PROCEDURE — 83690 ASSAY OF LIPASE: CPT

## 2022-12-03 PROCEDURE — 6360000002 HC RX W HCPCS: Performed by: PHYSICIAN ASSISTANT

## 2022-12-03 PROCEDURE — 96375 TX/PRO/DX INJ NEW DRUG ADDON: CPT

## 2022-12-03 PROCEDURE — 81003 URINALYSIS AUTO W/O SCOPE: CPT

## 2022-12-03 PROCEDURE — 74177 CT ABD & PELVIS W/CONTRAST: CPT

## 2022-12-03 PROCEDURE — 2580000003 HC RX 258: Performed by: PHYSICIAN ASSISTANT

## 2022-12-03 PROCEDURE — 96361 HYDRATE IV INFUSION ADD-ON: CPT

## 2022-12-03 PROCEDURE — 6360000004 HC RX CONTRAST MEDICATION: Performed by: PHYSICIAN ASSISTANT

## 2022-12-03 PROCEDURE — 6370000000 HC RX 637 (ALT 250 FOR IP): Performed by: PHYSICIAN ASSISTANT

## 2022-12-03 PROCEDURE — 96374 THER/PROPH/DIAG INJ IV PUSH: CPT

## 2022-12-03 PROCEDURE — 36415 COLL VENOUS BLD VENIPUNCTURE: CPT

## 2022-12-03 PROCEDURE — 80053 COMPREHEN METABOLIC PANEL: CPT

## 2022-12-03 PROCEDURE — 99285 EMERGENCY DEPT VISIT HI MDM: CPT

## 2022-12-03 PROCEDURE — 6360000002 HC RX W HCPCS: Performed by: PERSONAL EMERGENCY RESPONSE ATTENDANT

## 2022-12-03 RX ORDER — CIPROFLOXACIN 500 MG/1
500 TABLET, FILM COATED ORAL ONCE
Status: COMPLETED | OUTPATIENT
Start: 2022-12-03 | End: 2022-12-03

## 2022-12-03 RX ORDER — DICYCLOMINE HYDROCHLORIDE 10 MG/ML
20 INJECTION INTRAMUSCULAR ONCE
Status: COMPLETED | OUTPATIENT
Start: 2022-12-03 | End: 2022-12-03

## 2022-12-03 RX ORDER — 0.9 % SODIUM CHLORIDE 0.9 %
1000 INTRAVENOUS SOLUTION INTRAVENOUS ONCE
Status: COMPLETED | OUTPATIENT
Start: 2022-12-03 | End: 2022-12-03

## 2022-12-03 RX ORDER — CIPROFLOXACIN 500 MG/1
500 TABLET, FILM COATED ORAL 2 TIMES DAILY
Qty: 20 TABLET | Refills: 0 | Status: SHIPPED | OUTPATIENT
Start: 2022-12-03 | End: 2022-12-13

## 2022-12-03 RX ORDER — FENTANYL CITRATE 50 UG/ML
100 INJECTION, SOLUTION INTRAMUSCULAR; INTRAVENOUS ONCE
Status: COMPLETED | OUTPATIENT
Start: 2022-12-03 | End: 2022-12-03

## 2022-12-03 RX ORDER — METRONIDAZOLE 500 MG/1
500 TABLET ORAL 3 TIMES DAILY
Qty: 30 TABLET | Refills: 0 | Status: SHIPPED | OUTPATIENT
Start: 2022-12-03 | End: 2022-12-13

## 2022-12-03 RX ORDER — METRONIDAZOLE 500 MG/1
500 TABLET ORAL ONCE
Status: COMPLETED | OUTPATIENT
Start: 2022-12-03 | End: 2022-12-03

## 2022-12-03 RX ORDER — KETOROLAC TROMETHAMINE 30 MG/ML
30 INJECTION, SOLUTION INTRAMUSCULAR; INTRAVENOUS ONCE
Status: COMPLETED | OUTPATIENT
Start: 2022-12-03 | End: 2022-12-03

## 2022-12-03 RX ORDER — ONDANSETRON 4 MG/1
4 TABLET, ORALLY DISINTEGRATING ORAL 3 TIMES DAILY PRN
Qty: 21 TABLET | Refills: 0 | Status: SHIPPED | OUTPATIENT
Start: 2022-12-03

## 2022-12-03 RX ORDER — ONDANSETRON 2 MG/ML
4 INJECTION INTRAMUSCULAR; INTRAVENOUS ONCE
Status: COMPLETED | OUTPATIENT
Start: 2022-12-03 | End: 2022-12-03

## 2022-12-03 RX ORDER — MORPHINE SULFATE 4 MG/ML
4 INJECTION, SOLUTION INTRAMUSCULAR; INTRAVENOUS ONCE
Status: COMPLETED | OUTPATIENT
Start: 2022-12-03 | End: 2022-12-03

## 2022-12-03 RX ORDER — HYDROCODONE BITARTRATE AND ACETAMINOPHEN 5; 325 MG/1; MG/1
1 TABLET ORAL EVERY 6 HOURS PRN
Qty: 10 TABLET | Refills: 0 | Status: SHIPPED | OUTPATIENT
Start: 2022-12-03 | End: 2022-12-06

## 2022-12-03 RX ADMIN — SODIUM CHLORIDE 1000 ML: 9 INJECTION, SOLUTION INTRAVENOUS at 16:13

## 2022-12-03 RX ADMIN — METRONIDAZOLE 500 MG: 500 TABLET ORAL at 17:53

## 2022-12-03 RX ADMIN — MORPHINE SULFATE 4 MG: 4 INJECTION, SOLUTION INTRAMUSCULAR; INTRAVENOUS at 16:18

## 2022-12-03 RX ADMIN — DICYCLOMINE HYDROCHLORIDE 20 MG: 10 INJECTION, SOLUTION INTRAMUSCULAR at 17:37

## 2022-12-03 RX ADMIN — CIPROFLOXACIN 500 MG: 500 TABLET, FILM COATED ORAL at 17:53

## 2022-12-03 RX ADMIN — FENTANYL CITRATE 100 MCG: 50 INJECTION, SOLUTION INTRAMUSCULAR; INTRAVENOUS at 18:51

## 2022-12-03 RX ADMIN — IOPAMIDOL 50 ML: 612 INJECTION, SOLUTION INTRAVENOUS at 17:11

## 2022-12-03 RX ADMIN — KETOROLAC TROMETHAMINE 30 MG: 30 INJECTION, SOLUTION INTRAMUSCULAR; INTRAVENOUS at 17:37

## 2022-12-03 RX ADMIN — HYDROMORPHONE HYDROCHLORIDE 1 MG: 1 INJECTION, SOLUTION INTRAMUSCULAR; INTRAVENOUS; SUBCUTANEOUS at 17:54

## 2022-12-03 RX ADMIN — ONDANSETRON 4 MG: 2 INJECTION INTRAMUSCULAR; INTRAVENOUS at 16:19

## 2022-12-03 ASSESSMENT — PAIN SCALES - GENERAL
PAINLEVEL_OUTOF10: 10
PAINLEVEL_OUTOF10: 10

## 2022-12-03 ASSESSMENT — ENCOUNTER SYMPTOMS
NAUSEA: 1
RHINORRHEA: 0
PHOTOPHOBIA: 0
DIARRHEA: 0
COUGH: 0
BACK PAIN: 0
EYE PAIN: 0
SORE THROAT: 0
SHORTNESS OF BREATH: 0
ABDOMINAL PAIN: 1
VOMITING: 0

## 2022-12-03 ASSESSMENT — PAIN DESCRIPTION - DESCRIPTORS: DESCRIPTORS: ACHING

## 2022-12-03 ASSESSMENT — PAIN DESCRIPTION - LOCATION
LOCATION: ABDOMEN

## 2022-12-03 NOTE — ED PROVIDER NOTES
[address] [facility] ED  eMERGENCY dEPARTMENTeNCOUnter      Pt Name: Kobe Roque  MRN: [id number]  [place] 1975  Date ofevaluation: 12/3/2022  Provider: Jose Pichardo PA-C    CHIEF COMPLAINT       Chief Complaint   Patient presents with    Abdominal Pain     Abdominal pain 2 days, to left flank         HISTORY OF PRESENT ILLNESS   (Location/Symptom, Timing/Onset,Context/Setting, Quality, Duration, Modifying Factors, Severity)  Note limiting factors. Kobe Roque is a 50 y.o. female who presents to the emergency department severe left sided abdominal pain x2 days. Pain is cramping, stabbing associated nausea with no vomiting. Denies diarrhea. H/o diverticulitis. Felt chills this morning. Had surgery on her abdomen in 2014 unsure for what exactly      tablet used     HPI    NursingNotes were reviewed. REVIEW OF SYSTEMS    (2-9 systems for level 4, 10 or more for level 5)     Review of Systems   Constitutional:  Negative for chills, diaphoresis, fatigue and fever. HENT:  Negative for congestion, rhinorrhea and sore throat. Eyes:  Negative for photophobia and pain. Respiratory:  Negative for cough and shortness of breath. Cardiovascular:  Negative for chest pain and palpitations. Gastrointestinal:  Positive for abdominal pain and nausea. Negative for diarrhea and vomiting. Genitourinary:  Negative for dysuria and flank pain. Musculoskeletal:  Negative for back pain. Skin:  Negative for rash. Neurological:  Negative for dizziness, light-headedness and headaches. Psychiatric/Behavioral: Negative. All other systems reviewed and are negative. Except as noted above the remainder of the review of systems was reviewed and negative.        PAST MEDICAL HISTORY     Past Medical History:   Diagnosis Date    Asthma     Chronic abdominal pain     Depression     Hyperlipidemia     Hypertension     NATALEE (obstructive sleep apnea)     Schizophrenia ([facility] [address]     Type II or unspecified type diabetes mellitus without mention of complication, not stated as uncontrolled          SURGICALHISTORY       Past Surgical History:   Procedure Laterality Date    [facility]    UPPER GASTROINTEST[facility]AL END[facility]  [facility]    UPPER GASTROINTESTINAL ENDOSCOPY N/A 3/22/2021    EGD DIAGNOSTIC ONLY performed by Dea Patricio MD at [address] [place] [address]       Discharge Medication List as of 12/3/2022  6:30 PM        CONTINUE these medications which have NOT CHANGED    Details   !! ondansetron (ZOFRAN ODT) 4 MG disintegrating tablet Take 1 tablet by mouth every 8 hours as needed for Nausea, Disp-20 tablet, R-0Print      !! nitroGLYCERIN (NITROSTAT) 0.4 MG SL tablet Place 1 tablet under the tongue every 5 minutes as needed for Chest pain up to max of 3 total doses. If no relief after 1 dose, call 911., Disp-25 tablet, R-0Print      !! ondansetron (ZOFRAN ODT) 4 MG disintegrating tablet Take 1 tablet by mouth every 8 hours as needed for Nausea, Disp-12 tablet, R-0Print      clotrimazole (LOTRIMIN) 1 % cream Apply topically 2 times daily to lesions on skin for 3-4 weeks or for 1 week after lesion clears, Disp-12 g, R-0, Print      insulin glargine (LANTUS SOLOSTAR) 100 UNIT/ML injection pen 60 units every morning. 160 units nightly, Disp-5 pen, R-0Print      insulin lispro (HUMALOG KWIKPEN U-200) 200 UNIT/ML SOPN pen Inject 70 Units into the skin 3 times daily (with meals) for 4 days, Disp-2 pen, R-0Print      gabapentin (NEURONTIN) 800 MG tablet Take 800 mg by mouth 3 times daily. For leg painHistorical Med      magnesium oxide (MAG-OX) 400 (240 Mg) MG tablet Take 1 tablet by mouth daily, Disp-30 tablet, R-5Normal      !! nitroGLYCERIN (NITROSTAT) 0.4 MG SL tablet Place 1 tablet under the tongue every 5 minutes as needed for Chest pain up to max of 3 total doses.  If no relief after 1 dose, call 911., Disp-25 tablet, R-3Normal metoprolol succinate (TOPROL XL) 25 MG extended release tablet Take 1 tablet by mouth daily, Disp-30 tablet, R-3Normal      lisinopril (PRINIVIL;ZESTRIL) 10 MG tablet Take 1 tablet by mouth daily, Disp-30 tablet, R-3Normal      INSULIN SYRINGE .5CC/29G 29G X 1/2\" 0.5 ML MISC DAILY Starting Tue 8/11/2020, Disp-100 each,R-0, Print      meloxicam (MOBIC) 15 MG tablet Take 1 tablet by mouth daily as needed for Pain, Disp-90 tablet, R-1Print      esomeprazole (NEXIUM) 40 MG delayed release capsule Take 1 capsule by mouth every morning (before breakfast), Disp-30 capsule, R-3Print      sertraline (ZOLOFT) 100 MG tablet Take 100 mg by mouth dailyHistorical Med      traZODone (DESYREL) 50 MG tablet Take 50 mg by mouth nightlyHistorical Med      levothyroxine (SYNTHROID) 50 MCG tablet Take 50 mcg by mouth DailyHistorical Med      topiramate (TOPAMAX) 25 MG tablet Take 25 mg by mouth dailyHistorical Med      montelukast (SINGULAIR) 10 MG tablet Take 10 mg by mouth nightlyHistorical Med      METFORMIN HCL PO Take 1,000 mg by mouth Historical Med      BuPROPion HCl (WELLBUTRIN PO) Take 150 mg by mouth Historical Med      ARIPiprazole (ABILIFY PO) Take 5 mg by mouth Historical Med       !! - Potential duplicate medications found. Please discuss with provider.           ALLERGIES     Metoclopramide, Nalbuphine, Nalbuphine hcl, Nitrofurantoin, Other, Shellfish-derived products, Amoxicillin, Macrobid [nitrofurantoin monohyd macro], Montelukast, Montelukast sodium, Seasonal, Neomycin-bacitracin zn-polymyx, and Silver sulfadiazine    FAMILY HISTORY       Family History   Problem Relation Age of Onset    Other Mother         Diverticulitis    Colon Cancer Paternal Uncle     Colon Cancer Maternal Grandfather           SOCIAL HISTORY       Social History     Socioeconomic History    Marital status: Single   Tobacco Use    Smoking status: Former     Packs/day: 1.00     Types: Cigarettes    Smokeless tobacco: Never    Tobacco comments: Quit 4yrs   Vaping Use    Vaping Use: Never used   Substance and Sexual Activity    Alcohol use: No    Drug use: No    Sexual activity: Not Currently   Social History Narrative    Lives w brother       SCREENINGS    Emily Coma Scale  Eye Opening: Spontaneous  Best Verbal Response: Oriented  Best Motor Response: Obeys commands  [place] Coma Scale Score: 15 @FLOW(06226202)@      PHYSICAL EXAM    (up to 7 for level 4, 8 or more for level 5)     ED Triage Vitals   BP Temp Temp Source Heart Rate Resp SpO2 Height Weight   12/03/22 1555 12/03/22 1608 12/03/22 1608 12/03/22 1555 12/03/22 1555 12/03/22 1555 12/03/22 1608 12/03/22 1608   (!) 146/74 98.8 °F (37.1 °C) Oral 89 18 100 % 6' (1.829 m) (!) 340 lb (154.2 kg)       Physical Exam  Vitals and nursing note reviewed. Constitutional:       General: She is not in acute distress. Appearance: Normal appearance. She is well-developed. She is not diaphoretic. HENT:      Head: Normocephalic and atraumatic. Nose: Nose normal.      Mouth/Throat:      Mouth: Mucous membranes are moist.   Eyes:      General: Lids are normal.      Conjunctiva/sclera: Conjunctivae normal.   Cardiovascular:      Rate and Rhythm: Normal rate and regular rhythm. Pulses: Normal pulses. Heart sounds: Normal heart sounds. Pulmonary:      Effort: Pulmonary effort is normal.      Breath sounds: Normal breath sounds. Abdominal:      General: Bowel sounds are normal.      Palpations: Abdomen is soft. Tenderness: There is no abdominal tenderness. Musculoskeletal:         General: Normal range of motion. Cervical back: Normal range of motion and neck supple. Lymphadenopathy:      Cervical: No cervical adenopathy. Skin:     General: Skin is warm and dry. Capillary Refill: Capillary refill takes less than 2 seconds. Findings: No rash. Neurological:      Mental Status: She is alert and oriented to person, place, and time.    Psychiatric:         Thought Content: Thought content normal.         Judgment: Judgment normal.       DIAGNOSTIC RESULTS     EKG: All EKG's are interpreted by the Emergency Department Physician who either signs or Co-signsthis chart in the absence of a cardiologist.        RADIOLOGY:   Loetta Bell such as CT, Ultrasound and MRI are read by the radiologist. Plain radiographic images are visualized and preliminarily interpreted by the emergency physician with the below findings:        Interpretation per the Radiologist below, if available at the time ofthis note:    CT ABDOMEN PELVIS W IV CONTRAST Additional Contrast? None   Final Result   Thickening of the proximal to mid sigmoid colon with adjacent inflammatory   stranding. Findings may represent diverticulitis versus colitis. Gallbladder distension with no gallbladder wall thickening, stones, sludge or   pericholecystic fluid. Normal common bile duct. Hepatosplenomegaly.                ED BEDSIDE ULTRASOUND:   Performed by ED Physician - none    LABS:  Labs Reviewed   CBC WITH AUTO DIFFERENTIAL - Abnormal; Notable for the following components:       Result Value    RBC 4.16 (*)     MCHC 32.8 (*)     RDW 15.0 (*)     Platelets 88 (*)     Eosinophils Absolute 1.1 (*)     All other components within normal limits   COMPREHENSIVE METABOLIC PANEL - Abnormal; Notable for the following components:    Glucose 232 (*)     ALT 34 (*)     All other components within normal limits   LACTIC ACID - Abnormal; Notable for the following components:    Lactic Acid 2.4 (*)     All other components within normal limits   LIPASE - Abnormal; Notable for the following components:    Lipase 9 (*)     All other components within normal limits   URINALYSIS WITH REFLEX TO CULTURE - Abnormal; Notable for the following components:    Protein, UA TRACE (*)     All other components within normal limits   POCT CREATININE - Normal       All other labs were within normal range or not returned as of this dictation. EMERGENCY DEPARTMENT COURSE and DIFFERENTIAL DIAGNOSIS/MDM:   Vitals:    Vitals:    12/03/22 1700 12/03/22 1715 12/03/22 1730 12/03/22 1745   BP: (!) 145/93      Pulse:       Resp:       Temp:       TempSrc:       SpO2: 90% 99% 95% 97%   Weight:       Height:               MDM    Pt presents with left sided abd pain x2 days. Vitals are all wnl. Medicated in the ed for pain. No leukocytosis. Mild elevation lactic acid 2.4. pt hydrated with fluids, zofran and analgesic. No vomiting episodes while in the ED. Ct abd with contrast shows colitis vs mild divertivulitis. Due to h/o diverticulitis will treat as such with flagyl and cipro, allergies to pcn. Pt required repeat pain medication while in the ED. Labs vitals and ct show no complication of diverticulitis. I believe pt is a good candidate for outpatient tx with pcp and gi f/u. Given warning precautions to return for.  used several times throughout visit. REASSESSMENT          CRITICAL CARE TIME   Total Critical Care time was  minutes, excluding separatelyreportable procedures. There was a high probability ofclinically significant/life threatening deterioration in the patient's condition which required my urgent intervention. CONSULTS:  None    PROCEDURES:  Unless otherwise noted below, none     Procedures    FINAL IMPRESSION      1.  Diverticulitis of colon          DISPOSITION/PLAN   DISPOSITION Decision To Discharge 12/03/2022 07:23:23 PM      PATIENT REFERREDTO:  Mary Perez, [facility] [address] [phone]    Schedule an appointment as soon as possible for a visit in 2 days      Greg Cohen  [address] [place] [address]  [phone]    Schedule an appointment as soon as possible for a visit in 1 week      DISCHARGEMEDICATIONS:  Discharge Medication List as of 12/3/2022  6:30 PM        START taking these medications    Details   ciprofloxacin (CIPRO) 500 MG tablet Take 1 tablet by mouth 2 times daily for 10 days, Disp-20 tablet, R-0Normal      metroNIDAZOLE (FLAGYL) 500 MG tablet Take 1 tablet by mouth 3 times daily for 10 days, Disp-30 tablet, R-0Normal      HYDROcodone-acetaminophen (NORCO) 5-325 MG per tablet Take 1 tablet by mouth every 6 hours as needed for Pain for up to 3 days. Intended supply: 3 days. Take lowest dose possible to manage pain, Disp-10 tablet, R-0Normal      !! ondansetron (ZOFRAN-ODT) 4 MG disintegrating tablet Take 1 tablet by mouth 3 times daily as needed for Nausea or Vomiting, Disp-21 tablet, R-0Normal       !! - Potential duplicate medications found. Please discuss with provider.              (Please note that portions of this note were completed with a voice recognition program.  Efforts were made to edit the dictations but occasionally words are mis-transcribed.)    Elgin Lowe PA-C (electronically signed)  Attending Emergency Physician         Elgin Lowe PA-C  12/04/22 7830

## 2022-12-04 LAB
GFR SERPL CREATININE-BSD FRML MDRD: >60 ML/MIN/{1.73_M2}
PERFORMED ON: NORMAL
POC CREATININE: 0.7 MG/DL (ref 0.6–1.2)
POC SAMPLE TYPE: NORMAL

## 2022-12-04 NOTE — ED NOTES
Pt was given pain medication and SPO2 went to 88% Abundio Black went in and had pt take deep breaths. Went up to 97% spo2.  Nahed Katz was ok with pt being d/c      Emily Rodriguez  12/03/22 1923

## 2022-12-12 ENCOUNTER — HOSPITAL ENCOUNTER (EMERGENCY)
Age: 47
Discharge: HOME | End: 2022-12-12
Payer: COMMERCIAL

## 2022-12-12 ENCOUNTER — APPOINTMENT (OUTPATIENT)
Dept: CT IMAGING | Age: 47
End: 2022-12-12
Payer: COMMERCIAL

## 2022-12-12 VITALS
OXYGEN SATURATION: 95 % | BODY MASS INDEX: 39.68 KG/M2 | HEART RATE: 71 BPM | SYSTOLIC BLOOD PRESSURE: 120 MMHG | TEMPERATURE: 98.5 F | DIASTOLIC BLOOD PRESSURE: 76 MMHG | RESPIRATION RATE: 18 BRPM | HEIGHT: 72 IN | WEIGHT: 293 LBS

## 2022-12-12 DIAGNOSIS — R10.32 ABDOMINAL PAIN, LEFT LOWER QUADRANT: Primary | ICD-10-CM

## 2022-12-12 LAB
ALBUMIN SERPL-MCNC: 4 G/DL (ref 3.5–4.6)
ALP BLD-CCNC: 94 U/L (ref 40–130)
ALT SERPL-CCNC: 22 U/L (ref 0–33)
ANION GAP SERPL CALCULATED.3IONS-SCNC: 9 MEQ/L (ref 9–15)
AST SERPL-CCNC: 31 U/L (ref 0–35)
BASOPHILS ABSOLUTE: 0.1 K/UL (ref 0–0.2)
BASOPHILS RELATIVE PERCENT: 1.2 %
BILIRUB SERPL-MCNC: 0.5 MG/DL (ref 0.2–0.7)
BUN BLDV-MCNC: 6 MG/DL (ref 6–20)
CALCIUM SERPL-MCNC: 9.4 MG/DL (ref 8.5–9.9)
CHLORIDE BLD-SCNC: 104 MEQ/L (ref 95–107)
CO2: 29 MEQ/L (ref 20–31)
CREAT SERPL-MCNC: 0.84 MG/DL (ref 0.5–0.9)
EOSINOPHILS ABSOLUTE: 0.3 K/UL (ref 0–0.7)
EOSINOPHILS RELATIVE PERCENT: 5.1 %
GFR SERPL CREATININE-BSD FRML MDRD: >60 ML/MIN/{1.73_M2}
GFR SERPL CREATININE-BSD FRML MDRD: >60 ML/MIN/{1.73_M2}
GLOBULIN: 3.4 G/DL (ref 2.3–3.5)
GLUCOSE BLD-MCNC: 60 MG/DL (ref 70–99)
HCT VFR BLD CALC: 38.1 % (ref 37–47)
HEMOGLOBIN: 13 G/DL (ref 12–16)
LACTIC ACID: 1.7 MMOL/L (ref 0.5–2.2)
LIPASE: 11 U/L (ref 12–95)
LYMPHOCYTES ABSOLUTE: 2.7 K/UL (ref 1–4.8)
LYMPHOCYTES RELATIVE PERCENT: 40.3 %
MCH RBC QN AUTO: 30.5 PG (ref 27–31.3)
MCHC RBC AUTO-ENTMCNC: 34.1 % (ref 33–37)
MCV RBC AUTO: 89.5 FL (ref 79.4–94.8)
MONOCYTES ABSOLUTE: 0.5 K/UL (ref 0.2–0.8)
MONOCYTES RELATIVE PERCENT: 7.7 %
NEUTROPHILS ABSOLUTE: 3 K/UL (ref 1.4–6.5)
NEUTROPHILS RELATIVE PERCENT: 45.7 %
PDW BLD-RTO: 15.1 % (ref 11.5–14.5)
PERFORMED ON: NORMAL
PLATELET # BLD: 115 K/UL (ref 130–400)
POC CREATININE WHOLE BLOOD: 0.8
POC CREATININE: 0.8 MG/DL (ref 0.6–1.2)
POC SAMPLE TYPE: NORMAL
POTASSIUM SERPL-SCNC: 3.7 MEQ/L (ref 3.4–4.9)
RBC # BLD: 4.25 M/UL (ref 4.2–5.4)
SODIUM BLD-SCNC: 142 MEQ/L (ref 135–144)
TOTAL PROTEIN: 7.4 G/DL (ref 6.3–8)
WBC # BLD: 6.6 K/UL (ref 4.8–10.8)

## 2022-12-12 PROCEDURE — 85025 COMPLETE CBC W/AUTO DIFF WBC: CPT

## 2022-12-12 PROCEDURE — 2580000003 HC RX 258: Performed by: STUDENT IN AN ORGANIZED HEALTH CARE EDUCATION/TRAINING PROGRAM

## 2022-12-12 PROCEDURE — 80053 COMPREHEN METABOLIC PANEL: CPT

## 2022-12-12 PROCEDURE — 96374 THER/PROPH/DIAG INJ IV PUSH: CPT

## 2022-12-12 PROCEDURE — 99285 EMERGENCY DEPT VISIT HI MDM: CPT

## 2022-12-12 PROCEDURE — 6360000002 HC RX W HCPCS: Performed by: STUDENT IN AN ORGANIZED HEALTH CARE EDUCATION/TRAINING PROGRAM

## 2022-12-12 PROCEDURE — 6360000004 HC RX CONTRAST MEDICATION: Performed by: STUDENT IN AN ORGANIZED HEALTH CARE EDUCATION/TRAINING PROGRAM

## 2022-12-12 PROCEDURE — 36415 COLL VENOUS BLD VENIPUNCTURE: CPT

## 2022-12-12 PROCEDURE — 83690 ASSAY OF LIPASE: CPT

## 2022-12-12 PROCEDURE — 83605 ASSAY OF LACTIC ACID: CPT

## 2022-12-12 PROCEDURE — 96375 TX/PRO/DX INJ NEW DRUG ADDON: CPT

## 2022-12-12 PROCEDURE — 74177 CT ABD & PELVIS W/CONTRAST: CPT

## 2022-12-12 RX ORDER — 0.9 % SODIUM CHLORIDE 0.9 %
1000 INTRAVENOUS SOLUTION INTRAVENOUS ONCE
Status: COMPLETED | OUTPATIENT
Start: 2022-12-12 | End: 2022-12-12

## 2022-12-12 RX ORDER — MORPHINE SULFATE 4 MG/ML
4 INJECTION, SOLUTION INTRAMUSCULAR; INTRAVENOUS
Status: COMPLETED | OUTPATIENT
Start: 2022-12-12 | End: 2022-12-12

## 2022-12-12 RX ORDER — KETOROLAC TROMETHAMINE 15 MG/ML
15 INJECTION, SOLUTION INTRAMUSCULAR; INTRAVENOUS ONCE
Status: COMPLETED | OUTPATIENT
Start: 2022-12-12 | End: 2022-12-12

## 2022-12-12 RX ORDER — ONDANSETRON 2 MG/ML
4 INJECTION INTRAMUSCULAR; INTRAVENOUS ONCE
Status: COMPLETED | OUTPATIENT
Start: 2022-12-12 | End: 2022-12-12

## 2022-12-12 RX ADMIN — MORPHINE SULFATE 4 MG: 4 INJECTION, SOLUTION INTRAMUSCULAR; INTRAVENOUS at 03:21

## 2022-12-12 RX ADMIN — ONDANSETRON 4 MG: 2 INJECTION INTRAMUSCULAR; INTRAVENOUS at 03:19

## 2022-12-12 RX ADMIN — KETOROLAC TROMETHAMINE 15 MG: 15 INJECTION, SOLUTION INTRAMUSCULAR; INTRAVENOUS at 03:20

## 2022-12-12 RX ADMIN — IOPAMIDOL 50 ML: 612 INJECTION, SOLUTION INTRAVENOUS at 03:54

## 2022-12-12 RX ADMIN — HYDROMORPHONE HYDROCHLORIDE 1 MG: 1 INJECTION, SOLUTION INTRAMUSCULAR; INTRAVENOUS; SUBCUTANEOUS at 05:08

## 2022-12-12 RX ADMIN — SODIUM CHLORIDE 1000 ML: 9 INJECTION, SOLUTION INTRAVENOUS at 03:21

## 2022-12-12 ASSESSMENT — ENCOUNTER SYMPTOMS
NAUSEA: 0
BACK PAIN: 0
EYE PAIN: 0
ABDOMINAL PAIN: 1
CHEST TIGHTNESS: 0
DIARRHEA: 0
SHORTNESS OF BREATH: 0
SORE THROAT: 0

## 2022-12-12 ASSESSMENT — PAIN SCALES - GENERAL
PAINLEVEL_OUTOF10: 10

## 2022-12-12 ASSESSMENT — PAIN DESCRIPTION - PAIN TYPE: TYPE: ACUTE PAIN

## 2022-12-12 ASSESSMENT — PAIN DESCRIPTION - LOCATION
LOCATION: ABDOMEN

## 2022-12-12 ASSESSMENT — PAIN DESCRIPTION - FREQUENCY: FREQUENCY: CONTINUOUS

## 2022-12-12 ASSESSMENT — PAIN DESCRIPTION - ORIENTATION
ORIENTATION: RIGHT;LOWER
ORIENTATION: RIGHT;LOWER
ORIENTATION: LOWER
ORIENTATION: RIGHT;LOWER

## 2022-12-12 ASSESSMENT — LIFESTYLE VARIABLES: HOW OFTEN DO YOU HAVE A DRINK CONTAINING ALCOHOL: NEVER

## 2022-12-12 ASSESSMENT — PAIN DESCRIPTION - DESCRIPTORS: DESCRIPTORS: ACHING

## 2022-12-12 ASSESSMENT — PAIN - FUNCTIONAL ASSESSMENT: PAIN_FUNCTIONAL_ASSESSMENT: 0-10

## 2022-12-12 NOTE — ED TRIAGE NOTES
Pt states that she has been having lower ABD pain since 1900 tonight. Pt states that she is having pain in her back and right leg also. Pt has hx of diverticulitis.

## 2022-12-12 NOTE — ED PROVIDER NOTES
[facility] ED  eMERGENCYdEPARTMENT eNCOUnter      Pt Name: Gorge Hurley  MRN: [id number]  [place] 1975  Date of evaluation: 12/12/2022  Provider:Atul Dial PA-C    CHIEF COMPLAINT           HPI  Gorge Hurley is a 50 y.o. female per chart review has a h/o schizophrenia, sleep apnea, diabetes, obesity, diverticulitis presenting with left lower quadrant abdominal pain. Patient reports gradual onset, worsening, severe, sharp, not radiating to the left flank left lower quadrant abdominal pain ongoing for the past 3 days. Patient was diagnosed with diverticulitis 1 week ago and has since completed her antibiotic course but her pain and symptoms became abruptly worse today. She denies diarrhea, constipation, nausea, vomiting, fever, chills. ROS  Review of Systems   Constitutional:  Negative for chills, fatigue and fever. HENT:  Negative for ear pain, hearing loss and sore throat. Eyes:  Negative for pain and visual disturbance. Respiratory:  Negative for chest tightness and shortness of breath. Cardiovascular:  Negative for chest pain. Gastrointestinal:  Positive for abdominal pain. Negative for diarrhea and nausea. Endocrine: Negative for cold intolerance. Genitourinary:  Negative for hematuria. Musculoskeletal:  Negative for back pain. Skin:  Negative for rash and wound. Neurological:  Negative for dizziness and headaches. Psychiatric/Behavioral:  Negative for behavioral problems and confusion. Except as noted above the remainder of the review of systems was reviewed and negative.        PAST MEDICAL HISTORY     Past Medical History:   Diagnosis Date    Asthma     Chronic abdominal pain     Depression     Hyperlipidemia     Hypertension     NATALEE (obstructive sleep apnea)     Schizophrenia (HCC)     Type II or unspecified type diabetes mellitus without mention of complication, not stated as uncontrolled          SURGICAL HISTORY Past Surgical History:   Procedure Laterality Date    COLONOSCOPY  2/21/14    [facility]    UPPER GASTROINTESTINAL ENDOSCOPY  2/21/14    [facility]    UPPER GASTROINTESTINAL ENDOSCOPY N/A 3/22/2021    EGD DIAGNOSTIC ONLY performed by Patrick Clark MD at [address] [facility]       Previous Medications    ARIPIPRAZOLE (ABILIFY PO)    Take 5 mg by mouth     BUPROPION HCL (WELLBUTRIN PO)    Take 150 mg by mouth     CIPROFLOXACIN (CIPRO) 500 MG TABLET    Take 1 tablet by mouth 2 times daily for 10 days    CLOTRIMAZOLE (LOTRIMIN) 1 % CREAM    Apply topically 2 times daily to lesions on skin for 3-4 weeks or for 1 week after lesion clears    ESOMEPRAZOLE (NEXIUM) 40 MG DELAYED RELEASE CAPSULE    Take 1 capsule by mouth every morning (before breakfast)    GABAPENTIN (NEURONTIN) 800 MG TABLET    Take 800 mg by mouth 3 times daily. For leg pain    INSULIN GLARGINE (LANTUS SOLOSTAR) 100 UNIT/ML INJECTION PEN    60 units every morning.  160 units nightly    INSULIN LISPRO (HUMALOG KWIKPEN U-200) 200 UNIT/ML SOPN PEN    Inject 70 Units into the skin 3 times daily (with meals) for 4 days    INSULIN SYRINGE .5CC/29G 29G X 1/2\" 0.5 ML MISC    1 each by Does not apply route daily    LEVOTHYROXINE (SYNTHROID) 50 MCG TABLET    Take 50 mcg by mouth Daily    LISINOPRIL (PRINIVIL;ZESTRIL) 10 MG TABLET    Take 1 tablet by mouth daily    MAGNESIUM OXIDE (MAG-OX) 400 (240 MG) MG TABLET    Take 1 tablet by mouth daily    MELOXICAM (MOBIC) 15 MG TABLET    Take 1 tablet by mouth daily as needed for Pain    METFORMIN HCL PO    Take 1,000 mg by mouth     METOPROLOL SUCCINATE (TOPROL XL) 25 MG EXTENDED RELEASE TABLET    Take 1 tablet by mouth daily    METRONIDAZOLE (FLAGYL) 500 MG TABLET    Take 1 tablet by mouth 3 times daily for 10 days    MONTELUKAST (SINGULAIR) 10 MG TABLET    Take 10 mg by mouth nightly    NITROGLYCERIN (NITROSTAT) 0.4 MG SL TABLET    Place 1 tablet under the tongue every 5 minutes as needed for Chest pain up to max of 3 total doses. If no relief after 1 dose, call 911. NITROGLYCERIN (NITROSTAT) 0.4 MG SL TABLET    Place 1 tablet under the tongue every 5 minutes as needed for Chest pain up to max of 3 total doses. If no relief after 1 dose, call 911.     ONDANSETRON (ZOFRAN ODT) 4 MG DISINTEGRATING TABLET    Take 1 tablet by mouth every 8 hours as needed for Nausea    ONDANSETRON (ZOFRAN ODT) 4 MG DISINTEGRATING TABLET    Take 1 tablet by mouth every 8 hours as needed for Nausea    ONDANSETRON (ZOFRAN-ODT) 4 MG DISINTEGRATING TABLET    Take 1 tablet by mouth 3 times daily as needed for Nausea or Vomiting    SERTRALINE (ZOLOFT) 100 MG TABLET    Take 100 mg by mouth daily    TOPIRAMATE (TOPAMAX) 25 MG TABLET    Take 25 mg by mouth daily    TRAZODONE (DESYREL) 50 MG TABLET    Take 50 mg by mouth nightly       ALLERGIES     Metoclopramide, Nalbuphine, Nalbuphine hcl, Nitrofurantoin, Other, Shellfish-derived products, Amoxicillin, Macrobid [nitrofurantoin monohyd macro], Montelukast, Montelukast sodium, Seasonal, Neomycin-bacitracin zn-polymyx, and Silver sulfadiazine    FAMILY HISTORY       Family History   Problem Relation Age of Onset    Other Mother         Diverticulitis    Colon Cancer Paternal Uncle     Colon Cancer Maternal Grandfather           SOCIAL HISTORY       Social History     Socioeconomic History    Marital status: Single     Spouse name: None    Number of children: None    Years of education: None    Highest education level: None   Tobacco Use    Smoking status: Former     Packs/day: 1.00     Types: Cigarettes    Smokeless tobacco: Never    Tobacco comments:     Quit 4yrs   Vaping Use    Vaping Use: Never used   Substance and Sexual Activity    Alcohol use: No    Drug use: No    Sexual activity: Not Currently   Social History Narrative    Lives w brother         PHYSICAL EXAM       ED Triage Vitals [12/12/22 0230]   BP Temp Temp Source Heart Rate Resp SpO2 Height Weight   120/76 98.5 °F IMPRESSION      1.  Abdominal pain, left lower quadrant          DISPOSITION/PLAN   DISPOSITION Decision To Discharge 12/12/2022 04:58:00 AM        DISCHARGE MEDICATIONS:  [unfilled]         Isael Avitia PA-C(electronically signed)  Attending Emergency Physician           Isael Avitia PA-C  12/12/22 5086

## 2022-12-28 ENCOUNTER — APPOINTMENT (OUTPATIENT)
Dept: GENERAL RADIOLOGY | Age: 47
DRG: 244 | End: 2022-12-28
Payer: COMMERCIAL

## 2022-12-28 ENCOUNTER — APPOINTMENT (OUTPATIENT)
Dept: CT IMAGING | Age: 47
DRG: 244 | End: 2022-12-28
Payer: COMMERCIAL

## 2022-12-28 ENCOUNTER — HOSPITAL ENCOUNTER (INPATIENT)
Age: 47
LOS: 1 days | Discharge: HOME | DRG: 244 | End: 2022-12-29
Attending: STUDENT IN AN ORGANIZED HEALTH CARE EDUCATION/TRAINING PROGRAM | Admitting: INTERNAL MEDICINE
Payer: COMMERCIAL

## 2022-12-28 DIAGNOSIS — K57.32 DIVERTICULITIS OF COLON: ICD-10-CM

## 2022-12-28 DIAGNOSIS — R10.32 LEFT LOWER QUADRANT ABDOMINAL PAIN: Primary | ICD-10-CM

## 2022-12-28 DIAGNOSIS — K57.92 DIVERTICULITIS: ICD-10-CM

## 2022-12-28 LAB
ALBUMIN SERPL-MCNC: 4.4 G/DL (ref 3.5–4.6)
ALP BLD-CCNC: 153 U/L (ref 40–130)
ALT SERPL-CCNC: 58 U/L (ref 0–33)
ANION GAP SERPL CALCULATED.3IONS-SCNC: 14 MEQ/L (ref 9–15)
AST SERPL-CCNC: 62 U/L (ref 0–35)
BACTERIA: ABNORMAL /HPF
BASOPHILS ABSOLUTE: 0 K/UL (ref 0–0.2)
BASOPHILS RELATIVE PERCENT: 0.7 %
BILIRUB SERPL-MCNC: 0.7 MG/DL (ref 0.2–0.7)
BILIRUBIN URINE: NEGATIVE
BLOOD, URINE: NEGATIVE
BUN BLDV-MCNC: 13 MG/DL (ref 6–20)
CALCIUM SERPL-MCNC: 9.6 MG/DL (ref 8.5–9.9)
CHLORIDE BLD-SCNC: 99 MEQ/L (ref 95–107)
CLARITY: ABNORMAL
CO2: 26 MEQ/L (ref 20–31)
COLOR: ABNORMAL
CREAT SERPL-MCNC: 0.91 MG/DL (ref 0.5–0.9)
EOSINOPHILS ABSOLUTE: 0.2 K/UL (ref 0–0.7)
EOSINOPHILS RELATIVE PERCENT: 3.3 %
EPITHELIAL CELLS, UA: ABNORMAL /HPF (ref 0–5)
GFR SERPL CREATININE-BSD FRML MDRD: >60 ML/MIN/{1.73_M2}
GLOBULIN: 3.8 G/DL (ref 2.3–3.5)
GLUCOSE BLD-MCNC: 235 MG/DL (ref 70–99)
GLUCOSE URINE: NEGATIVE MG/DL
HCT VFR BLD CALC: 41.1 % (ref 37–47)
HEMOGLOBIN: 13.6 G/DL (ref 12–16)
HYALINE CASTS: ABNORMAL /HPF (ref 0–5)
KETONES, URINE: ABNORMAL MG/DL
LACTIC ACID: 2.7 MMOL/L (ref 0.5–2.2)
LEUKOCYTE ESTERASE, URINE: ABNORMAL
LIPASE: 16 U/L (ref 12–95)
LYMPHOCYTES ABSOLUTE: 0.6 K/UL (ref 1–4.8)
LYMPHOCYTES RELATIVE PERCENT: 11.8 %
MAGNESIUM: 1.7 MG/DL (ref 1.7–2.4)
MCH RBC QN AUTO: 29.7 PG (ref 27–31.3)
MCHC RBC AUTO-ENTMCNC: 33.2 % (ref 33–37)
MCV RBC AUTO: 89.5 FL (ref 79.4–94.8)
MONOCYTES ABSOLUTE: 0.3 K/UL (ref 0.2–0.8)
MONOCYTES RELATIVE PERCENT: 5.4 %
NEUTROPHILS ABSOLUTE: 3.8 K/UL (ref 1.4–6.5)
NEUTROPHILS RELATIVE PERCENT: 78.8 %
NITRITE, URINE: NEGATIVE
PDW BLD-RTO: 15 % (ref 11.5–14.5)
PH UA: 5 (ref 5–9)
PLATELET # BLD: 88 K/UL (ref 130–400)
POTASSIUM SERPL-SCNC: 4 MEQ/L (ref 3.4–4.9)
PROTEIN UA: 30 MG/DL
RBC # BLD: 4.59 M/UL (ref 4.2–5.4)
RBC UA: ABNORMAL /HPF (ref 0–5)
SODIUM BLD-SCNC: 139 MEQ/L (ref 135–144)
SPECIFIC GRAVITY UA: 1.02 (ref 1–1.03)
TOTAL CK: 57 U/L (ref 0–170)
TOTAL PROTEIN: 8.2 G/DL (ref 6.3–8)
TROPONIN: <0.01 NG/ML (ref 0–0.01)
URINE REFLEX TO CULTURE: YES
UROBILINOGEN, URINE: 0.2 E.U./DL
WBC # BLD: 4.8 K/UL (ref 4.8–10.8)
WBC UA: ABNORMAL /HPF (ref 0–5)

## 2022-12-28 PROCEDURE — 87077 CULTURE AEROBIC IDENTIFY: CPT

## 2022-12-28 PROCEDURE — 96374 THER/PROPH/DIAG INJ IV PUSH: CPT

## 2022-12-28 PROCEDURE — 87186 SC STD MICRODIL/AGAR DIL: CPT

## 2022-12-28 PROCEDURE — 99285 EMERGENCY DEPT VISIT HI MDM: CPT

## 2022-12-28 PROCEDURE — 83735 ASSAY OF MAGNESIUM: CPT

## 2022-12-28 PROCEDURE — 83690 ASSAY OF LIPASE: CPT

## 2022-12-28 PROCEDURE — 6370000000 HC RX 637 (ALT 250 FOR IP): Performed by: STUDENT IN AN ORGANIZED HEALTH CARE EDUCATION/TRAINING PROGRAM

## 2022-12-28 PROCEDURE — 80053 COMPREHEN METABOLIC PANEL: CPT

## 2022-12-28 PROCEDURE — 96361 HYDRATE IV INFUSION ADD-ON: CPT

## 2022-12-28 PROCEDURE — 6360000002 HC RX W HCPCS: Performed by: STUDENT IN AN ORGANIZED HEALTH CARE EDUCATION/TRAINING PROGRAM

## 2022-12-28 PROCEDURE — 2580000003 HC RX 258: Performed by: STUDENT IN AN ORGANIZED HEALTH CARE EDUCATION/TRAINING PROGRAM

## 2022-12-28 PROCEDURE — 96375 TX/PRO/DX INJ NEW DRUG ADDON: CPT

## 2022-12-28 PROCEDURE — 87086 URINE CULTURE/COLONY COUNT: CPT

## 2022-12-28 PROCEDURE — A4216 STERILE WATER/SALINE, 10 ML: HCPCS | Performed by: STUDENT IN AN ORGANIZED HEALTH CARE EDUCATION/TRAINING PROGRAM

## 2022-12-28 PROCEDURE — 84484 ASSAY OF TROPONIN QUANT: CPT

## 2022-12-28 PROCEDURE — 85025 COMPLETE CBC W/AUTO DIFF WBC: CPT

## 2022-12-28 PROCEDURE — 71045 X-RAY EXAM CHEST 1 VIEW: CPT

## 2022-12-28 PROCEDURE — 36415 COLL VENOUS BLD VENIPUNCTURE: CPT

## 2022-12-28 PROCEDURE — 74176 CT ABD & PELVIS W/O CONTRAST: CPT

## 2022-12-28 PROCEDURE — 83605 ASSAY OF LACTIC ACID: CPT

## 2022-12-28 PROCEDURE — 2500000003 HC RX 250 WO HCPCS: Performed by: STUDENT IN AN ORGANIZED HEALTH CARE EDUCATION/TRAINING PROGRAM

## 2022-12-28 PROCEDURE — 93005 ELECTROCARDIOGRAM TRACING: CPT | Performed by: STUDENT IN AN ORGANIZED HEALTH CARE EDUCATION/TRAINING PROGRAM

## 2022-12-28 PROCEDURE — 82550 ASSAY OF CK (CPK): CPT

## 2022-12-28 PROCEDURE — 81001 URINALYSIS AUTO W/SCOPE: CPT

## 2022-12-28 RX ORDER — ONDANSETRON 2 MG/ML
4 INJECTION INTRAMUSCULAR; INTRAVENOUS ONCE
Status: COMPLETED | OUTPATIENT
Start: 2022-12-28 | End: 2022-12-28

## 2022-12-28 RX ORDER — MOXIFLOXACIN HYDROCHLORIDE 400 MG/250ML
400 INJECTION, SOLUTION INTRAVENOUS EVERY 24 HOURS
Status: DISCONTINUED | OUTPATIENT
Start: 2022-12-28 | End: 2022-12-29

## 2022-12-28 RX ORDER — ACETAMINOPHEN 500 MG
1000 TABLET ORAL ONCE
Status: COMPLETED | OUTPATIENT
Start: 2022-12-28 | End: 2022-12-28

## 2022-12-28 RX ORDER — ONDANSETRON 2 MG/ML
4 INJECTION INTRAMUSCULAR; INTRAVENOUS ONCE
Status: COMPLETED | OUTPATIENT
Start: 2022-12-28 | End: 2022-12-29

## 2022-12-28 RX ORDER — MORPHINE SULFATE 4 MG/ML
4 INJECTION, SOLUTION INTRAMUSCULAR; INTRAVENOUS ONCE
Status: COMPLETED | OUTPATIENT
Start: 2022-12-28 | End: 2022-12-29

## 2022-12-28 RX ORDER — MORPHINE SULFATE 4 MG/ML
4 INJECTION, SOLUTION INTRAMUSCULAR; INTRAVENOUS ONCE
Status: COMPLETED | OUTPATIENT
Start: 2022-12-28 | End: 2022-12-28

## 2022-12-28 RX ORDER — 0.9 % SODIUM CHLORIDE 0.9 %
1000 INTRAVENOUS SOLUTION INTRAVENOUS ONCE
Status: COMPLETED | OUTPATIENT
Start: 2022-12-28 | End: 2022-12-29

## 2022-12-28 RX ORDER — 0.9 % SODIUM CHLORIDE 0.9 %
1000 INTRAVENOUS SOLUTION INTRAVENOUS ONCE
Status: COMPLETED | OUTPATIENT
Start: 2022-12-28 | End: 2022-12-28

## 2022-12-28 RX ADMIN — ONDANSETRON 4 MG: 2 INJECTION INTRAMUSCULAR; INTRAVENOUS at 20:20

## 2022-12-28 RX ADMIN — ACETAMINOPHEN 1000 MG: 500 TABLET ORAL at 20:22

## 2022-12-28 RX ADMIN — FAMOTIDINE 20 MG: 10 INJECTION, SOLUTION INTRAVENOUS at 20:20

## 2022-12-28 RX ADMIN — MORPHINE SULFATE 4 MG: 4 INJECTION INTRAVENOUS at 20:20

## 2022-12-28 RX ADMIN — SODIUM CHLORIDE 1000 ML: 9 INJECTION, SOLUTION INTRAVENOUS at 20:19

## 2022-12-28 ASSESSMENT — PAIN SCALES - GENERAL
PAINLEVEL_OUTOF10: 8
PAINLEVEL_OUTOF10: 10

## 2022-12-28 ASSESSMENT — PAIN DESCRIPTION - LOCATION
LOCATION: ABDOMEN
LOCATION: ABDOMEN;CHEST
LOCATION: ABDOMEN;CHEST

## 2022-12-28 ASSESSMENT — PAIN - FUNCTIONAL ASSESSMENT: PAIN_FUNCTIONAL_ASSESSMENT: 0-10

## 2022-12-29 VITALS
HEART RATE: 86 BPM | DIASTOLIC BLOOD PRESSURE: 62 MMHG | BODY MASS INDEX: 39.68 KG/M2 | SYSTOLIC BLOOD PRESSURE: 115 MMHG | TEMPERATURE: 98.8 F | WEIGHT: 293 LBS | HEIGHT: 72 IN | OXYGEN SATURATION: 97 % | RESPIRATION RATE: 18 BRPM

## 2022-12-29 PROBLEM — K57.92 DIVERTICULITIS: Status: ACTIVE | Noted: 2022-12-29

## 2022-12-29 PROBLEM — K57.32 DIVERTICULITIS OF COLON: Status: ACTIVE | Noted: 2022-12-29

## 2022-12-29 PROBLEM — R10.32 LEFT LOWER QUADRANT ABDOMINAL PAIN: Status: ACTIVE | Noted: 2022-12-29

## 2022-12-29 LAB
ALBUMIN SERPL-MCNC: 3.7 G/DL (ref 3.5–4.6)
ALP BLD-CCNC: 123 U/L (ref 40–130)
ALT SERPL-CCNC: 44 U/L (ref 0–33)
ANION GAP SERPL CALCULATED.3IONS-SCNC: 13 MEQ/L (ref 9–15)
AST SERPL-CCNC: 48 U/L (ref 0–35)
BASOPHILS ABSOLUTE: 0 K/UL (ref 0–0.2)
BASOPHILS RELATIVE PERCENT: 0.8 %
BILIRUB SERPL-MCNC: 0.6 MG/DL (ref 0.2–0.7)
BUN BLDV-MCNC: 11 MG/DL (ref 6–20)
CALCIUM SERPL-MCNC: 8.2 MG/DL (ref 8.5–9.9)
CHLORIDE BLD-SCNC: 104 MEQ/L (ref 95–107)
CO2: 23 MEQ/L (ref 20–31)
CREAT SERPL-MCNC: 0.76 MG/DL (ref 0.5–0.9)
EOSINOPHILS ABSOLUTE: 0.2 K/UL (ref 0–0.7)
EOSINOPHILS RELATIVE PERCENT: 5 %
GFR SERPL CREATININE-BSD FRML MDRD: >60 ML/MIN/{1.73_M2}
GLOBULIN: 3.1 G/DL (ref 2.3–3.5)
GLUCOSE BLD-MCNC: 136 MG/DL (ref 70–99)
GLUCOSE BLD-MCNC: 139 MG/DL (ref 70–99)
GLUCOSE BLD-MCNC: 148 MG/DL (ref 70–99)
GLUCOSE BLD-MCNC: 158 MG/DL (ref 70–99)
GLUCOSE BLD-MCNC: 209 MG/DL (ref 70–99)
GLUCOSE BLD-MCNC: 271 MG/DL (ref 70–99)
HCT VFR BLD CALC: 34.9 % (ref 37–47)
HEMOGLOBIN: 11.5 G/DL (ref 12–16)
LACTIC ACID: 1.4 MMOL/L (ref 0.5–2.2)
LACTIC ACID: 2.6 MMOL/L (ref 0.5–2.2)
LYMPHOCYTES ABSOLUTE: 1.2 K/UL (ref 1–4.8)
LYMPHOCYTES RELATIVE PERCENT: 30.6 %
MAGNESIUM: 1.6 MG/DL (ref 1.7–2.4)
MCH RBC QN AUTO: 29.9 PG (ref 27–31.3)
MCHC RBC AUTO-ENTMCNC: 33 % (ref 33–37)
MCV RBC AUTO: 90.5 FL (ref 79.4–94.8)
MONOCYTES ABSOLUTE: 0.3 K/UL (ref 0.2–0.8)
MONOCYTES RELATIVE PERCENT: 8.2 %
NEUTROPHILS ABSOLUTE: 2.2 K/UL (ref 1.4–6.5)
NEUTROPHILS RELATIVE PERCENT: 55.4 %
PDW BLD-RTO: 14.7 % (ref 11.5–14.5)
PERFORMED ON: ABNORMAL
PLATELET # BLD: 68 K/UL (ref 130–400)
POTASSIUM SERPL-SCNC: 3.7 MEQ/L (ref 3.4–4.9)
RBC # BLD: 3.85 M/UL (ref 4.2–5.4)
SODIUM BLD-SCNC: 140 MEQ/L (ref 135–144)
TOTAL PROTEIN: 6.8 G/DL (ref 6.3–8)
WBC # BLD: 3.9 K/UL (ref 4.8–10.8)

## 2022-12-29 PROCEDURE — 99253 IP/OBS CNSLTJ NEW/EST LOW 45: CPT | Performed by: INTERNAL MEDICINE

## 2022-12-29 PROCEDURE — 83605 ASSAY OF LACTIC ACID: CPT

## 2022-12-29 PROCEDURE — 2580000003 HC RX 258: Performed by: STUDENT IN AN ORGANIZED HEALTH CARE EDUCATION/TRAINING PROGRAM

## 2022-12-29 PROCEDURE — 83735 ASSAY OF MAGNESIUM: CPT

## 2022-12-29 PROCEDURE — 2580000003 HC RX 258: Performed by: NURSE PRACTITIONER

## 2022-12-29 PROCEDURE — 6360000002 HC RX W HCPCS: Performed by: NURSE PRACTITIONER

## 2022-12-29 PROCEDURE — 80053 COMPREHEN METABOLIC PANEL: CPT

## 2022-12-29 PROCEDURE — 36415 COLL VENOUS BLD VENIPUNCTURE: CPT

## 2022-12-29 PROCEDURE — 6370000000 HC RX 637 (ALT 250 FOR IP): Performed by: NURSE PRACTITIONER

## 2022-12-29 PROCEDURE — 2500000003 HC RX 250 WO HCPCS: Performed by: NURSE PRACTITIONER

## 2022-12-29 PROCEDURE — 6360000002 HC RX W HCPCS: Performed by: STUDENT IN AN ORGANIZED HEALTH CARE EDUCATION/TRAINING PROGRAM

## 2022-12-29 PROCEDURE — 2700000000 HC OXYGEN THERAPY PER DAY

## 2022-12-29 PROCEDURE — 2060000000 HC ICU INTERMEDIATE R&B

## 2022-12-29 PROCEDURE — 94664 DEMO&/EVAL PT USE INHALER: CPT

## 2022-12-29 PROCEDURE — 85025 COMPLETE CBC W/AUTO DIFF WBC: CPT

## 2022-12-29 RX ORDER — KETOROLAC TROMETHAMINE 15 MG/ML
15 INJECTION, SOLUTION INTRAMUSCULAR; INTRAVENOUS EVERY 6 HOURS PRN
Status: DISCONTINUED | OUTPATIENT
Start: 2022-12-29 | End: 2022-12-30 | Stop reason: HOSPADM

## 2022-12-29 RX ORDER — POLYETHYLENE GLYCOL 3350 17 G/17G
17 POWDER, FOR SOLUTION ORAL DAILY
Status: DISCONTINUED | OUTPATIENT
Start: 2022-12-29 | End: 2022-12-30 | Stop reason: HOSPADM

## 2022-12-29 RX ORDER — SODIUM CHLORIDE 9 MG/ML
INJECTION, SOLUTION INTRAVENOUS PRN
Status: DISCONTINUED | OUTPATIENT
Start: 2022-12-29 | End: 2022-12-30 | Stop reason: HOSPADM

## 2022-12-29 RX ORDER — INSULIN LISPRO 100 [IU]/ML
0-16 INJECTION, SOLUTION INTRAVENOUS; SUBCUTANEOUS
Status: DISCONTINUED | OUTPATIENT
Start: 2022-12-29 | End: 2022-12-30 | Stop reason: HOSPADM

## 2022-12-29 RX ORDER — OXYCODONE HYDROCHLORIDE AND ACETAMINOPHEN 5; 325 MG/1; MG/1
1 TABLET ORAL EVERY 6 HOURS PRN
Qty: 20 TABLET | Refills: 0 | Status: SHIPPED | OUTPATIENT
Start: 2022-12-29 | End: 2023-01-03

## 2022-12-29 RX ORDER — ONDANSETRON 2 MG/ML
4 INJECTION INTRAMUSCULAR; INTRAVENOUS EVERY 6 HOURS PRN
Status: DISCONTINUED | OUTPATIENT
Start: 2022-12-29 | End: 2022-12-30 | Stop reason: HOSPADM

## 2022-12-29 RX ORDER — DEXTROSE MONOHYDRATE 100 MG/ML
INJECTION, SOLUTION INTRAVENOUS CONTINUOUS PRN
Status: DISCONTINUED | OUTPATIENT
Start: 2022-12-29 | End: 2022-12-30 | Stop reason: HOSPADM

## 2022-12-29 RX ORDER — SODIUM CHLORIDE 9 MG/ML
INJECTION, SOLUTION INTRAVENOUS CONTINUOUS
Status: DISCONTINUED | OUTPATIENT
Start: 2022-12-29 | End: 2022-12-30 | Stop reason: HOSPADM

## 2022-12-29 RX ORDER — METRONIDAZOLE 500 MG/1
500 TABLET ORAL 3 TIMES DAILY
Qty: 21 TABLET | Refills: 0 | Status: SHIPPED | OUTPATIENT
Start: 2022-12-29 | End: 2023-01-05

## 2022-12-29 RX ORDER — CIPROFLOXACIN 2 MG/ML
400 INJECTION, SOLUTION INTRAVENOUS EVERY 12 HOURS
Status: DISCONTINUED | OUTPATIENT
Start: 2022-12-29 | End: 2022-12-30 | Stop reason: HOSPADM

## 2022-12-29 RX ORDER — INSULIN LISPRO 100 [IU]/ML
0-4 INJECTION, SOLUTION INTRAVENOUS; SUBCUTANEOUS NIGHTLY
Status: DISCONTINUED | OUTPATIENT
Start: 2022-12-29 | End: 2022-12-30 | Stop reason: HOSPADM

## 2022-12-29 RX ORDER — METRONIDAZOLE 500 MG/100ML
500 INJECTION, SOLUTION INTRAVENOUS EVERY 8 HOURS
Status: DISCONTINUED | OUTPATIENT
Start: 2022-12-29 | End: 2022-12-30 | Stop reason: HOSPADM

## 2022-12-29 RX ORDER — QUETIAPINE FUMARATE 50 MG/1
50 TABLET, EXTENDED RELEASE ORAL NIGHTLY
COMMUNITY

## 2022-12-29 RX ORDER — DICYCLOMINE HYDROCHLORIDE 10 MG/ML
20 INJECTION INTRAMUSCULAR 4 TIMES DAILY PRN
Status: DISCONTINUED | OUTPATIENT
Start: 2022-12-29 | End: 2022-12-30 | Stop reason: HOSPADM

## 2022-12-29 RX ORDER — CIPROFLOXACIN 500 MG/1
500 TABLET, FILM COATED ORAL 2 TIMES DAILY
Qty: 14 TABLET | Refills: 0 | Status: SHIPPED | OUTPATIENT
Start: 2022-12-29 | End: 2023-01-05

## 2022-12-29 RX ORDER — ATORVASTATIN CALCIUM 40 MG/1
40 TABLET, FILM COATED ORAL DAILY
COMMUNITY

## 2022-12-29 RX ORDER — POLYETHYLENE GLYCOL 3350 17 G/17G
17 POWDER, FOR SOLUTION ORAL DAILY PRN
Status: DISCONTINUED | OUTPATIENT
Start: 2022-12-29 | End: 2022-12-30 | Stop reason: HOSPADM

## 2022-12-29 RX ORDER — ONDANSETRON 4 MG/1
4 TABLET, ORALLY DISINTEGRATING ORAL EVERY 8 HOURS PRN
Status: DISCONTINUED | OUTPATIENT
Start: 2022-12-29 | End: 2022-12-30 | Stop reason: HOSPADM

## 2022-12-29 RX ORDER — SODIUM CHLORIDE 0.9 % (FLUSH) 0.9 %
5-40 SYRINGE (ML) INJECTION PRN
Status: DISCONTINUED | OUTPATIENT
Start: 2022-12-29 | End: 2022-12-30 | Stop reason: HOSPADM

## 2022-12-29 RX ORDER — SODIUM CHLORIDE 0.9 % (FLUSH) 0.9 %
5-40 SYRINGE (ML) INJECTION EVERY 12 HOURS SCHEDULED
Status: DISCONTINUED | OUTPATIENT
Start: 2022-12-29 | End: 2022-12-30 | Stop reason: HOSPADM

## 2022-12-29 RX ORDER — ISOSORBIDE MONONITRATE 60 MG/1
60 TABLET, EXTENDED RELEASE ORAL DAILY
COMMUNITY

## 2022-12-29 RX ORDER — IPRATROPIUM BROMIDE AND ALBUTEROL SULFATE 2.5; .5 MG/3ML; MG/3ML
1 SOLUTION RESPIRATORY (INHALATION) EVERY 4 HOURS PRN
Status: DISCONTINUED | OUTPATIENT
Start: 2022-12-29 | End: 2022-12-30 | Stop reason: HOSPADM

## 2022-12-29 RX ADMIN — Medication 10 ML: at 22:38

## 2022-12-29 RX ADMIN — METRONIDAZOLE 500 MG: 500 INJECTION, SOLUTION INTRAVENOUS at 12:27

## 2022-12-29 RX ADMIN — KETOROLAC TROMETHAMINE 15 MG: 15 INJECTION, SOLUTION INTRAMUSCULAR; INTRAVENOUS at 09:47

## 2022-12-29 RX ADMIN — Medication 10 ML: at 09:00

## 2022-12-29 RX ADMIN — ONDANSETRON 4 MG: 2 INJECTION INTRAMUSCULAR; INTRAVENOUS at 09:47

## 2022-12-29 RX ADMIN — CIPROFLOXACIN 400 MG: 2 INJECTION, SOLUTION INTRAVENOUS at 03:00

## 2022-12-29 RX ADMIN — MORPHINE SULFATE 4 MG: 4 INJECTION INTRAVENOUS at 00:31

## 2022-12-29 RX ADMIN — POLYETHYLENE GLYCOL 3350 17 G: 17 POWDER, FOR SOLUTION ORAL at 12:28

## 2022-12-29 RX ADMIN — KETOROLAC TROMETHAMINE 15 MG: 15 INJECTION, SOLUTION INTRAMUSCULAR; INTRAVENOUS at 22:37

## 2022-12-29 RX ADMIN — SODIUM CHLORIDE: 9 INJECTION, SOLUTION INTRAVENOUS at 02:10

## 2022-12-29 RX ADMIN — SODIUM CHLORIDE 1000 ML: 9 INJECTION, SOLUTION INTRAVENOUS at 00:31

## 2022-12-29 RX ADMIN — METRONIDAZOLE 500 MG: 500 INJECTION, SOLUTION INTRAVENOUS at 03:57

## 2022-12-29 RX ADMIN — ONDANSETRON 4 MG: 2 INJECTION INTRAMUSCULAR; INTRAVENOUS at 00:31

## 2022-12-29 ASSESSMENT — ENCOUNTER SYMPTOMS
BACK PAIN: 1
ABDOMINAL PAIN: 1
COUGH: 0
EYE PAIN: 0
PHOTOPHOBIA: 0
NAUSEA: 1
BLOOD IN STOOL: 0
RHINORRHEA: 0
WHEEZING: 0
BACK PAIN: 0
SORE THROAT: 0
SHORTNESS OF BREATH: 0
ALLERGIC/IMMUNOLOGIC NEGATIVE: 1
VOMITING: 1
DIARRHEA: 1
EYES NEGATIVE: 1

## 2022-12-29 ASSESSMENT — PAIN SCALES - GENERAL
PAINLEVEL_OUTOF10: 10
PAINLEVEL_OUTOF10: 6

## 2022-12-29 NOTE — DISCHARGE INSTR - DIET
Good nutrition is important when healing from an illness, injury, or surgery. Follow any nutrition recommendations given to you during your hospital stay. If you were given an oral nutrition supplement while in the hospital, continue to take this supplement at home. You can take it with meals, in-between meals, and/or before bedtime. These supplements can be purchased at most local grocery stores, pharmacies, and chain [employer]-stores. If you have any questions about your diet or nutrition, call the hospital and ask for the dietitian. As tolerated. Heart healthy diet.

## 2022-12-29 NOTE — ED NOTES
Pt placed on 3 l nc bc she desats when she sleeps, pt states she wears a breathing machine at home while she sleeps     Musa Fair RN  12/29/22 1570

## 2022-12-29 NOTE — FLOWSHEET NOTE
Arrived to rm 190 from ED, assisted safely to bed, able to walk/pivot to her bed, A&O x3, v/s stable. Admission assessment done, unable to update pts home meds, pt is unsure and also this staff spoke to the daughter, daughter is unsure and prefer to call the pts pharmacy in am, NP made aware.

## 2022-12-29 NOTE — H&P
[facility]  MEDICINE    HISTORY AND PHYSICAL EXAM    PATIENT NAME:  Rashi Cosme    MRN:  [id number]  SERVICE DATE:  12/29/2022   SERVICE TIME:  1:05 AM    Primary Care Physician: DANIEL Pinto NP         SUBJECTIVE  CHIEF COMPLAINT:   Abd pain    HPI: Patient being admitted for inability to maintain oral intake secondary to diverticulitis. Patient has had numerous visits to ER. Follow-up with GI at [facility] regarding chronic abdominal pain. Today patient presented to the ED for 2 days of inability keep down any food and patient complains of left lower quadrant abdominal pain. Patient states she has loose stools but no diarrhea or constipation. Patient has no hematochezia or hematemesis. Patient also denies any fever, shortness of breath, or chest pain. Patient was last seen in the ED December 12 diverticulitis and finished a course of Cipro and Flagyl which improved her symptoms but then returned again last few days. Patient's other past medical history includes schizophrenia depression, asthma, DM 2, hypertension, hypothyroidism, and GERD.     PAST MEDICAL HISTORY:    Past Medical History:   Diagnosis Date    Asthma     Chronic abdominal pain     Depression     Hyperlipidemia     Hypertension     NATALEE (obstructive sleep apnea)     [facility]     Type II or unspecified type diabetes mellitus without mention of complication, not stated as uncontrolled      PAST SURGICAL HISTORY:    Past Surgical History:   Procedure Laterality Date    COLONOSCOPY  2/21/14    [facility]    UPPER GASTROINTESTINAL ENDOSCOPY  2/21/14    [facility]    UPPER GASTROINTESTINAL ENDOSCOPY N/A 3/22/2021    EGD DIAGNOSTIC ONLY performed by Pretty Ruiz MD at [address]:    Family History   Problem Relation Age of Onset    Other Mother         Diverticulitis    Colon Cancer Paternal Uncle     Colon Cancer Maternal Grandfather      SOCIAL HISTORY:    Social History     Socioeconomic History    Marital status: Single     Spouse name: Not on file    Number of children: Not on file    Years of education: Not on file    Highest education level: Not on file   Occupational History    Not on file   Tobacco Use    Smoking status: Former     Packs/day: 1.00     Types: Cigarettes    Smokeless tobacco: Never    Tobacco comments:     Quit 4yrs   Vaping Use    Vaping Use: Never used   Substance and Sexual Activity    Alcohol use: No    Drug use: No    Sexual activity: Not Currently   Other Topics Concern    Not on file   Social History Narrative    Lives w brother     Social Determinants of Health     Financial Resource Strain: Not on file   Food Insecurity: Not on file   Transportation Needs: Not on file   Physical Activity: Not on file   Stress: Not on file   Social Connections: Not on file   Intimate Partner Violence: Not on file   Housing Stability: Not on file     MEDICATIONS:   Prior to Admission medications    Medication Sig Start Date End Date Taking? Authorizing Provider   ondansetron (ZOFRAN-ODT) 4 MG disintegrating tablet Take 1 tablet by mouth 3 times daily as needed for Nausea or Vomiting 12/3/22   Nancy Alanis PA-C   ondansetron (ZOFRAN ODT) 4 MG disintegrating tablet Take 1 tablet by mouth every 8 hours as needed for Nausea 10/23/22   BRIDGET Minor   nitroGLYCERIN (NITROSTAT) 0.4 MG SL tablet Place 1 tablet under the tongue every 5 minutes as needed for Chest pain up to max of 3 total doses. If no relief after 1 dose, call 911. 10/23/22   BRIDGET Minor   ondansetron (ZOFRAN ODT) 4 MG disintegrating tablet Take 1 tablet by mouth every 8 hours as needed for Nausea 9/10/22   Petrona Sousa MD   clotrimazole (LOTRIMIN) 1 % cream Apply topically 2 times daily to lesions on skin for 3-4 weeks or for 1 week after lesion clears 3/16/22   BRIDGET Minor   insulin glargine (LANTUS SOLOSTAR) 100 UNIT/ML injection pen 60 units every morning.  160 units nightly 3/16/22   Jessica Joseph BRIDGET Diaz   insulin lispro (HUMALOG KWIKPEN U-200) 200 UNIT/ML SOPN pen Inject 70 Units into the skin 3 times daily (with meals) for 4 days 3/16/22 3/20/22  BRIDGET Wan   gabapentin (NEURONTIN) 800 MG tablet Take 800 mg by mouth 3 times daily. For leg pain 8/9/21   Historical Provider, MD   magnesium oxide (MAG-OX) 400 (240 Mg) MG tablet Take 1 tablet by mouth daily 3/26/21   Isabelle Naylor MD   nitroGLYCERIN (NITROSTAT) 0.4 MG SL tablet Place 1 tablet under the tongue every 5 minutes as needed for Chest pain up to max of 3 total doses.  If no relief after 1 dose, call 911. 3/25/21   Isabelle Naylor MD   metoprolol succinate (TOPROL XL) 25 MG extended release tablet Take 1 tablet by mouth daily 3/25/21   Isabelle Naylor MD   lisinopril (PRINIVIL;ZESTRIL) 10 MG tablet Take 1 tablet by mouth daily 3/25/21   Isabelle Naylor MD   INSULIN SYRINGE .5CC/29G 29G X 1/2\" 0.5 ML MISC 1 each by Does not apply route daily 8/11/20   Liliana Heard PA-C   meloxicam KATELYN JASSO ANNIKA [facility]) 15 MG tablet Take 1 tablet by mouth daily as needed for Pain 4/10/20   Karol Spencer MD   esomeprazole (445 Impeva) 40 MG delayed release capsule Take 1 capsule by mouth every morning (before breakfast)  Patient taking differently: Take 40 mg by mouth 2 times daily  1/11/19   Mica Cervantes MD   sertraline (ZOLOFT) 100 MG tablet Take 100 mg by mouth daily    Historical Provider, MD   traZODone (DESYREL) 50 MG tablet Take 50 mg by mouth nightly    Historical Provider, MD   levothyroxine (SYNTHROID) 50 MCG tablet Take 50 mcg by mouth Daily    Historical Provider, MD   topiramate (TOPAMAX) 25 MG tablet Take 25 mg by mouth daily    Historical Provider, MD   montelukast (SINGULAIR) 10 MG tablet Take 10 mg by mouth nightly    Historical Provider, MD   METFORMIN HCL PO Take 1,000 mg by mouth     Historical Provider, MD   BuPROPion HCl (WELLBUTRIN PO) Take 150 mg by mouth     Historical Provider, MD   ARIPiprazole (ABILIFY PO) Take 5 mg by mouth     Historical Provider, MD       ALLERGIES: Metoclopramide, Nalbuphine, Nalbuphine hcl, Nitrofurantoin, Other, Shellfish-derived products, Amoxicillin, Macrobid [nitrofurantoin monohyd macro], Montelukast, Montelukast sodium, Seasonal, Neomycin-bacitracin zn-polymyx, and Silver sulfadiazine    REVIEW OF SYSTEM:   Review of Systems   Constitutional:  Negative for appetite change, fatigue, fever and unexpected weight change. HENT:  Negative for congestion, rhinorrhea and sore throat. Eyes: Negative. Negative for photophobia and visual disturbance. Respiratory:  Negative for shortness of breath and wheezing. Cardiovascular:  Negative for chest pain. Gastrointestinal:  Positive for abdominal pain (LLQ), nausea and vomiting. Endocrine: Negative. Negative for polydipsia, polyphagia and polyuria. Genitourinary:  Negative for difficulty urinating, dysuria and pelvic pain. Musculoskeletal:  Negative for back pain. Skin: Negative. Negative for rash. Allergic/Immunologic: Negative. Neurological: Negative. Negative for dizziness, speech difficulty and weakness. Hematological: Negative. Psychiatric/Behavioral: Negative. Negative for behavioral problems and confusion. OBJECTIVE  PHYSICAL EXAM: BP (!) 141/86   Pulse 93   Temp 97.5 °F (36.4 °C) (Temporal)   Resp 18   Ht 6' (1.829 m)   Wt (!) 340 lb (154.2 kg)   LMP  (LMP Unknown)   SpO2 97%   BMI 46.11 kg/m²     Physical Exam  Vitals and nursing note reviewed. Constitutional:       General: She is not in acute distress. Appearance: She is well-developed. She is not ill-appearing or toxic-appearing. HENT:      Head: Normocephalic and atraumatic. Nose: Nose normal.      Mouth/Throat:      Mouth: Mucous membranes are moist.   Eyes:      Pupils: Pupils are equal, round, and reactive to light. Cardiovascular:      Rate and Rhythm: Normal rate and regular rhythm. Pulses: Normal pulses.       Heart sounds: Normal heart sounds. Pulmonary:      Effort: Pulmonary effort is normal. No respiratory distress. Breath sounds: Normal breath sounds. No wheezing or rales. Abdominal:      General: Bowel sounds are normal. There is no distension. Palpations: Abdomen is soft. There is no mass. Tenderness: There is abdominal tenderness in the left upper quadrant and left lower quadrant. There is no guarding or rebound. Hernia: No hernia is present. Musculoskeletal:         General: Normal range of motion. Cervical back: Normal range of motion. Skin:     General: Skin is warm and dry. Capillary Refill: Capillary refill takes less than 2 seconds. Neurological:      Mental Status: She is alert and oriented to person, place, and time. Psychiatric:         Mood and Affect: Mood normal.         DATA:     Diagnostic tests reviewed for today's visit:    Most recent labs and imaging results reviewed.      LABS:    Recent Results (from the past 24 hour(s))   Lactic Acid    Collection Time: 12/28/22  7:30 PM   Result Value Ref Range    Lactic Acid 2.7 (H) 0.5 - 2.2 mmol/L   Comprehensive Metabolic Panel    Collection Time: 12/28/22  7:30 PM   Result Value Ref Range    Sodium 139 135 - 144 mEq/L    Potassium 4.0 3.4 - 4.9 mEq/L    Chloride 99 95 - 107 mEq/L    CO2 26 20 - 31 mEq/L    Anion Gap 14 9 - 15 mEq/L    Glucose 235 (H) 70 - 99 mg/dL    BUN 13 6 - 20 mg/dL    Creatinine 0.91 (H) 0.50 - 0.90 mg/dL    Est, Glom Filt Rate >60.0 >60    Calcium 9.6 8.5 - 9.9 mg/dL    Total Protein 8.2 (H) 6.3 - 8.0 g/dL    Albumin 4.4 3.5 - 4.6 g/dL    Total Bilirubin 0.7 0.2 - 0.7 mg/dL    Alkaline Phosphatase 153 (H) 40 - 130 U/L    ALT 58 (H) 0 - 33 U/L    AST 62 (H) 0 - 35 U/L    Globulin 3.8 (H) 2.3 - 3.5 g/dL   Magnesium    Collection Time: 12/28/22  7:30 PM   Result Value Ref Range    Magnesium 1.7 1.7 - 2.4 mg/dL   CBC with Auto Differential    Collection Time: 12/28/22  7:30 PM   Result Value Ref Range    WBC 4.8 4.8 - 10.8 K/uL    RBC 4.59 4.20 - 5.40 M/uL    Hemoglobin 13.6 12.0 - 16.0 g/dL    Hematocrit 41.1 37.0 - 47.0 %    MCV 89.5 79.4 - 94.8 fL    MCH 29.7 27.0 - 31.3 pg    MCHC 33.2 33.0 - 37.0 %    RDW 15.0 (H) 11.5 - 14.5 %    Platelets 88 (L) 601 - 400 K/uL    Neutrophils % 78.8 %    Lymphocytes % 11.8 %    Monocytes % 5.4 %    Eosinophils % 3.3 %    Basophils % 0.7 %    Neutrophils Absolute 3.8 1.4 - 6.5 K/uL    Lymphocytes Absolute 0.6 (L) 1.0 - 4.8 K/uL    Monocytes Absolute 0.3 0.2 - 0.8 K/uL    Eosinophils Absolute 0.2 0.0 - 0.7 K/uL    Basophils Absolute 0.0 0.0 - 0.2 K/uL   Troponin    Collection Time: 12/28/22  7:30 PM   Result Value Ref Range    Troponin <0.010 0.000 - 0.010 ng/mL   Lipase    Collection Time: 12/28/22  7:30 PM   Result Value Ref Range    Lipase 16 12 - 95 U/L   CK    Collection Time: 12/28/22  7:30 PM   Result Value Ref Range    Total CK 57 0 - 170 U/L   Urinalysis with Reflex to Culture    Collection Time: 12/28/22  7:30 PM    Specimen: Urine, clean catch   Result Value Ref Range    Color, UA DARK YELLOW (A) Straw/Yellow    Clarity, UA CLOUDY (A) Clear    Glucose, Ur Negative Negative mg/dL    Bilirubin Urine Negative Negative    Ketones, Urine TRACE (A) Negative mg/dL    Specific Gravity, UA 1.017 1.005 - 1.030    Blood, Urine Negative Negative    pH, UA 5.0 5.0 - 9.0    Protein, UA 30 (A) Negative mg/dL    Urobilinogen, Urine 0.2 <2.0 E.U./dL    Nitrite, Urine Negative Negative    Leukocyte Esterase, Urine SMALL (A) Negative    Urine Reflex to Culture Yes    Microscopic Urinalysis    Collection Time: 12/28/22  7:30 PM   Result Value Ref Range    Bacteria, UA MODERATE (A) Negative /HPF    Hyaline Casts, UA 10-20 0 - 5 /HPF    WBC, UA 10-20 (A) 0 - 5 /HPF    RBC, UA 3-5 (A) 0 - 5 /HPF    Epithelial Cells, UA 10-20 0 - 5 /HPF       IMAGING:   XR CHEST PORTABLE    Result Date: 12/28/2022  No acute process.         VTE Prophylaxis: SCDs    ASSESSMENT AND PLAN    Principal Problem:  Diverticulitis: Mild diverticulitis seen on abdomen CT. Inability to maintain oral intake. Patient given 2 L NS in ED and started on moxifloxacin. Patient afebrile. WBC 4.8. Lactic acid slightly elevated 2.7. We will continue IV hydration. We will consult GI. We will continue antibiotics pending GI recommendations. We will administer antiemetics pain meds using Bentyl, and NSAIDs. We will avoid narcotics in order to prevent constipation and worsening GI symptoms. We will monitor CBC daily and repeat lactic acid    Elevated LFTs: ALT and AST elevated which is around baseline. Will monitor with CMP daily. We will avoid hepatotoxic agents whenever possible. Patient following up with GI as outpatient regarding concern for nonalcoholic cirrhosis. Thrombocytopenia: Platelets 88. Near baseline. Likely due to liver disease. We will utilize SCDs for DVT prophylaxis. Active problems:  DM2: Insulin-dependent. Will monitor and cover with sliding scale insulin before meals and at bedtime. We will resume any long-acting insulin. HTN: PT on home meds to control. Will resume home meds, as tolerated. Depression/anxiety/schizophrenia: PT on home meds to control. Will resume home meds, as tolerated. Hypothyroidism: PT on home meds to control. Will resume home meds, as tolerated. GERD: PT on home meds to control. Will resume home meds, as tolerated. Asthma: Patient on Singulair and inhalers. Will resume home meds.   We will monitor respiratory status closely      Plan of care discussed with: patient and adult child    SIGNATURE: DANIEL Thibodeaux CNP  DATE: December 29, 2022  TIME: 1:05 AM     Julian Babin DO - supervising physician

## 2022-12-29 NOTE — CONSULTS
Inpatient consult to GI  Consult performed by: Darin Mckeon MD  Consult ordered by: DANIEL Collazo - CNP        Patient Name: Shahab Baumann Date: 2022  7:14 PM  MR #: [id number]  : 1975    Attending Physician: Georgiana Gonzalez MD  Reason for consult: diverticulitis      History of Presenting Illness:      Lula Krabbe is a 50 y.o. female on hospital day 0 with a history of recurrent diverticulitis, asthma, NATALEE, diabetes, hypertension, hypothyroidism, GERD, schizophrenia. Past surgical history includes multiple GI endoscopic procedures. Family history is notable for colorectal cancer in multiple second-degree relatives. Social history patient reports former nicotine, no EtOH or illicit drug use. History Obtained From:  patient, electronic medical record  GI consult for diverticulitis-patient was seen and examined on the regular medical floor, her primary language is Welsh and  was used for this visit. Patient presented to the emergency department with LLQ abdominal pain and poor p.o. intake, noted history of recent diverticulitis was seen in the emergency department 2 weeks prior and diagnosed with diverticulitis finished oral course of Cipro and Flagyl. Initial stat reading of CT scan showed possible mild diverticulitis, final CT imaging shows no findings of acute diverticulitis. Patient was initiated on IV Cipro and Flagyl. Patient denies fever or chills, no leukocytosis, noted mild normocytic anemia with hemoglobin 11.5, no overt bleeding, no significant electrolyte abnormalities. Noted elevated serum lactate 2.6. Denies diarrhea or constipation. UA is positive with culture pending.      History:      Past Medical History:   Diagnosis Date    Asthma     Chronic abdominal pain     Depression     Hyperlipidemia     Hypertension     NATALEE (obstructive sleep apnea)     Schizophrenia (HCC)     Type II or unspecified type diabetes mellitus without mention of complication, not stated as uncontrolled      Past Surgical History:   Procedure Laterality Date    COLONOSCOPY  2/21/14    [email or web address]    [facility]PP[facility]    UPPER GASTROINTESTINAL ENDOSCOPY N/A 3/22/2021    EGD DIAGNOSTIC ONLY performed by Doni Calixto MD at [facility]     Family History  Family History   Problem Relation Age of Onset    Other Mother         Diverticulitis    Colon Cancer Paternal Uncle     Colon Cancer Maternal Grandfather      [] Unable to obtain due to ventilated and/ or neurologic status  Social History     Socioeconomic History    Marital status: Single     Spouse name: Not on file    Number of children: Not on file    Years of education: Not on file    Highest education level: Not on file   Occupational History    Not on file   Tobacco Use    Smoking status: Former     Packs/day: 1.00     Types: Cigarettes    Smokeless tobacco: Never    Tobacco comments:     Quit 4yrs   Vaping Use    Vaping Use: Never used   Substance and Sexual Activity    Alcohol use: No    Drug use: No    Sexual activity: Not Currently   Other Topics Concern    Not on file   Social History Narrative    Lives w brother     Social Determinants of Health     Financial Resource Strain: Not on file   Food Insecurity: Not on file   Transportation Needs: Not on file   Physical Activity: Not on file   Stress: Not on file   Social Connections: Not on file   Intimate Partner Violence: Not on file   Housing Stability: Not on file      [] Unable to obtain due to ventilated and/ or neurologic status    Home Medications:      Medications Prior to Admission: ondansetron (ZOFRAN-ODT) 4 MG disintegrating tablet, Take 1 tablet by mouth 3 times daily as needed for Nausea or Vomiting  ondansetron (ZOFRAN ODT) 4 MG disintegrating tablet, Take 1 tablet by mouth every 8 hours as needed for Nausea  nitroGLYCERIN (NITROSTAT) 0.4 MG SL tablet, Place 1 tablet under the tongue every 5 minutes as needed for Chest pain up to max of 3 total doses. If no relief after 1 dose, call 911. ondansetron (ZOFRAN ODT) 4 MG disintegrating tablet, Take 1 tablet by mouth every 8 hours as needed for Nausea  clotrimazole (LOTRIMIN) 1 % cream, Apply topically 2 times daily to lesions on skin for 3-4 weeks or for 1 week after lesion clears  insulin glargine (LANTUS SOLOSTAR) 100 UNIT/ML injection pen, 60 units every morning. 160 units nightly  insulin lispro (HUMALOG KWIKPEN U-200) 200 UNIT/ML SOPN pen, Inject 70 Units into the skin 3 times daily (with meals) for 4 days  gabapentin (NEURONTIN) 800 MG tablet, Take 800 mg by mouth 3 times daily. For leg pain  magnesium oxide (MAG-OX) 400 (240 Mg) MG tablet, Take 1 tablet by mouth daily  nitroGLYCERIN (NITROSTAT) 0.4 MG SL tablet, Place 1 tablet under the tongue every 5 minutes as needed for Chest pain up to max of 3 total doses. If no relief after 1 dose, call 911.   metoprolol succinate (TOPROL XL) 25 MG extended release tablet, Take 1 tablet by mouth daily  lisinopril (PRINIVIL;ZESTRIL) 10 MG tablet, Take 1 tablet by mouth daily  INSULIN SYRINGE .5CC/29G 29G X 1/2\" 0.5 ML MISC, 1 each by Does not apply route daily  meloxicam (MOBIC) 15 MG tablet, Take 1 tablet by mouth daily as needed for Pain  esomeprazole (NEXIUM) 40 MG delayed release capsule, Take 1 capsule by mouth every morning (before breakfast) (Patient taking differently: Take 40 mg by mouth 2 times daily )  sertraline (ZOLOFT) 100 MG tablet, Take 100 mg by mouth daily  traZODone (DESYREL) 50 MG tablet, Take 50 mg by mouth nightly  levothyroxine (SYNTHROID) 50 MCG tablet, Take 50 mcg by mouth Daily  topiramate (TOPAMAX) 25 MG tablet, Take 25 mg by mouth daily  montelukast (SINGULAIR) 10 MG tablet, Take 10 mg by mouth nightly  METFORMIN HCL PO, Take 1,000 mg by mouth   BuPROPion HCl (WELLBUTRIN PO), Take 150 mg by mouth   ARIPiprazole (ABILIFY PO), Take 5 mg by mouth     Current Hospital Medications: Scheduled Meds:   sodium chloride flush  5-40 mL IntraVENous 2 times per day    insulin lispro  0-16 Units SubCUTAneous TID WC    insulin lispro  0-4 Units SubCUTAneous Nightly    polyethylene glycol  17 g Oral Daily    ciprofloxacin  400 mg IntraVENous Q12H    metroNIDAZOLE  500 mg IntraVENous Q8H     Continuous Infusions:   sodium chloride      dextrose      sodium chloride 100 mL/hr at 12/29/22 0210     PRN Meds:.sodium chloride flush, sodium chloride, ondansetron **OR** ondansetron, polyethylene glycol, glucose, dextrose bolus **OR** dextrose bolus, glucagon (rDNA), dextrose, ipratropium-albuterol, dicyclomine, ketorolac   sodium chloride      dextrose      sodium chloride 100 mL/hr at 12/29/22 0210      Allergies: Allergies   Allergen Reactions    Metoclopramide Swelling    Nalbuphine Anaphylaxis, Rash and Swelling     Facial swelling    Nalbuphine Hcl Swelling, Rash and Anaphylaxis     Facial swelling    Nitrofurantoin Shortness Of Breath and Swelling    Other Anaphylaxis and Hives    Shellfish-Derived Products Anaphylaxis and Hives    Amoxicillin Swelling     swelling  swelling      Macrobid [Nitrofurantoin Monohyd Macro]     Montelukast Other (See Comments)     Worsening depression  Patient states as of 11/26/21 she is corrently taking. Worsening depression      Montelukast Sodium Other (See Comments)     Worsening depression    Seasonal Itching     PAPER TAPE IS OK  PAPER TAPE IS OK  PAPER TAPE IS OK      Neomycin-Bacitracin Zn-Polymyx Itching and Rash    Silver Sulfadiazine Itching and Rash      Review of Systems:       [x] CV, Resp, Neuro, , and all other systems reviewed and negative other than listed in HPI.          Objective Findings:     Vitals:   Vitals:    12/28/22 2336 12/29/22 0102 12/29/22 0145 12/29/22 0353   BP: (!) 156/91 (!) 141/86 127/70    Pulse: (!) 107 93 94    Resp: 20 18 18    Temp:   98.8 °F (37.1 °C)    TempSrc:   Oral    SpO2: 94% 97% 100%    Weight:    (!) 306 lb (138.8 kg)   Height:            Physical Examination:  General: alert  HEENT: Normocephalic, no scleral icterus. Neck: No JVD. Heart: Regular, no murmur, no rub/gallop. No RV heave. Lungs: Clear to ascultation, no rales/wheezing/rhonchi. Good chest wall excursion. Abdomen: Appearance:, no Distension , Soft , lower abdominal tenderness , Scars , Bowel sounds normal  Extremities: No clubbing/cyanosis, no edema. Skin: Warm, dry, normal turgor, no rash, no bruise, no petichiae. Neuro: No myoclonus or tremor. Psych: Normal affect    Results/ Medications reviewed 12/29/2022, 8:06 AM     Laboratory, Microbiology, Pathology, Radiology, Cardiology, Medications and Transcriptions reviewed  Scheduled Meds:   sodium chloride flush  5-40 mL IntraVENous 2 times per day    insulin lispro  0-16 Units SubCUTAneous TID WC    insulin lispro  0-4 Units SubCUTAneous Nightly    polyethylene glycol  17 g Oral Daily    ciprofloxacin  400 mg IntraVENous Q12H    metroNIDAZOLE  500 mg IntraVENous Q8H     Continuous Infusions:   sodium chloride      dextrose      sodium chloride 100 mL/hr at 12/29/22 0210       Recent Labs     12/28/22 1930 12/29/22  0545   WBC 4.8 3.9*   HGB 13.6 11.5*   HCT 41.1 34.9*   MCV 89.5 90.5   PLT 88* 68*     Recent Labs     12/28/22 1930 12/29/22  0545    140   K 4.0 3.7   CL 99 104   CO2 26 23   BUN 13 11   CREATININE 0.91* 0.76     Recent Labs     12/28/22 1930 12/29/22  0545   AST 62* 48*   ALT 58* 44*   BILITOT 0.7 0.6   ALKPHOS 153* 123     Recent Labs     12/28/22  1930   LIPASE 16     Recent Labs     12/28/22 1930 12/29/22  0545   PROT 8.2* 6.8     CT ABDOMEN PELVIS W IV CONTRAST Additional Contrast? Radiologist Recommendation    Result Date: 12/12/2022  EXAMINATION: CT OF THE ABDOMEN AND PELVIS WITH CONTRAST 12/12/2022 3:53 am TECHNIQUE: CT of the abdomen and pelvis was performed with the administration of intravenous contrast. Multiplanar reformatted images are provided for review.  Automated exposure control, iterative reconstruction, and/or weight based adjustment of the mA/kV was utilized to reduce the radiation dose to as low as reasonably achievable. COMPARISON: 12/03/2022. HISTORY: ORDERING SYSTEM PROVIDED HISTORY: Severe LLQ pain, recent diverticulitis, r/o perf/abscess TECHNOLOGIST PROVIDED HISTORY: Additional Contrast?->Radiologist Recommendation Reason for exam:->Severe LLQ pain, recent diverticulitis, r/o perf/abscess Decision Support Exception - unselect if not a suspected or confirmed emergency medical condition->Emergency Medical Condition (MA) What reading provider will be dictating this exam?->CRC FINDINGS: Lower Chest: Limited evaluation of the lower lung fields demonstrates unremarkable bronchovascular markings with no evidence of focal infiltrate or consolidation. No evidence of acute cardiopulmonary disease is seen. Organs: The liver demonstrates unremarkable attenuation, no evidence of masses no evidence of intrahepatic biliary dilatation is seen. The gallbladder is distended, no evidence of gallbladder wall thickening or pericholecystic  stranding. The common bile duct is unremarkable. The pancreas is atrophic. Spleen demonstrate no evidence of masses. 2.1 cm low-attenuation lesion visualized in the right adrenal gland. The right adrenal gland is unremarkable. The kidneys demonstrate no evidence of stones no evidence of renal masses. No evidence of hydronephrosis or hydroureter is seen. GI/Bowel: The stomach is unremarkable, no evidence of masses. No significant distention of the small and large bowel loops is visualized. The appendix is visualized and is unremarkable. Abundance of stool is visualized in the large bowel. Scattered diverticular disease visualized but no evidence of acute diverticulitis. Significant improvement in the previously visualized soft tissue stranding in the left lower quadrant. Pelvis: The urinary bladder is not optimally distended.  The prostate gland is unremarkable. No evidence of free fluid in the pelvis. No evidence of pelvic mass is seen. Peritoneum/Retroperitoneum: Prominent lymphadenopathy visualized in the upper abdomen. No evidence of free fluid or air within the peritoneal cavity. Bones/Soft Tissues: Abdominal wall soft tissues are unremarkable. Extensive degenerative changes of bilateral hip joints and the lumbar spine visualized. Distended gallbladder but no evidence of acute cholecystitis. . Near complete resolution of the previously visualized inflammatory changes that was seen in the left lower quadrant/4 previously visualized diverticulitis. Abundance of stool in the large bowel. Degenerative joint changes. CT ABDOMEN PELVIS W IV CONTRAST Additional Contrast? None    Result Date: 12/3/2022  EXAMINATION: CT OF THE ABDOMEN AND PELVIS WITH CONTRAST 12/3/2022 5:09 pm TECHNIQUE: CT of the abdomen and pelvis was performed with the administration of intravenous contrast. Multiplanar reformatted images are provided for review. Automated exposure control, iterative reconstruction, and/or weight based adjustment of the mA/kV was utilized to reduce the radiation dose to as low as reasonably achievable. COMPARISON: None. HISTORY: ORDERING SYSTEM PROVIDED HISTORY: left sided flank pain h/o diverticulitis TECHNOLOGIST PROVIDED HISTORY: Reason for exam:->left sided flank pain h/o diverticulitis Additional Contrast?->None Decision Support Exception - unselect if not a suspected or confirmed emergency medical condition->Emergency Medical Condition (MA) What reading provider will be dictating this exam?->CRC FINDINGS: Lower Chest: Visualized lungs are normal. Organs: Hepatosplenomegaly. The adrenal glands, kidneys, pancreas are normal.  Gallbladder distension with no gallbladder wall thickening, stones or sludge. Grossly normal common bile duct. GI/Bowel: Thickening of the proximal to mid descending colon with mild adjacent inflammatory stranding. Although the findings may represent diverticulitis, colitis is also possibility. The small bowel and appendix are normal.  Anastomosis sutures in the mid sigmoid colon. Pelvis: Unremarkable. Peritoneum/Retroperitoneum: No free fluid or free air. Unremarkable. Bones/Soft Tissues: Thickening of the proximal to mid sigmoid colon with adjacent inflammatory stranding. Findings may represent diverticulitis versus colitis. Gallbladder distension with no gallbladder wall thickening, stones, sludge or pericholecystic fluid. Normal common bile duct. Hepatosplenomegaly. XR CHEST PORTABLE    Result Date: 12/28/2022  EXAMINATION: ONE XRAY VIEW OF THE CHEST 12/28/2022 8:30 pm COMPARISON: 10/23/2022 HISTORY: ORDERING SYSTEM PROVIDED HISTORY: CP TECHNOLOGIST PROVIDED HISTORY: Reason for exam:->CP Is the patient pregnant?->No What reading provider will be dictating this exam?->CRC FINDINGS: The lungs are without acute focal process. There is no effusion or pneumothorax. The cardiomediastinal silhouette is without acute process. The osseous structures are without acute process. The left costophrenic angle is not included on the image. No acute process. Impression:   49 y/o female admitted with abdominal pain and poor p.o. intake, in the setting of recent diverticulitis, was treated with Cipro and Flagyl approximately 2 weeks prior, CT on arrival noted no acute diverticulitis. Patient has no leukocytosis, no fever or chills, mild elevation of serum lactate, no significant electrolyte abnormalities, UA was positive with reflex to culture pending.   Of note patient has incidental finding of hepatosplenomegaly with mild elevation of transaminases less than 1.5 times normal with chronic thrombocytopenia since 2021 & comorbid conditions including hypothyroidism, hypertension, hyperlipidemia and class III obesity  Ddx abdominal pain: Possibly postinfectious IBS versus cystitis  Plan:   -Continue course of care  -Continue supportive measures and pain management per primary team  -Okay for diet as tolerated  -Await urinary culture  -outpatient hepatology follow up for abnormal liver imaging and abnormal transaminases  Comments: Thank you for allowing us to participate in the care of this patient. Will continue to follow. Please call if questions or concerns arise. Electronically signed by Cassia Sanders MD on 12/29/2022 at 8:06 AM    Please note this report has been partially produced using speech recognition software and may cause contain errors related to that system including grammar, punctuation and spelling as well as words and phrases that may seem inappropriate. If there are questions or concerns please feel free to contact me to clarify.

## 2022-12-29 NOTE — CARE COORDINATION
[facility] Case Management Initial Discharge Assessment    Met with Patient and Family to discuss discharge plan. PCP: DANIEL Lora - TONY                                Date of Last Visit:     [facility] Patient: No        [facility] Notified: no    If no PCP, list provided? N/A    Discharge Planning    Living Arrangements: independently at home    Who do you live with? DTR    Who helps you with your care:  self    If lives at home:     Do you have any barriers navigating in your home? no    Patient can perform ADL? Yes    Current Services (outpatient and in home) :  None    Dialysis: No    Is transportation available to get to your appointments? Yes, insurance     DME Equipment:  yes - ROLLATOR REQUESTING SCRIPT FOR NEW ONE    Respiratory equipment: CPAP without O2    Respiratory provider:  yes - [address]:  yes - [address] with Medication Assistance Program?  No      Patient agreeable to [address]? Declined    Patient agreeable to SNF/Rehab? Declined    Other discharge needs identified? N/A    Does Patient Have a High-Risk for Readmission Diagnosis (CHF, PN, MI, COPD)? No    Initial Discharge Plan? (Note: please see concurrent daily documentation for any updates after initial note). MET WITH PT AT BEDSIDE TO DISCUSS DISCHARGE PLANNING. PT REQUESTING SCRIPT FOR ROLLATOR AS [place], STATES SHE OBTAINED FROM A FRIEND NOT INSURANCE.      Readmission Risk              Risk of Unplanned Readmission:  16         Electronically signed by Natalee Marina RN on 12/29/2022 at 10:16 AM

## 2022-12-29 NOTE — DISCHARGE INSTR - COC
Continuity of Care Form    Patient Name: Yonathan Acevedo   :  1975  MRN:  [id number]    Admit date:  2022  Discharge date:  ***    Code Status Order: Full Code   Advance Directives:     Admitting Physician:  Adriane San DO  PCP: DANIEL Sarabia - NP    Discharging Nurse: [facility] Unit/Room#: L813/Y983-52  Discharging Unit Phone Number: ***    Emergency Contact:   Extended Emergency Contact Information  Primary Emergency Contact: [address] Phone: [phone]  Relation: Child  Preferred language: Angolan   needed? No  Secondary Emergency Contact: [address] Phone: [phone]  Mobile Phone: [phone]  Relation: Brother/Sister    Past Surgical History:  Past Surgical History:   Procedure Laterality Date    COLONOSCOPY  14    [email or web address]    UPPER GASTROINTESTINAL ENDOSCOPY  14    jarmoszuk    UPPER GASTROINTESTINAL ENDOSCOPY N/A 3/22/2021    EGD DIAGNOSTIC ONLY performed by Bartolome Delarosa MD at [facility]       Immunization History:   Immunization History   Administered Date(s) Administered    Pneumococcal Polysaccharide (Kngnuonxl43) 2016    Tdap (Boostrix, Adacel) 2016       Active Problems:  Patient Active Problem List   Diagnosis Code    Anxiety F41.9    Asthma J45.909    Auditory hallucination R44.0    Depression F32. A    Uncontrolled type 2 diabetes mellitus [id number]    Diabetes mellitus ([facility] [address] E11.9    Family history of coronary arteriosclerosis Z82.49    Gastroesophageal reflux disease K21.9    Hyperlipidemia E78.5    Hypertension I10    Hypothyroidism due to Hashimoto's thyroiditis E03.8, E06.3    Ingrowing nail L60.0    Laryngeal spasm J38.5    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    Class 3 severe obesity due to excess calories with serious comorbidity and body mass index (BMI) of 45.0 to 49.9 in adult (HCC) E66.01, Z68.42    Obstructive sleep apnea syndrome G47.33    Pain of toe M79.676    Schizophrenia (HCC) F20.9    Type 2 diabetes mellitus with other diabetic neurological complication (HCC) E53.06    Type 1 diabetes mellitus (HCC) E10.9    Vitamin D deficiency E55.9    Pancreatitis, unspecified pancreatitis type K85.90    DKA, type 2, not at goal [facility]) E11.10    Chest pain R07.9    Hyperglycemia R73.9    Noncompliance Z91.199    Diverticulitis K57.92    Left lower quadrant abdominal pain R10.32    Diverticulitis of colon K57.32       Isolation/Infection:   Isolation            No Isolation          Patient Infection Status       Infection Onset Added Last Indicated Last Indicated By Review Planned Expiration Resolved Resolved By    None active    Resolved    COVID-19 (Rule Out) 21 COVID-19, Rapid (Ordered)   21 Rule-Out Test Resulted    COVID-19 (Rule Out) 10/01/21 10/01/21 10/01/21 COVID-19, Rapid (Ordered)   10/01/21 Rule-Out Test Resulted    COVID-19 (Rule Out) 20 Covid-19 Ambulatory (Ordered)   20 Rule-Out Test Resulted            Nurse Assessment:  Last Vital Signs: /70   Pulse 92   Temp 98.8 °F (37.1 °C) (Oral)   Resp 18   Ht 6' (1.829 m)   Wt (!) 306 lb (138.8 kg)   LMP  (LMP Unknown)   SpO2 96%   BMI 41.50 kg/m²     Last documented pain score (0-10 scale): Pain Level: 10  Last Weight:   Wt Readings from Last 1 Encounters:   22 (!) 306 lb (138.8 kg)     Mental Status:  {IP PT MENTAL STATUS:}    IV Access:  { SAMMY IV ACCESS:049083218}    Nursing Mobility/ADLs:  Walking   {CHP DME MHOV:320856418}  Transfer  {CHP DME YLHB:561400883}  Bathing  {CHP DME ONNC:636794993}  Dressing  {CHP DME MQFE:744859833}  Toileting  {CHP DME XKFB:933153982}  Feeding  {CHP DME REUL:606450558}  Med Admin  {CHP DME NVI}  Med Delivery   { SAMMY MED Delivery:277102033}    Wound Care Documentation and Therapy:        Elimination:  Continence:    Bowel: {YES / MZ:41999}  Bladder: {YES / NB:04279}  Urinary Catheter: {Urinary Catheter:059960469}   Colostomy/Ileostomy/Ileal Conduit: {YES / TQ:38867}       Date of Last BM: ***    Intake/Output Summary (Last 24 hours) at 2022 1748  Last data filed at 2022 0533  Gross per 24 hour   Intake 120 ml   Output --   Net 120 ml     I/O last 3 completed shifts:   In: 120 [P.O.:120]  Out: -     Safety Concerns:     [address] [employer] Safety Concerns:413535602}    Impairments/Disabilities:      [facility] SAMMY Impairments/Disabilities:854955243}    Nutrition Therapy:  Current Nutrition Therapy:   [facility] Diet List:900354878}    Routes of Feeding: {CHP DME Other Feedings:087622706}  Liquids: {Slp liquid thickness:32628}  Daily Fluid Restriction: {CHP DME Yes amt example:808705256}  Last Modified Barium Swallow with Video (Video Swallowing Test): {Done Not Done RRGA:091891573}    Treatments at the Time of Hospital Discharge:   Respiratory Treatments: ***  Oxygen Therapy:  {Therapy; copd oxygen:49102}  Ventilator:    {[facility] Vent DEB}    Rehab Therapies: {THERAPEUTIC INTERVENTION:8527278822}  Weight Bearing Status/Restrictions: [facility] Weight Bearin}  Other Medical Equipment (for information only, NOT a DME order):  {EQUIPMENT:867179980}  Other Treatments: ***    Patient's personal belongings (please select all that are sent with patient):  {[facility] DME Belongings:313256098}    RN SIGNATURE:  {Esignature:043004111}    CASE MANAGEMENT/SOCIAL WORK SECTION    Inpatient Status Date: ***    Readmission Risk Assessment Score:  Readmission Risk              Risk of Unplanned Readmission:  17           Discharging to Facility/ Agency   Name:   Address:  Phone:  Fax:    Dialysis Facility (if applicable)   Name:  Address:  Dialysis Schedule:  Phone:  Fax:    / signature: {Esignature:468189983}    PHYSICIAN SECTION    Prognosis: {Prognosis:6928282161}    Condition at Discharge: [address] Patient Condition:963187727}    Rehab Potential (if transferring to Rehab): {Prognosis:4723897287}    Recommended Labs or Other Treatments After Discharge: ***    Physician Certification: I certify the above information and transfer of Rosealee Runner  is necessary for the continuing treatment of the diagnosis listed and that she requires {Admit to Appropriate Level of Care:10507} for {GREATER/LESS:429143114} 30 days.      Update Admission H&P: {CHP DME [facility]8749[facility]}    PHYSICIAN SIGNATURE:  {Esignature:440344702}

## 2022-12-29 NOTE — ED PROVIDER NOTES
[facility] ED  eMERGENCY dEPARTMENT eNCOUnter      Pt Name: Rashi Cosme  MRN: [id number]  [place] 1975  Date of evaluation: 12/28/2022  Provider: Catalino Granados MD      HISTORY OF PRESENT ILLNESS      Chief Complaint   Patient presents with    Chest Pain     Mid sternal, started around 2pm        The history is provided by the Patient. Rashi Cosme is a 50 y.o. female with a PMH clinically significant for HTN, HLD, NATALEE, DMII, Chronic abdominal pain, Schizophrenia, Anxiety, Depression presenting to the ED via PV c/o left lower quadrant abdominal pain, multiple episodes of nonbloody nonbilious emesis, nausea, mild diarrhea and lower back pain. Also complaining of generalized myalgias and decreased p.o. intake. Also having chest pain only present with vomiting. Characterizes abdominal pain is sharp, stabbing, severe. No radiation of the pain. Denies any urinary symptoms. Has been passing gas without difficulty. States that symptoms have been ongoing over the past month since being admitted to [facility] for diverticulitis. States that her diverticulitis does feel similar to this. Did not take anything for relief prior to arrival.  Unsure when she last took antibiotics. Worse with p.o. intake. Denies any chance of pregnancy. Per Chart Review: Most recent evaluation in the ED for abdominal pain appreciated. CT w/o evidence of worsening processes. Most recent stress test showing normal LVEF, Possible mild LAD distribution mild ischemia with significant breast attenuation artifact. REVIEW OF SYSTEMS       Review of Systems   Constitutional:  Positive for activity change, appetite change and fatigue. Negative for chills and fever. HENT:  Negative for rhinorrhea and sore throat. Eyes:  Negative for pain and visual disturbance. Respiratory:  Negative for cough and shortness of breath. Cardiovascular:  Positive for chest pain. Negative for palpitations. Gastrointestinal:  Positive for abdominal pain, diarrhea, nausea and vomiting. Negative for blood in stool. Genitourinary:  Negative for difficulty urinating and dysuria. Musculoskeletal:  Positive for back pain and myalgias. Negative for neck pain. Skin:  Negative for rash. Neurological:  Positive for headaches. Negative for weakness and numbness.      PAST MEDICAL HISTORY     Past Medical History:   Diagnosis Date    Asthma     Chronic abdominal pain     Depression     Hyperlipidemia     Hypertension     NATALEE (obstructive sleep apnea)     Schizophrenia (HCC)     Type II or unspecified type diabetes mellitus without mention of complication, not stated as uncontrolled        SURGICAL HISTORY       Past Surgical History:   Procedure Laterality Date    COLONOSCOPY  2/21/14    [email or web address]    UPPER GASTROINTESTINAL ENDOSCOPY  2/21/14    [email or web address]    UPPER GASTROINTESTINAL ENDOSCOPY N/A 3/22/2021    EGD DIAGNOSTIC ONLY performed by Vanessa Mercado MD at [address] [place] [address]       Family History   Problem Relation Age of Onset    Other Mother         Diverticulitis    Colon Cancer Paternal Uncle     Colon Cancer Maternal Grandfather        SOCIAL HISTORY       Social History     Socioeconomic History    Marital status: Single   Tobacco Use    Smoking status: Former     Packs/day: 1.00     Types: Cigarettes    Smokeless tobacco: Never    Tobacco comments:     Quit 4yrs   Vaping Use    Vaping Use: Never used   Substance and Sexual Activity    Alcohol use: No    Drug use: No    Sexual activity: Not Currently   Social History Narrative    Lives w brother       CURRENT MEDICATIONS       Previous Medications    ARIPIPRAZOLE (ABILIFY PO)    Take 5 mg by mouth     BUPROPION HCL (WELLBUTRIN PO)    Take 150 mg by mouth     CLOTRIMAZOLE (LOTRIMIN) 1 % CREAM    Apply topically 2 times daily to lesions on skin for 3-4 weeks or for 1 week after lesion clears    ESOMEPRAZOLE (NEXIUM) 40 MG DELAYED RELEASE CAPSULE Take 1 capsule by mouth every morning (before breakfast)    GABAPENTIN (NEURONTIN) 800 MG TABLET    Take 800 mg by mouth 3 times daily. For leg pain    INSULIN GLARGINE (LANTUS SOLOSTAR) 100 UNIT/ML INJECTION PEN    60 units every morning. 160 units nightly    INSULIN LISPRO (HUMALOG KWIKPEN U-200) 200 UNIT/ML SOPN PEN    Inject 70 Units into the skin 3 times daily (with meals) for 4 days    INSULIN SYRINGE .5CC/29G 29G X 1/2\" 0.5 ML MISC    1 each by Does not apply route daily    LEVOTHYROXINE (SYNTHROID) 50 MCG TABLET    Take 50 mcg by mouth Daily    LISINOPRIL (PRINIVIL;ZESTRIL) 10 MG TABLET    Take 1 tablet by mouth daily    MAGNESIUM OXIDE (MAG-OX) 400 (240 MG) MG TABLET    Take 1 tablet by mouth daily    MELOXICAM (MOBIC) 15 MG TABLET    Take 1 tablet by mouth daily as needed for Pain    METFORMIN HCL PO    Take 1,000 mg by mouth     METOPROLOL SUCCINATE (TOPROL XL) 25 MG EXTENDED RELEASE TABLET    Take 1 tablet by mouth daily    MONTELUKAST (SINGULAIR) 10 MG TABLET    Take 10 mg by mouth nightly    NITROGLYCERIN (NITROSTAT) 0.4 MG SL TABLET    Place 1 tablet under the tongue every 5 minutes as needed for Chest pain up to max of 3 total doses. If no relief after 1 dose, call 911. NITROGLYCERIN (NITROSTAT) 0.4 MG SL TABLET    Place 1 tablet under the tongue every 5 minutes as needed for Chest pain up to max of 3 total doses. If no relief after 1 dose, call 911.     ONDANSETRON (ZOFRAN ODT) 4 MG DISINTEGRATING TABLET    Take 1 tablet by mouth every 8 hours as needed for Nausea    ONDANSETRON (ZOFRAN ODT) 4 MG DISINTEGRATING TABLET    Take 1 tablet by mouth every 8 hours as needed for Nausea    ONDANSETRON (ZOFRAN-ODT) 4 MG DISINTEGRATING TABLET    Take 1 tablet by mouth 3 times daily as needed for Nausea or Vomiting    SERTRALINE (ZOLOFT) 100 MG TABLET    Take 100 mg by mouth daily    TOPIRAMATE (TOPAMAX) 25 MG TABLET    Take 25 mg by mouth daily    TRAZODONE (DESYREL) 50 MG TABLET    Take 50 mg by mouth nightly       ALLERGIES     Metoclopramide, Nalbuphine, Nalbuphine hcl, Nitrofurantoin, Other, Shellfish-derived products, Amoxicillin, Macrobid [nitrofurantoin monohyd macro], Montelukast, Montelukast sodium, Seasonal, Neomycin-bacitracin zn-polymyx, and Silver sulfadiazine      PHYSICAL EXAM       ED Triage Vitals [12/28/22 1913]   BP Temp Temp Source Heart Rate Resp SpO2 Height Weight   124/74 97.5 °F (36.4 °C) Temporal (!) 109 18 96 % -- (!) 340 lb (154.2 kg)       Physical Exam  Vitals and nursing note reviewed. Constitutional:       General: She is not in acute distress. Appearance: She is morbidly obese. She is not toxic-appearing. Comments: Uncomfortable appearing 2/2 pain   HENT:      Head: Normocephalic and atraumatic. Mouth/Throat:      Mouth: Mucous membranes are dry. Pharynx: Oropharynx is clear. Eyes:      Extraocular Movements: Extraocular movements intact. Pupils: Pupils are equal, round, and reactive to light. Cardiovascular:      Rate and Rhythm: Regular rhythm. Tachycardia present. Pulses: Normal pulses. Heart sounds: Normal heart sounds. Pulmonary:      Effort: Pulmonary effort is normal.      Breath sounds: Normal breath sounds. Chest:      Chest wall: Tenderness present. Abdominal:      Palpations: Abdomen is soft. Tenderness: There is abdominal tenderness in the suprapubic area and left lower quadrant. There is no right CVA tenderness, left CVA tenderness, guarding or rebound. Musculoskeletal:      Cervical back: Normal range of motion and neck supple. Thoracic back: Tenderness present. No bony tenderness. Lumbar back: Tenderness present. No bony tenderness. Right lower leg: No edema. Left lower leg: No edema. Skin:     General: Skin is warm and dry. Capillary Refill: Capillary refill takes less than 2 seconds. Neurological:      General: No focal deficit present.       Mental Status: She is alert and oriented to person, place, and time. Sensory: No sensory deficit. Motor: No weakness. Psychiatric:         Mood and Affect: Mood is anxious. MDM:   Chart Reviewed: PMH and additional information as noted in HPI obtained from chart review    Vitals:    Vitals:    12/28/22 2010 12/28/22 2112 12/28/22 2216 12/28/22 2336   BP: (!) 147/87 136/69 101/77 (!) 156/91   Pulse: (!) 105 (!) 107 (!) 105 (!) 107   Resp: 18 18 20 20   Temp:       TempSrc:       SpO2: 96% 97% 98% 94%   Weight:       Height:           PROCEDURES:  Unless otherwise noted below, none  Procedures    LABS:  Labs Reviewed   LACTIC ACID - Abnormal; Notable for the following components:       Result Value    Lactic Acid 2.7 (*)     All other components within normal limits   COMPREHENSIVE METABOLIC PANEL - Abnormal; Notable for the following components:    Glucose 235 (*)     Creatinine 0.91 (*)     Total Protein 8.2 (*)     Alkaline Phosphatase 153 (*)     ALT 58 (*)     AST 62 (*)     Globulin 3.8 (*)     All other components within normal limits   CBC WITH AUTO DIFFERENTIAL - Abnormal; Notable for the following components:    RDW 15.0 (*)     Platelets 88 (*)     Lymphocytes Absolute 0.6 (*)     All other components within normal limits   URINALYSIS WITH REFLEX TO CULTURE - Abnormal; Notable for the following components:    Color, UA DARK YELLOW (*)     Clarity, UA CLOUDY (*)     Ketones, Urine TRACE (*)     Protein, UA 30 (*)     Leukocyte Esterase, Urine SMALL (*)     All other components within normal limits   MICROSCOPIC URINALYSIS - Abnormal; Notable for the following components:    Bacteria, UA MODERATE (*)     WBC, UA 10-20 (*)     RBC, UA 3-5 (*)     All other components within normal limits   CULTURE, URINE   MAGNESIUM   TROPONIN   LIPASE   CK       XR CHEST PORTABLE   Final Result   No acute process.          CT ABDOMEN PELVIS WO CONTRAST Additional Contrast? None    (Results Pending)       ED Course as of 12/29/22 0009   Wed Dec 28, 2022 1927 EKG 12 Lead - Chest Pain  EKG showing sinus tachycardia, rate of 110 bpm.  Normal axis, normal intervals. No gross acute ST-T wave abnormalities. Borderline low voltage likely secondary to body habitus. [NA]      ED Course User Index  [NA] Cece Faye MD       50 y.o. female with a PMH clinically significant for HTN, HLD, NATALEE, DMII, Chronic abdominal pain, Schizophrenia, Anxiety, Depression presenting to the ED via PV c/o left lower quadrant abdominal pain, multiple episodes of nonbloody nonbilious emesis, nausea, mild diarrhea and lower back pain. Upon initial evaluation, Pt uncomfortable appearing 2/2 pain, but otherwise Afebrile, HDS and in NAD. PE as noted above. Labs, , EKG,, and Imaging as noted above. Given findings, clinical presentation most likely consistent w/ recurrent diverticulitis causing the patient's abd pain in the ED. CT abdomen pelvis showing trace fluid stranding adjacent to the sigmoid colon consistent with likely mild diverticulitis. Also noted incidental splenomegaly and hepatomegaly. No other acute abnormalities. Labs showing evidence of mildly elevated LFTs, mild LIA, elevated lactic acid. Consistent with dehydration. Low suspicion for hepatobiliary pathology at this time. Will initiate antibiotics and admit to medicine for further evaluation and management. Not improved following multiple doses of analgesics in the ED. Still nauseous and having abdominal pain. Elevated PDMP appreciated however.   Multiple scripts for narcotics in the past.  Pt was administered   Medications   0.9 % sodium chloride bolus (has no administration in time range)   ondansetron (ZOFRAN) injection 4 mg (has no administration in time range)   morphine sulfate (PF) injection 4 mg (has no administration in time range)   moxifloxacin (AVELOX) IVPB 400 mg (has no administration in time range)   acetaminophen (TYLENOL) tablet 1,000 mg (1,000 mg Oral Given 12/28/22 2022)   0.9 % sodium chloride bolus (0 mLs IntraVENous Stopped 12/28/22 2136)   ondansetron (ZOFRAN) injection 4 mg (4 mg IntraVENous Given 12/28/22 2020)   famotidine (PEPCID) 20 mg in sodium chloride (PF) 0.9 % 10 mL injection (20 mg IntraVENous Given 12/28/22 2020)   morphine sulfate (PF) injection 4 mg (4 mg IntraVENous Given 12/28/22 2020)       Plan: Admit to IM for further evaluation and management. Report given to Dr. Cinthia Anderson and Patient understanding and amenable to the POC. CRITICAL CARE TIME   Total CriticalCare time was 0 minutes, excluding separately reportable procedures. There was a high probability of clinically significant/life threatening deterioration in the patient's condition which required my urgent intervention. FINAL IMPRESSION      1. Left lower quadrant abdominal pain    2.  Diverticulitis of colon          DISPOSITION/PLAN   DISPOSITION Decision To Admit 12/28/2022 11:57:35 PM      Current Discharge Medication List           Saintclair Barlow, MD Saintclair Barlow, MD  12/30/22 2131

## 2022-12-29 NOTE — DISCHARGE SUMMARY
Physician Discharge Summary     Patient ID:  Vicky Kyle  [id number]  23 y.o.  1975    Admit date: 12/28/2022    Discharge date : 12/29/22     Admitting Physician: Kasia Fitzgerald DO     Discharge Physician: Thee Purcell MD     Admission Diagnoses: Diverticulitis [K57.92]  Diverticulitis of colon [K57.32]  Left lower quadrant abdominal pain [R10.32]    Discharge Diagnoses: Diverticulitis, acute on chronic    Admission Condition: fair    Discharged Condition: good    Hospital Course:   Patient admitted with intractable pain, resolved quickly, cont cipro/flagyl on dc with short course pain medication, patient desires dc home today, counseled on warning signs and need to return to ED. Patient understood, desires dc. Consults: GI    Significant Diagnostic Studies: as below    Discharge Exam:  /70   Pulse 92   Temp 98.8 °F (37.1 °C) (Oral)   Resp 18   Ht 6' (1.829 m)   Wt (!) 306 lb (138.8 kg)   LMP  (LMP Unknown)   SpO2 96%   BMI 41.50 kg/m²   General appearance: alert, appears stated age, and cooperative  Lungs: clear to auscultation bilaterally  Heart: regular rate and rhythm, S1, S2 normal, no murmur, click, rub or gallop  Abdomen: soft, non-tender; bowel sounds normal; no masses,  no organomegaly  Extremities: extremities normal, atraumatic, no cyanosis or edema  Skin: Skin color, texture, turgor normal. No rashes or lesions    Labs:   Recent Labs     12/28/22 1930 12/29/22  0545   WBC 4.8 3.9*   HGB 13.6 11.5*   HCT 41.1 34.9*   PLT 88* 68*     Recent Labs     12/28/22 1930 12/29/22  0545    140   K 4.0 3.7   CL 99 104   CO2 26 23   BUN 13 11   CREATININE 0.91* 0.76   CALCIUM 9.6 8.2*     Recent Labs     12/28/22 1930 12/29/22  0545   AST 62* 48*   ALT 58* 44*   BILITOT 0.7 0.6   ALKPHOS 153* 123     No results for input(s): INR in the last 72 hours.   Recent Labs     12/28/22 1930   CKTOTAL 62   TROPONINI <0.010       Urinalysis:   Lab Results   Component Value Date/Time    NITRU Negative 12/28/2022 07:30 PM    WBCUA 10-20 12/28/2022 07:30 PM    BACTERIA MODERATE 12/28/2022 07:30 PM    RBCUA 3-5 12/28/2022 07:30 PM    BLOODU Negative 12/28/2022 07:30 PM    SPECGRAV 1.017 12/28/2022 07:30 PM    GLUCOSEU Negative 12/28/2022 07:30 PM       Radiology:   Most recent    Chest CT      WITH CONTRAST:No results found for this or any previous visit. WITHOUT CONTRAST: No results found for this or any previous visit. CXR      2-view: Results for orders placed during the hospital encounter of 12/31/21    XR CHEST (2 VW)    Narrative  EXAMINATION: Chest x-ray, 2 view    HISTORY: Shortness breath. Chest pain. TECHNIQUE: Frontal and lateral views of the chest    COMPARISON: Portable chest radiograph October 1, 2021    FINDINGS:    Cardiomediastinal silhouette is within normal limits. No pneumothorax, pleural effusion, or consolidation. No acute osseous abnormality. Impression  No radiographic evidence of acute intrathoracic process. Results for orders placed during the hospital encounter of 03/20/21    XR CHEST (2 VW)    Narrative  EXAMINATION: XR CHEST (2 VW)    CLINICAL HISTORY: CHEST PAIN, SHORTNESS OF BREATH    COMPARISONS: NOVEMBER 14, 2020    FINDINGS: Osseous structures are intact. Cardiopericardial silhouette is normal. Pulmonary vasculature is normal. Lungs are clear. Impression  NO ACUTE CARDIOPULMONARY DISEASE. Portable: Results for orders placed during the hospital encounter of 12/28/22    XR CHEST PORTABLE    Narrative  EXAMINATION:  ONE XRAY VIEW OF THE CHEST    12/28/2022 8:30 pm    COMPARISON:  10/23/2022    HISTORY:  ORDERING SYSTEM PROVIDED HISTORY: CP  TECHNOLOGIST PROVIDED HISTORY:  Reason for exam:->CP  Is the patient pregnant?->No  What reading provider will be dictating this exam?->CRC    FINDINGS:  The lungs are without acute focal process. There is no effusion or  pneumothorax.  The cardiomediastinal silhouette is without acute process. The  osseous structures are without acute process. The left costophrenic angle is  not included on the image. Impression  No acute process. Echo No results found for this or any previous visit. Disposition: home    In process/preliminary results:  Outstanding Order Results       Date and Time Order Name Status Description    12/28/2022  7:30 PM Culture, Urine In process     12/28/2022  7:20 PM EKG 12 Lead Preliminary             Patient Instructions:   Current Discharge Medication List        START taking these medications    Details   ciprofloxacin (CIPRO) 500 MG tablet Take 1 tablet by mouth 2 times daily for 7 days  Qty: 14 tablet, Refills: 0      metroNIDAZOLE (FLAGYL) 500 MG tablet Take 1 tablet by mouth 3 times daily for 7 days  Qty: 21 tablet, Refills: 0      oxyCODONE-acetaminophen (PERCOCET) 5-325 MG per tablet Take 1 tablet by mouth every 6 hours as needed for Pain for up to 5 days. Intended supply: 5 days. Take lowest dose possible to manage pain Max Daily Amount: 4 tablets  Qty: 20 tablet, Refills: 0    Comments: Reduce doses taken as pain becomes manageable  Associated Diagnoses: Diverticulitis           CONTINUE these medications which have NOT CHANGED    Details   isosorbide mononitrate (IMDUR) 60 MG extended release tablet Take 60 mg by mouth daily      atorvastatin (LIPITOR) 40 MG tablet Take 40 mg by mouth daily      QUEtiapine (SEROQUEL XR) 50 MG extended release tablet Take 50 mg by mouth nightly      insulin glargine (LANTUS SOLOSTAR) 100 UNIT/ML injection pen 60 units every morning. 160 units nightly  Qty: 5 pen, Refills: 0      insulin lispro (HUMALOG KWIKPEN U-200) 200 UNIT/ML SOPN pen Inject 70 Units into the skin 3 times daily (with meals) for 4 days  Qty: 2 pen, Refills: 0      gabapentin (NEURONTIN) 800 MG tablet Take 800 mg by mouth 3 times daily.  For leg pain      magnesium oxide (MAG-OX) 400 (240 Mg) MG tablet Take 1 tablet by mouth daily  Qty: 30 tablet, Refills: 5      metoprolol succinate (TOPROL XL) 25 MG extended release tablet Take 1 tablet by mouth daily  Qty: 30 tablet, Refills: 3      lisinopril (PRINIVIL;ZESTRIL) 10 MG tablet Take 1 tablet by mouth daily  Qty: 30 tablet, Refills: 3      INSULIN SYRINGE .5CC/29G 29G X 1/2\" 0.5 ML MISC 1 each by Does not apply route daily  Qty: 100 each, Refills: 0      esomeprazole (NEXIUM) 40 MG delayed release capsule Take 1 capsule by mouth every morning (before breakfast)  Qty: 30 capsule, Refills: 3      sertraline (ZOLOFT) 100 MG tablet Take 100 mg by mouth daily      levothyroxine (SYNTHROID) 50 MCG tablet Take 50 mcg by mouth Daily      montelukast (SINGULAIR) 10 MG tablet Take 10 mg by mouth nightly           STOP taking these medications       ondansetron (ZOFRAN-ODT) 4 MG disintegrating tablet Comments:   Reason for Stopping:         ondansetron (ZOFRAN ODT) 4 MG disintegrating tablet Comments:   Reason for Stopping:         nitroGLYCERIN (NITROSTAT) 0.4 MG SL tablet Comments:   Reason for Stopping:         ondansetron (ZOFRAN ODT) 4 MG disintegrating tablet Comments:   Reason for Stopping:         clotrimazole (LOTRIMIN) 1 % cream Comments:   Reason for Stopping:         nitroGLYCERIN (NITROSTAT) 0.4 MG SL tablet Comments:   Reason for Stopping:         meloxicam (MOBIC) 15 MG tablet Comments:   Reason for Stopping:         traZODone (DESYREL) 50 MG tablet Comments:   Reason for Stopping:         topiramate (TOPAMAX) 25 MG tablet Comments:   Reason for Stopping:         METFORMIN HCL PO Comments:   Reason for Stopping:         BuPROPion HCl (WELLBUTRIN PO) Comments:   Reason for Stopping:         ARIPiprazole (ABILIFY PO) Comments:   Reason for Stopping:             Activity: activity as tolerated  Diet: regular diet  Wound Care: none needed    Follow-up with PCP 1-2 weeks.     DC time 35 minutes    Signed:  Electronically signed by Flynn Peck MD on 12/29/2022 at 4:51 PM

## 2022-12-29 NOTE — PROGRESS NOTES
[facility] Respiratory Therapy Evaluation   Current Order:  duoneb q4 prn      Home Regimen: no      Ordering Physician: tate thompson  Re-evaluation Date:  1/1     Diagnosis: diverticulitis       Patient Status: Stable / Unstable + Physician notified    The following MDI Criteria must be met in order to convert aerosol to MDI with spacer. If unable to meet, MDI will be converted to aerosol:  []  Patient able to demonstrate the ability to use MDI effectively  []  Patient alert and cooperative  []  Patient able to take deep breath with 5-10 second hold  []  Medication(s) available in this delivery method   []  Peak flow greater than or equal to 200 ml/min            Current Order Substituted To  (same drug, same frequency)   Aerosol to MDI [] Albuterol Sulfate 0.083% unit dose by aerosol Albuterol Sulfate MDI 2 puffs by inhalation with spacer    [] Levalbuterol 1.25 mg unit dose by aerosol Levalbuterol MDI 2 puffs by inhalation with spacer    [] Levalbuterol 0.63 mg unit dose by aerosol Levalbuterol MDI 2 puffs by inhalation with spacer    [] Ipratropium Bromide 0.02% unit dose by aerosol Ipratropium Bromide MDI 2 puffs by inhalation with spacer    [] Duoneb (Ipratropium + Albuterol) unit dose by aerosol Ipratropium MDI + Albuterol MDI 2 puffs by inhalation w/spacer   MDI to Aerosol [] Albuterol Sulfate MDI Albuterol Sulfate 0.083% unit dose by aerosol    [] Levalbuterol MDI 2 puffs by inhalation Levalbuterol 1.25 mg unit dose by aerosol    [] Ipratropium Bromide MDI by inhalation Ipratropium Bromide 0.02% unit dose by aerosol    [] Combivent (Ipratropium + Albuterol) MDI by inhalation Duoneb (Ipratropium + Albuterol) unit dose by aerosol       Treatment Assessment [Frequency/Schedule]:  Change frequency to: ___________________________prn_______________________per Protocol, P&T, [facility]      Points 0 1 2 3 4   Pulmonary Status  Non-Smoker  []   Smoking history   < 20 pack years  []   Smoking history  ?  20 pack years  [] Pulmonary Disorder  (acute or chronic)  [x]   Severe or Chronic w/ Exacerbation  []     Surgical Status No [x]   Surgeries     General []   Surgery Lower []   Abdominal Thoracic or []   Upper Abdominal Thoracic with  PulmonaryDisorder  []     Chest X-ray Clear/Not  Ordered     [x]  Chronic Changes  Results Pending  []  Infiltrates, atelectasis, pleural effusion, or edema  []  Infiltrates in more than one lobe []  Infiltrate + Atelectasis, &/or pleural effusion  []    Respiratory Pattern Regular,  RR = 12-20 [x]  Increased,  RR = 21-25 []  JUAREZ, irregular,  or RR = 26-30 []  Decreased FEV1  or RR = 31-35 []  Severe SOB, use  of accessory muscles, or RR ? 35  []    Mental Status Alert, oriented,  Cooperative [x]  Confused but Follows commands []  Lethargic or unable to follow commands []  Obtunded  []  Comatose  []    Breath Sounds Clear to  auscultation  [x]  Decreased unilaterally or  in bases only []  Decreased  bilaterally  []  Crackles or intermittent wheezes []  Wheezes []    Cough Strong, Spontan., & nonproductive [x]  Strong,  spontaneous, &  productive []  Weak,  Nonproductive []  Weak, productive or  with wheezes []  No spontaneous  cough or may require suctioning []    Level of Activity Ambulatory [x]  Ambulatory w/ Assist  []  Non-ambulatory []  Paraplegic []  Quadriplegic []    Total    Score:_____3__     Triage Score:___5_____      Tri       Triage:     1. (>20) Freq: Q3    2. (16-20) Freq: Q4   3. (11-15) Freq: QID & Albuterol Q2 PRN    4. (6-10) Freq: TID & Albuterol Q2 PRN    5. (0-5) Freq Q4prn

## 2022-12-30 LAB
ORGANISM: ABNORMAL
URINE CULTURE, ROUTINE: ABNORMAL
URINE CULTURE, ROUTINE: ABNORMAL

## 2022-12-31 LAB
EKG ATRIAL RATE: 110 BPM
EKG P AXIS: 15 DEGREES
EKG P-R INTERVAL: 152 MS
EKG Q-T INTERVAL: 316 MS
EKG QRS DURATION: 88 MS
EKG QTC CALCULATION (BAZETT): 427 MS
EKG R AXIS: 260 DEGREES
EKG T AXIS: 11 DEGREES
EKG VENTRICULAR RATE: 110 BPM

## 2023-06-20 ENCOUNTER — HOSPITAL ENCOUNTER (EMERGENCY)
Age: 48
Discharge: HOME | End: 2023-06-20
Attending: EMERGENCY MEDICINE
Payer: COMMERCIAL

## 2023-06-20 VITALS
OXYGEN SATURATION: 95 % | TEMPERATURE: 98.4 F | WEIGHT: 293 LBS | HEIGHT: 72 IN | SYSTOLIC BLOOD PRESSURE: 127 MMHG | DIASTOLIC BLOOD PRESSURE: 79 MMHG | HEART RATE: 82 BPM | BODY MASS INDEX: 39.68 KG/M2 | RESPIRATION RATE: 15 BRPM

## 2023-06-20 DIAGNOSIS — G62.9 NEUROPATHY: Primary | ICD-10-CM

## 2023-06-20 LAB
ALBUMIN SERPL-MCNC: 4.4 G/DL (ref 3.5–4.6)
ALP SERPL-CCNC: 138 U/L (ref 40–130)
ALT SERPL-CCNC: 60 U/L (ref 0–33)
ANION GAP SERPL CALCULATED.3IONS-SCNC: 14 MEQ/L (ref 9–15)
ANISOCYTOSIS BLD QL SMEAR: ABNORMAL
AST SERPL-CCNC: 44 U/L (ref 0–35)
BASOPHILS # BLD: 0.1 K/UL (ref 0–0.2)
BASOPHILS NFR BLD: 1 %
BILIRUB SERPL-MCNC: 0.4 MG/DL (ref 0.2–0.7)
BUN SERPL-MCNC: 14 MG/DL (ref 6–20)
CALCIUM SERPL-MCNC: 9.8 MG/DL (ref 8.5–9.9)
CHLORIDE SERPL-SCNC: 103 MEQ/L (ref 95–107)
CO2 SERPL-SCNC: 26 MEQ/L (ref 20–31)
CREAT SERPL-MCNC: 0.76 MG/DL (ref 0.5–0.9)
EOSINOPHIL # BLD: 0.3 K/UL (ref 0–0.7)
EOSINOPHIL NFR BLD: 4.7 %
ERYTHROCYTE [DISTWIDTH] IN BLOOD BY AUTOMATED COUNT: 14.7 % (ref 11.5–14.5)
GLOBULIN SER CALC-MCNC: 4 G/DL (ref 2.3–3.5)
GLUCOSE SERPL-MCNC: 248 MG/DL (ref 70–99)
HCT VFR BLD AUTO: 39.6 % (ref 37–47)
HGB BLD-MCNC: 13.2 G/DL (ref 12–16)
LYMPHOCYTES # BLD: 2.5 K/UL (ref 1–4.8)
LYMPHOCYTES NFR BLD: 38.6 %
MCH RBC QN AUTO: 30 PG (ref 27–31.3)
MCHC RBC AUTO-ENTMCNC: 33.3 % (ref 33–37)
MCV RBC AUTO: 89.9 FL (ref 79.4–94.8)
MONOCYTES # BLD: 0.4 K/UL (ref 0.2–0.8)
MONOCYTES NFR BLD: 5.7 %
NEUTROPHILS # BLD: 3.3 K/UL (ref 1.4–6.5)
NEUTS SEG NFR BLD: 50 %
PLATELET # BLD AUTO: 94 K/UL (ref 130–400)
PLATELET BLD QL SMEAR: ABNORMAL
POTASSIUM SERPL-SCNC: 4 MEQ/L (ref 3.4–4.9)
PROT SERPL-MCNC: 8.4 G/DL (ref 6.3–8)
RBC # BLD AUTO: 4.41 M/UL (ref 4.2–5.4)
SLIDE REVIEW: ABNORMAL
SODIUM SERPL-SCNC: 143 MEQ/L (ref 135–144)
TROPONIN T SERPL-MCNC: <0.01 NG/ML (ref 0–0.01)
WBC # BLD AUTO: 6.5 K/UL (ref 4.8–10.8)

## 2023-06-20 PROCEDURE — 85025 COMPLETE CBC W/AUTO DIFF WBC: CPT

## 2023-06-20 PROCEDURE — 36415 COLL VENOUS BLD VENIPUNCTURE: CPT

## 2023-06-20 PROCEDURE — 93005 ELECTROCARDIOGRAM TRACING: CPT | Performed by: EMERGENCY MEDICINE

## 2023-06-20 PROCEDURE — 96374 THER/PROPH/DIAG INJ IV PUSH: CPT

## 2023-06-20 PROCEDURE — 96375 TX/PRO/DX INJ NEW DRUG ADDON: CPT

## 2023-06-20 PROCEDURE — 6360000002 HC RX W HCPCS: Performed by: EMERGENCY MEDICINE

## 2023-06-20 PROCEDURE — 80053 COMPREHEN METABOLIC PANEL: CPT

## 2023-06-20 PROCEDURE — 6370000000 HC RX 637 (ALT 250 FOR IP): Performed by: EMERGENCY MEDICINE

## 2023-06-20 PROCEDURE — 84484 ASSAY OF TROPONIN QUANT: CPT

## 2023-06-20 PROCEDURE — 99284 EMERGENCY DEPT VISIT MOD MDM: CPT

## 2023-06-20 RX ORDER — MORPHINE SULFATE 4 MG/ML
4 INJECTION, SOLUTION INTRAMUSCULAR; INTRAVENOUS ONCE
Status: COMPLETED | OUTPATIENT
Start: 2023-06-20 | End: 2023-06-20

## 2023-06-20 RX ORDER — HYDROCODONE BITARTRATE AND ACETAMINOPHEN 5; 325 MG/1; MG/1
1 TABLET ORAL ONCE
Status: COMPLETED | OUTPATIENT
Start: 2023-06-20 | End: 2023-06-20

## 2023-06-20 RX ORDER — ACETAMINOPHEN 500 MG
1000 TABLET ORAL EVERY 6 HOURS PRN
Qty: 10 TABLET | Refills: 0 | Status: SHIPPED | OUTPATIENT
Start: 2023-06-20

## 2023-06-20 RX ORDER — IBUPROFEN 600 MG/1
600 TABLET ORAL EVERY 6 HOURS PRN
Qty: 20 TABLET | Refills: 0 | Status: SHIPPED | OUTPATIENT
Start: 2023-06-20

## 2023-06-20 RX ORDER — CLOTRIMAZOLE 1 %
CREAM (GRAM) TOPICAL
Qty: 28 G | Refills: 0 | Status: SHIPPED | OUTPATIENT
Start: 2023-06-20 | End: 2023-06-27

## 2023-06-20 RX ORDER — ONDANSETRON 2 MG/ML
4 INJECTION INTRAMUSCULAR; INTRAVENOUS ONCE
Status: COMPLETED | OUTPATIENT
Start: 2023-06-20 | End: 2023-06-20

## 2023-06-20 RX ADMIN — ONDANSETRON 4 MG: 2 INJECTION INTRAMUSCULAR; INTRAVENOUS at 03:20

## 2023-06-20 RX ADMIN — HYDROCODONE BITARTRATE AND ACETAMINOPHEN 1 TABLET: 5; 325 TABLET ORAL at 05:19

## 2023-06-20 RX ADMIN — MORPHINE SULFATE 4 MG: 4 INJECTION, SOLUTION INTRAMUSCULAR; INTRAVENOUS at 03:21

## 2023-06-20 ASSESSMENT — ENCOUNTER SYMPTOMS
SHORTNESS OF BREATH: 0
VOMITING: 0
WHEEZING: 0
EYE DISCHARGE: 0
BACK PAIN: 0
CHEST TIGHTNESS: 0
ABDOMINAL DISTENTION: 0
COUGH: 0
SORE THROAT: 0
ABDOMINAL PAIN: 0
PHOTOPHOBIA: 0

## 2023-06-20 ASSESSMENT — PAIN DESCRIPTION - LOCATION: LOCATION: GENERALIZED

## 2023-06-20 ASSESSMENT — PAIN SCALES - GENERAL
PAINLEVEL_OUTOF10: 10
PAINLEVEL_OUTOF10: 10

## 2023-06-20 ASSESSMENT — PAIN DESCRIPTION - ONSET: ONSET: ON-GOING

## 2023-06-20 ASSESSMENT — PAIN DESCRIPTION - FREQUENCY: FREQUENCY: CONTINUOUS

## 2023-06-20 ASSESSMENT — PAIN DESCRIPTION - DESCRIPTORS: DESCRIPTORS: DISCOMFORT;ACHING

## 2023-06-20 NOTE — ED PROVIDER NOTES
[facility] ED  eMERGENCY dEPARTMENT eNCOUnter      Pt Name: Alex Stoddard  MRN: [id number]  [place] 1975  Date of evaluation: 6/20/2023  Provider: Vicki Camacho MD    CHIEF COMPLAINT       Chief Complaint   Patient presents with    Chest Pain     \"Everything hurts\"         HISTORY OF PRESENT ILLNESS   (Location/Symptom, Timing/Onset,Context/Setting, Quality, Duration, Modifying Factors, Severity)  Note limiting factors. Alex Stoddard is a 46 y.o. female who presents to the emergency department for multiple complaints. Patient's primary complaint is bilateral feet hurting her toes but then also some symptoms of both hands. She had some chest pain over the past 2 days. She is poorly controlled diabetic and I believe is getting neuropathy related symptoms. No nausea or vomiting. No fever or chills. No Headache. HPI    NursingNotes were reviewed. REVIEW OF SYSTEMS    (2-9 systems for level 4, 10 or more for level 5)     Review of Systems   Constitutional:  Negative for chills and diaphoresis. HENT:  Negative for congestion, ear pain, mouth sores and sore throat. Eyes:  Negative for photophobia and discharge. Respiratory:  Negative for cough, chest tightness, shortness of breath and wheezing. Cardiovascular:  Negative for chest pain and palpitations. Gastrointestinal:  Negative for abdominal distention, abdominal pain and vomiting. Endocrine: Negative for cold intolerance. Genitourinary:  Negative for difficulty urinating. Musculoskeletal:  Positive for myalgias. Negative for arthralgias, back pain and gait problem. Skin:  Negative for pallor and rash. Allergic/Immunologic: Negative for immunocompromised state. Neurological:  Negative for dizziness and syncope. Hematological:  Negative for adenopathy. Psychiatric/Behavioral:  Negative for agitation and hallucinations. All other systems reviewed and are negative.     Except as

## 2023-06-21 LAB
EKG ATRIAL RATE: 85 BPM
EKG P AXIS: 58 DEGREES
EKG P-R INTERVAL: 160 MS
EKG Q-T INTERVAL: 368 MS
EKG QRS DURATION: 86 MS
EKG QTC CALCULATION (BAZETT): 437 MS
EKG R AXIS: 158 DEGREES
EKG T AXIS: 27 DEGREES
EKG VENTRICULAR RATE: 85 BPM

## 2023-08-03 ENCOUNTER — HOSPITAL ENCOUNTER (EMERGENCY)
Age: 48
Discharge: HOME | End: 2023-08-03
Attending: EMERGENCY MEDICINE
Payer: COMMERCIAL

## 2023-08-03 ENCOUNTER — APPOINTMENT (OUTPATIENT)
Dept: CT IMAGING | Age: 48
End: 2023-08-03
Payer: COMMERCIAL

## 2023-08-03 VITALS
HEIGHT: 72 IN | RESPIRATION RATE: 19 BRPM | WEIGHT: 293 LBS | TEMPERATURE: 99.1 F | HEART RATE: 82 BPM | SYSTOLIC BLOOD PRESSURE: 113 MMHG | BODY MASS INDEX: 39.68 KG/M2 | DIASTOLIC BLOOD PRESSURE: 59 MMHG | OXYGEN SATURATION: 94 %

## 2023-08-03 DIAGNOSIS — R10.32 LEFT LOWER QUADRANT ABDOMINAL PAIN: Primary | ICD-10-CM

## 2023-08-03 LAB
ALBUMIN SERPL-MCNC: 4 G/DL (ref 3.5–4.6)
ALP SERPL-CCNC: 119 U/L (ref 40–130)
ALT SERPL-CCNC: 45 U/L (ref 0–33)
ANION GAP SERPL CALCULATED.3IONS-SCNC: 11 MEQ/L (ref 9–15)
AST SERPL-CCNC: 39 U/L (ref 0–35)
BASOPHILS # BLD: 0 K/UL (ref 0–0.2)
BASOPHILS NFR BLD: 0.4 %
BILIRUB SERPL-MCNC: 0.5 MG/DL (ref 0.2–0.7)
BUN SERPL-MCNC: 15 MG/DL (ref 6–20)
CALCIUM SERPL-MCNC: 9.7 MG/DL (ref 8.5–9.9)
CHLORIDE SERPL-SCNC: 102 MEQ/L (ref 95–107)
CO2 SERPL-SCNC: 27 MEQ/L (ref 20–31)
CREAT SERPL-MCNC: 0.83 MG/DL (ref 0.5–0.9)
EOSINOPHIL # BLD: 0.5 K/UL (ref 0–0.7)
EOSINOPHIL NFR BLD: 7.1 %
ERYTHROCYTE [DISTWIDTH] IN BLOOD BY AUTOMATED COUNT: 15.5 % (ref 11.5–14.5)
GLOBULIN SER CALC-MCNC: 3.7 G/DL (ref 2.3–3.5)
GLUCOSE SERPL-MCNC: 325 MG/DL (ref 70–99)
HCT VFR BLD AUTO: 38.4 % (ref 37–47)
HGB BLD-MCNC: 12.9 G/DL (ref 12–16)
LACTATE BLDV-SCNC: 1.8 MMOL/L (ref 0.5–2.2)
LYMPHOCYTES # BLD: 2.3 K/UL (ref 1–4.8)
LYMPHOCYTES NFR BLD: 32.7 %
MCH RBC QN AUTO: 30.4 PG (ref 27–31.3)
MCHC RBC AUTO-ENTMCNC: 33.6 % (ref 33–37)
MCV RBC AUTO: 90.3 FL (ref 79.4–94.8)
MONOCYTES # BLD: 0.3 K/UL (ref 0.2–0.8)
MONOCYTES NFR BLD: 4.9 %
NEUTROPHILS # BLD: 3.8 K/UL (ref 1.4–6.5)
NEUTS SEG NFR BLD: 54.9 %
PLATELET # BLD AUTO: 74 K/UL (ref 130–400)
PLATELET BLD QL SMEAR: ABNORMAL
POTASSIUM SERPL-SCNC: 4 MEQ/L (ref 3.4–4.9)
PROT SERPL-MCNC: 7.7 G/DL (ref 6.3–8)
RBC # BLD AUTO: 4.25 M/UL (ref 4.2–5.4)
SODIUM SERPL-SCNC: 140 MEQ/L (ref 135–144)
WBC # BLD AUTO: 6.9 K/UL (ref 4.8–10.8)

## 2023-08-03 PROCEDURE — 36415 COLL VENOUS BLD VENIPUNCTURE: CPT

## 2023-08-03 PROCEDURE — 83605 ASSAY OF LACTIC ACID: CPT

## 2023-08-03 PROCEDURE — 85025 COMPLETE CBC W/AUTO DIFF WBC: CPT

## 2023-08-03 PROCEDURE — 6360000004 HC RX CONTRAST MEDICATION: Performed by: EMERGENCY MEDICINE

## 2023-08-03 PROCEDURE — 74177 CT ABD & PELVIS W/CONTRAST: CPT

## 2023-08-03 PROCEDURE — 96376 TX/PRO/DX INJ SAME DRUG ADON: CPT

## 2023-08-03 PROCEDURE — 2580000003 HC RX 258: Performed by: EMERGENCY MEDICINE

## 2023-08-03 PROCEDURE — 6360000002 HC RX W HCPCS: Performed by: EMERGENCY MEDICINE

## 2023-08-03 PROCEDURE — 96374 THER/PROPH/DIAG INJ IV PUSH: CPT

## 2023-08-03 PROCEDURE — 99285 EMERGENCY DEPT VISIT HI MDM: CPT

## 2023-08-03 PROCEDURE — 80053 COMPREHEN METABOLIC PANEL: CPT

## 2023-08-03 PROCEDURE — 96375 TX/PRO/DX INJ NEW DRUG ADDON: CPT

## 2023-08-03 RX ORDER — ONDANSETRON 2 MG/ML
4 INJECTION INTRAMUSCULAR; INTRAVENOUS ONCE
Status: COMPLETED | OUTPATIENT
Start: 2023-08-03 | End: 2023-08-03

## 2023-08-03 RX ORDER — 0.9 % SODIUM CHLORIDE 0.9 %
1000 INTRAVENOUS SOLUTION INTRAVENOUS ONCE
Status: COMPLETED | OUTPATIENT
Start: 2023-08-03 | End: 2023-08-03

## 2023-08-03 RX ORDER — HYDROMORPHONE HYDROCHLORIDE 1 MG/ML
1 INJECTION, SOLUTION INTRAMUSCULAR; INTRAVENOUS; SUBCUTANEOUS ONCE
Status: COMPLETED | OUTPATIENT
Start: 2023-08-03 | End: 2023-08-03

## 2023-08-03 RX ORDER — DICYCLOMINE HCL 20 MG
20 TABLET ORAL 4 TIMES DAILY
Qty: 20 TABLET | Refills: 0 | Status: SHIPPED | OUTPATIENT
Start: 2023-08-03 | End: 2023-08-08

## 2023-08-03 RX ADMIN — ONDANSETRON 4 MG: 2 INJECTION INTRAMUSCULAR; INTRAVENOUS at 20:45

## 2023-08-03 RX ADMIN — SODIUM CHLORIDE 1000 ML: 9 INJECTION, SOLUTION INTRAVENOUS at 20:45

## 2023-08-03 RX ADMIN — HYDROMORPHONE HYDROCHLORIDE 1 MG: 1 INJECTION, SOLUTION INTRAMUSCULAR; INTRAVENOUS; SUBCUTANEOUS at 20:46

## 2023-08-03 RX ADMIN — IOPAMIDOL 75 ML: 612 INJECTION, SOLUTION INTRAVENOUS at 21:54

## 2023-08-03 RX ADMIN — HYDROMORPHONE HYDROCHLORIDE 1 MG: 1 INJECTION, SOLUTION INTRAMUSCULAR; INTRAVENOUS; SUBCUTANEOUS at 21:41

## 2023-08-03 ASSESSMENT — ENCOUNTER SYMPTOMS
WHEEZING: 0
VOMITING: 1
PHOTOPHOBIA: 0
CHEST TIGHTNESS: 0
COUGH: 0
SHORTNESS OF BREATH: 0
EYE DISCHARGE: 0
ABDOMINAL DISTENTION: 0
ABDOMINAL PAIN: 1
SORE THROAT: 0
NAUSEA: 1

## 2023-08-03 ASSESSMENT — PAIN SCALES - GENERAL
PAINLEVEL_OUTOF10: 10
PAINLEVEL_OUTOF10: 10

## 2023-08-03 ASSESSMENT — PAIN - FUNCTIONAL ASSESSMENT: PAIN_FUNCTIONAL_ASSESSMENT: 0-10

## 2023-08-03 ASSESSMENT — PAIN DESCRIPTION - DESCRIPTORS: DESCRIPTORS: SHARP;STABBING

## 2023-08-03 ASSESSMENT — PAIN DESCRIPTION - PAIN TYPE: TYPE: ACUTE PAIN

## 2023-08-03 ASSESSMENT — PAIN DESCRIPTION - LOCATION: LOCATION: ABDOMEN

## 2023-08-04 NOTE — ED PROVIDER NOTES
Breath sounds: Normal breath sounds. Abdominal:      General: Abdomen is flat. Bowel sounds are normal. There is no distension. Palpations: Abdomen is soft. Tenderness: There is abdominal tenderness in the left lower quadrant. There is no guarding. Musculoskeletal:         General: Normal range of motion. Cervical back: Normal range of motion and neck supple. Skin:     General: Skin is warm and dry. Capillary Refill: Capillary refill takes less than 2 seconds. Neurological:      Mental Status: She is alert and oriented to person, place, and time. Psychiatric:         Mood and Affect: Mood normal.       DIAGNOSTIC RESULTS     EKG: All EKG's are interpreted by the Emergency Department Physician who either signs or Co-signsthis chart in the absence of a cardiologist.      RADIOLOGY:   Cathaleen Canary such as CT, Ultrasound and MRI are read by the radiologist. Plain radiographic images are visualized and preliminarily interpreted by the emergency physician with the below findings:      Interpretation per the Radiologist below, if available at the time ofthis note:    CT ABDOMEN PELVIS W IV CONTRAST Additional Contrast? None    (Results Pending)         ED BEDSIDE ULTRASOUND:   Performed by ED Physician - none    LABS:  Labs Reviewed   COMPREHENSIVE METABOLIC PANEL - Abnormal; Notable for the following components:       Result Value    Glucose 325 (*)     ALT 45 (*)     AST 39 (*)     Globulin 3.7 (*)     All other components within normal limits   CBC WITH AUTO DIFFERENTIAL - Abnormal; Notable for the following components:    RDW 15.5 (*)     Platelets 74 (*)     All other components within normal limits   LACTIC ACID   URINALYSIS WITH REFLEX TO CULTURE       All other labs were within normal range or not returned as of this dictation.     EMERGENCY DEPARTMENT COURSE and DIFFERENTIAL DIAGNOSIS/MDM:   Vitals:    Vitals:    08/03/23 2009 08/03/23 2228   BP: 119/71 (!) 113/59   Pulse:

## 2023-08-31 ENCOUNTER — HOSPITAL ENCOUNTER (EMERGENCY)
Age: 48
Discharge: HOME | End: 2023-09-01
Attending: EMERGENCY MEDICINE
Payer: COMMERCIAL

## 2023-08-31 DIAGNOSIS — R07.89 ATYPICAL CHEST PAIN: ICD-10-CM

## 2023-08-31 DIAGNOSIS — R10.32 ABDOMINAL PAIN, LEFT LOWER QUADRANT: Primary | ICD-10-CM

## 2023-08-31 LAB
BASOPHILS # BLD: 0.1 K/UL (ref 0–0.2)
BASOPHILS NFR BLD: 0.9 %
EOSINOPHIL # BLD: 0.7 K/UL (ref 0–0.7)
EOSINOPHIL NFR BLD: 10.2 %
ERYTHROCYTE [DISTWIDTH] IN BLOOD BY AUTOMATED COUNT: 15.8 % (ref 11.5–14.5)
HCT VFR BLD AUTO: 39.6 % (ref 37–47)
HGB BLD-MCNC: 13.3 G/DL (ref 12–16)
LACTATE BLDV-SCNC: 1.8 MMOL/L (ref 0.5–2.2)
LYMPHOCYTES # BLD: 2.4 K/UL (ref 1–4.8)
LYMPHOCYTES NFR BLD: 35.8 %
MCH RBC QN AUTO: 30.6 PG (ref 27–31.3)
MCHC RBC AUTO-ENTMCNC: 33.6 % (ref 33–37)
MCV RBC AUTO: 91.1 FL (ref 79.4–94.8)
MONOCYTES # BLD: 0.5 K/UL (ref 0.2–0.8)
MONOCYTES NFR BLD: 6.8 %
NEUTROPHILS # BLD: 3.1 K/UL (ref 1.4–6.5)
NEUTS SEG NFR BLD: 46.3 %
PLATELET # BLD AUTO: 71 K/UL (ref 130–400)
RBC # BLD AUTO: 4.35 M/UL (ref 4.2–5.4)
REASON FOR REJECTION: NORMAL
REJECTED TEST: NORMAL
TROPONIN T SERPL-MCNC: <0.01 NG/ML (ref 0–0.01)
WBC # BLD AUTO: 6.8 K/UL (ref 4.8–10.8)

## 2023-08-31 PROCEDURE — 93005 ELECTROCARDIOGRAM TRACING: CPT

## 2023-08-31 PROCEDURE — 96375 TX/PRO/DX INJ NEW DRUG ADDON: CPT

## 2023-08-31 PROCEDURE — 84484 ASSAY OF TROPONIN QUANT: CPT

## 2023-08-31 PROCEDURE — 36415 COLL VENOUS BLD VENIPUNCTURE: CPT

## 2023-08-31 PROCEDURE — 85025 COMPLETE CBC W/AUTO DIFF WBC: CPT

## 2023-08-31 PROCEDURE — 6360000002 HC RX W HCPCS: Performed by: EMERGENCY MEDICINE

## 2023-08-31 PROCEDURE — 99284 EMERGENCY DEPT VISIT MOD MDM: CPT

## 2023-08-31 PROCEDURE — 83605 ASSAY OF LACTIC ACID: CPT

## 2023-08-31 PROCEDURE — 96374 THER/PROPH/DIAG INJ IV PUSH: CPT

## 2023-08-31 RX ORDER — MORPHINE SULFATE 4 MG/ML
4 INJECTION, SOLUTION INTRAMUSCULAR; INTRAVENOUS ONCE
Status: COMPLETED | OUTPATIENT
Start: 2023-08-31 | End: 2023-08-31

## 2023-08-31 RX ORDER — ONDANSETRON 2 MG/ML
4 INJECTION INTRAMUSCULAR; INTRAVENOUS ONCE
Status: COMPLETED | OUTPATIENT
Start: 2023-08-31 | End: 2023-08-31

## 2023-08-31 RX ADMIN — MORPHINE SULFATE 4 MG: 4 INJECTION INTRAVENOUS at 23:13

## 2023-08-31 RX ADMIN — ONDANSETRON 4 MG: 2 INJECTION INTRAMUSCULAR; INTRAVENOUS at 23:11

## 2023-08-31 ASSESSMENT — ENCOUNTER SYMPTOMS
CHEST TIGHTNESS: 0
PHOTOPHOBIA: 0
SORE THROAT: 0
BLOOD IN STOOL: 0
NAUSEA: 1
ABDOMINAL DISTENTION: 0
VOMITING: 0
WHEEZING: 0
COUGH: 0
SHORTNESS OF BREATH: 0
ABDOMINAL PAIN: 1
EYE DISCHARGE: 0

## 2023-08-31 ASSESSMENT — PAIN DESCRIPTION - LOCATION: LOCATION: CHEST;BACK

## 2023-08-31 ASSESSMENT — PAIN SCALES - WONG BAKER: WONGBAKER_NUMERICALRESPONSE: 4

## 2023-08-31 ASSESSMENT — PAIN DESCRIPTION - ORIENTATION: ORIENTATION: LEFT

## 2023-08-31 ASSESSMENT — PAIN DESCRIPTION - ONSET: ONSET: ON-GOING

## 2023-08-31 ASSESSMENT — PAIN DESCRIPTION - FREQUENCY: FREQUENCY: CONTINUOUS

## 2023-09-01 VITALS
OXYGEN SATURATION: 97 % | SYSTOLIC BLOOD PRESSURE: 112 MMHG | HEART RATE: 71 BPM | TEMPERATURE: 98.5 F | DIASTOLIC BLOOD PRESSURE: 78 MMHG | BODY MASS INDEX: 46.11 KG/M2 | RESPIRATION RATE: 18 BRPM | WEIGHT: 293 LBS

## 2023-09-01 LAB
ALBUMIN SERPL-MCNC: 4.4 G/DL (ref 3.5–4.6)
ALP SERPL-CCNC: 98 U/L (ref 40–130)
ALT SERPL-CCNC: 54 U/L (ref 0–33)
ANION GAP SERPL CALCULATED.3IONS-SCNC: 12 MEQ/L (ref 9–15)
AST SERPL-CCNC: 44 U/L (ref 0–35)
BILIRUB SERPL-MCNC: 0.5 MG/DL (ref 0.2–0.7)
BUN SERPL-MCNC: 19 MG/DL (ref 6–20)
CALCIUM SERPL-MCNC: 9.7 MG/DL (ref 8.5–9.9)
CHLORIDE SERPL-SCNC: 99 MEQ/L (ref 95–107)
CO2 SERPL-SCNC: 25 MEQ/L (ref 20–31)
CREAT SERPL-MCNC: 0.74 MG/DL (ref 0.5–0.9)
EKG ATRIAL RATE: 85 BPM
EKG P AXIS: 31 DEGREES
EKG P-R INTERVAL: 156 MS
EKG Q-T INTERVAL: 372 MS
EKG QRS DURATION: 88 MS
EKG QTC CALCULATION (BAZETT): 442 MS
EKG R AXIS: -69 DEGREES
EKG T AXIS: 17 DEGREES
EKG VENTRICULAR RATE: 85 BPM
GLOBULIN SER CALC-MCNC: 3.1 G/DL (ref 2.3–3.5)
GLUCOSE SERPL-MCNC: 347 MG/DL (ref 70–99)
LIPASE SERPL-CCNC: 16 U/L (ref 12–95)
POTASSIUM SERPL-SCNC: 4.3 MEQ/L (ref 3.4–4.9)
PROT SERPL-MCNC: 7.5 G/DL (ref 6.3–8)
SODIUM SERPL-SCNC: 136 MEQ/L (ref 135–144)

## 2023-09-01 PROCEDURE — 6370000000 HC RX 637 (ALT 250 FOR IP): Performed by: EMERGENCY MEDICINE

## 2023-09-01 PROCEDURE — 80053 COMPREHEN METABOLIC PANEL: CPT

## 2023-09-01 PROCEDURE — 83690 ASSAY OF LIPASE: CPT

## 2023-09-01 PROCEDURE — 96375 TX/PRO/DX INJ NEW DRUG ADDON: CPT

## 2023-09-01 PROCEDURE — 6360000002 HC RX W HCPCS: Performed by: EMERGENCY MEDICINE

## 2023-09-01 PROCEDURE — 36415 COLL VENOUS BLD VENIPUNCTURE: CPT

## 2023-09-01 RX ORDER — HYDROCODONE BITARTRATE AND ACETAMINOPHEN 5; 325 MG/1; MG/1
1 TABLET ORAL EVERY 6 HOURS PRN
Qty: 12 TABLET | Refills: 0 | Status: SHIPPED | OUTPATIENT
Start: 2023-09-01 | End: 2023-09-04

## 2023-09-01 RX ORDER — KETOROLAC TROMETHAMINE 15 MG/ML
15 INJECTION, SOLUTION INTRAMUSCULAR; INTRAVENOUS ONCE
Status: COMPLETED | OUTPATIENT
Start: 2023-09-01 | End: 2023-09-01

## 2023-09-01 RX ADMIN — INSULIN HUMAN 8 UNITS: 100 INJECTION, SOLUTION PARENTERAL at 01:18

## 2023-09-01 RX ADMIN — KETOROLAC TROMETHAMINE 15 MG: 15 INJECTION, SOLUTION INTRAMUSCULAR; INTRAVENOUS at 01:20

## 2023-09-01 ASSESSMENT — ENCOUNTER SYMPTOMS
ABDOMINAL DISTENTION: 0
SHORTNESS OF BREATH: 0
PHOTOPHOBIA: 0
WHEEZING: 0
VOMITING: 0
BLOOD IN STOOL: 0
ABDOMINAL PAIN: 1
NAUSEA: 1
COUGH: 0
EYE DISCHARGE: 0
SORE THROAT: 0
CHEST TIGHTNESS: 0

## 2023-09-01 NOTE — ED NOTES
Pt c/o chest pain, abdominal pain, left arm pain and left pain x's 3 days.      Paty Hush  08/31/23 [id number]

## 2023-09-01 NOTE — ED NOTES
Discharge instructions reviewed with patient. New medications discussed with patient. Patient is ambulatory, vitals stable, no distress noted. Respirations even and unlabored. Patient GCS 15. Patient discharged to home.      Myrene Phalen, RN  09/01/23 0451

## 2023-09-23 PROCEDURE — 96375 TX/PRO/DX INJ NEW DRUG ADDON: CPT

## 2023-09-23 PROCEDURE — 96374 THER/PROPH/DIAG INJ IV PUSH: CPT

## 2023-09-23 PROCEDURE — 99285 EMERGENCY DEPT VISIT HI MDM: CPT

## 2023-09-23 ASSESSMENT — PAIN SCALES - GENERAL: PAINLEVEL_OUTOF10: 4

## 2023-09-23 ASSESSMENT — PAIN DESCRIPTION - ONSET: ONSET: ON-GOING

## 2023-09-23 ASSESSMENT — PAIN DESCRIPTION - FREQUENCY: FREQUENCY: CONTINUOUS

## 2023-09-23 ASSESSMENT — PAIN DESCRIPTION - LOCATION: LOCATION: ABDOMEN

## 2023-09-23 ASSESSMENT — PAIN DESCRIPTION - DESCRIPTORS: DESCRIPTORS: DISCOMFORT

## 2023-09-24 ENCOUNTER — APPOINTMENT (OUTPATIENT)
Dept: GENERAL RADIOLOGY | Age: 48
End: 2023-09-24
Payer: COMMERCIAL

## 2023-09-24 ENCOUNTER — HOSPITAL ENCOUNTER (EMERGENCY)
Age: 48
Discharge: HOME | End: 2023-09-24
Payer: COMMERCIAL

## 2023-09-24 ENCOUNTER — APPOINTMENT (OUTPATIENT)
Dept: CT IMAGING | Age: 48
End: 2023-09-24
Payer: COMMERCIAL

## 2023-09-24 VITALS
SYSTOLIC BLOOD PRESSURE: 122 MMHG | OXYGEN SATURATION: 98 % | RESPIRATION RATE: 16 BRPM | DIASTOLIC BLOOD PRESSURE: 67 MMHG | HEART RATE: 77 BPM | TEMPERATURE: 98.4 F | WEIGHT: 293 LBS | BODY MASS INDEX: 46.11 KG/M2

## 2023-09-24 DIAGNOSIS — R10.32 ABDOMINAL PAIN, LEFT LOWER QUADRANT: ICD-10-CM

## 2023-09-24 DIAGNOSIS — R07.89 CHEST WALL PAIN: Primary | ICD-10-CM

## 2023-09-24 LAB
ALBUMIN SERPL-MCNC: 4.6 G/DL (ref 3.5–4.6)
ALP SERPL-CCNC: 91 U/L (ref 40–130)
ALT SERPL-CCNC: 52 U/L (ref 0–33)
ANION GAP SERPL CALCULATED.3IONS-SCNC: 11 MEQ/L (ref 9–15)
AST SERPL-CCNC: 43 U/L (ref 0–35)
BASOPHILS # BLD: 0.1 K/UL (ref 0–0.2)
BASOPHILS NFR BLD: 1.1 %
BILIRUB SERPL-MCNC: 0.6 MG/DL (ref 0.2–0.7)
BILIRUB UR QL STRIP: NEGATIVE
BUN SERPL-MCNC: 15 MG/DL (ref 6–20)
CALCIUM SERPL-MCNC: 9.2 MG/DL (ref 8.5–9.9)
CHLORIDE SERPL-SCNC: 101 MEQ/L (ref 95–107)
CLARITY UR: CLEAR
CO2 SERPL-SCNC: 25 MEQ/L (ref 20–31)
COLOR UR: YELLOW
CREAT SERPL-MCNC: 0.89 MG/DL (ref 0.5–0.9)
EOSINOPHIL # BLD: 0.5 K/UL (ref 0–0.7)
EOSINOPHIL NFR BLD: 9.2 %
ERYTHROCYTE [DISTWIDTH] IN BLOOD BY AUTOMATED COUNT: 14.5 % (ref 11.5–14.5)
GLOBULIN SER CALC-MCNC: 3.1 G/DL (ref 2.3–3.5)
GLUCOSE SERPL-MCNC: 286 MG/DL (ref 70–99)
GLUCOSE UR STRIP-MCNC: >=1000 MG/DL
HCT VFR BLD AUTO: 38 % (ref 37–47)
HGB BLD-MCNC: 12.8 G/DL (ref 12–16)
HGB UR QL STRIP: NEGATIVE
KETONES UR STRIP-MCNC: NEGATIVE MG/DL
LEUKOCYTE ESTERASE UR QL STRIP: NEGATIVE
LYMPHOCYTES # BLD: 2.3 K/UL (ref 1–4.8)
LYMPHOCYTES NFR BLD: 42.3 %
MCH RBC QN AUTO: 30.7 PG (ref 27–31.3)
MCHC RBC AUTO-ENTMCNC: 33.7 % (ref 33–37)
MCV RBC AUTO: 91.1 FL (ref 79.4–94.8)
MONOCYTES # BLD: 0.2 K/UL (ref 0.2–0.8)
MONOCYTES NFR BLD: 4 %
NEUTROPHILS # BLD: 2.4 K/UL (ref 1.4–6.5)
NEUTS SEG NFR BLD: 43.2 %
NITRITE UR QL STRIP: NEGATIVE
PH UR STRIP: 6 [PH] (ref 5–9)
PLATELET # BLD AUTO: 67 K/UL (ref 130–400)
POC CREATININE WHOLE BLOOD: 0.8
POTASSIUM SERPL-SCNC: 3.8 MEQ/L (ref 3.4–4.9)
PROT SERPL-MCNC: 7.7 G/DL (ref 6.3–8)
PROT UR STRIP-MCNC: NEGATIVE MG/DL
RBC # BLD AUTO: 4.17 M/UL (ref 4.2–5.4)
SODIUM SERPL-SCNC: 137 MEQ/L (ref 135–144)
SP GR UR STRIP: 1.01 (ref 1–1.03)
TROPONIN T SERPL-MCNC: <0.01 NG/ML (ref 0–0.01)
URINE REFLEX TO CULTURE: ABNORMAL
UROBILINOGEN UR STRIP-ACNC: 0.2 E.U./DL
WBC # BLD AUTO: 5.5 K/UL (ref 4.8–10.8)

## 2023-09-24 PROCEDURE — 96361 HYDRATE IV INFUSION ADD-ON: CPT

## 2023-09-24 PROCEDURE — 96375 TX/PRO/DX INJ NEW DRUG ADDON: CPT

## 2023-09-24 PROCEDURE — 36415 COLL VENOUS BLD VENIPUNCTURE: CPT

## 2023-09-24 PROCEDURE — 71045 X-RAY EXAM CHEST 1 VIEW: CPT

## 2023-09-24 PROCEDURE — 93005 ELECTROCARDIOGRAM TRACING: CPT | Performed by: PERSONAL EMERGENCY RESPONSE ATTENDANT

## 2023-09-24 PROCEDURE — 96374 THER/PROPH/DIAG INJ IV PUSH: CPT

## 2023-09-24 PROCEDURE — 80053 COMPREHEN METABOLIC PANEL: CPT

## 2023-09-24 PROCEDURE — 81003 URINALYSIS AUTO W/O SCOPE: CPT

## 2023-09-24 PROCEDURE — 6360000002 HC RX W HCPCS: Performed by: PERSONAL EMERGENCY RESPONSE ATTENDANT

## 2023-09-24 PROCEDURE — 74177 CT ABD & PELVIS W/CONTRAST: CPT

## 2023-09-24 PROCEDURE — 84484 ASSAY OF TROPONIN QUANT: CPT

## 2023-09-24 PROCEDURE — 85025 COMPLETE CBC W/AUTO DIFF WBC: CPT

## 2023-09-24 PROCEDURE — 2580000003 HC RX 258: Performed by: PERSONAL EMERGENCY RESPONSE ATTENDANT

## 2023-09-24 PROCEDURE — 6370000000 HC RX 637 (ALT 250 FOR IP): Performed by: PERSONAL EMERGENCY RESPONSE ATTENDANT

## 2023-09-24 PROCEDURE — 6360000004 HC RX CONTRAST MEDICATION: Performed by: PERSONAL EMERGENCY RESPONSE ATTENDANT

## 2023-09-24 RX ORDER — ONDANSETRON 2 MG/ML
4 INJECTION INTRAMUSCULAR; INTRAVENOUS ONCE
Status: COMPLETED | OUTPATIENT
Start: 2023-09-24 | End: 2023-09-24

## 2023-09-24 RX ORDER — CYCLOBENZAPRINE HCL 10 MG
10 TABLET ORAL ONCE
Status: COMPLETED | OUTPATIENT
Start: 2023-09-24 | End: 2023-09-24

## 2023-09-24 RX ORDER — KETOROLAC TROMETHAMINE 30 MG/ML
30 INJECTION, SOLUTION INTRAMUSCULAR; INTRAVENOUS ONCE
Status: COMPLETED | OUTPATIENT
Start: 2023-09-24 | End: 2023-09-24

## 2023-09-24 RX ORDER — 0.9 % SODIUM CHLORIDE 0.9 %
500 INTRAVENOUS SOLUTION INTRAVENOUS ONCE
Status: COMPLETED | OUTPATIENT
Start: 2023-09-24 | End: 2023-09-24

## 2023-09-24 RX ADMIN — ONDANSETRON 4 MG: 2 INJECTION INTRAMUSCULAR; INTRAVENOUS at 01:17

## 2023-09-24 RX ADMIN — CYCLOBENZAPRINE 10 MG: 10 TABLET, FILM COATED ORAL at 02:15

## 2023-09-24 RX ADMIN — SODIUM CHLORIDE 500 ML: 9 INJECTION, SOLUTION INTRAVENOUS at 01:23

## 2023-09-24 RX ADMIN — IOPAMIDOL 75 ML: 612 INJECTION, SOLUTION INTRAVENOUS at 02:48

## 2023-09-24 RX ADMIN — KETOROLAC TROMETHAMINE 30 MG: 30 INJECTION, SOLUTION INTRAMUSCULAR; INTRAVENOUS at 01:18

## 2023-09-24 ASSESSMENT — ENCOUNTER SYMPTOMS
SHORTNESS OF BREATH: 1
DIARRHEA: 0
VOMITING: 1
NAUSEA: 1
COLOR CHANGE: 0
BLOOD IN STOOL: 0
SORE THROAT: 0
ABDOMINAL PAIN: 1
COUGH: 0
BACK PAIN: 1
WHEEZING: 1
RHINORRHEA: 0

## 2023-09-24 ASSESSMENT — PAIN DESCRIPTION - LOCATION: LOCATION: ABDOMEN

## 2023-09-24 ASSESSMENT — PAIN SCALES - GENERAL
PAINLEVEL_OUTOF10: 8
PAINLEVEL_OUTOF10: 10

## 2023-09-24 NOTE — ED PROVIDER NOTES
[facility] ED  eMERGENCY dEPARTMENT eNCOUnter      Pt Name: Betty Terry  MRN: [id number]  [address] 1975  Date of evaluation: 9/23/2023  Provider: BRIDGET Clemons    My attending is Dr. Gillette Fails is a 46 y.o. female with PMHx of asthma, chronic abdominal pain, depression, hyperlipidemia, hypertension, NATALEE, schizophrenia, DM 2 presents to the emergency department with chest pain and abdominal pain. Patient states for 2 days she has been midsternal chest pain radiating to left shoulder, intermittent, worse with breathing and truncal rotation with some shortness of breath and wheezing at home. Using inhaler without relief. She has also had left lower back pain rating to left lower quadrant, concern for diverticulitis. She has been nauseous but episodes of emesis today. Last bowel movement was this morning. She denies fevers, coughing, diarrhea, urinary symptoms. Not take anything at home for symptoms. HPI    Nursing Notes were reviewed. REVIEW OF SYSTEMS       Review of Systems   Constitutional:  Negative for appetite change, chills and fever. HENT:  Negative for congestion, rhinorrhea and sore throat. Respiratory:  Positive for shortness of breath and wheezing. Negative for cough. Cardiovascular:  Positive for chest pain. Gastrointestinal:  Positive for abdominal pain, nausea and vomiting. Negative for blood in stool and diarrhea. Genitourinary:  Negative for difficulty urinating. Musculoskeletal:  Positive for back pain. Negative for neck stiffness. Skin:  Negative for color change and rash. Neurological:  Negative for dizziness, syncope, weakness, light-headedness, numbness and headaches. All other systems reviewed and are negative.             PAST MEDICAL HISTORY     Past Medical History:   Diagnosis Date    Asthma     Chronic abdominal pain     Depression     Hyperlipidemia     Hypertension NATALEE (obstructive sleep apnea)     Schizophrenia (HCC)     Type II or unspecified type diabetes mellitus without mention of complication, not stated as uncontrolled          SURGICAL HISTORY       Past Surgical History:   Procedure Laterality Date    COLONOSCOPY  2/21/14    [facility]    UPPER GASTROINTESTINAL ENDOSCOPY  2/21/14    [facility]    UPPER GASTROINTESTINAL ENDOSCOPY N/A 3/22/2021    EGD DIAGNOSTIC ONLY performed by Dixie Rg MD at [address] [place] [address]       Previous Medications    ACETAMINOPHEN (TYLENOL) 500 MG TABLET    Take 2 tablets by mouth every 6 hours as needed for Pain    ATORVASTATIN (LIPITOR) 40 MG TABLET    Take 40 mg by mouth daily    DICYCLOMINE (BENTYL) 20 MG TABLET    Take 1 tablet by mouth 4 times daily for 20 doses    ESOMEPRAZOLE (NEXIUM) 40 MG DELAYED RELEASE CAPSULE    Take 1 capsule by mouth every morning (before breakfast)    GABAPENTIN (NEURONTIN) 800 MG TABLET    Take 800 mg by mouth 3 times daily. For leg pain    IBUPROFEN (IBU) 600 MG TABLET    Take 1 tablet by mouth every 6 hours as needed for Pain    INSULIN GLARGINE (LANTUS SOLOSTAR) 100 UNIT/ML INJECTION PEN    60 units every morning.  160 units nightly    INSULIN LISPRO (HUMALOG KWIKPEN U-200) 200 UNIT/ML SOPN PEN    Inject 70 Units into the skin 3 times daily (with meals) for 4 days    INSULIN SYRINGE .5CC/29G 29G X 1/2\" 0.5 ML MISC    1 each by Does not apply route daily    ISOSORBIDE MONONITRATE (IMDUR) 60 MG EXTENDED RELEASE TABLET    Take 60 mg by mouth daily    LEVOTHYROXINE (SYNTHROID) 50 MCG TABLET    Take 50 mcg by mouth Daily    LISINOPRIL (PRINIVIL;ZESTRIL) 10 MG TABLET    Take 1 tablet by mouth daily    MAGNESIUM OXIDE (MAG-OX) 400 (240 MG) MG TABLET    Take 1 tablet by mouth daily    METOPROLOL SUCCINATE (TOPROL XL) 25 MG EXTENDED RELEASE TABLET    Take 1 tablet by mouth daily    MONTELUKAST (SINGULAIR) 10 MG TABLET    Take 10 mg by mouth nightly    QUETIAPINE (SEROQUEL XR) 50 MG

## 2023-09-25 LAB
EKG ATRIAL RATE: 78 BPM
EKG P AXIS: 31 DEGREES
EKG P-R INTERVAL: 164 MS
EKG Q-T INTERVAL: 428 MS
EKG QRS DURATION: 96 MS
EKG QTC CALCULATION (BAZETT): 487 MS
EKG R AXIS: -31 DEGREES
EKG T AXIS: 22 DEGREES
EKG VENTRICULAR RATE: 78 BPM

## 2023-09-25 PROCEDURE — 93010 ELECTROCARDIOGRAM REPORT: CPT | Performed by: INTERNAL MEDICINE

## 2023-09-26 LAB
PERFORMED ON: NORMAL
POC CREATININE: 0.8 MG/DL (ref 0.6–1.2)
POC SAMPLE TYPE: NORMAL

## 2024-03-05 ENCOUNTER — HOSPITAL ENCOUNTER (EMERGENCY)
Age: 49
Discharge: HOME | End: 2024-03-05
Payer: COMMERCIAL

## 2024-03-05 ENCOUNTER — APPOINTMENT (OUTPATIENT)
Dept: GENERAL RADIOLOGY | Age: 49
End: 2024-03-05
Payer: COMMERCIAL

## 2024-03-05 VITALS
DIASTOLIC BLOOD PRESSURE: 66 MMHG | BODY MASS INDEX: 39.68 KG/M2 | OXYGEN SATURATION: 99 % | HEIGHT: 72 IN | RESPIRATION RATE: 18 BRPM | HEART RATE: 66 BPM | TEMPERATURE: 98.3 F | SYSTOLIC BLOOD PRESSURE: 127 MMHG | WEIGHT: 293 LBS

## 2024-03-05 DIAGNOSIS — E11.65 TYPE 2 DIABETES MELLITUS WITH HYPERGLYCEMIA, WITH LONG-TERM CURRENT USE OF INSULIN (HCC): ICD-10-CM

## 2024-03-05 DIAGNOSIS — R07.9 CHEST PAIN, UNSPECIFIED TYPE: Primary | ICD-10-CM

## 2024-03-05 DIAGNOSIS — Z86.69 HISTORY OF NEUROPATHY: ICD-10-CM

## 2024-03-05 DIAGNOSIS — L30.9 DERMATITIS: ICD-10-CM

## 2024-03-05 DIAGNOSIS — Z79.4 TYPE 2 DIABETES MELLITUS WITH HYPERGLYCEMIA, WITH LONG-TERM CURRENT USE OF INSULIN (HCC): ICD-10-CM

## 2024-03-05 LAB
ALBUMIN SERPL-MCNC: 4 G/DL (ref 3.5–4.6)
ALP SERPL-CCNC: 106 U/L (ref 40–130)
ALT SERPL-CCNC: 48 U/L (ref 0–33)
ANION GAP SERPL CALCULATED.3IONS-SCNC: 10 MEQ/L (ref 9–15)
AST SERPL-CCNC: 37 U/L (ref 0–35)
BASOPHILS # BLD: 0 K/UL (ref 0–0.2)
BASOPHILS NFR BLD: 0.9 %
BILIRUB SERPL-MCNC: 0.5 MG/DL (ref 0.2–0.7)
BUN SERPL-MCNC: 20 MG/DL (ref 6–20)
CALCIUM SERPL-MCNC: 9.2 MG/DL (ref 8.5–9.9)
CHLORIDE SERPL-SCNC: 101 MEQ/L (ref 95–107)
CO2 SERPL-SCNC: 26 MEQ/L (ref 20–31)
CREAT SERPL-MCNC: 0.7 MG/DL (ref 0.5–0.9)
EOSINOPHIL # BLD: 0.2 K/UL (ref 0–0.7)
EOSINOPHIL NFR BLD: 4.4 %
ERYTHROCYTE [DISTWIDTH] IN BLOOD BY AUTOMATED COUNT: 13.9 % (ref 11.5–14.5)
GLOBULIN SER CALC-MCNC: 3.9 G/DL (ref 2.3–3.5)
GLUCOSE SERPL-MCNC: 366 MG/DL (ref 70–99)
HCT VFR BLD AUTO: 33.7 % (ref 37–47)
HGB BLD-MCNC: 11.2 G/DL (ref 12–16)
LYMPHOCYTES # BLD: 1.7 K/UL (ref 1–4.8)
LYMPHOCYTES NFR BLD: 38.8 %
MAGNESIUM SERPL-MCNC: 2.1 MG/DL (ref 1.7–2.4)
MCH RBC QN AUTO: 30.2 PG (ref 27–31.3)
MCHC RBC AUTO-ENTMCNC: 33.2 % (ref 33–37)
MCV RBC AUTO: 90.8 FL (ref 79.4–94.8)
MONOCYTES # BLD: 0.3 K/UL (ref 0.2–0.8)
MONOCYTES NFR BLD: 6.7 %
NEUTROPHILS # BLD: 2.1 K/UL (ref 1.4–6.5)
NEUTS SEG NFR BLD: 49 %
PLATELET # BLD AUTO: 84 K/UL (ref 130–400)
POTASSIUM SERPL-SCNC: 4.4 MEQ/L (ref 3.4–4.9)
PROT SERPL-MCNC: 7.9 G/DL (ref 6.3–8)
RBC # BLD AUTO: 3.71 M/UL (ref 4.2–5.4)
SODIUM SERPL-SCNC: 137 MEQ/L (ref 135–144)
TROPONIN, HIGH SENSITIVITY: 11 NG/L (ref 0–19)
TROPONIN, HIGH SENSITIVITY: 11 NG/L (ref 0–19)
WBC # BLD AUTO: 4.4 K/UL (ref 4.8–10.8)

## 2024-03-05 PROCEDURE — 6370000000 HC RX 637 (ALT 250 FOR IP): Performed by: PHYSICIAN ASSISTANT

## 2024-03-05 PROCEDURE — 83735 ASSAY OF MAGNESIUM: CPT

## 2024-03-05 PROCEDURE — 96374 THER/PROPH/DIAG INJ IV PUSH: CPT

## 2024-03-05 PROCEDURE — 84484 ASSAY OF TROPONIN QUANT: CPT

## 2024-03-05 PROCEDURE — 36415 COLL VENOUS BLD VENIPUNCTURE: CPT

## 2024-03-05 PROCEDURE — 6360000002 HC RX W HCPCS: Performed by: PHYSICIAN ASSISTANT

## 2024-03-05 PROCEDURE — 80053 COMPREHEN METABOLIC PANEL: CPT

## 2024-03-05 PROCEDURE — 93005 ELECTROCARDIOGRAM TRACING: CPT | Performed by: PHYSICIAN ASSISTANT

## 2024-03-05 PROCEDURE — 99285 EMERGENCY DEPT VISIT HI MDM: CPT

## 2024-03-05 PROCEDURE — 71045 X-RAY EXAM CHEST 1 VIEW: CPT

## 2024-03-05 PROCEDURE — 85025 COMPLETE CBC W/AUTO DIFF WBC: CPT

## 2024-03-05 RX ORDER — KETOROLAC TROMETHAMINE 30 MG/ML
30 INJECTION, SOLUTION INTRAMUSCULAR; INTRAVENOUS ONCE
Status: COMPLETED | OUTPATIENT
Start: 2024-03-05 | End: 2024-03-05

## 2024-03-05 RX ORDER — ASPIRIN 81 MG/1
324 TABLET, CHEWABLE ORAL ONCE
Status: COMPLETED | OUTPATIENT
Start: 2024-03-05 | End: 2024-03-05

## 2024-03-05 RX ORDER — MELOXICAM 7.5 MG/1
15 TABLET ORAL DAILY
Qty: 14 TABLET | Refills: 0 | Status: SHIPPED | OUTPATIENT
Start: 2024-03-05

## 2024-03-05 RX ORDER — NITROGLYCERIN 0.4 MG/1
0.4 TABLET SUBLINGUAL EVERY 5 MIN PRN
Status: DISCONTINUED | OUTPATIENT
Start: 2024-03-05 | End: 2024-03-05 | Stop reason: HOSPADM

## 2024-03-05 RX ORDER — OXYCODONE HYDROCHLORIDE AND ACETAMINOPHEN 5; 325 MG/1; MG/1
1 TABLET ORAL ONCE
Status: DISCONTINUED | OUTPATIENT
Start: 2024-03-05 | End: 2024-03-05

## 2024-03-05 RX ADMIN — ASPIRIN 81 MG 324 MG: 81 TABLET ORAL at 17:15

## 2024-03-05 RX ADMIN — KETOROLAC TROMETHAMINE 30 MG: 30 INJECTION, SOLUTION INTRAMUSCULAR at 19:07

## 2024-03-05 RX ADMIN — NITROGLYCERIN 0.4 MG: 0.4 TABLET, ORALLY DISINTEGRATING SUBLINGUAL at 17:15

## 2024-03-05 RX ADMIN — NITROGLYCERIN 0.4 MG: 0.4 TABLET, ORALLY DISINTEGRATING SUBLINGUAL at 17:54

## 2024-03-05 ASSESSMENT — ENCOUNTER SYMPTOMS
VOICE CHANGE: 0
COUGH: 0
EYE DISCHARGE: 0
VOMITING: 0
ANAL BLEEDING: 0
ABDOMINAL DISTENTION: 0
NAUSEA: 0

## 2024-03-05 ASSESSMENT — PAIN DESCRIPTION - LOCATION
LOCATION: CHEST

## 2024-03-05 ASSESSMENT — PAIN - FUNCTIONAL ASSESSMENT
PAIN_FUNCTIONAL_ASSESSMENT: 0-10

## 2024-03-05 ASSESSMENT — PAIN SCALES - GENERAL
PAINLEVEL_OUTOF10: 5
PAINLEVEL_OUTOF10: 5
PAINLEVEL_OUTOF10: 8

## 2024-03-05 ASSESSMENT — LIFESTYLE VARIABLES
HOW MANY STANDARD DRINKS CONTAINING ALCOHOL DO YOU HAVE ON A TYPICAL DAY: PATIENT DOES NOT DRINK
HOW OFTEN DO YOU HAVE A DRINK CONTAINING ALCOHOL: NEVER

## 2024-03-05 ASSESSMENT — PAIN DESCRIPTION - DESCRIPTORS: DESCRIPTORS: SQUEEZING

## 2024-03-05 NOTE — ED PROVIDER NOTES
[facility] ED  EMERGENCY DEPARTMENT ENCOUNTER      Pt Name: Mehnaz Rivera  MRN: [id number]  Birthdate 1975  Date of evaluation: 3/5/2024  Provider: Jose Raul Pederson PA-C  4:34 PM EST    CHIEF COMPLAINT       Chief Complaint   Patient presents with    Chest Pain         HISTORY OF PRESENT ILLNESS   (Location/Symptom, Timing/Onset, Context/Setting, Quality, Duration, Modifying Factors, Severity)  Note limiting factors.   Mehnaz Rivera is a 48 y.o. female who presents to the emergency department patient has chest pain that is been intermittent 1 to 2 weeks become more constant today she states she does take her Imdur but did not take her sublingual nitro today.  She also has a rash on her feet notes she has had foot pain has seen her primary care physician for this is referred to dermatology and pain management.  Patient denies any fever cough congestion.  Mild to moderate severity chest pain worse with touch or motion.    HPI    Nursing Notes were reviewed.    REVIEW OF SYSTEMS    (2-9 systems for level 4, 10 or more for level 5)     Review of Systems   Constitutional:  Negative for activity change, appetite change and unexpected weight change.   HENT:  Negative for ear discharge, nosebleeds and voice change.    Eyes:  Negative for discharge.   Respiratory:  Negative for cough.    Cardiovascular:  Positive for chest pain.   Gastrointestinal:  Negative for abdominal distention, anal bleeding, nausea and vomiting.   Musculoskeletal:  Positive for myalgias. Negative for joint swelling.   Skin:  Positive for rash. Negative for pallor.   Neurological:  Negative for seizures and facial asymmetry.   Psychiatric/Behavioral:  Negative for behavioral problems, self-injury and sleep disturbance.    All other systems reviewed and are negative.      Except as noted above the remainder of the review of systems was reviewed and negative.       PAST MEDICAL HISTORY     Past Medical History:

## 2024-03-05 NOTE — ED NOTES
Turkish interpretor Yarelis #[id number]. Pt presents to ED due to chest pain that has been going on since 0400. Pt states it woke her up out of her sleep. Pt has been having the pain coming and going for the last week, consistent since 0400. Pt co of shortness of breath.

## 2024-03-06 LAB
EKG ATRIAL RATE: 68 BPM
EKG P AXIS: 29 DEGREES
EKG P-R INTERVAL: 156 MS
EKG Q-T INTERVAL: 430 MS
EKG QRS DURATION: 94 MS
EKG QTC CALCULATION (BAZETT): 457 MS
EKG R AXIS: -21 DEGREES
EKG T AXIS: 28 DEGREES
EKG VENTRICULAR RATE: 68 BPM

## 2024-03-06 PROCEDURE — 93010 ELECTROCARDIOGRAM REPORT: CPT | Performed by: INTERNAL MEDICINE

## 2024-03-06 NOTE — ED NOTES
Interpretor Ajit #[id number] used for discharge instructions and all questions answered to the best of this nurses abilities.

## 2024-04-19 ENCOUNTER — HOSPITAL ENCOUNTER (OUTPATIENT)
Age: 49
Setting detail: OBSERVATION
Discharge: HOME | End: 2024-04-21
Attending: STUDENT IN AN ORGANIZED HEALTH CARE EDUCATION/TRAINING PROGRAM | Admitting: STUDENT IN AN ORGANIZED HEALTH CARE EDUCATION/TRAINING PROGRAM
Payer: COMMERCIAL

## 2024-04-19 ENCOUNTER — APPOINTMENT (OUTPATIENT)
Dept: GENERAL RADIOLOGY | Age: 49
End: 2024-04-19
Payer: COMMERCIAL

## 2024-04-19 DIAGNOSIS — K31.84 GASTROPARESIS: ICD-10-CM

## 2024-04-19 DIAGNOSIS — R11.2 NAUSEA AND VOMITING, UNSPECIFIED VOMITING TYPE: ICD-10-CM

## 2024-04-19 DIAGNOSIS — R73.9 HYPERGLYCEMIA: Primary | ICD-10-CM

## 2024-04-19 LAB
ALBUMIN SERPL-MCNC: 3.8 G/DL (ref 3.5–4.6)
ALP SERPL-CCNC: 128 U/L (ref 40–130)
ALT SERPL-CCNC: 61 U/L (ref 0–33)
ANION GAP SERPL CALCULATED.3IONS-SCNC: 16 MEQ/L (ref 9–15)
APTT PPP: 23.2 SEC (ref 24.4–36.8)
AST SERPL-CCNC: 59 U/L (ref 0–35)
BASOPHILS # BLD: 0 K/UL (ref 0–0.2)
BASOPHILS NFR BLD: 1 %
BILIRUB SERPL-MCNC: 0.5 MG/DL (ref 0.2–0.7)
BUN SERPL-MCNC: 23 MG/DL (ref 6–20)
CALCIUM SERPL-MCNC: 9.8 MG/DL (ref 8.5–9.9)
CHLORIDE SERPL-SCNC: 97 MEQ/L (ref 95–107)
CO2 SERPL-SCNC: 24 MEQ/L (ref 20–31)
CREAT SERPL-MCNC: 0.98 MG/DL (ref 0.5–0.9)
EOSINOPHIL # BLD: 0.1 K/UL (ref 0–0.7)
EOSINOPHIL NFR BLD: 3 %
ERYTHROCYTE [DISTWIDTH] IN BLOOD BY AUTOMATED COUNT: 13.6 % (ref 11.5–14.5)
GLOBULIN SER CALC-MCNC: 4.1 G/DL (ref 2.3–3.5)
GLUCOSE SERPL-MCNC: 546 MG/DL (ref 70–99)
HCT VFR BLD AUTO: 35.4 % (ref 37–47)
HGB BLD-MCNC: 12.1 G/DL (ref 12–16)
INR PPP: 1.1
LACTATE BLDV-SCNC: 4.2 MMOL/L (ref 0.5–2.2)
LIPASE SERPL-CCNC: 19 U/L (ref 12–95)
LYMPHOCYTES # BLD: 1.5 K/UL (ref 1–4.8)
LYMPHOCYTES NFR BLD: 31 %
MAGNESIUM SERPL-MCNC: 2.2 MG/DL (ref 1.7–2.4)
MCH RBC QN AUTO: 30.4 PG (ref 27–31.3)
MCHC RBC AUTO-ENTMCNC: 34.2 % (ref 33–37)
MCV RBC AUTO: 88.9 FL (ref 79.4–94.8)
MONOCYTES # BLD: 0.4 K/UL (ref 0.2–0.8)
MONOCYTES NFR BLD: 7.7 %
NEUTROPHILS # BLD: 2.6 K/UL (ref 1.4–6.5)
NEUTS BAND NFR BLD MANUAL: 1 % (ref 5–11)
NEUTS SEG NFR BLD: 56 %
PLATELET # BLD AUTO: 75 K/UL (ref 130–400)
PLATELET BLD QL SMEAR: ABNORMAL
POTASSIUM SERPL-SCNC: 5.4 MEQ/L (ref 3.4–4.9)
PROLYMPHOCYTES # BLD MANUAL: 1 %
PROT SERPL-MCNC: 7.9 G/DL (ref 6.3–8)
PROTHROMBIN TIME: 14.6 SEC (ref 12.3–14.9)
RBC # BLD AUTO: 3.98 M/UL (ref 4.2–5.4)
REASON FOR REJECTION: NORMAL
REJECTED TEST: NORMAL
SLIDE REVIEW: ABNORMAL
SMUDGE CELLS BLD QL SMEAR: 12.5
SODIUM SERPL-SCNC: 137 MEQ/L (ref 135–144)
TROPONIN, HIGH SENSITIVITY: 14 NG/L (ref 0–19)
TROPONIN, HIGH SENSITIVITY: 15 NG/L (ref 0–19)
WBC # BLD AUTO: 4.6 K/UL (ref 4.8–10.8)

## 2024-04-19 PROCEDURE — A4216 STERILE WATER/SALINE, 10 ML: HCPCS | Performed by: STUDENT IN AN ORGANIZED HEALTH CARE EDUCATION/TRAINING PROGRAM

## 2024-04-19 PROCEDURE — 85730 THROMBOPLASTIN TIME PARTIAL: CPT

## 2024-04-19 PROCEDURE — 36415 COLL VENOUS BLD VENIPUNCTURE: CPT

## 2024-04-19 PROCEDURE — 99285 EMERGENCY DEPT VISIT HI MDM: CPT

## 2024-04-19 PROCEDURE — 84484 ASSAY OF TROPONIN QUANT: CPT

## 2024-04-19 PROCEDURE — 6370000000 HC RX 637 (ALT 250 FOR IP): Performed by: STUDENT IN AN ORGANIZED HEALTH CARE EDUCATION/TRAINING PROGRAM

## 2024-04-19 PROCEDURE — 2580000003 HC RX 258: Performed by: STUDENT IN AN ORGANIZED HEALTH CARE EDUCATION/TRAINING PROGRAM

## 2024-04-19 PROCEDURE — 83605 ASSAY OF LACTIC ACID: CPT

## 2024-04-19 PROCEDURE — 2500000003 HC RX 250 WO HCPCS: Performed by: STUDENT IN AN ORGANIZED HEALTH CARE EDUCATION/TRAINING PROGRAM

## 2024-04-19 PROCEDURE — 71046 X-RAY EXAM CHEST 2 VIEWS: CPT

## 2024-04-19 PROCEDURE — 83735 ASSAY OF MAGNESIUM: CPT

## 2024-04-19 PROCEDURE — 96366 THER/PROPH/DIAG IV INF ADDON: CPT

## 2024-04-19 PROCEDURE — 85025 COMPLETE CBC W/AUTO DIFF WBC: CPT

## 2024-04-19 PROCEDURE — 85610 PROTHROMBIN TIME: CPT

## 2024-04-19 PROCEDURE — 96365 THER/PROPH/DIAG IV INF INIT: CPT

## 2024-04-19 PROCEDURE — 93005 ELECTROCARDIOGRAM TRACING: CPT | Performed by: STUDENT IN AN ORGANIZED HEALTH CARE EDUCATION/TRAINING PROGRAM

## 2024-04-19 PROCEDURE — 80053 COMPREHEN METABOLIC PANEL: CPT

## 2024-04-19 PROCEDURE — 6360000002 HC RX W HCPCS: Performed by: STUDENT IN AN ORGANIZED HEALTH CARE EDUCATION/TRAINING PROGRAM

## 2024-04-19 PROCEDURE — 96375 TX/PRO/DX INJ NEW DRUG ADDON: CPT

## 2024-04-19 PROCEDURE — 96361 HYDRATE IV INFUSION ADD-ON: CPT

## 2024-04-19 PROCEDURE — 83690 ASSAY OF LIPASE: CPT

## 2024-04-19 RX ORDER — ONDANSETRON 2 MG/ML
4 INJECTION INTRAMUSCULAR; INTRAVENOUS ONCE
Status: COMPLETED | OUTPATIENT
Start: 2024-04-19 | End: 2024-04-19

## 2024-04-19 RX ORDER — 0.9 % SODIUM CHLORIDE 0.9 %
1000 INTRAVENOUS SOLUTION INTRAVENOUS ONCE
Status: COMPLETED | OUTPATIENT
Start: 2024-04-19 | End: 2024-04-19

## 2024-04-19 RX ADMIN — ONDANSETRON 4 MG: 2 INJECTION INTRAMUSCULAR; INTRAVENOUS at 23:59

## 2024-04-19 RX ADMIN — FAMOTIDINE 20 MG: 10 INJECTION, SOLUTION INTRAVENOUS at 21:47

## 2024-04-19 RX ADMIN — SODIUM CHLORIDE 1000 ML: 9 INJECTION, SOLUTION INTRAVENOUS at 22:59

## 2024-04-19 RX ADMIN — Medication: at 21:47

## 2024-04-19 RX ADMIN — ERYTHROMYCIN LACTOBIONATE 500 MG: 500 INJECTION, POWDER, LYOPHILIZED, FOR SOLUTION INTRAVENOUS at 22:11

## 2024-04-19 RX ADMIN — INSULIN HUMAN 20 UNITS: 100 INJECTION, SOLUTION PARENTERAL at 23:19

## 2024-04-20 ENCOUNTER — APPOINTMENT (OUTPATIENT)
Dept: ULTRASOUND IMAGING | Age: 49
End: 2024-04-20
Payer: COMMERCIAL

## 2024-04-20 ENCOUNTER — APPOINTMENT (OUTPATIENT)
Dept: CT IMAGING | Age: 49
End: 2024-04-20
Payer: COMMERCIAL

## 2024-04-20 PROBLEM — E11.65 HYPERGLYCEMIA DUE TO DIABETES MELLITUS (HCC): Status: ACTIVE | Noted: 2024-04-20

## 2024-04-20 LAB
ANION GAP SERPL CALCULATED.3IONS-SCNC: 11 MEQ/L (ref 9–15)
BUN SERPL-MCNC: 22 MG/DL (ref 6–20)
CALCIUM SERPL-MCNC: 9.1 MG/DL (ref 8.5–9.9)
CHLORIDE SERPL-SCNC: 102 MEQ/L (ref 95–107)
CO2 SERPL-SCNC: 24 MEQ/L (ref 20–31)
CREAT SERPL-MCNC: 0.89 MG/DL (ref 0.5–0.9)
GLUCOSE BLD-MCNC: 115 MG/DL (ref 70–99)
GLUCOSE BLD-MCNC: 213 MG/DL (ref 70–99)
GLUCOSE BLD-MCNC: 215 MG/DL (ref 70–99)
GLUCOSE BLD-MCNC: 290 MG/DL (ref 70–99)
GLUCOSE BLD-MCNC: 340 MG/DL (ref 70–99)
GLUCOSE BLD-MCNC: 437 MG/DL (ref 70–99)
GLUCOSE BLD-MCNC: 519 MG/DL (ref 70–99)
GLUCOSE SERPL-MCNC: 557 MG/DL (ref 70–99)
LACTATE BLDV-SCNC: 2.7 MMOL/L (ref 0.5–2.2)
PERFORMED ON: ABNORMAL
POTASSIUM SERPL-SCNC: 5 MEQ/L (ref 3.4–4.9)
SODIUM SERPL-SCNC: 137 MEQ/L (ref 135–144)

## 2024-04-20 PROCEDURE — 6360000002 HC RX W HCPCS: Performed by: SPECIALIST

## 2024-04-20 PROCEDURE — 96375 TX/PRO/DX INJ NEW DRUG ADDON: CPT

## 2024-04-20 PROCEDURE — 74177 CT ABD & PELVIS W/CONTRAST: CPT

## 2024-04-20 PROCEDURE — C9113 INJ PANTOPRAZOLE SODIUM, VIA: HCPCS | Performed by: SPECIALIST

## 2024-04-20 PROCEDURE — 83036 HEMOGLOBIN GLYCOSYLATED A1C: CPT

## 2024-04-20 PROCEDURE — 97166 OT EVAL MOD COMPLEX 45 MIN: CPT

## 2024-04-20 PROCEDURE — 96376 TX/PRO/DX INJ SAME DRUG ADON: CPT

## 2024-04-20 PROCEDURE — 6360000002 HC RX W HCPCS: Performed by: STUDENT IN AN ORGANIZED HEALTH CARE EDUCATION/TRAINING PROGRAM

## 2024-04-20 PROCEDURE — 6370000000 HC RX 637 (ALT 250 FOR IP): Performed by: STUDENT IN AN ORGANIZED HEALTH CARE EDUCATION/TRAINING PROGRAM

## 2024-04-20 PROCEDURE — 2580000003 HC RX 258: Performed by: STUDENT IN AN ORGANIZED HEALTH CARE EDUCATION/TRAINING PROGRAM

## 2024-04-20 PROCEDURE — G0378 HOSPITAL OBSERVATION PER HR: HCPCS

## 2024-04-20 PROCEDURE — 83605 ASSAY OF LACTIC ACID: CPT

## 2024-04-20 PROCEDURE — 6360000004 HC RX CONTRAST MEDICATION: Performed by: STUDENT IN AN ORGANIZED HEALTH CARE EDUCATION/TRAINING PROGRAM

## 2024-04-20 PROCEDURE — 36415 COLL VENOUS BLD VENIPUNCTURE: CPT

## 2024-04-20 PROCEDURE — 80048 BASIC METABOLIC PNL TOTAL CA: CPT

## 2024-04-20 PROCEDURE — A4216 STERILE WATER/SALINE, 10 ML: HCPCS | Performed by: SPECIALIST

## 2024-04-20 PROCEDURE — 96361 HYDRATE IV INFUSION ADD-ON: CPT

## 2024-04-20 PROCEDURE — 2580000003 HC RX 258: Performed by: SPECIALIST

## 2024-04-20 PROCEDURE — 76705 ECHO EXAM OF ABDOMEN: CPT

## 2024-04-20 PROCEDURE — 97162 PT EVAL MOD COMPLEX 30 MIN: CPT

## 2024-04-20 RX ORDER — INSULIN GLARGINE 100 [IU]/ML
160 INJECTION, SOLUTION SUBCUTANEOUS NIGHTLY
Status: DISCONTINUED | OUTPATIENT
Start: 2024-04-20 | End: 2024-04-21 | Stop reason: HOSPADM

## 2024-04-20 RX ORDER — GABAPENTIN 400 MG/1
800 CAPSULE ORAL 3 TIMES DAILY
Status: DISCONTINUED | OUTPATIENT
Start: 2024-04-20 | End: 2024-04-21 | Stop reason: HOSPADM

## 2024-04-20 RX ORDER — INSULIN LISPRO 100 [IU]/ML
0-4 INJECTION, SOLUTION INTRAVENOUS; SUBCUTANEOUS NIGHTLY
Status: DISCONTINUED | OUTPATIENT
Start: 2024-04-20 | End: 2024-04-21 | Stop reason: HOSPADM

## 2024-04-20 RX ORDER — INSULIN LISPRO 100 [IU]/ML
70 INJECTION, SOLUTION INTRAVENOUS; SUBCUTANEOUS ONCE
Status: COMPLETED | OUTPATIENT
Start: 2024-04-20 | End: 2024-04-20

## 2024-04-20 RX ORDER — INSULIN GLARGINE 100 [IU]/ML
80 INJECTION, SOLUTION SUBCUTANEOUS EVERY MORNING
Status: DISCONTINUED | OUTPATIENT
Start: 2024-04-20 | End: 2024-04-21 | Stop reason: HOSPADM

## 2024-04-20 RX ORDER — INSULIN LISPRO 100 [IU]/ML
0-4 INJECTION, SOLUTION INTRAVENOUS; SUBCUTANEOUS NIGHTLY
Status: DISCONTINUED | OUTPATIENT
Start: 2024-04-20 | End: 2024-04-20

## 2024-04-20 RX ORDER — LISINOPRIL 10 MG/1
10 TABLET ORAL DAILY
Status: DISCONTINUED | OUTPATIENT
Start: 2024-04-20 | End: 2024-04-21 | Stop reason: HOSPADM

## 2024-04-20 RX ORDER — DEXTROSE MONOHYDRATE 100 MG/ML
INJECTION, SOLUTION INTRAVENOUS CONTINUOUS PRN
Status: DISCONTINUED | OUTPATIENT
Start: 2024-04-20 | End: 2024-04-21 | Stop reason: HOSPADM

## 2024-04-20 RX ORDER — POLYETHYLENE GLYCOL 3350 17 G/17G
17 POWDER, FOR SOLUTION ORAL DAILY PRN
Status: DISCONTINUED | OUTPATIENT
Start: 2024-04-20 | End: 2024-04-21 | Stop reason: HOSPADM

## 2024-04-20 RX ORDER — SODIUM CHLORIDE 9 MG/ML
INJECTION, SOLUTION INTRAVENOUS PRN
Status: DISCONTINUED | OUTPATIENT
Start: 2024-04-20 | End: 2024-04-21 | Stop reason: HOSPADM

## 2024-04-20 RX ORDER — ACETAMINOPHEN 650 MG/1
650 SUPPOSITORY RECTAL EVERY 6 HOURS PRN
Status: DISCONTINUED | OUTPATIENT
Start: 2024-04-20 | End: 2024-04-21 | Stop reason: HOSPADM

## 2024-04-20 RX ORDER — ONDANSETRON 2 MG/ML
4 INJECTION INTRAMUSCULAR; INTRAVENOUS ONCE
Status: COMPLETED | OUTPATIENT
Start: 2024-04-20 | End: 2024-04-20

## 2024-04-20 RX ORDER — POTASSIUM CHLORIDE 7.45 MG/ML
10 INJECTION INTRAVENOUS PRN
Status: DISCONTINUED | OUTPATIENT
Start: 2024-04-20 | End: 2024-04-21 | Stop reason: HOSPADM

## 2024-04-20 RX ORDER — ASPIRIN 81 MG/1
81 TABLET ORAL DAILY
Status: DISCONTINUED | OUTPATIENT
Start: 2024-04-20 | End: 2024-04-21 | Stop reason: HOSPADM

## 2024-04-20 RX ORDER — MELOXICAM 7.5 MG/1
15 TABLET ORAL 2 TIMES DAILY PRN
Status: DISCONTINUED | OUTPATIENT
Start: 2024-04-20 | End: 2024-04-21 | Stop reason: HOSPADM

## 2024-04-20 RX ORDER — ONDANSETRON 2 MG/ML
4 INJECTION INTRAMUSCULAR; INTRAVENOUS EVERY 6 HOURS PRN
Status: DISCONTINUED | OUTPATIENT
Start: 2024-04-20 | End: 2024-04-21 | Stop reason: HOSPADM

## 2024-04-20 RX ORDER — MAGNESIUM SULFATE IN WATER 40 MG/ML
2000 INJECTION, SOLUTION INTRAVENOUS PRN
Status: DISCONTINUED | OUTPATIENT
Start: 2024-04-20 | End: 2024-04-21 | Stop reason: HOSPADM

## 2024-04-20 RX ORDER — ONDANSETRON 4 MG/1
4 TABLET, ORALLY DISINTEGRATING ORAL EVERY 8 HOURS PRN
Status: DISCONTINUED | OUTPATIENT
Start: 2024-04-20 | End: 2024-04-21 | Stop reason: HOSPADM

## 2024-04-20 RX ORDER — SODIUM CHLORIDE 0.9 % (FLUSH) 0.9 %
5-40 SYRINGE (ML) INJECTION EVERY 12 HOURS SCHEDULED
Status: DISCONTINUED | OUTPATIENT
Start: 2024-04-20 | End: 2024-04-21 | Stop reason: HOSPADM

## 2024-04-20 RX ORDER — DICYCLOMINE HCL 20 MG
20 TABLET ORAL 4 TIMES DAILY PRN
Status: DISCONTINUED | OUTPATIENT
Start: 2024-04-20 | End: 2024-04-21 | Stop reason: HOSPADM

## 2024-04-20 RX ORDER — GLUCAGON 1 MG/ML
1 KIT INJECTION PRN
Status: DISCONTINUED | OUTPATIENT
Start: 2024-04-20 | End: 2024-04-21 | Stop reason: HOSPADM

## 2024-04-20 RX ORDER — MORPHINE SULFATE 4 MG/ML
4 INJECTION, SOLUTION INTRAMUSCULAR; INTRAVENOUS ONCE
Status: COMPLETED | OUTPATIENT
Start: 2024-04-20 | End: 2024-04-20

## 2024-04-20 RX ORDER — MONTELUKAST SODIUM 10 MG/1
10 TABLET ORAL NIGHTLY
Status: DISCONTINUED | OUTPATIENT
Start: 2024-04-20 | End: 2024-04-21 | Stop reason: HOSPADM

## 2024-04-20 RX ORDER — SODIUM CHLORIDE, SODIUM LACTATE, POTASSIUM CHLORIDE, CALCIUM CHLORIDE 600; 310; 30; 20 MG/100ML; MG/100ML; MG/100ML; MG/100ML
INJECTION, SOLUTION INTRAVENOUS CONTINUOUS
Status: DISCONTINUED | OUTPATIENT
Start: 2024-04-20 | End: 2024-04-21

## 2024-04-20 RX ORDER — INSULIN LISPRO 100 [IU]/ML
0-8 INJECTION, SOLUTION INTRAVENOUS; SUBCUTANEOUS
Status: DISCONTINUED | OUTPATIENT
Start: 2024-04-20 | End: 2024-04-21 | Stop reason: HOSPADM

## 2024-04-20 RX ORDER — LEVOTHYROXINE SODIUM 0.05 MG/1
50 TABLET ORAL DAILY
Status: DISCONTINUED | OUTPATIENT
Start: 2024-04-20 | End: 2024-04-20

## 2024-04-20 RX ORDER — INSULIN LISPRO 100 [IU]/ML
70 INJECTION, SOLUTION INTRAVENOUS; SUBCUTANEOUS
Status: DISCONTINUED | OUTPATIENT
Start: 2024-04-20 | End: 2024-04-21 | Stop reason: HOSPADM

## 2024-04-20 RX ORDER — ENOXAPARIN SODIUM 100 MG/ML
40 INJECTION SUBCUTANEOUS 2 TIMES DAILY
Status: DISCONTINUED | OUTPATIENT
Start: 2024-04-20 | End: 2024-04-21 | Stop reason: HOSPADM

## 2024-04-20 RX ORDER — POTASSIUM CHLORIDE 20 MEQ/1
40 TABLET, EXTENDED RELEASE ORAL PRN
Status: DISCONTINUED | OUTPATIENT
Start: 2024-04-20 | End: 2024-04-21 | Stop reason: HOSPADM

## 2024-04-20 RX ORDER — ATORVASTATIN CALCIUM 40 MG/1
40 TABLET, FILM COATED ORAL DAILY
Status: DISCONTINUED | OUTPATIENT
Start: 2024-04-20 | End: 2024-04-21 | Stop reason: HOSPADM

## 2024-04-20 RX ORDER — INSULIN LISPRO 100 [IU]/ML
0-16 INJECTION, SOLUTION INTRAVENOUS; SUBCUTANEOUS
Status: DISCONTINUED | OUTPATIENT
Start: 2024-04-20 | End: 2024-04-20

## 2024-04-20 RX ORDER — ACETAMINOPHEN 325 MG/1
650 TABLET ORAL EVERY 6 HOURS PRN
Status: DISCONTINUED | OUTPATIENT
Start: 2024-04-20 | End: 2024-04-21 | Stop reason: HOSPADM

## 2024-04-20 RX ORDER — LANOLIN ALCOHOL/MO/W.PET/CERES
400 CREAM (GRAM) TOPICAL DAILY
Status: DISCONTINUED | OUTPATIENT
Start: 2024-04-20 | End: 2024-04-21 | Stop reason: HOSPADM

## 2024-04-20 RX ORDER — ASPIRIN 81 MG/1
81 TABLET ORAL DAILY
COMMUNITY

## 2024-04-20 RX ORDER — DICYCLOMINE HYDROCHLORIDE 10 MG/ML
20 INJECTION INTRAMUSCULAR 4 TIMES DAILY PRN
Status: DISCONTINUED | OUTPATIENT
Start: 2024-04-20 | End: 2024-04-21 | Stop reason: HOSPADM

## 2024-04-20 RX ORDER — SODIUM CHLORIDE, SODIUM LACTATE, POTASSIUM CHLORIDE, AND CALCIUM CHLORIDE .6; .31; .03; .02 G/100ML; G/100ML; G/100ML; G/100ML
1000 INJECTION, SOLUTION INTRAVENOUS ONCE
Status: COMPLETED | OUTPATIENT
Start: 2024-04-20 | End: 2024-04-20

## 2024-04-20 RX ORDER — SODIUM CHLORIDE 0.9 % (FLUSH) 0.9 %
5-40 SYRINGE (ML) INJECTION PRN
Status: DISCONTINUED | OUTPATIENT
Start: 2024-04-20 | End: 2024-04-21 | Stop reason: HOSPADM

## 2024-04-20 RX ORDER — ISOSORBIDE MONONITRATE 60 MG/1
60 TABLET, EXTENDED RELEASE ORAL DAILY
Status: DISCONTINUED | OUTPATIENT
Start: 2024-04-20 | End: 2024-04-21 | Stop reason: HOSPADM

## 2024-04-20 RX ORDER — METOPROLOL SUCCINATE 25 MG/1
25 TABLET, EXTENDED RELEASE ORAL DAILY
Status: DISCONTINUED | OUTPATIENT
Start: 2024-04-20 | End: 2024-04-21 | Stop reason: HOSPADM

## 2024-04-20 RX ADMIN — METOPROLOL SUCCINATE 25 MG: 25 TABLET, FILM COATED, EXTENDED RELEASE ORAL at 16:58

## 2024-04-20 RX ADMIN — ATORVASTATIN CALCIUM 40 MG: 40 TABLET, FILM COATED ORAL at 16:58

## 2024-04-20 RX ADMIN — Medication 400 MG: at 16:58

## 2024-04-20 RX ADMIN — ISOSORBIDE MONONITRATE 60 MG: 60 TABLET, EXTENDED RELEASE ORAL at 16:58

## 2024-04-20 RX ADMIN — SODIUM CHLORIDE, PRESERVATIVE FREE 40 MG: 5 INJECTION INTRAVENOUS at 14:17

## 2024-04-20 RX ADMIN — INSULIN GLARGINE 80 UNITS: 100 INJECTION, SOLUTION SUBCUTANEOUS at 22:29

## 2024-04-20 RX ADMIN — MORPHINE SULFATE 4 MG: 4 INJECTION, SOLUTION INTRAMUSCULAR; INTRAVENOUS at 04:43

## 2024-04-20 RX ADMIN — SODIUM CHLORIDE, POTASSIUM CHLORIDE, SODIUM LACTATE AND CALCIUM CHLORIDE: 600; 310; 30; 20 INJECTION, SOLUTION INTRAVENOUS at 15:46

## 2024-04-20 RX ADMIN — LISINOPRIL 10 MG: 10 TABLET ORAL at 16:58

## 2024-04-20 RX ADMIN — INSULIN LISPRO 70 UNITS: 100 INJECTION, SOLUTION INTRAVENOUS; SUBCUTANEOUS at 04:43

## 2024-04-20 RX ADMIN — INSULIN LISPRO 8 UNITS: 100 INJECTION, SOLUTION INTRAVENOUS; SUBCUTANEOUS at 11:47

## 2024-04-20 RX ADMIN — INSULIN GLARGINE 80 UNITS: 100 INJECTION, SOLUTION SUBCUTANEOUS at 10:06

## 2024-04-20 RX ADMIN — INSULIN LISPRO 70 UNITS: 100 INJECTION, SOLUTION INTRAVENOUS; SUBCUTANEOUS at 11:47

## 2024-04-20 RX ADMIN — IOPAMIDOL 75 ML: 755 INJECTION, SOLUTION INTRAVENOUS at 01:39

## 2024-04-20 RX ADMIN — ONDANSETRON 4 MG: 2 INJECTION INTRAMUSCULAR; INTRAVENOUS at 15:43

## 2024-04-20 RX ADMIN — SODIUM CHLORIDE, POTASSIUM CHLORIDE, SODIUM LACTATE AND CALCIUM CHLORIDE 1000 ML: 600; 310; 30; 20 INJECTION, SOLUTION INTRAVENOUS at 10:06

## 2024-04-20 RX ADMIN — GABAPENTIN 800 MG: 400 CAPSULE ORAL at 16:56

## 2024-04-20 RX ADMIN — SODIUM CHLORIDE, POTASSIUM CHLORIDE, SODIUM LACTATE AND CALCIUM CHLORIDE: 600; 310; 30; 20 INJECTION, SOLUTION INTRAVENOUS at 11:48

## 2024-04-20 RX ADMIN — GABAPENTIN 800 MG: 400 CAPSULE ORAL at 20:23

## 2024-04-20 RX ADMIN — LEVOTHYROXINE SODIUM 137 MCG: 0.05 TABLET ORAL at 16:56

## 2024-04-20 RX ADMIN — ASPIRIN 81 MG: 81 TABLET, COATED ORAL at 16:58

## 2024-04-20 RX ADMIN — Medication 10 ML: at 10:01

## 2024-04-20 RX ADMIN — ONDANSETRON 4 MG: 2 INJECTION INTRAMUSCULAR; INTRAVENOUS at 04:43

## 2024-04-20 RX ADMIN — MONTELUKAST 10 MG: 10 TABLET, FILM COATED ORAL at 20:23

## 2024-04-20 ASSESSMENT — PAIN SCALES - GENERAL
PAINLEVEL_OUTOF10: 10
PAINLEVEL_OUTOF10: 4

## 2024-04-20 ASSESSMENT — PAIN DESCRIPTION - DESCRIPTORS
DESCRIPTORS: BURNING
DESCRIPTORS: ACHING

## 2024-04-20 ASSESSMENT — PAIN DESCRIPTION - LOCATION
LOCATION: ABDOMEN
LOCATION: ABDOMEN

## 2024-04-20 NOTE — CARE COORDINATION
Case Management Assessment  Initial Evaluation    Date/Time of Evaluation: 4/20/2024 3:47 PM  Assessment Completed by: Muriel Diallo    If patient is discharged prior to next notation, then this note serves as note for discharge by case management.    Patient Name: Mehnaz Rivera                   YOB: 1975  Diagnosis: Gastroparesis [K31.84]  Hyperglycemia [R73.9]  Hyperglycemia due to diabetes mellitus (HCC) [E11.65]  Nausea and vomiting, unspecified vomiting type [R11.2]                   Date / Time: 4/19/2024  9:18 PM    Patient Admission Status: Observation   Readmission Risk (Low < 19, Mod (19-27), High > 27): No data recorded  Current PCP: Erick Meyers MD  PCP verified by CM? Yes    Chart Reviewed: Yes      History Provided by: Child/Family  Patient Orientation: Alert and Oriented, Person, Place, Situation, Self    Patient Cognition: Alert    Hospitalization in the last 30 days (Readmission):  No    If yes, Readmission Assessment in CM Navigator will be completed.    Advance Directives:      Code Status: Full Code   Patient's Primary Decision Maker is: Named in Scanned ACP Document    Primary Decision Maker: Lakeshia Oviedo - Child - [phone]    Secondary Decision Maker: Jaycee Oviedo - Child - [phone]    Discharge Planning:    Patient lives with: Children Type of Home: House  Primary Care Giver: Self  Patient Support Systems include: Children   Current Financial resources:    Current community resources:    Current services prior to admission: C-pap            Current DME:              Type of Home Care services:  None    ADLS  Prior functional level: Independent in ADLs/IADLs  Current functional level: Independent in ADLs/IADLs    PT AM-PAC: 17 /24  OT AM-PAC: 18 /24    Family can provide assistance at DC: Yes  Would you like Case Management to discuss the discharge plan with any other family members/significant others, and if so, who? Yes  Plans to

## 2024-04-20 NOTE — H&P
body habitus and overlying bowel gas.   2. No evidence of cholelithiasis, gallbladder wall thickening, or   pericholecystic fluid.   3. No evidence of ascites.   4. Suboptimal visualization of the pancreas.         CT ABDOMEN PELVIS W IV CONTRAST Additional Contrast? None   Final Result      XR CHEST (2 VW)   Final Result   No acute cardiopulmonary disease.             ASSESSMENT AND PLAN  Acute Illnesses    Epigastric pain  Nausea/Vomiting  Lactic acidosis  -Unclear etiology, considering PUD vs gastroparesis  -CT A/P on admission unremarkable  -GI consulted, adding PPI. Will continue to follow in case she needs EGD    T2DM w/ hyperglycemia  -Glc > 500 on admit, received 20U regular insulin and 70U lispro with minimal improvement  -Continue home Lantus 80U qam, 160U qhs, 70U lispro TID w/ meals + SSI  -1L LR bolus given. Will continue with LR 100cc/hr  -Endocrine consult  -Continue statin, gabapentin  -A1C pending    Chronic Illnesses  HTN  -Continue home Imdur, lisinopril        VTE Prophylaxis: low molecular weight heparin -  start         SIGNATURE: Juan Soriano MD  DATE: April 20, 2024  TIME: 3:17 PM

## 2024-04-20 NOTE — CARE COORDINATION
CM attempted to see pt, she is sleeping.  Attempted to call dtr ALIX Asher.  Will attempt later.

## 2024-04-20 NOTE — PLAN OF CARE
Problem: Discharge Planning  Goal: Discharge to home or other facility with appropriate resources  Outcome: Progressing     Problem: Safety - Adult  Goal: Free from fall injury  Outcome: Progressing     Problem: Discharge Planning  Goal: Discharge to home or other facility with appropriate resources  4/20/2024 1019 by Gita Machuca, RN  Outcome: Progressing  4/20/2024 1018 by Gita Machuca, RN  Outcome: Progressing

## 2024-04-20 NOTE — ED PROVIDER NOTES
Glucose 546 (*)     BUN 23 (*)     Creatinine 0.98 (*)     ALT 61 (*)     AST 59 (*)     Globulin 4.1 (*)     All other components within normal limits    Narrative:     CALL  [facility] tel. [phone],  GLU results called to and read back by CONSTANTIN MARCANO RN, 04/19/2024 22:43, by  WEBAM   LACTIC ACID - Abnormal; Notable for the following components:    Lactic Acid 4.2 (*)     All other components within normal limits    Narrative:     CALL  [facility] tel. [phone],  LACID results called to and read back by CONSTANTIN MARCANO RN, 04/19/2024 22:43, by  WEBAM   APTT - Abnormal; Notable for the following components:    aPTT 23.2 (*)     All other components within normal limits   CBC WITH AUTO DIFFERENTIAL - Abnormal; Notable for the following components:    WBC 4.6 (*)     RBC 3.98 (*)     Hematocrit 35.4 (*)     Platelets 75 (*)     Bands Relative 1 (*)     Prolymphocytes 1 (*)     All other components within normal limits   LACTIC ACID - Abnormal; Notable for the following components:    Lactic Acid 2.7 (*)     All other components within normal limits   BASIC METABOLIC PANEL - Abnormal; Notable for the following components:    Potassium 5.0 (*)     Glucose 557 (*)     BUN 22 (*)     All other components within normal limits    Narrative:     CALL  [facility] tel. [phone],  GLU results called to and read back by NEW BROWN RN, 04/20/2024 04:54,  by WEBAM   POCT GLUCOSE - Abnormal; Notable for the following components:    POC Glucose 519 (*)     All other components within normal limits   POCT GLUCOSE - Abnormal; Notable for the following components:    POC Glucose 437 (*)     All other components within normal limits   POCT GLUCOSE - Abnormal; Notable for the following components:    POC Glucose 340 (*)     All other components within normal limits   POCT GLUCOSE - Abnormal; Notable for the following components:    POC Glucose 290 (*)     All other components within normal limits   POCT GLUCOSE - Abnormal;

## 2024-04-20 NOTE — ED NOTES
Patient blood glucose tested twice 519 and recheck 494 Dr. Alvarez notified of results at this time.

## 2024-04-21 ENCOUNTER — APPOINTMENT (OUTPATIENT)
Dept: GENERAL RADIOLOGY | Age: 49
End: 2024-04-21
Payer: COMMERCIAL

## 2024-04-21 VITALS
TEMPERATURE: 98.1 F | DIASTOLIC BLOOD PRESSURE: 84 MMHG | OXYGEN SATURATION: 96 % | HEART RATE: 78 BPM | SYSTOLIC BLOOD PRESSURE: 157 MMHG | RESPIRATION RATE: 16 BRPM | WEIGHT: 293 LBS | BODY MASS INDEX: 39.68 KG/M2 | HEIGHT: 72 IN

## 2024-04-21 LAB
ANION GAP SERPL CALCULATED.3IONS-SCNC: 10 MEQ/L (ref 9–15)
BASOPHILS # BLD: 0.1 K/UL (ref 0–0.2)
BASOPHILS NFR BLD: 1.1 %
BUN SERPL-MCNC: 20 MG/DL (ref 6–20)
CALCIUM SERPL-MCNC: 9.2 MG/DL (ref 8.5–9.9)
CHLORIDE SERPL-SCNC: 106 MEQ/L (ref 95–107)
CO2 SERPL-SCNC: 25 MEQ/L (ref 20–31)
CREAT SERPL-MCNC: 0.86 MG/DL (ref 0.5–0.9)
EOSINOPHIL # BLD: 0.4 K/UL (ref 0–0.7)
EOSINOPHIL NFR BLD: 5.7 %
ERYTHROCYTE [DISTWIDTH] IN BLOOD BY AUTOMATED COUNT: 13.9 % (ref 11.5–14.5)
ESTIMATED AVERAGE GLUCOSE: 206 MG/DL
ESTIMATED AVERAGE GLUCOSE: 206 MG/DL
GLUCOSE BLD-MCNC: 189 MG/DL (ref 70–99)
GLUCOSE BLD-MCNC: 208 MG/DL (ref 70–99)
GLUCOSE BLD-MCNC: 226 MG/DL (ref 70–99)
GLUCOSE BLD-MCNC: 268 MG/DL (ref 70–99)
GLUCOSE BLD-MCNC: 276 MG/DL (ref 70–99)
GLUCOSE SERPL-MCNC: 254 MG/DL (ref 70–99)
HBA1C MFR BLD: 8.8 % (ref 4–6)
HBA1C MFR BLD: 8.8 % (ref 4–6)
HCT VFR BLD AUTO: 31.3 % (ref 37–47)
HGB BLD-MCNC: 10.7 G/DL (ref 12–16)
LYMPHOCYTES # BLD: 2.9 K/UL (ref 1–4.8)
LYMPHOCYTES NFR BLD: 44.1 %
MCH RBC QN AUTO: 30.2 PG (ref 27–31.3)
MCHC RBC AUTO-ENTMCNC: 34.2 % (ref 33–37)
MCV RBC AUTO: 88.4 FL (ref 79.4–94.8)
MONOCYTES # BLD: 0.4 K/UL (ref 0.2–0.8)
MONOCYTES NFR BLD: 6.3 %
NEUTROPHILS # BLD: 2.8 K/UL (ref 1.4–6.5)
NEUTS SEG NFR BLD: 42.6 %
PERFORMED ON: ABNORMAL
PLATELET # BLD AUTO: 87 K/UL (ref 130–400)
POTASSIUM SERPL-SCNC: 4.1 MEQ/L (ref 3.4–4.9)
RBC # BLD AUTO: 3.54 M/UL (ref 4.2–5.4)
SODIUM SERPL-SCNC: 141 MEQ/L (ref 135–144)
WBC # BLD AUTO: 6.5 K/UL (ref 4.8–10.8)

## 2024-04-21 PROCEDURE — 6370000000 HC RX 637 (ALT 250 FOR IP): Performed by: STUDENT IN AN ORGANIZED HEALTH CARE EDUCATION/TRAINING PROGRAM

## 2024-04-21 PROCEDURE — 2580000003 HC RX 258: Performed by: STUDENT IN AN ORGANIZED HEALTH CARE EDUCATION/TRAINING PROGRAM

## 2024-04-21 PROCEDURE — 36415 COLL VENOUS BLD VENIPUNCTURE: CPT

## 2024-04-21 PROCEDURE — 6360000002 HC RX W HCPCS: Performed by: SPECIALIST

## 2024-04-21 PROCEDURE — 2580000003 HC RX 258: Performed by: SPECIALIST

## 2024-04-21 PROCEDURE — G0378 HOSPITAL OBSERVATION PER HR: HCPCS

## 2024-04-21 PROCEDURE — 85025 COMPLETE CBC W/AUTO DIFF WBC: CPT

## 2024-04-21 PROCEDURE — 80048 BASIC METABOLIC PNL TOTAL CA: CPT

## 2024-04-21 PROCEDURE — 96361 HYDRATE IV INFUSION ADD-ON: CPT

## 2024-04-21 PROCEDURE — 96376 TX/PRO/DX INJ SAME DRUG ADON: CPT

## 2024-04-21 PROCEDURE — C9113 INJ PANTOPRAZOLE SODIUM, VIA: HCPCS | Performed by: SPECIALIST

## 2024-04-21 PROCEDURE — 99254 IP/OBS CNSLTJ NEW/EST MOD 60: CPT | Performed by: INTERNAL MEDICINE

## 2024-04-21 PROCEDURE — 73560 X-RAY EXAM OF KNEE 1 OR 2: CPT

## 2024-04-21 RX ORDER — DICYCLOMINE HCL 20 MG
20 TABLET ORAL 4 TIMES DAILY
Qty: 20 TABLET | Refills: 0 | Status: SHIPPED | OUTPATIENT
Start: 2024-04-21 | End: 2024-04-26

## 2024-04-21 RX ADMIN — INSULIN LISPRO 70 UNITS: 100 INJECTION, SOLUTION INTRAVENOUS; SUBCUTANEOUS at 12:23

## 2024-04-21 RX ADMIN — ASPIRIN 81 MG: 81 TABLET, COATED ORAL at 08:55

## 2024-04-21 RX ADMIN — ACETAMINOPHEN 650 MG: 325 TABLET ORAL at 00:53

## 2024-04-21 RX ADMIN — DICYCLOMINE HYDROCHLORIDE 20 MG: 20 TABLET ORAL at 15:58

## 2024-04-21 RX ADMIN — ONDANSETRON 4 MG: 4 TABLET, ORALLY DISINTEGRATING ORAL at 15:58

## 2024-04-21 RX ADMIN — ATORVASTATIN CALCIUM 40 MG: 40 TABLET, FILM COATED ORAL at 08:55

## 2024-04-21 RX ADMIN — SODIUM CHLORIDE, POTASSIUM CHLORIDE, SODIUM LACTATE AND CALCIUM CHLORIDE: 600; 310; 30; 20 INJECTION, SOLUTION INTRAVENOUS at 03:40

## 2024-04-21 RX ADMIN — GABAPENTIN 800 MG: 400 CAPSULE ORAL at 08:55

## 2024-04-21 RX ADMIN — LEVOTHYROXINE SODIUM 137 MCG: 0.05 TABLET ORAL at 05:11

## 2024-04-21 RX ADMIN — INSULIN LISPRO 2 UNITS: 100 INJECTION, SOLUTION INTRAVENOUS; SUBCUTANEOUS at 08:56

## 2024-04-21 RX ADMIN — INSULIN GLARGINE 80 UNITS: 100 INJECTION, SOLUTION SUBCUTANEOUS at 08:56

## 2024-04-21 RX ADMIN — INSULIN LISPRO 70 UNITS: 100 INJECTION, SOLUTION INTRAVENOUS; SUBCUTANEOUS at 08:56

## 2024-04-21 RX ADMIN — ISOSORBIDE MONONITRATE 60 MG: 60 TABLET, EXTENDED RELEASE ORAL at 08:55

## 2024-04-21 RX ADMIN — SODIUM CHLORIDE, PRESERVATIVE FREE 40 MG: 5 INJECTION INTRAVENOUS at 08:55

## 2024-04-21 RX ADMIN — GABAPENTIN 800 MG: 400 CAPSULE ORAL at 14:29

## 2024-04-21 RX ADMIN — LISINOPRIL 10 MG: 10 TABLET ORAL at 08:55

## 2024-04-21 RX ADMIN — Medication 400 MG: at 08:55

## 2024-04-21 RX ADMIN — METOPROLOL SUCCINATE 25 MG: 25 TABLET, FILM COATED, EXTENDED RELEASE ORAL at 08:55

## 2024-04-21 RX ADMIN — DICYCLOMINE HYDROCHLORIDE 20 MG: 20 TABLET ORAL at 11:15

## 2024-04-21 ASSESSMENT — PAIN DESCRIPTION - LOCATION
LOCATION: NECK;BACK
LOCATION: BACK;NECK
LOCATION: ABDOMEN

## 2024-04-21 ASSESSMENT — PAIN SCALES - GENERAL
PAINLEVEL_OUTOF10: 8
PAINLEVEL_OUTOF10: 3
PAINLEVEL_OUTOF10: 4
PAINLEVEL_OUTOF10: 5
PAINLEVEL_OUTOF10: 5

## 2024-04-21 ASSESSMENT — PAIN DESCRIPTION - DESCRIPTORS: DESCRIPTORS: ACHING

## 2024-04-21 NOTE — CONSULTS
Consults    Patient Name: Mehnaz Rivera  Admit Date: 2024  9:18 PM  MR #: [id number]  : 1975    Attending Physician: Juan Soriano MD  Reason for consult: Nausea emesis and abdominal pain    History of Presenting Illness:      Mehnaz Rivera is a 49 y.o. female on hospital day 0 with a history of abdominal pain associate with nausea and vomiting since last 3 days.  No history of hematemesis or melena.,  Pain is in the epigastric area.  No prior history of ulcer disease.,.  Patient had EGD in 2021 by Dr. Palafox which did not show any significant abnormality.  Has been on,  She has not had any nausea or vomiting since coming to the floor. ,  Patient is Upper sorbian speaking.  History Obtained From:  patient      History:      Past Medical History:   Diagnosis Date    Asthma     Chronic abdominal pain     Depression     Hyperlipidemia     Hypertension     NATALEE (obstructive sleep apnea)     Schizophrenia (HCC)     Type II or unspecified type diabetes mellitus without mention of complication, not stated as uncontrolled      Past Surgical History:   Procedure Laterality Date    [facility]  14    [facility]    UPPER GASTROINTEST[facility]AL ENDOS[facility]  14    [facility]    UPPER GASTROINTESTINAL ENDOSCOPY N/A 3/22/2021    EGD DIAGNOSTIC ONLY performed by Alex Palafox MD at [facility]       Family History  Family History   Problem Relation Age of Onset    Other Mother         Diverticulitis    Colon Cancer Paternal Uncle     Colon Cancer Maternal Grandfather      [] Unable to obtain due to ventilated and/ or neurologic status    Social History     Socioeconomic History    Marital status: Single     Spouse name: Not on file    Number of children: Not on file    Years of education: Not on file    Highest education level: Not on file   Occupational History    Not on file   Tobacco Use    Smoking status: Former     Current packs/day: 1.00     Types: Cigarettes    Smokeless tobacco: 
Session ID: [id number]  Language: Hungarian   ID: #[id number]   Name: Maritza
Session ID: [id number]  Language: Korean   ID: #[id number]   Name: Ravi
Session ID: [id number]  Language: Sami   ID: #[id number]   Name: Hiren
unintentional weight loss, dysphagia, hematemesis, hematochezia, nor melena.  Differential diagnosis includes GERD, gastritis, gastroparesis, less likely pancreatitis with normal lipase, cholecystitis with negative imaging.   Plan:   -Continue daily PPI, may switch to oral tablet  -Continue acid reflux measures, sitting up after eating, avoiding carbonated, caffeinated foods.  -No endoscopic evaluation at this time, no alarm symptoms  -Follow-up with GI outpatient, patient currently seen at [facility].  Comments:     Thank you for allowing us to participate in the care of this patient.     Will  continue to follow .    Please call if questions or concerns arise.    Electronically signed by Alex Palafox MD on 4/21/2024 at 1:51 PM    Please note this report has been partially produced using speech recognition software and may cause contain errors related to that system including grammar, punctuation and spelling as well as words and phrases that may seem inappropriate. If there are questions or concerns please feel free to contact me to clarify.

## 2024-04-21 NOTE — PLAN OF CARE
Problem: Discharge Planning  Goal: Discharge to home or other facility with appropriate resources  4/21/2024 0833 by Rebeca Green RN  Outcome: Progressing  4/21/2024 0647 by Reyna Rice RN  Outcome: Progressing  Flowsheets (Taken 4/20/2024 2023)  Discharge to home or other facility with appropriate resources:   Identify barriers to discharge with patient and caregiver   Arrange for needed discharge resources and transportation as appropriate   Identify discharge learning needs (meds, wound care, etc)   Arrange for interpreters to assist at discharge as needed     Problem: Safety - Adult  Goal: Free from fall injury  4/21/2024 0833 by Rebeca Green RN  Outcome: Progressing  4/21/2024 0647 by Reyna Rice RN  Outcome: Progressing     Problem: Pain  Goal: Verbalizes/displays adequate comfort level or baseline comfort level  4/21/2024 0833 by Rebeca Green RN  Outcome: Progressing  4/21/2024 0647 by Reyna Rice RN  Outcome: Progressing  Flowsheets (Taken 4/20/2024 1903)  Verbalizes/displays adequate comfort level or baseline comfort level:   Encourage patient to monitor pain and request assistance   Assess pain using appropriate pain scale   Administer analgesics based on type and severity of pain and evaluate response   Implement non-pharmacological measures as appropriate and evaluate response

## 2024-04-21 NOTE — PLAN OF CARE
Problem: Discharge Planning  Goal: Discharge to home or other facility with appropriate resources  Outcome: Progressing  Flowsheets (Taken 4/20/2024 2023)  Discharge to home or other facility with appropriate resources:   Identify barriers to discharge with patient and caregiver   Arrange for needed discharge resources and transportation as appropriate   Identify discharge learning needs (meds, wound care, etc)   Arrange for interpreters to assist at discharge as needed

## 2024-04-21 NOTE — DISCHARGE SUMMARY
without murmurs, rubs or gallops.  Abdomen: Soft, mildly TTP in epigastric region, non-distended with normal bowel sounds.  Musculoskeletal: No clubbing, cyanosis or edema bilaterally.  Full range of motion without deformity.  Skin: Skin color, texture, turgor normal.  No rashes or lesions.  Neuro: Non Focal. Symetrical motor and tone. Nl Comprehension, Alert,awake and oriented. NL CN. Symetrical tone and reflexes.  Psychiatric: Alert and oriented, thought content appropriate, normal insight  Capillary Refill: Brisk,< 3 seconds   Peripheral Pulses: +2 palpable, equal bilaterally     Patient was seen by the following consultants   Consults:  IP CONSULT TO GI  IP CONSULT TO ENDOCRINOLOGY    Significant Diagnostic Studies:    Refer to chart     Please refer to chart if no studies are shown here    US ABDOMEN LIMITED    Result Date: 4/20/2024  EXAMINATION: RIGHT UPPER QUADRANT ULTRASOUND 4/20/2024 1:36 pm COMPARISON: None. HISTORY: ORDERING SYSTEM PROVIDED HISTORY: Abdominal pain TECHNOLOGIST PROVIDED HISTORY: Reason for exam:->Abdominal pain What reading provider will be dictating this exam?->CRC FINDINGS: Limited examination due to suboptimal penetration due to patient body habitus and overlying bowel gas.  No intrahepatic bile duct dilatation.  No evidence of ascites.  Pancreas is grossly unremarkable as visualized.  Pancreatic tail is not visualized due to overlying bowel gas.  No evidence of cholelithiasis, gallbladder wall thickening or pericholecystic fluid.  Common bile duct measures approximately 4 mm.  No hydronephrosis involving the right kidney.     1. Limited examination due to patient body habitus and overlying bowel gas. 2. No evidence of cholelithiasis, gallbladder wall thickening, or pericholecystic fluid. 3. No evidence of ascites. 4. Suboptimal visualization of the pancreas.     CT ABDOMEN PELVIS W IV CONTRAST Additional Contrast? None    Result Date: 4/20/2024  EXAMINATION: CT OF THE ABDOMEN AND

## 2024-04-22 LAB
EKG ATRIAL RATE: 82 BPM
EKG P AXIS: 26 DEGREES
EKG P-R INTERVAL: 156 MS
EKG Q-T INTERVAL: 398 MS
EKG QRS DURATION: 90 MS
EKG QTC CALCULATION (BAZETT): 464 MS
EKG R AXIS: -42 DEGREES
EKG T AXIS: 21 DEGREES
EKG VENTRICULAR RATE: 82 BPM

## 2024-04-22 NOTE — PROGRESS NOTES
0930 Patient arrived to unit via cart in stable condition. Admission assessment complete, see flowsheets. All admission questions answered through  services, patient requests for daughter Jaycee to be primary contact if family members are needed. Home medications removed from belongings and placed in lock box in patients room. Patient provided with a fan per request and medicated per MAR. All patient needs met at this time, patient oriented to room and educated on call light use. Safety maintained and call light within reach.    Electronically signed by Gita Machuca RN on 4/20/2024 at 10:28 AM   
Physical Therapy  Facility/Department: [facility] MED SURG W465/W465-01  Physical Therapy Discharge      NAME: Mehnaz Rivera    : 1975 (49 y.o.)  MRN: [id number]    Account: [id number]  Gender: female      Patient has been discharged from [facility] hospital. DC patient from current PT program.      Electronically signed by Yumi Hensley PT on 24 at 1:16 PM EDT    
Physical Therapy Med Surg Initial Assessment  Facility/Department: [address] MED SURG UNIT  Room: [place] [address]       NAME: Mehnaz Rivera  : 1975 (49 y.o.)  MRN: [id number]  CODE STATUS: Full Code    Date of Service: 2024    Patient Diagnosis(es): Gastroparesis [K31.84]  Hyperglycemia [R73.9]  Hyperglycemia due to diabetes mellitus (HCC) [E11.65]  Nausea and vomiting, unspecified vomiting type [R11.2]   Chief Complaint   Patient presents with    Chest Pain     Chest pain X2 days took nitro yesterday, none today ,     Emesis     X2days can keep anything down.      Patient Active Problem List    Diagnosis Date Noted    Diverticulitis 2022    Left lower quadrant abdominal pain 2022    Diverticulitis of colon 2022    Hyperglycemia due to diabetes mellitus (HCC) 2024    Hyperglycemia 2021    Noncompliance 2021    Chest pain 2021    DKA, type 2, not at goal ([facility]) 2019    Pancreatitis, unspecified pancreatitis type 2017    Hyperlipidemia 10/03/2016    Hypertension 10/03/2016    Type 1 diabetes mellitus ([facility]) 2016    Gastroesophageal reflux disease 2016    Laryngeal spasm 2016    Ingrowing nail 2016    Pain of toe 2016    Diabetes mellitus ([facility]) 2016    Hypothyroidism due to Hashimoto's thyroiditis 2016    Schizophrenia ([facility]) 2016    Combined fat and carbohydrate induced hyperlipemia 10/02/2015    Class 3 severe obesity due to excess calories with serious comorbidity and body mass index (BMI) of 45.0 to 49.9 in adult ([facility]) 10/02/2015    Type 2 diabetes mellitus with other diabetic neurological complication ([facility]) 2015    Vitamin D deficiency 2014    Anxiety 2014    Asthma 2014    Auditory hallucination 2014    Depression 2014    Uncontrolled type 2 diabetes mellitus 2014    Family history of coronary arteriosclerosis 2014    Obstructive sleep apnea 
Pt shift assessments complete per flowsheet. VSS. Alert and oriented x 4. Pt has daughters at bedside. Pt was given a shower and linens changed. Pt was given 80 units at HS instead of 160 units per Patient's request. Notified NP and 80 units was administered. Pt walks independently to bathroom , walker as needed. Denies any other needs at this time. Call light in reach. Will continue to monitor.  Electronically signed by Reyna Rice RN on 4/21/2024 at 2:53 AM    
See OT evaluation for all goals and OT POC. Electronically signed by Patrick Trujillo, OTR/L on 4/20/2024 at 2:32 PM   
bathroom and from bed to transport bed in casiano with standard walker at SBA level. Slow gain pattern.     Bed Mobility  Bed mobility  Supine to Sit: Unable to assess  Sit to Supine: Stand by assistance  Bed Mobility Comments: pt seated EOB after using restroom then walked to bed for transport    Seated and Standing Balance:  Balance  Sitting: Intact  Standing: Impaired  Standing - Static: Fair  Standing - Dynamic: Fair    Functional Endurance:  Activity Tolerance  Activity Tolerance Comments: fair    D/C Recommendations:  OT D/C RECOMMENDATIONS  REQUIRES OT FOLLOW-UP: Yes    Equipment Recommendations:  OT Equipment Recommendations  Other: continue to assess, may benefit from shower chair for safety and LB AE to increase IND with LB ADL's    OT Education:   Patient Education  Education Given To: Patient  Education Provided: Role of Therapy;Plan of Care  Education Method: Verbal  Barriers to Learning:  (language)  Education Outcome: Verbalized understanding    OT Follow Up:   OT D/C RECOMMENDATIONS  REQUIRES OT FOLLOW-UP: Yes       Assessment/Discharge Disposition:  Assessment: Pt is a 49 y.o. female admitted for nausea and vomitting. Pt reported having pain in RLE impairing ability for functional mobility and to participate in ADL's. Pt reported being dizzy at home. Pt may benefit from OT services to address deficits and maximize safety and function during ADL's.  Performance deficits / Impairments: Decreased functional mobility , Decreased ADL status, Decreased strength, Decreased high-level IADLs, Decreased endurance  Prognosis: Fair  Discharge Recommendations: Continue to assess pending progress  Decision Making: Medium Complexity     History: multi comorb  Exam: 5 perf imp  Assistance / Modification: Min A    AMPAC (Six Click) Self care Score   How much help is needed for putting on and taking off regular lower body clothing?: A Lot  How much help is needed for bathing (which includes washing, rinsing, drying)?: A

## 2024-08-01 ENCOUNTER — HOSPITAL ENCOUNTER (OUTPATIENT)
Age: 49
Setting detail: OBSERVATION
Discharge: HOME | End: 2024-08-05
Attending: STUDENT IN AN ORGANIZED HEALTH CARE EDUCATION/TRAINING PROGRAM | Admitting: INTERNAL MEDICINE
Payer: COMMERCIAL

## 2024-08-01 ENCOUNTER — APPOINTMENT (OUTPATIENT)
Dept: GENERAL RADIOLOGY | Age: 49
End: 2024-08-01
Payer: COMMERCIAL

## 2024-08-01 DIAGNOSIS — R07.9 CHEST PAIN, UNSPECIFIED TYPE: Primary | ICD-10-CM

## 2024-08-01 PROBLEM — K75.81 LIVER CIRRHOSIS SECONDARY TO NASH (HCC): Status: ACTIVE | Noted: 2024-08-01

## 2024-08-01 PROBLEM — D69.6 THROMBOCYTOPENIA (HCC): Status: ACTIVE | Noted: 2024-08-01

## 2024-08-01 PROBLEM — E66.813 CLASS 3 DRUG-INDUCED OBESITY WITH BODY MASS INDEX (BMI) OF 50.0 TO 59.9 IN ADULT: Status: ACTIVE | Noted: 2024-08-01

## 2024-08-01 PROBLEM — K74.60 LIVER CIRRHOSIS SECONDARY TO NASH (HCC): Status: ACTIVE | Noted: 2024-08-01

## 2024-08-01 PROBLEM — E66.1 CLASS 3 DRUG-INDUCED OBESITY WITH BODY MASS INDEX (BMI) OF 50.0 TO 59.9 IN ADULT (HCC): Status: ACTIVE | Noted: 2024-08-01

## 2024-08-01 LAB
ANION GAP SERPL CALCULATED.3IONS-SCNC: 9 MEQ/L (ref 9–15)
ANISOCYTOSIS BLD QL SMEAR: ABNORMAL
BASOPHILS # BLD: 0.1 K/UL (ref 0–0.2)
BASOPHILS NFR BLD: 0.9 %
BNP BLD-MCNC: <36 PG/ML
BUN SERPL-MCNC: 21 MG/DL (ref 6–20)
CALCIUM SERPL-MCNC: 9.1 MG/DL (ref 8.5–9.9)
CHLORIDE SERPL-SCNC: 101 MEQ/L (ref 95–107)
CO2 SERPL-SCNC: 26 MEQ/L (ref 20–31)
CREAT SERPL-MCNC: 0.92 MG/DL (ref 0.5–0.9)
D DIMER PPP FEU-MCNC: <0.27 MG/L FEU (ref 0–0.5)
EOSINOPHIL # BLD: 0.9 K/UL (ref 0–0.7)
EOSINOPHIL NFR BLD: 15.7 %
ERYTHROCYTE [DISTWIDTH] IN BLOOD BY AUTOMATED COUNT: 15.1 % (ref 11.5–14.5)
GLUCOSE BLD-MCNC: 317 MG/DL (ref 70–99)
GLUCOSE SERPL-MCNC: 274 MG/DL (ref 70–99)
HCT VFR BLD AUTO: 34.5 % (ref 37–47)
HGB BLD-MCNC: 11.6 G/DL (ref 12–16)
LYMPHOCYTES # BLD: 1.9 K/UL (ref 1–4.8)
LYMPHOCYTES NFR BLD: 32.1 %
MCHC RBC AUTO-ENTMCNC: 33.6 % (ref 33–37)
MCV RBC AUTO: 86.9 FL (ref 79.4–94.8)
MONOCYTES # BLD: 0.4 K/UL (ref 0.2–0.8)
MONOCYTES NFR BLD: 6.6 %
NEUTROPHILS # BLD: 2.6 K/UL (ref 1.4–6.5)
NEUTS SEG NFR BLD: 44.5 %
PERFORMED ON: ABNORMAL
PLATELET # BLD AUTO: 72 K/UL (ref 130–400)
PLATELET BLD QL SMEAR: ABNORMAL
POTASSIUM SERPL-SCNC: 4.1 MEQ/L (ref 3.4–4.9)
RBC # BLD AUTO: 3.97 M/UL (ref 4.2–5.4)
SLIDE REVIEW: ABNORMAL
SODIUM SERPL-SCNC: 136 MEQ/L (ref 135–144)
TROPONIN, HIGH SENSITIVITY: 10 NG/L (ref 0–19)
TROPONIN, HIGH SENSITIVITY: 10 NG/L (ref 0–19)
WBC # BLD AUTO: 5.8 K/UL (ref 4.8–10.8)

## 2024-08-01 PROCEDURE — 96374 THER/PROPH/DIAG INJ IV PUSH: CPT

## 2024-08-01 PROCEDURE — 2580000003 HC RX 258: Performed by: INTERNAL MEDICINE

## 2024-08-01 PROCEDURE — 80048 BASIC METABOLIC PNL TOTAL CA: CPT

## 2024-08-01 PROCEDURE — 6360000002 HC RX W HCPCS: Performed by: INTERNAL MEDICINE

## 2024-08-01 PROCEDURE — G0378 HOSPITAL OBSERVATION PER HR: HCPCS

## 2024-08-01 PROCEDURE — 6360000002 HC RX W HCPCS: Performed by: STUDENT IN AN ORGANIZED HEALTH CARE EDUCATION/TRAINING PROGRAM

## 2024-08-01 PROCEDURE — 83880 ASSAY OF NATRIURETIC PEPTIDE: CPT

## 2024-08-01 PROCEDURE — 6370000000 HC RX 637 (ALT 250 FOR IP): Performed by: STUDENT IN AN ORGANIZED HEALTH CARE EDUCATION/TRAINING PROGRAM

## 2024-08-01 PROCEDURE — 93005 ELECTROCARDIOGRAM TRACING: CPT | Performed by: STUDENT IN AN ORGANIZED HEALTH CARE EDUCATION/TRAINING PROGRAM

## 2024-08-01 PROCEDURE — 99285 EMERGENCY DEPT VISIT HI MDM: CPT

## 2024-08-01 PROCEDURE — 36415 COLL VENOUS BLD VENIPUNCTURE: CPT

## 2024-08-01 PROCEDURE — 96376 TX/PRO/DX INJ SAME DRUG ADON: CPT

## 2024-08-01 PROCEDURE — 96372 THER/PROPH/DIAG INJ SC/IM: CPT

## 2024-08-01 PROCEDURE — 85025 COMPLETE CBC W/AUTO DIFF WBC: CPT

## 2024-08-01 PROCEDURE — 85379 FIBRIN DEGRADATION QUANT: CPT

## 2024-08-01 PROCEDURE — 6370000000 HC RX 637 (ALT 250 FOR IP): Performed by: INTERNAL MEDICINE

## 2024-08-01 PROCEDURE — 84484 ASSAY OF TROPONIN QUANT: CPT

## 2024-08-01 PROCEDURE — 71045 X-RAY EXAM CHEST 1 VIEW: CPT

## 2024-08-01 PROCEDURE — 96375 TX/PRO/DX INJ NEW DRUG ADDON: CPT

## 2024-08-01 RX ORDER — MORPHINE SULFATE 4 MG/ML
4 INJECTION, SOLUTION INTRAMUSCULAR; INTRAVENOUS ONCE
Status: COMPLETED | OUTPATIENT
Start: 2024-08-01 | End: 2024-08-01

## 2024-08-01 RX ORDER — ISOSORBIDE MONONITRATE 30 MG/1
30 TABLET, EXTENDED RELEASE ORAL DAILY
Status: DISCONTINUED | OUTPATIENT
Start: 2024-08-01 | End: 2024-08-05 | Stop reason: HOSPADM

## 2024-08-01 RX ORDER — INSULIN LISPRO 100 [IU]/ML
0-16 INJECTION, SOLUTION INTRAVENOUS; SUBCUTANEOUS
Status: DISCONTINUED | OUTPATIENT
Start: 2024-08-01 | End: 2024-08-05 | Stop reason: HOSPADM

## 2024-08-01 RX ORDER — ACETAMINOPHEN 325 MG/1
650 TABLET ORAL EVERY 6 HOURS PRN
Status: DISCONTINUED | OUTPATIENT
Start: 2024-08-01 | End: 2024-08-05 | Stop reason: HOSPADM

## 2024-08-01 RX ORDER — MAGNESIUM SULFATE IN WATER 40 MG/ML
2000 INJECTION, SOLUTION INTRAVENOUS PRN
Status: DISCONTINUED | OUTPATIENT
Start: 2024-08-01 | End: 2024-08-05 | Stop reason: HOSPADM

## 2024-08-01 RX ORDER — INSULIN LISPRO 100 [IU]/ML
0-4 INJECTION, SOLUTION INTRAVENOUS; SUBCUTANEOUS NIGHTLY
Status: DISCONTINUED | OUTPATIENT
Start: 2024-08-01 | End: 2024-08-05 | Stop reason: HOSPADM

## 2024-08-01 RX ORDER — GABAPENTIN 400 MG/1
800 CAPSULE ORAL 3 TIMES DAILY
Status: DISCONTINUED | OUTPATIENT
Start: 2024-08-01 | End: 2024-08-05 | Stop reason: HOSPADM

## 2024-08-01 RX ORDER — ENOXAPARIN SODIUM 100 MG/ML
40 INJECTION SUBCUTANEOUS 2 TIMES DAILY
Status: DISCONTINUED | OUTPATIENT
Start: 2024-08-01 | End: 2024-08-05 | Stop reason: HOSPADM

## 2024-08-01 RX ORDER — NITROGLYCERIN 0.4 MG/1
0.4 TABLET SUBLINGUAL EVERY 5 MIN PRN
Status: DISCONTINUED | OUTPATIENT
Start: 2024-08-01 | End: 2024-08-05 | Stop reason: HOSPADM

## 2024-08-01 RX ORDER — ACETAMINOPHEN 650 MG/1
650 SUPPOSITORY RECTAL EVERY 6 HOURS PRN
Status: DISCONTINUED | OUTPATIENT
Start: 2024-08-01 | End: 2024-08-05 | Stop reason: HOSPADM

## 2024-08-01 RX ORDER — SODIUM CHLORIDE 0.9 % (FLUSH) 0.9 %
5-40 SYRINGE (ML) INJECTION PRN
Status: DISCONTINUED | OUTPATIENT
Start: 2024-08-01 | End: 2024-08-05 | Stop reason: HOSPADM

## 2024-08-01 RX ORDER — ONDANSETRON 2 MG/ML
4 INJECTION INTRAMUSCULAR; INTRAVENOUS EVERY 6 HOURS PRN
Status: DISCONTINUED | OUTPATIENT
Start: 2024-08-01 | End: 2024-08-05 | Stop reason: HOSPADM

## 2024-08-01 RX ORDER — MORPHINE SULFATE 2 MG/ML
2 INJECTION, SOLUTION INTRAMUSCULAR; INTRAVENOUS EVERY 4 HOURS PRN
Status: DISCONTINUED | OUTPATIENT
Start: 2024-08-01 | End: 2024-08-02 | Stop reason: SDUPTHER

## 2024-08-01 RX ORDER — ATORVASTATIN CALCIUM 40 MG/1
40 TABLET, FILM COATED ORAL DAILY
Status: DISCONTINUED | OUTPATIENT
Start: 2024-08-01 | End: 2024-08-05 | Stop reason: HOSPADM

## 2024-08-01 RX ORDER — SODIUM CHLORIDE 0.9 % (FLUSH) 0.9 %
5-40 SYRINGE (ML) INJECTION EVERY 12 HOURS SCHEDULED
Status: DISCONTINUED | OUTPATIENT
Start: 2024-08-01 | End: 2024-08-05 | Stop reason: HOSPADM

## 2024-08-01 RX ORDER — METOPROLOL SUCCINATE 25 MG/1
25 TABLET, EXTENDED RELEASE ORAL DAILY
Status: DISCONTINUED | OUTPATIENT
Start: 2024-08-01 | End: 2024-08-05 | Stop reason: HOSPADM

## 2024-08-01 RX ORDER — ONDANSETRON 4 MG/1
4 TABLET, ORALLY DISINTEGRATING ORAL EVERY 8 HOURS PRN
Status: DISCONTINUED | OUTPATIENT
Start: 2024-08-01 | End: 2024-08-05 | Stop reason: HOSPADM

## 2024-08-01 RX ORDER — INSULIN LISPRO 100 [IU]/ML
14 INJECTION, SOLUTION INTRAVENOUS; SUBCUTANEOUS
Status: DISCONTINUED | OUTPATIENT
Start: 2024-08-01 | End: 2024-08-04

## 2024-08-01 RX ORDER — ONDANSETRON 2 MG/ML
4 INJECTION INTRAMUSCULAR; INTRAVENOUS ONCE
Status: COMPLETED | OUTPATIENT
Start: 2024-08-01 | End: 2024-08-01

## 2024-08-01 RX ORDER — ASPIRIN 81 MG/1
243 TABLET, CHEWABLE ORAL ONCE
Status: COMPLETED | OUTPATIENT
Start: 2024-08-01 | End: 2024-08-01

## 2024-08-01 RX ORDER — POLYETHYLENE GLYCOL 3350 17 G/17G
17 POWDER, FOR SOLUTION ORAL DAILY PRN
Status: DISCONTINUED | OUTPATIENT
Start: 2024-08-01 | End: 2024-08-05 | Stop reason: HOSPADM

## 2024-08-01 RX ORDER — POTASSIUM CHLORIDE 7.45 MG/ML
10 INJECTION INTRAVENOUS PRN
Status: DISCONTINUED | OUTPATIENT
Start: 2024-08-01 | End: 2024-08-05 | Stop reason: HOSPADM

## 2024-08-01 RX ORDER — POTASSIUM CHLORIDE 20 MEQ/1
40 TABLET, EXTENDED RELEASE ORAL PRN
Status: DISCONTINUED | OUTPATIENT
Start: 2024-08-01 | End: 2024-08-05 | Stop reason: HOSPADM

## 2024-08-01 RX ORDER — ASPIRIN 81 MG/1
81 TABLET ORAL DAILY
Status: DISCONTINUED | OUTPATIENT
Start: 2024-08-02 | End: 2024-08-05 | Stop reason: HOSPADM

## 2024-08-01 RX ORDER — KETOROLAC TROMETHAMINE 15 MG/ML
15 INJECTION, SOLUTION INTRAMUSCULAR; INTRAVENOUS ONCE
Status: COMPLETED | OUTPATIENT
Start: 2024-08-01 | End: 2024-08-01

## 2024-08-01 RX ORDER — ASPIRIN 81 MG/1
81 TABLET ORAL DAILY
Status: DISCONTINUED | OUTPATIENT
Start: 2024-08-01 | End: 2024-08-01

## 2024-08-01 RX ORDER — INSULIN GLARGINE 100 [IU]/ML
0.25 INJECTION, SOLUTION SUBCUTANEOUS NIGHTLY
Status: DISCONTINUED | OUTPATIENT
Start: 2024-08-01 | End: 2024-08-04

## 2024-08-01 RX ORDER — GLUCAGON 1 MG/ML
1 KIT INJECTION PRN
Status: DISCONTINUED | OUTPATIENT
Start: 2024-08-01 | End: 2024-08-05 | Stop reason: HOSPADM

## 2024-08-01 RX ORDER — SODIUM CHLORIDE 9 MG/ML
INJECTION, SOLUTION INTRAVENOUS PRN
Status: DISCONTINUED | OUTPATIENT
Start: 2024-08-01 | End: 2024-08-05 | Stop reason: HOSPADM

## 2024-08-01 RX ORDER — DEXTROSE MONOHYDRATE 100 MG/ML
INJECTION, SOLUTION INTRAVENOUS CONTINUOUS PRN
Status: DISCONTINUED | OUTPATIENT
Start: 2024-08-01 | End: 2024-08-05 | Stop reason: HOSPADM

## 2024-08-01 RX ADMIN — METOPROLOL SUCCINATE 25 MG: 25 TABLET, EXTENDED RELEASE ORAL at 19:54

## 2024-08-01 RX ADMIN — INSULIN GLARGINE 44 UNITS: 100 INJECTION, SOLUTION SUBCUTANEOUS at 19:55

## 2024-08-01 RX ADMIN — MORPHINE SULFATE 2 MG: 2 INJECTION, SOLUTION INTRAMUSCULAR; INTRAVENOUS at 19:55

## 2024-08-01 RX ADMIN — KETOROLAC TROMETHAMINE 15 MG: 15 INJECTION, SOLUTION INTRAMUSCULAR; INTRAVENOUS at 17:44

## 2024-08-01 RX ADMIN — GABAPENTIN 800 MG: 400 CAPSULE ORAL at 19:55

## 2024-08-01 RX ADMIN — ISOSORBIDE MONONITRATE 30 MG: 30 TABLET, EXTENDED RELEASE ORAL at 19:55

## 2024-08-01 RX ADMIN — NITROGLYCERIN 0.4 MG: 0.4 TABLET, ORALLY DISINTEGRATING SUBLINGUAL at 15:45

## 2024-08-01 RX ADMIN — ATORVASTATIN CALCIUM 40 MG: 40 TABLET, FILM COATED ORAL at 19:54

## 2024-08-01 RX ADMIN — ASPIRIN 243 MG: 81 TABLET, CHEWABLE ORAL at 15:43

## 2024-08-01 RX ADMIN — NITROGLYCERIN 0.4 MG: 0.4 TABLET, ORALLY DISINTEGRATING SUBLINGUAL at 15:49

## 2024-08-01 RX ADMIN — INSULIN LISPRO 4 UNITS: 100 INJECTION, SOLUTION INTRAVENOUS; SUBCUTANEOUS at 19:55

## 2024-08-01 RX ADMIN — ENOXAPARIN SODIUM 40 MG: 100 INJECTION SUBCUTANEOUS at 19:56

## 2024-08-01 RX ADMIN — ONDANSETRON 4 MG: 2 INJECTION INTRAMUSCULAR; INTRAVENOUS at 19:56

## 2024-08-01 RX ADMIN — MORPHINE SULFATE 4 MG: 4 INJECTION, SOLUTION INTRAMUSCULAR; INTRAVENOUS at 16:34

## 2024-08-01 RX ADMIN — ONDANSETRON 4 MG: 2 INJECTION INTRAMUSCULAR; INTRAVENOUS at 17:44

## 2024-08-01 RX ADMIN — Medication 10 ML: at 19:56

## 2024-08-01 ASSESSMENT — PAIN SCALES - GENERAL
PAINLEVEL_OUTOF10: 10
PAINLEVEL_OUTOF10: 8
PAINLEVEL_OUTOF10: 8
PAINLEVEL_OUTOF10: 6
PAINLEVEL_OUTOF10: 9
PAINLEVEL_OUTOF10: 6
PAINLEVEL_OUTOF10: 7

## 2024-08-01 ASSESSMENT — PAIN - FUNCTIONAL ASSESSMENT
PAIN_FUNCTIONAL_ASSESSMENT: 0-10
PAIN_FUNCTIONAL_ASSESSMENT: 0-10
PAIN_FUNCTIONAL_ASSESSMENT: NONE - DENIES PAIN
PAIN_FUNCTIONAL_ASSESSMENT: 0-10

## 2024-08-01 ASSESSMENT — PAIN DESCRIPTION - ORIENTATION: ORIENTATION: ANTERIOR

## 2024-08-01 ASSESSMENT — PAIN DESCRIPTION - LOCATION
LOCATION: LEG
LOCATION: CHEST
LOCATION: GENERALIZED
LOCATION: CHEST;LEG
LOCATION: CHEST;LEG
LOCATION: GENERALIZED

## 2024-08-01 ASSESSMENT — PAIN DESCRIPTION - DESCRIPTORS
DESCRIPTORS: ACHING

## 2024-08-01 NOTE — H&P
HISTORY AND PHYSICAL             Date: 8/1/2024        Patient Name: Mehnaz Rivera     YOB: 1975      Age:  49 y.o.    Chief Complaint     Chief Complaint   Patient presents with    Chest Pain          History Obtained From   Chest pain    History of Present Illness   Patient is a 49-year-old Italian-speaking female who comes to ED for complaints of chest pain and shortness of breath for the past 3 days.  Patient describes the pain as a sharp and burning that radiates down the left side of her body and burning pain.  She also reports occasional nausea.  She reports chest pain is worse with exertion and when she takes in a deep breath and better at rest.  High sensitive troponins 10.  Chest x-ray with no acute findings. denies any cardiac history, she does report having sleep apnea, diabetes.  Patient has a past medical history of asthma, hypertension, hyperlipidemia NATALEE, and type 2 diabetes.  Patient given aspirin 243 mg Toradol morphine Zofran with relief.  Other labs reviewed with no significant findings  Past Medical History     Past Medical History:   Diagnosis Date    Asthma     Chronic abdominal pain     Depression     Hyperlipidemia     Hypertension     NATALEE (obstructive sleep apnea)     Schizophrenia (HCC)     Type II or unspecified type diabetes mellitus without mention of complication, not stated as uncontrolled         Past Surgical History     Past Surgical History:   Procedure Laterality Date    [facility]    UPPER GASTROINTESTINAL ENDOSCOPY  [facility]/21/[facility]    UPPER GASTROINTESTINAL ENDOSCOPY N/A 3/22/2021    EGD DIAGNOSTIC ONLY performed by Alex Palafox MD at [facility]        Medications Prior to Admission     Prior to Admission medications    Medication Sig Start Date End Date Taking? Authorizing Provider   dicyclomine (BENTYL) 20 MG tablet Take 1 tablet by mouth 4 times daily for 20 doses 4/21/24 4/26/24  Juan Soriano MD

## 2024-08-01 NOTE — ED PROVIDER NOTES
[facility]W TELEMETRY  EMERGENCY DEPARTMENT ENCOUNTER      Pt Name: Mehnaz Rivera  MRN: [id number]  Birthdate 1975  Date of evaluation: 8/1/2024  Provider: Timmy Zapata MD  2:49 PM    CHIEF COMPLAINT       Chief Complaint   Patient presents with    Chest Pain         HISTORY OF PRESENT ILLNESS    Mehnaz Rivera is a 49 y.o. female who presents to the emergency department chest pain     HPI  Patient is a 49-year-old female presenting to the ED due to concern for chest pain and shortness of breath.  Patient has a past medical history of diverticulitis, type 2 diabetes, diabetic neuropathy, hyperlipidemia, hypertension, schizophrenia, pancreatitis, hiatal hernia, degenerative disc disease.  Patient endorsed that for the past 3 days, she has had a constant centralized chest pressure that worsens intermittently with exertion.  She denies noticing chest pain that worsens with inhalation but it does worsen a bit when she lays down flat, especially during the nighttime.  The chest pain radiates to her left shoulder and down the left mid arm but not into the back.  She Dors is some nausea and some sweating but no vomiting.  She denies any lightheadedness or dizziness.  She denies any syncopal events.  She endorses shortness of breath, not at rest, but shortness of breath occurs with exertion.  She endorses sharp burning pain to the dorsal aspect of both feet extending up both shins to both knees which has been going on for the past week and is present at exertion and at rest, denies having history of this.  She endorses that she used to take nitroglycerin for chest pain but did not take it within the past 3 days, she has been taking 81 mg of aspirin daily.  She otherwise denies any pain or swelling to either of her calfs.  She denies any recent long distance traveling or any recent surgery in the past 6 months or any recent cancers to her current chemotherapy.  She denies having history of DVT/PEs.

## 2024-08-01 NOTE — ED TRIAGE NOTES
Pt alert and calm. Pt resp even no distress noted.  Pt c/o gen pain along with CP 10/10. Pt able to follow all commands.pt states that her CP comes with exertion.

## 2024-08-02 ENCOUNTER — APPOINTMENT (OUTPATIENT)
Dept: NUCLEAR MEDICINE | Age: 49
End: 2024-08-02
Payer: COMMERCIAL

## 2024-08-02 ENCOUNTER — HOSPITAL ENCOUNTER (OUTPATIENT)
Age: 49
Setting detail: OBSERVATION
Discharge: HOME | End: 2024-08-04
Payer: COMMERCIAL

## 2024-08-02 ENCOUNTER — APPOINTMENT (OUTPATIENT)
Age: 49
End: 2024-08-02
Payer: COMMERCIAL

## 2024-08-02 LAB
EKG ATRIAL RATE: 84 BPM
EKG P AXIS: 41 DEGREES
EKG P-R INTERVAL: 150 MS
EKG Q-T INTERVAL: 402 MS
EKG QRS DURATION: 86 MS
EKG QTC CALCULATION (BAZETT): 475 MS
EKG R AXIS: -41 DEGREES
EKG T AXIS: 22 DEGREES
EKG VENTRICULAR RATE: 84 BPM
GLUCOSE BLD-MCNC: 141 MG/DL (ref 70–99)
GLUCOSE BLD-MCNC: 151 MG/DL (ref 70–99)
GLUCOSE BLD-MCNC: 171 MG/DL (ref 70–99)
GLUCOSE BLD-MCNC: 196 MG/DL (ref 70–99)
PERFORMED ON: ABNORMAL

## 2024-08-02 PROCEDURE — G0378 HOSPITAL OBSERVATION PER HR: HCPCS

## 2024-08-02 PROCEDURE — 2580000003 HC RX 258: Performed by: FAMILY MEDICINE

## 2024-08-02 PROCEDURE — 93017 CV STRESS TEST TRACING ONLY: CPT

## 2024-08-02 PROCEDURE — 6360000002 HC RX W HCPCS: Performed by: FAMILY MEDICINE

## 2024-08-02 PROCEDURE — 2580000003 HC RX 258: Performed by: INTERNAL MEDICINE

## 2024-08-02 PROCEDURE — 3430000000 HC RX DIAGNOSTIC RADIOPHARMACEUTICAL: Performed by: FAMILY MEDICINE

## 2024-08-02 PROCEDURE — 96376 TX/PRO/DX INJ SAME DRUG ADON: CPT

## 2024-08-02 PROCEDURE — 93306 TTE W/DOPPLER COMPLETE: CPT

## 2024-08-02 PROCEDURE — A9502 TC99M TETROFOSMIN: HCPCS | Performed by: FAMILY MEDICINE

## 2024-08-02 PROCEDURE — 6360000002 HC RX W HCPCS: Performed by: INTERNAL MEDICINE

## 2024-08-02 PROCEDURE — 78452 HT MUSCLE IMAGE SPECT MULT: CPT

## 2024-08-02 PROCEDURE — APPSS45 APP SPLIT SHARED TIME 31-45 MINUTES: Performed by: PHYSICIAN ASSISTANT

## 2024-08-02 PROCEDURE — 6370000000 HC RX 637 (ALT 250 FOR IP): Performed by: INTERNAL MEDICINE

## 2024-08-02 PROCEDURE — 96372 THER/PROPH/DIAG INJ SC/IM: CPT

## 2024-08-02 RX ORDER — MORPHINE SULFATE 2 MG/ML
2 INJECTION, SOLUTION INTRAMUSCULAR; INTRAVENOUS EVERY 4 HOURS PRN
Status: DISCONTINUED | OUTPATIENT
Start: 2024-08-02 | End: 2024-08-03

## 2024-08-02 RX ORDER — REGADENOSON 0.08 MG/ML
0.4 INJECTION, SOLUTION INTRAVENOUS
Status: COMPLETED | OUTPATIENT
Start: 2024-08-02 | End: 2024-08-02

## 2024-08-02 RX ORDER — SODIUM CHLORIDE 0.9 % (FLUSH) 0.9 %
10 SYRINGE (ML) INJECTION PRN
Status: COMPLETED | OUTPATIENT
Start: 2024-08-02 | End: 2024-08-02

## 2024-08-02 RX ADMIN — ATORVASTATIN CALCIUM 40 MG: 40 TABLET, FILM COATED ORAL at 22:45

## 2024-08-02 RX ADMIN — Medication 10 ML: at 09:34

## 2024-08-02 RX ADMIN — ACETAMINOPHEN 650 MG: 325 TABLET ORAL at 12:44

## 2024-08-02 RX ADMIN — Medication 10 ML: at 10:48

## 2024-08-02 RX ADMIN — METOPROLOL SUCCINATE 25 MG: 25 TABLET, EXTENDED RELEASE ORAL at 09:33

## 2024-08-02 RX ADMIN — GABAPENTIN 800 MG: 400 CAPSULE ORAL at 09:33

## 2024-08-02 RX ADMIN — Medication 10 ML: at 22:45

## 2024-08-02 RX ADMIN — MORPHINE SULFATE 2 MG: 2 INJECTION, SOLUTION INTRAMUSCULAR; INTRAVENOUS at 00:42

## 2024-08-02 RX ADMIN — TETROFOSMIN 36 MILLICURIE: 1.38 INJECTION, POWDER, LYOPHILIZED, FOR SOLUTION INTRAVENOUS at 10:48

## 2024-08-02 RX ADMIN — INSULIN GLARGINE 44 UNITS: 100 INJECTION, SOLUTION SUBCUTANEOUS at 22:43

## 2024-08-02 RX ADMIN — Medication 10 ML: at 10:49

## 2024-08-02 RX ADMIN — ASPIRIN 81 MG: 81 TABLET, COATED ORAL at 09:33

## 2024-08-02 RX ADMIN — ISOSORBIDE MONONITRATE 30 MG: 30 TABLET, EXTENDED RELEASE ORAL at 09:33

## 2024-08-02 RX ADMIN — GABAPENTIN 800 MG: 400 CAPSULE ORAL at 13:01

## 2024-08-02 RX ADMIN — LEVOTHYROXINE SODIUM 137 MCG: 0.03 TABLET ORAL at 09:33

## 2024-08-02 RX ADMIN — GABAPENTIN 800 MG: 400 CAPSULE ORAL at 22:45

## 2024-08-02 RX ADMIN — ONDANSETRON 4 MG: 2 INJECTION INTRAMUSCULAR; INTRAVENOUS at 13:06

## 2024-08-02 RX ADMIN — ONDANSETRON 4 MG: 2 INJECTION INTRAMUSCULAR; INTRAVENOUS at 22:39

## 2024-08-02 RX ADMIN — MORPHINE SULFATE 2 MG: 2 INJECTION, SOLUTION INTRAMUSCULAR; INTRAVENOUS at 17:08

## 2024-08-02 RX ADMIN — REGADENOSON 0.4 MG: 0.08 INJECTION, SOLUTION INTRAVENOUS at 10:48

## 2024-08-02 RX ADMIN — MORPHINE SULFATE 2 MG: 2 INJECTION, SOLUTION INTRAMUSCULAR; INTRAVENOUS at 10:01

## 2024-08-02 RX ADMIN — MORPHINE SULFATE 2 MG: 2 INJECTION, SOLUTION INTRAMUSCULAR; INTRAVENOUS at 22:39

## 2024-08-02 RX ADMIN — INSULIN LISPRO 14 UNITS: 100 INJECTION, SOLUTION INTRAVENOUS; SUBCUTANEOUS at 12:58

## 2024-08-02 RX ADMIN — ENOXAPARIN SODIUM 40 MG: 100 INJECTION SUBCUTANEOUS at 22:44

## 2024-08-02 RX ADMIN — MORPHINE SULFATE 2 MG: 2 INJECTION, SOLUTION INTRAMUSCULAR; INTRAVENOUS at 13:01

## 2024-08-02 RX ADMIN — INSULIN LISPRO 14 UNITS: 100 INJECTION, SOLUTION INTRAVENOUS; SUBCUTANEOUS at 17:07

## 2024-08-02 ASSESSMENT — PAIN DESCRIPTION - LOCATION
LOCATION: CHEST
LOCATION: HEAD
LOCATION: CHEST
LOCATION: CHEST;LEG

## 2024-08-02 ASSESSMENT — PAIN DESCRIPTION - ORIENTATION: ORIENTATION: LEFT

## 2024-08-02 ASSESSMENT — PAIN SCALES - GENERAL
PAINLEVEL_OUTOF10: 9
PAINLEVEL_OUTOF10: 10
PAINLEVEL_OUTOF10: 10
PAINLEVEL_OUTOF10: 5
PAINLEVEL_OUTOF10: 9
PAINLEVEL_OUTOF10: 10
PAINLEVEL_OUTOF10: 9
PAINLEVEL_OUTOF10: 10

## 2024-08-02 ASSESSMENT — PAIN SCALES - WONG BAKER: WONGBAKER_NUMERICALRESPONSE: NO HURT

## 2024-08-02 NOTE — CONSULTS
Session ID: [id number]  Language: Irish   ID: #[id number]   Name: Huan
Session ID: [id number]  Language: Persian   ID: #[id number]   Name: Rashid
Session ID: [id number]  Language: Portuguese   ID: #[id number]   Name: Humberto
Session ID: [id number]  Language: Lao   ID: #[id number]   Name: Pamela
Determinants of Health     Food Insecurity: No Food Insecurity (8/2/2024)    Hunger Vital Sign     Worried About Running Out of Food in the Last Year: Never true     Ran Out of Food in the Last Year: Never true   Transportation Needs: No Transportation Needs (8/2/2024)    PRAPARE - Transportation     Lack of Transportation (Medical): No     Lack of Transportation (Non-Medical): No    Social Connections ([facility] HRSN)   Housing Stability: Low Risk  (8/2/2024)    Housing Stability Vital Sign     Unable to Pay for Housing in the Last Year: No     Number of Places Lived in the Last Year: 1     Unstable Housing in the Last Year: No       Family Hx:  Family History   Problem Relation Age of Onset    Other Mother         Diverticulitis    Colon Cancer Paternal Uncle     Colon Cancer Maternal Grandfather        Review of Systems:   Review of Systems   Constitutional:  Negative for fever.   HENT:  Negative for congestion.    Respiratory:  Positive for shortness of breath (exertional).    Cardiovascular:  Positive for chest pain (exertional) and leg swelling. Negative for palpitations.   Gastrointestinal:  Positive for nausea (yesterday). Negative for vomiting.   Genitourinary:  Negative for difficulty urinating.   Musculoskeletal:  Negative for arthralgias.   Skin:  Negative for color change.   Neurological:  Negative for dizziness and syncope.   Psychiatric/Behavioral:  Negative for agitation.          Physical Examination:    BP (!) 105/55   Pulse 90   Temp 98.8 °F (37.1 °C) (Oral)   Resp 20   Ht 1.753 m (5' 9\")   Wt (!) 147 kg (324 lb 1.2 oz)   LMP  (LMP Unknown)   SpO2 97%   BMI 47.86 kg/m²    Physical Exam  Constitutional:       General: She is not in acute distress.     Appearance: She is obese.   HENT:      Head: Normocephalic and atraumatic.   Cardiovascular:      Rate and Rhythm: Normal rate and regular rhythm.   Pulmonary:      Effort: Pulmonary effort is normal. No respiratory distress.      Breath sounds: No

## 2024-08-02 NOTE — CARE COORDINATION
Spoke with Dr Suarez, states pt can't leave until 2nd part of stress test is complete. The images are not loaded from first round. Per Dr Suarez if stress test is positive pt states she will proceed with cath. Pt will stay through the weekend due to risk factors.

## 2024-08-02 NOTE — CARE COORDINATION
Case Management Assessment  Initial Evaluation    Date/Time of Evaluation: 8/2/2024 11:16 AM  Assessment Completed by: Tata Pena RN    If patient is discharged prior to next notation, then this note serves as note for discharge by case management.    Patient Name: Mehnaz Rivera                   YOB: 1975  Diagnosis: Chest pain [R07.9]  Chest pain, unspecified type [R07.9]                   Date / Time: 8/1/2024  3:10 PM    Patient Admission Status: Observation   Readmission Risk (Low < 19, Mod (19-27), High > 27): No data recorded  Current PCP: Elizabet Campos MD  PCP verified by CM? Yes    Chart Reviewed: Yes      History Provided by: Medical Record  Patient Orientation: Alert and Oriented, Person, Place, Situation, Self    Patient Cognition: Alert    Hospitalization in the last 30 days (Readmission):  No    If yes, Readmission Assessment in  Navigator will be completed.    Advance Directives:      Code Status: Full Code   Patient's Primary Decision Maker is: Legal Next of Kin    Primary Decision Maker: Preet,Lakeshia - Child - [phone]    Secondary Decision Maker: PreetJaycee pineda - Child - [phone]    Discharge Planning:    Patient lives with: Children Type of Home: House  Primary Care Giver: Self  Patient Support Systems include: Children   Current Financial resources:    Current community resources:    Current services prior to admission: None            Current DME:              Type of Home Care services:  None    ADLS  Prior functional level: Independent in ADLs/IADLs  Current functional level: Independent in ADLs/IADLs    PT AM-PAC:   /24  OT AM-PAC:   /24    Family can provide assistance at DC: Yes  Would you like Case Management to discuss the discharge plan with any other family members/significant others, and if so, who? No  Plans to Return to Present Housing: Yes  Other Identified Issues/Barriers to RETURNING to current housing:   Potential Assistance needed

## 2024-08-03 LAB
ECHO AV AREA PEAK VELOCITY: 3.1 CM2
ECHO AV AREA VTI: 3.6 CM2
ECHO AV AREA/BSA PEAK VELOCITY: 1.2 CM2/M2
ECHO AV AREA/BSA VTI: 1.4 CM2/M2
ECHO AV CUSP MM: 1.8 CM
ECHO AV MEAN GRADIENT: 5 MMHG
ECHO AV MEAN VELOCITY: 1.1 M/S
ECHO AV PEAK GRADIENT: 8 MMHG
ECHO AV PEAK VELOCITY: 1.4 M/S
ECHO AV VELOCITY RATIO: 0.93
ECHO AV VTI: 27.1 CM
ECHO BSA: 2.67 M2
ECHO EST RA PRESSURE: 3 MMHG
ECHO LA DIAMETER INDEX: 1.85 CM/M2
ECHO LA DIAMETER: 4.7 CM
ECHO LA VOL A-L A2C: 47 ML (ref 22–52)
ECHO LA VOL A-L A4C: 50 ML (ref 22–52)
ECHO LA VOL MOD A2C: 46 ML (ref 22–52)
ECHO LA VOL MOD A4C: 48 ML (ref 22–52)
ECHO LA VOLUME AREA LENGTH: 51 ML
ECHO LA VOLUME INDEX A-L A2C: 19 ML/M2 (ref 16–34)
ECHO LA VOLUME INDEX A-L A4C: 20 ML/M2 (ref 16–34)
ECHO LA VOLUME INDEX AREA LENGTH: 20 ML/M2 (ref 16–34)
ECHO LA VOLUME INDEX MOD A2C: 18 ML/M2 (ref 16–34)
ECHO LA VOLUME INDEX MOD A4C: 19 ML/M2 (ref 16–34)
ECHO LV E' LATERAL VELOCITY: 11 CM/S
ECHO LV E' SEPTAL VELOCITY: 7 CM/S
ECHO LV EDV A2C: 84 ML
ECHO LV EDV A4C: 104 ML
ECHO LV EDV BP: 94 ML (ref 56–104)
ECHO LV EDV INDEX A4C: 41 ML/M2
ECHO LV EDV INDEX BP: 37 ML/M2
ECHO LV EDV NDEX A2C: 33 ML/M2
ECHO LV EJECTION FRACTION A2C: 72 %
ECHO LV EJECTION FRACTION A4C: 71 %
ECHO LV EJECTION FRACTION BIPLANE: 71 % (ref 55–100)
ECHO LV ESV A2C: 24 ML
ECHO LV ESV A4C: 30 ML
ECHO LV ESV BP: 27 ML (ref 19–49)
ECHO LV ESV INDEX A2C: 9 ML/M2
ECHO LV ESV INDEX A4C: 12 ML/M2
ECHO LV ESV INDEX BP: 11 ML/M2
ECHO LV FRACTIONAL SHORTENING: -145 % (ref 28–44)
ECHO LV INTERNAL DIMENSION DIASTOLE INDEX: 0.43 CM/M2
ECHO LV INTERNAL DIMENSION DIASTOLIC: 1.1 CM (ref 3.9–5.3)
ECHO LV INTERNAL DIMENSION SYSTOLIC INDEX: 1.06 CM/M2
ECHO LV INTERNAL DIMENSION SYSTOLIC: 2.7 CM
ECHO LV IVSD: 5 CM (ref 0.6–0.9)
ECHO LV IVSS: 1.9 CM
ECHO LV POSTERIOR WALL SYSTOLIC: 3.1 CM
ECHO LVOT AREA: 3.1 CM2
ECHO LVOT AV VTI INDEX: 1.11
ECHO LVOT DIAM: 2 CM
ECHO LVOT MEAN GRADIENT: 4 MMHG
ECHO LVOT PEAK GRADIENT: 7 MMHG
ECHO LVOT PEAK VELOCITY: 1.3 M/S
ECHO LVOT STROKE VOLUME INDEX: 37.2 ML/M2
ECHO LVOT SV: 94.5 ML
ECHO LVOT VTI: 30.1 CM
ECHO MV A VELOCITY: 0.74 M/S
ECHO MV AREA VTI: 2.8 CM2
ECHO MV E DECELERATION TIME (DT): 232.7 MS
ECHO MV E VELOCITY: 0.92 M/S
ECHO MV E/A RATIO: 1.24
ECHO MV E/E' LATERAL: 8.36
ECHO MV E/E' RATIO (AVERAGED): 10.75
ECHO MV E/E' SEPTAL: 13.14
ECHO MV LVOT VTI INDEX: 1.1
ECHO MV MAX VELOCITY: 1.2 M/S
ECHO MV MEAN GRADIENT: 2 MMHG
ECHO MV MEAN VELOCITY: 0.6 M/S
ECHO MV PEAK GRADIENT: 5 MMHG
ECHO MV VTI: 33.2 CM
ECHO PV MAX VELOCITY: 0.9 M/S
ECHO PV PEAK GRADIENT: 3 MMHG
ECHO RIGHT VENTRICULAR SYSTOLIC PRESSURE (RVSP): 21 MMHG
ECHO RV INTERNAL DIMENSION: 1.3 CM
ECHO RV TAPSE: 2.3 CM (ref 1.7–?)
ECHO TV REGURGITANT MAX VELOCITY: 2.12 M/S
ECHO TV REGURGITANT PEAK GRADIENT: 18 MMHG
GLUCOSE BLD-MCNC: 128 MG/DL (ref 70–99)
GLUCOSE BLD-MCNC: 210 MG/DL (ref 70–99)
GLUCOSE BLD-MCNC: 236 MG/DL (ref 70–99)
GLUCOSE BLD-MCNC: 346 MG/DL (ref 70–99)
PERFORMED ON: ABNORMAL

## 2024-08-03 PROCEDURE — 99232 SBSQ HOSP IP/OBS MODERATE 35: CPT | Performed by: INTERNAL MEDICINE

## 2024-08-03 PROCEDURE — 6370000000 HC RX 637 (ALT 250 FOR IP): Performed by: INTERNAL MEDICINE

## 2024-08-03 PROCEDURE — 6360000002 HC RX W HCPCS

## 2024-08-03 PROCEDURE — 6360000002 HC RX W HCPCS: Performed by: INTERNAL MEDICINE

## 2024-08-03 PROCEDURE — 96372 THER/PROPH/DIAG INJ SC/IM: CPT

## 2024-08-03 PROCEDURE — 96376 TX/PRO/DX INJ SAME DRUG ADON: CPT

## 2024-08-03 PROCEDURE — 93306 TTE W/DOPPLER COMPLETE: CPT | Performed by: INTERNAL MEDICINE

## 2024-08-03 PROCEDURE — 2580000003 HC RX 258: Performed by: INTERNAL MEDICINE

## 2024-08-03 PROCEDURE — G0378 HOSPITAL OBSERVATION PER HR: HCPCS

## 2024-08-03 RX ORDER — MORPHINE SULFATE 2 MG/ML
1 INJECTION, SOLUTION INTRAMUSCULAR; INTRAVENOUS EVERY 4 HOURS PRN
Status: DISCONTINUED | OUTPATIENT
Start: 2024-08-03 | End: 2024-08-05 | Stop reason: HOSPADM

## 2024-08-03 RX ADMIN — Medication 10 ML: at 09:04

## 2024-08-03 RX ADMIN — INSULIN LISPRO 14 UNITS: 100 INJECTION, SOLUTION INTRAVENOUS; SUBCUTANEOUS at 09:04

## 2024-08-03 RX ADMIN — INSULIN LISPRO 4 UNITS: 100 INJECTION, SOLUTION INTRAVENOUS; SUBCUTANEOUS at 22:47

## 2024-08-03 RX ADMIN — ATORVASTATIN CALCIUM 40 MG: 40 TABLET, FILM COATED ORAL at 22:44

## 2024-08-03 RX ADMIN — Medication 10 ML: at 22:51

## 2024-08-03 RX ADMIN — ACETAMINOPHEN 650 MG: 325 TABLET ORAL at 09:03

## 2024-08-03 RX ADMIN — GABAPENTIN 800 MG: 400 CAPSULE ORAL at 14:32

## 2024-08-03 RX ADMIN — ENOXAPARIN SODIUM 40 MG: 100 INJECTION SUBCUTANEOUS at 09:04

## 2024-08-03 RX ADMIN — MORPHINE SULFATE 2 MG: 2 INJECTION, SOLUTION INTRAMUSCULAR; INTRAVENOUS at 03:33

## 2024-08-03 RX ADMIN — ASPIRIN 81 MG: 81 TABLET, COATED ORAL at 09:03

## 2024-08-03 RX ADMIN — ISOSORBIDE MONONITRATE 30 MG: 30 TABLET, EXTENDED RELEASE ORAL at 09:03

## 2024-08-03 RX ADMIN — MORPHINE SULFATE 1 MG: 2 INJECTION, SOLUTION INTRAMUSCULAR; INTRAVENOUS at 22:51

## 2024-08-03 RX ADMIN — LEVOTHYROXINE SODIUM 137 MCG: 0.03 TABLET ORAL at 06:51

## 2024-08-03 RX ADMIN — INSULIN GLARGINE 44 UNITS: 100 INJECTION, SOLUTION SUBCUTANEOUS at 22:46

## 2024-08-03 RX ADMIN — INSULIN LISPRO 4 UNITS: 100 INJECTION, SOLUTION INTRAVENOUS; SUBCUTANEOUS at 17:12

## 2024-08-03 RX ADMIN — GABAPENTIN 800 MG: 400 CAPSULE ORAL at 22:44

## 2024-08-03 RX ADMIN — INSULIN LISPRO 14 UNITS: 100 INJECTION, SOLUTION INTRAVENOUS; SUBCUTANEOUS at 17:12

## 2024-08-03 RX ADMIN — MORPHINE SULFATE 2 MG: 2 INJECTION, SOLUTION INTRAMUSCULAR; INTRAVENOUS at 09:17

## 2024-08-03 RX ADMIN — GABAPENTIN 800 MG: 400 CAPSULE ORAL at 09:03

## 2024-08-03 RX ADMIN — ENOXAPARIN SODIUM 40 MG: 100 INJECTION SUBCUTANEOUS at 22:45

## 2024-08-03 RX ADMIN — METOPROLOL SUCCINATE 25 MG: 25 TABLET, EXTENDED RELEASE ORAL at 09:03

## 2024-08-03 ASSESSMENT — PAIN DESCRIPTION - DESCRIPTORS: DESCRIPTORS: DISCOMFORT

## 2024-08-03 ASSESSMENT — PAIN SCALES - GENERAL
PAINLEVEL_OUTOF10: 9
PAINLEVEL_OUTOF10: 5
PAINLEVEL_OUTOF10: 8
PAINLEVEL_OUTOF10: 9
PAINLEVEL_OUTOF10: 0

## 2024-08-03 ASSESSMENT — PAIN DESCRIPTION - ORIENTATION
ORIENTATION: LEFT
ORIENTATION: MID;ANTERIOR

## 2024-08-03 ASSESSMENT — PAIN DESCRIPTION - LOCATION
LOCATION: CHEST
LOCATION: CHEST
LOCATION: HEAD

## 2024-08-03 ASSESSMENT — PAIN SCALES - WONG BAKER: WONGBAKER_NUMERICALRESPONSE: NO HURT

## 2024-08-04 LAB
ANION GAP SERPL CALCULATED.3IONS-SCNC: 12 MEQ/L (ref 9–15)
BASOPHILS # BLD: 0.1 K/UL (ref 0–0.2)
BASOPHILS NFR BLD: 1 %
BUN SERPL-MCNC: 13 MG/DL (ref 6–20)
CALCIUM SERPL-MCNC: 8.6 MG/DL (ref 8.5–9.9)
CHLORIDE SERPL-SCNC: 101 MEQ/L (ref 95–107)
CO2 SERPL-SCNC: 24 MEQ/L (ref 20–31)
CREAT SERPL-MCNC: 0.9 MG/DL (ref 0.5–0.9)
EOSINOPHIL # BLD: 0.6 K/UL (ref 0–0.7)
EOSINOPHIL NFR BLD: 11 %
ERYTHROCYTE [DISTWIDTH] IN BLOOD BY AUTOMATED COUNT: 14.6 % (ref 11.5–14.5)
GLUCOSE BLD-MCNC: 276 MG/DL (ref 70–99)
GLUCOSE BLD-MCNC: 312 MG/DL (ref 70–99)
GLUCOSE BLD-MCNC: 340 MG/DL (ref 70–99)
GLUCOSE BLD-MCNC: 413 MG/DL (ref 70–99)
GLUCOSE SERPL-MCNC: 336 MG/DL (ref 70–99)
HCT VFR BLD AUTO: 34.1 % (ref 37–47)
HGB BLD-MCNC: 11.9 G/DL (ref 12–16)
LYMPHOCYTES # BLD: 1.6 K/UL (ref 1–4.8)
LYMPHOCYTES NFR BLD: 28 %
MCH RBC QN AUTO: 30.1 PG (ref 27–31.3)
MCHC RBC AUTO-ENTMCNC: 34.9 % (ref 33–37)
MCV RBC AUTO: 86.1 FL (ref 79.4–94.8)
MONOCYTES # BLD: 0.2 K/UL (ref 0.2–0.8)
MONOCYTES NFR BLD: 3.8 %
NEUTROPHILS # BLD: 3.2 K/UL (ref 1.4–6.5)
NEUTS SEG NFR BLD: 56 %
PERFORMED ON: ABNORMAL
PLATELET # BLD AUTO: 73 K/UL (ref 130–400)
PLATELET BLD QL SMEAR: ABNORMAL
POTASSIUM SERPL-SCNC: 4.2 MEQ/L (ref 3.4–4.9)
RBC # BLD AUTO: 3.96 M/UL (ref 4.2–5.4)
SLIDE REVIEW: ABNORMAL
SMUDGE CELLS BLD QL SMEAR: 16.2
SODIUM SERPL-SCNC: 137 MEQ/L (ref 135–144)
TSH REFLEX: 1.56 UIU/ML (ref 0.44–3.86)
WBC # BLD AUTO: 5.8 K/UL (ref 4.8–10.8)

## 2024-08-04 PROCEDURE — 84443 ASSAY THYROID STIM HORMONE: CPT

## 2024-08-04 PROCEDURE — 6370000000 HC RX 637 (ALT 250 FOR IP): Performed by: INTERNAL MEDICINE

## 2024-08-04 PROCEDURE — 2580000003 HC RX 258: Performed by: INTERNAL MEDICINE

## 2024-08-04 PROCEDURE — 85025 COMPLETE CBC W/AUTO DIFF WBC: CPT

## 2024-08-04 PROCEDURE — 96372 THER/PROPH/DIAG INJ SC/IM: CPT

## 2024-08-04 PROCEDURE — G0378 HOSPITAL OBSERVATION PER HR: HCPCS

## 2024-08-04 PROCEDURE — 6360000002 HC RX W HCPCS: Performed by: INTERNAL MEDICINE

## 2024-08-04 PROCEDURE — 80048 BASIC METABOLIC PNL TOTAL CA: CPT

## 2024-08-04 PROCEDURE — 6360000002 HC RX W HCPCS

## 2024-08-04 PROCEDURE — 36415 COLL VENOUS BLD VENIPUNCTURE: CPT

## 2024-08-04 PROCEDURE — 99232 SBSQ HOSP IP/OBS MODERATE 35: CPT | Performed by: INTERNAL MEDICINE

## 2024-08-04 PROCEDURE — 96376 TX/PRO/DX INJ SAME DRUG ADON: CPT

## 2024-08-04 RX ORDER — INSULIN GLARGINE 100 [IU]/ML
66 INJECTION, SOLUTION SUBCUTANEOUS NIGHTLY
Status: DISCONTINUED | OUTPATIENT
Start: 2024-08-04 | End: 2024-08-05

## 2024-08-04 RX ORDER — INSULIN LISPRO 100 [IU]/ML
20 INJECTION, SOLUTION INTRAVENOUS; SUBCUTANEOUS
Status: DISCONTINUED | OUTPATIENT
Start: 2024-08-04 | End: 2024-08-04

## 2024-08-04 RX ORDER — INSULIN LISPRO 100 [IU]/ML
22 INJECTION, SOLUTION INTRAVENOUS; SUBCUTANEOUS
Status: DISCONTINUED | OUTPATIENT
Start: 2024-08-04 | End: 2024-08-05

## 2024-08-04 RX ORDER — INSULIN GLARGINE 100 [IU]/ML
60 INJECTION, SOLUTION SUBCUTANEOUS NIGHTLY
Status: DISCONTINUED | OUTPATIENT
Start: 2024-08-04 | End: 2024-08-04

## 2024-08-04 RX ADMIN — GABAPENTIN 800 MG: 400 CAPSULE ORAL at 15:11

## 2024-08-04 RX ADMIN — METOPROLOL SUCCINATE 25 MG: 25 TABLET, EXTENDED RELEASE ORAL at 08:42

## 2024-08-04 RX ADMIN — GABAPENTIN 800 MG: 400 CAPSULE ORAL at 08:42

## 2024-08-04 RX ADMIN — ENOXAPARIN SODIUM 40 MG: 100 INJECTION SUBCUTANEOUS at 20:54

## 2024-08-04 RX ADMIN — INSULIN LISPRO 12 UNITS: 100 INJECTION, SOLUTION INTRAVENOUS; SUBCUTANEOUS at 12:03

## 2024-08-04 RX ADMIN — ISOSORBIDE MONONITRATE 30 MG: 30 TABLET, EXTENDED RELEASE ORAL at 08:42

## 2024-08-04 RX ADMIN — LEVOTHYROXINE SODIUM 137 MCG: 0.03 TABLET ORAL at 08:42

## 2024-08-04 RX ADMIN — INSULIN LISPRO 8 UNITS: 100 INJECTION, SOLUTION INTRAVENOUS; SUBCUTANEOUS at 17:05

## 2024-08-04 RX ADMIN — MORPHINE SULFATE 1 MG: 2 INJECTION, SOLUTION INTRAMUSCULAR; INTRAVENOUS at 20:46

## 2024-08-04 RX ADMIN — ONDANSETRON 4 MG: 4 TABLET, ORALLY DISINTEGRATING ORAL at 09:05

## 2024-08-04 RX ADMIN — INSULIN GLARGINE 66 UNITS: 100 INJECTION, SOLUTION SUBCUTANEOUS at 21:04

## 2024-08-04 RX ADMIN — Medication 10 ML: at 08:43

## 2024-08-04 RX ADMIN — INSULIN LISPRO 14 UNITS: 100 INJECTION, SOLUTION INTRAVENOUS; SUBCUTANEOUS at 12:03

## 2024-08-04 RX ADMIN — MORPHINE SULFATE 1 MG: 2 INJECTION, SOLUTION INTRAMUSCULAR; INTRAVENOUS at 15:12

## 2024-08-04 RX ADMIN — INSULIN LISPRO 4 UNITS: 100 INJECTION, SOLUTION INTRAVENOUS; SUBCUTANEOUS at 21:04

## 2024-08-04 RX ADMIN — INSULIN LISPRO 22 UNITS: 100 INJECTION, SOLUTION INTRAVENOUS; SUBCUTANEOUS at 17:07

## 2024-08-04 RX ADMIN — Medication 10 ML: at 20:36

## 2024-08-04 RX ADMIN — ATORVASTATIN CALCIUM 40 MG: 40 TABLET, FILM COATED ORAL at 20:55

## 2024-08-04 RX ADMIN — INSULIN LISPRO 12 UNITS: 100 INJECTION, SOLUTION INTRAVENOUS; SUBCUTANEOUS at 08:43

## 2024-08-04 RX ADMIN — GABAPENTIN 800 MG: 400 CAPSULE ORAL at 20:54

## 2024-08-04 RX ADMIN — ENOXAPARIN SODIUM 40 MG: 100 INJECTION SUBCUTANEOUS at 08:42

## 2024-08-04 RX ADMIN — INSULIN LISPRO 14 UNITS: 100 INJECTION, SOLUTION INTRAVENOUS; SUBCUTANEOUS at 08:42

## 2024-08-04 RX ADMIN — ASPIRIN 81 MG: 81 TABLET, COATED ORAL at 08:42

## 2024-08-04 ASSESSMENT — PAIN SCALES - GENERAL
PAINLEVEL_OUTOF10: 0
PAINLEVEL_OUTOF10: 10
PAINLEVEL_OUTOF10: 0

## 2024-08-04 ASSESSMENT — PAIN SCALES - WONG BAKER
WONGBAKER_NUMERICALRESPONSE: NO HURT

## 2024-08-04 ASSESSMENT — PAIN DESCRIPTION - ORIENTATION: ORIENTATION: MID;ANTERIOR

## 2024-08-04 ASSESSMENT — PAIN DESCRIPTION - DESCRIPTORS: DESCRIPTORS: DISCOMFORT;NAGGING

## 2024-08-04 ASSESSMENT — PAIN DESCRIPTION - LOCATION: LOCATION: CHEST

## 2024-08-04 NOTE — PLAN OF CARE
Problem: Discharge Planning  Goal: Discharge to home or other facility with appropriate resources  8/2/2024 1033 by Linda Parekh RN  Outcome: Progressing  8/2/2024 0048 by Kate Downs RN  Outcome: Progressing  Flowsheets (Taken 8/1/2024 1946)  Discharge to home or other facility with appropriate resources:   Identify barriers to discharge with patient and caregiver   Arrange for needed discharge resources and transportation as appropriate     Problem: Pain  Goal: Verbalizes/displays adequate comfort level or baseline comfort level  8/2/2024 1033 by Linda Parekh RN  Outcome: Progressing  8/2/2024 0048 by Kate Downs RN  Outcome: Progressing     Problem: Safety - Adult  Goal: Free from fall injury  8/2/2024 1033 by Linda Parekh RN  Outcome: Progressing  8/2/2024 0048 by Kate oDwns RN  Outcome: Progressing     Problem: Chronic Conditions and Co-morbidities  Goal: Patient's chronic conditions and co-morbidity symptoms are monitored and maintained or improved  Outcome: Progressing     
  Problem: Discharge Planning  Goal: Discharge to home or other facility with appropriate resources  Outcome: Progressing  Flowsheets  Taken 8/4/2024 0835 by Adalgisa Webster RN  Discharge to home or other facility with appropriate resources:   Identify barriers to discharge with patient and caregiver   Arrange for needed discharge resources and transportation as appropriate   Identify discharge learning needs (meds, wound care, etc)   Arrange for interpreters to assist at discharge as needed  Taken 8/3/2024 2120 by Hernán Barth RN  Discharge to home or other facility with appropriate resources: Identify barriers to discharge with patient and caregiver     Problem: Pain  Goal: Verbalizes/displays adequate comfort level or baseline comfort level  Outcome: Progressing     Problem: Safety - Adult  Goal: Free from fall injury  Outcome: Progressing     Problem: Chronic Conditions and Co-morbidities  Goal: Patient's chronic conditions and co-morbidity symptoms are monitored and maintained or improved  Outcome: Progressing  Flowsheets  Taken 8/4/2024 0835 by Adalgisa Webster RN  Care Plan - Patient's Chronic Conditions and Co-Morbidity Symptoms are Monitored and Maintained or Improved: Monitor and assess patient's chronic conditions and comorbid symptoms for stability, deterioration, or improvement  Taken 8/3/2024 2120 by Hernán Barth RN  Care Plan - Patient's Chronic Conditions and Co-Morbidity Symptoms are Monitored and Maintained or Improved:   Monitor and assess patient's chronic conditions and comorbid symptoms for stability, deterioration, or improvement   Collaborate with multidisciplinary team to address chronic and comorbid conditions and prevent exacerbation or deterioration     Problem: Cardiovascular - Adult  Goal: Maintains optimal cardiac output and hemodynamic stability  Outcome: Progressing  Flowsheets  Taken 8/4/2024 0835 by Adalgisa Webster RN  Maintains optimal cardiac output and hemodynamic stability: 
  Problem: Discharge Planning  Goal: Discharge to home or other facility with appropriate resources  Outcome: Progressing  Flowsheets (Taken 8/1/2024 1946)  Discharge to home or other facility with appropriate resources:   Identify barriers to discharge with patient and caregiver   Arrange for needed discharge resources and transportation as appropriate     Problem: Pain  Goal: Verbalizes/displays adequate comfort level or baseline comfort level  Outcome: Progressing     Problem: Safety - Adult  Goal: Free from fall injury  Outcome: Progressing     
  Problem: Discharge Planning  Goal: Discharge to home or other facility with appropriate resources  Outcome: Progressing  Flowsheets (Taken 8/2/2024 2246)  Discharge to home or other facility with appropriate resources:   Identify barriers to discharge with patient and caregiver   Arrange for needed discharge resources and transportation as appropriate   Identify discharge learning needs (meds, wound care, etc)     Problem: Chronic Conditions and Co-morbidities  Goal: Patient's chronic conditions and co-morbidity symptoms are monitored and maintained or improved  Outcome: Progressing  Flowsheets (Taken 8/2/2024 2246)  Care Plan - Patient's Chronic Conditions and Co-Morbidity Symptoms are Monitored and Maintained or Improved:   Monitor and assess patient's chronic conditions and comorbid symptoms for stability, deterioration, or improvement   Collaborate with multidisciplinary team to address chronic and comorbid conditions and prevent exacerbation or deterioration   Update acute care plan with appropriate goals if chronic or comorbid symptoms are exacerbated and prevent overall improvement and discharge     Problem: Cardiovascular - Adult  Goal: Maintains optimal cardiac output and hemodynamic stability  Outcome: Progressing  Flowsheets (Taken 8/2/2024 2246)  Maintains optimal cardiac output and hemodynamic stability:   Monitor blood pressure and heart rate   Monitor urine output and notify Licensed Independent Practitioner for values outside of normal range   Assess for signs of decreased cardiac output  Goal: Absence of cardiac dysrhythmias or at baseline  Outcome: Progressing  Flowsheets (Taken 8/2/2024 2246)  Absence of cardiac dysrhythmias or at baseline:   Monitor cardiac rate and rhythm   Assess for signs of decreased cardiac output     
appropriate goals if chronic or comorbid symptoms are exacerbated and prevent overall improvement and discharge     Problem: Cardiovascular - Adult  Goal: Maintains optimal cardiac output and hemodynamic stability  8/3/2024 1131 by Adalgisa Webster RN  Outcome: Progressing  Flowsheets (Taken 8/3/2024 0854)  Maintains optimal cardiac output and hemodynamic stability: Monitor blood pressure and heart rate  8/3/2024 0603 by Samantha Sharp RN  Outcome: Progressing  Flowsheets (Taken 8/2/2024 2246)  Maintains optimal cardiac output and hemodynamic stability:   Monitor blood pressure and heart rate   Monitor urine output and notify Licensed Independent Practitioner for values outside of normal range   Assess for signs of decreased cardiac output  Goal: Absence of cardiac dysrhythmias or at baseline  8/3/2024 1131 by Adalgisa Webster RN  Outcome: Progressing  Flowsheets (Taken 8/3/2024 0854)  Absence of cardiac dysrhythmias or at baseline: Monitor cardiac rate and rhythm  8/3/2024 0603 by Samantha Sharp RN  Outcome: Progressing  Flowsheets (Taken 8/2/2024 2246)  Absence of cardiac dysrhythmias or at baseline:   Monitor cardiac rate and rhythm   Assess for signs of decreased cardiac output   Electronically signed by Adalgisa Webster RN on 8/3/2024 at 11:31 AM

## 2024-08-05 VITALS
RESPIRATION RATE: 18 BRPM | DIASTOLIC BLOOD PRESSURE: 70 MMHG | SYSTOLIC BLOOD PRESSURE: 115 MMHG | OXYGEN SATURATION: 94 % | WEIGHT: 293 LBS | BODY MASS INDEX: 43.4 KG/M2 | HEIGHT: 69 IN | TEMPERATURE: 97.1 F | HEART RATE: 77 BPM

## 2024-08-05 LAB
GLUCOSE BLD-MCNC: 264 MG/DL (ref 70–99)
GLUCOSE BLD-MCNC: 309 MG/DL (ref 70–99)
GLUCOSE BLD-MCNC: 338 MG/DL (ref 70–99)
GLUCOSE BLD-MCNC: 346 MG/DL (ref 70–99)
GLUCOSE BLD-MCNC: 352 MG/DL (ref 70–99)
GLUCOSE BLD-MCNC: 398 MG/DL (ref 70–99)
PERFORMED ON: ABNORMAL

## 2024-08-05 PROCEDURE — 3430000000 HC RX DIAGNOSTIC RADIOPHARMACEUTICAL: Performed by: FAMILY MEDICINE

## 2024-08-05 PROCEDURE — 6360000002 HC RX W HCPCS: Performed by: INTERNAL MEDICINE

## 2024-08-05 PROCEDURE — 6370000000 HC RX 637 (ALT 250 FOR IP): Performed by: INTERNAL MEDICINE

## 2024-08-05 PROCEDURE — A9502 TC99M TETROFOSMIN: HCPCS | Performed by: FAMILY MEDICINE

## 2024-08-05 PROCEDURE — G0378 HOSPITAL OBSERVATION PER HR: HCPCS

## 2024-08-05 PROCEDURE — 96375 TX/PRO/DX INJ NEW DRUG ADDON: CPT

## 2024-08-05 PROCEDURE — 96372 THER/PROPH/DIAG INJ SC/IM: CPT

## 2024-08-05 PROCEDURE — 99232 SBSQ HOSP IP/OBS MODERATE 35: CPT | Performed by: INTERNAL MEDICINE

## 2024-08-05 PROCEDURE — 96376 TX/PRO/DX INJ SAME DRUG ADON: CPT

## 2024-08-05 PROCEDURE — 6360000002 HC RX W HCPCS

## 2024-08-05 RX ORDER — INSULIN LISPRO 100 [IU]/ML
35 INJECTION, SOLUTION INTRAVENOUS; SUBCUTANEOUS
Status: DISCONTINUED | OUTPATIENT
Start: 2024-08-05 | End: 2024-08-05 | Stop reason: HOSPADM

## 2024-08-05 RX ORDER — INSULIN GLARGINE 100 [IU]/ML
100 INJECTION, SOLUTION SUBCUTANEOUS NIGHTLY
Status: DISCONTINUED | OUTPATIENT
Start: 2024-08-05 | End: 2024-08-05 | Stop reason: HOSPADM

## 2024-08-05 RX ORDER — INSULIN GLARGINE 100 [IU]/ML
60 INJECTION, SOLUTION SUBCUTANEOUS DAILY
Status: DISCONTINUED | OUTPATIENT
Start: 2024-08-06 | End: 2024-08-05 | Stop reason: HOSPADM

## 2024-08-05 RX ORDER — INSULIN GLARGINE 100 [IU]/ML
30 INJECTION, SOLUTION SUBCUTANEOUS DAILY
Status: DISCONTINUED | OUTPATIENT
Start: 2024-08-05 | End: 2024-08-05

## 2024-08-05 RX ADMIN — INSULIN LISPRO 35 UNITS: 100 INJECTION, SOLUTION INTRAVENOUS; SUBCUTANEOUS at 16:07

## 2024-08-05 RX ADMIN — INSULIN LISPRO 8 UNITS: 100 INJECTION, SOLUTION INTRAVENOUS; SUBCUTANEOUS at 16:07

## 2024-08-05 RX ADMIN — INSULIN HUMAN 20 UNITS: 100 INJECTION, SOLUTION PARENTERAL at 14:53

## 2024-08-05 RX ADMIN — ENOXAPARIN SODIUM 40 MG: 100 INJECTION SUBCUTANEOUS at 10:24

## 2024-08-05 RX ADMIN — ISOSORBIDE MONONITRATE 30 MG: 30 TABLET, EXTENDED RELEASE ORAL at 10:24

## 2024-08-05 RX ADMIN — MORPHINE SULFATE 1 MG: 2 INJECTION, SOLUTION INTRAMUSCULAR; INTRAVENOUS at 06:35

## 2024-08-05 RX ADMIN — INSULIN GLARGINE 30 UNITS: 100 INJECTION, SOLUTION SUBCUTANEOUS at 10:26

## 2024-08-05 RX ADMIN — METOPROLOL SUCCINATE 25 MG: 25 TABLET, EXTENDED RELEASE ORAL at 10:24

## 2024-08-05 RX ADMIN — LEVOTHYROXINE SODIUM 137 MCG: 0.03 TABLET ORAL at 06:33

## 2024-08-05 RX ADMIN — HUMAN INSULIN 10 UNITS: 100 INJECTION, SOLUTION SUBCUTANEOUS at 13:33

## 2024-08-05 RX ADMIN — ASPIRIN 81 MG: 81 TABLET, COATED ORAL at 10:24

## 2024-08-05 RX ADMIN — ACETAMINOPHEN 650 MG: 325 TABLET ORAL at 00:04

## 2024-08-05 RX ADMIN — INSULIN LISPRO 12 UNITS: 100 INJECTION, SOLUTION INTRAVENOUS; SUBCUTANEOUS at 10:23

## 2024-08-05 RX ADMIN — GABAPENTIN 800 MG: 400 CAPSULE ORAL at 10:24

## 2024-08-05 RX ADMIN — TETROFOSMIN 35.9 MILLICURIE: 1.38 INJECTION, POWDER, LYOPHILIZED, FOR SOLUTION INTRAVENOUS at 08:40

## 2024-08-05 RX ADMIN — INSULIN LISPRO 22 UNITS: 100 INJECTION, SOLUTION INTRAVENOUS; SUBCUTANEOUS at 12:27

## 2024-08-05 ASSESSMENT — PAIN DESCRIPTION - DESCRIPTORS
DESCRIPTORS: ACHING
DESCRIPTORS: DISCOMFORT;HEAVINESS;NAGGING

## 2024-08-05 ASSESSMENT — PAIN SCALES - WONG BAKER
WONGBAKER_NUMERICALRESPONSE: NO HURT

## 2024-08-05 ASSESSMENT — PAIN SCALES - GENERAL
PAINLEVEL_OUTOF10: 10
PAINLEVEL_OUTOF10: 0
PAINLEVEL_OUTOF10: 10

## 2024-08-05 ASSESSMENT — ENCOUNTER SYMPTOMS
NAUSEA: 1
COLOR CHANGE: 0
VOMITING: 0
SHORTNESS OF BREATH: 1

## 2024-08-05 ASSESSMENT — PAIN DESCRIPTION - LOCATION
LOCATION: CHEST
LOCATION: HEAD

## 2024-08-05 ASSESSMENT — PAIN DESCRIPTION - ORIENTATION
ORIENTATION: MID;ANTERIOR
ORIENTATION: RIGHT;LEFT

## 2024-08-05 NOTE — DISCHARGE SUMMARY
[facility] Medicine Discharge Summary    Mehnaz Rivera  :  1975  MRN:  [id number]    Admit date:  2024  Discharge date:  2024    Admitting Physician:  Donato Solis DO  Primary Care Physician:  Elizabet Campos MD    Discharge Diagnoses:    Principal Problem:    Chest pain  Active Problems:    Type 2 diabetes mellitus with hyperglycemia (HCC)    Class 3 drug-induced obesity with body mass index (BMI) of 50.0 to 59.9 in adult (HCC)    Liver cirrhosis secondary to LAYNE (HCC)    Thrombocytopenia (HCC)  Resolved Problems:    * No resolved hospital problems. *    Chief Complaint   Patient presents with    Chest Pain       Condition: improved   Activity: no restrictions   Diet: diabetic  Disposition: home  Functional Status: ambulatory    Significant Findings:     Negative troponin    Negative stress test      Hospital Course:   49-year-old female with class III obesity, liver cirrhosis from fatty liver disease, type 2 diabetes, NATALEE was hospitalized for chest pain. This pain was reproducible with palpation however due to her risk factors, cardiology held her for several days in the hospital trying to complete a stress test.  The final part of the test was completed  and was negative after which the patient was discharged in stable condition.     I recommend extensive lifestyle modification starting with weight loss.      Exam on Discharge:   /80   Pulse 79   Temp 97.1 °F (36.2 °C) (Oral)   Resp 18   Ht 1.753 m (5' 9.02\")   Wt (!) 144.9 kg (319 lb 7.1 oz)   LMP  (LMP Unknown)   SpO2 93%   BMI 47.15 kg/m²   General appearance: alert, cooperative and no distress. obese  Mental Status: oriented to person, place and time and normal affect  Lungs: clear to auscultation bilaterally, normal effort  Heart: regular rate and rhythm, no murmur  Abdomen: soft, nontender, nondistended, bowel sounds present, no masses  Extremities: no edema, redness,

## 2024-08-05 NOTE — PROGRESS NOTES
PROGRESS NOTE      Patient: Mehnaz Rivera  Unit/Bed: W180/W180-01  YOB: 1975  MRN: [id number]  Acct: [id number]   Admitting Diagnosis: Chest pain [R07.9]  Chest pain, unspecified type [R07.9]  Date:  8/1/2024  Hospital Day: 0      Chief Complaint:  Chest pain    History of Present Illness:    This is a 49-year-old  female with past medical history significant for hypertension, dyslipidemia, diabetes, obstructive sleep apnea on CPAP at bedtime, cirrhosis of the liver with portal hypertension per CT scan of abdomen and pelvis at [facility] on 6/2/2024, morbid obesity and multiple medical problems who presented to [facility] ER yesterday with chief complaint of chest pain.  Over the past 3 days, patient has been experiencing exertional chest pain and exertional shortness of breath which has progressed since its onset and became extremely severe yesterday.  She reports some nausea with the chest pain episode yesterday.  Also reports lower extremity edema.  Denies any lightheadedness, dizziness, syncope, fever or chills.  Due to this chest pain she presented to ER for further evaluation.    On presentation to the emergency room, blood pressure 119/57, heart rate 85, respiratory rate 19, pulse ox 97%, temperature 98 °F.  Sodium 136, potassium 4.1, chloride 101, total CO2 26, BUN elevated 21, creatinine elevated 0.92, GFR 75.2, glucose 274.  proBNP less than 36.  High-sensitivity troponin negative at 10 with repeat negative at 10.  WBC 5.8, hemoglobin 11.6, hematocrit 34.5, platelets low at 72.  Chest x-ray showed no acute process.  D-dimer negative at less than 0.27.  EKG showed sinus rhythm 84 bpm, low voltage QRS, poor R wave progression, no acute ischemic changes, QTc 475 ms.  She was admitted for further evaluation.    Cardiology has been consulted for chest pain.      At time my evaluation, patient is resting comfortably and in no acute distress.  Intraoperative iPad used to translate for 
 PROGRESS NOTE      Patient: Mehnaz Rivera  Unit/Bed: W180/W180-01  YOB: 1975  MRN: [id number]  Acct: [id number]   Admitting Diagnosis: Chest pain [R07.9]  Chest pain, unspecified type [R07.9]  Date:  8/1/2024  Hospital Day: 0      Chief Complaint:  Chest pain    History of Present Illness:    This is a 49-year-old  female with past medical history significant for hypertension, dyslipidemia, diabetes, obstructive sleep apnea on CPAP at bedtime, cirrhosis of the liver with portal hypertension per CT scan of abdomen and pelvis at [facility] on 6/2/2024, morbid obesity and multiple medical problems who presented to [facility] ER yesterday with chief complaint of chest pain.  Over the past 3 days, patient has been experiencing exertional chest pain and exertional shortness of breath which has progressed since its onset and became extremely severe yesterday.  She reports some nausea with the chest pain episode yesterday.  Also reports lower extremity edema.  Denies any lightheadedness, dizziness, syncope, fever or chills.  Due to this chest pain she presented to ER for further evaluation.    On presentation to the emergency room, blood pressure 119/57, heart rate 85, respiratory rate 19, pulse ox 97%, temperature 98 °F.  Sodium 136, potassium 4.1, chloride 101, total CO2 26, BUN elevated 21, creatinine elevated 0.92, GFR 75.2, glucose 274.  proBNP less than 36.  High-sensitivity troponin negative at 10 with repeat negative at 10.  WBC 5.8, hemoglobin 11.6, hematocrit 34.5, platelets low at 72.  Chest x-ray showed no acute process.  D-dimer negative at less than 0.27.  EKG showed sinus rhythm 84 bpm, low voltage QRS, poor R wave progression, no acute ischemic changes, QTc 475 ms.  She was admitted for further evaluation.    Cardiology has been consulted for chest pain.      At time my evaluation, patient is resting comfortably and in no acute distress.  Intraoperative iPad used to translate for 
0900-Cardiology NP at bedside with  for exam of pt, Dr Suarez entered room spoke with pt in Slovak, reported pt willing to do stress test this am , we are to keep pt npo for stress test, pt explained procedure via Dr Suarez.   1020-To stress lab via wheelchair  1230- returned from stress lab  1245 one touch 141 lunch tray ordered  
: Karen # [id number]    Electronically signed by Adalgisa Webster RN on 8/2/2024 at 5:05 PM      
Assumed care of patient for 12 hour shift.  Chart and medications reviewed.  Bed in lowest position, wheels locked, 2 side rails up and bed alarm on. Personal belongings and call light within reach.       NOTES:  0708: received bedside shift report from Samantha JOHNSON.     0900: assessment and medication administration completed.  services:  Philipp # [id number]    0908: Dr. Benoit at bedside. Advised to make diet no caffeine in preparation for stress test. This RN asked Dr. Benoit if Lovenox should still be given with pt current platelets -per Dr. Benoit - yes give Lovenox.     1207: Insulin administration. Pt not eating lunch yet. Pt refusing to each lunch provided This RN called dietary to get another meal ordered.     1400: Insulin not as BS reading is no longer valid and pt already eating.    Electronically signed by Adalgisa Webster RN on 8/3/2024 at 3:18 PM      
Assumed care of patient for 12 hour shift.  Chart and medications reviewed.  Bed in lowest position, wheels locked, 2 side rails up and bed alarm on. Personal belongings and call light within reach.       NOTES:  0719: received bedside shift report from Hernán JOHNSON     0835: shift assessment and medication administration.  Pt . Pt requesting strawberry ice cream for breakfast. This RN declined to provide ice cream with breakfast. Pt educated on BS levels and diet recommendations.  :   Justice # [id number]    Electronically signed by Adalgisa Webster RN on 8/4/2024 at 8:58 AM    
Home cpap not here and patient declines hospital cpap. Electronically signed by Katelyn Velazco RCP on 8/1/2024 at 8:18 PM    
Notified Dr. Suarez that patient is refusing cardiac catheterization tomorrow and any cardiac related procedures. This nurse did call the daughter Jaycee per patient request and extensive education was give to patient and daughter regarding cardiac catheterization. Patient and family still refusing at this time.  
Nuclear stress test is negative for ischemia. EF 68%. Full report to follow.   
Physician Progress Note    8/3/2024   3:50 PM    Name:  Mehnaz Rivera  MRN:    [id number]     IP Day: 0     Admit Date: 8/1/2024  3:10 PM  PCP: Elizabet Campos MD    Code Status:  Full Code    Subjective:     Chest only hurts when pushed    Current Facility-Administered Medications   Medication Dose Route Frequency Provider Last Rate Last Admin    perflutren lipid microspheres (DEFINITY) injection 1.5 mL  1.5 mL IntraVENous ONCE PRN Sebastien Suarez MD        nitroGLYCERIN (NITROSTAT) SL tablet 0.4 mg  0.4 mg SubLINGual Q5 Min PRN Will, Donato R, DO   0.4 mg at 08/01/24 1549    atorvastatin (LIPITOR) tablet 40 mg  40 mg Oral Daily Will, Donato R, DO   40 mg at 08/02/24 2245    metoprolol succinate (TOPROL XL) extended release tablet 25 mg  25 mg Oral Daily Will, Donato R, DO   25 mg at 08/03/24 0903    sodium chloride flush 0.9 % injection 5-40 mL  5-40 mL IntraVENous 2 times per day Will, Donato R, DO   10 mL at 08/03/24 0904    sodium chloride flush 0.9 % injection 5-40 mL  5-40 mL IntraVENous PRN Will, Donato R, DO        0.9 % sodium chloride infusion   IntraVENous PRN Will, Donato R, DO        potassium chloride (KLOR-CON M) extended release tablet 40 mEq  40 mEq Oral PRN Will, Donato R, DO        Or    potassium bicarb-citric acid (EFFER-K) effervescent tablet 40 mEq  40 mEq Oral PRN Will, Donato R, DO        Or    potassium chloride 10 mEq/100 mL IVPB (Peripheral Line)  10 mEq IntraVENous PRN Will, Donato R, DO        magnesium sulfate 2000 mg in 50 mL IVPB premix  2,000 mg IntraVENous PRN Will, Donato R, DO        ondansetron (ZOFRAN-ODT) disintegrating tablet 4 mg  4 mg Oral Q8H PRN Will, Donato R, DO        Or    ondansetron (ZOFRAN) injection 4 mg  4 mg IntraVENous Q6H PRN Will, Donato R, DO   4 mg at 08/02/24 2239    acetaminophen (TYLENOL) tablet 650 mg  650 mg Oral Q6H PRN Donato Solis DO   650 mg at 08/03/24 0903    Or    acetaminophen (TYLENOL) suppository 650 
Physician Progress Note    8/4/2024   3:05 PM    Name:  Mehnaz Rivera  MRN:    [id number]     IP Day: 0     Admit Date: 8/1/2024  3:10 PM  PCP: Elizabet Campos MD    Code Status:  Full Code    Subjective:     Chest primarily hurts when pushed    Current Facility-Administered Medications   Medication Dose Route Frequency Provider Last Rate Last Admin    insulin glargine (LANTUS) injection vial 66 Units  66 Units SubCUTAneous Nightly Will, Donato R, DO        insulin lispro (HUMALOG,ADMELOG) injection vial 22 Units  22 Units SubCUTAneous TID WC Will, Donato R, DO        morphine (PF) injection 1 mg  1 mg IntraVENous Q4H PRN Sadia Rodriguez APRN - CNP   1 mg at 08/03/24 2251    perflutren lipid microspheres (DEFINITY) injection 1.5 mL  1.5 mL IntraVENous ONCE PRN Sebastien Suarez MD        nitroGLYCERIN (NITROSTAT) SL tablet 0.4 mg  0.4 mg SubLINGual Q5 Min PRN Will, Donato R, DO   0.4 mg at 08/01/24 1549    atorvastatin (LIPITOR) tablet 40 mg  40 mg Oral Daily Will, Donato R, DO   40 mg at 08/03/24 2244    metoprolol succinate (TOPROL XL) extended release tablet 25 mg  25 mg Oral Daily Will, Donato R, DO   25 mg at 08/04/24 0842    sodium chloride flush 0.9 % injection 5-40 mL  5-40 mL IntraVENous 2 times per day Will, Donato R, DO   10 mL at 08/04/24 0843    sodium chloride flush 0.9 % injection 5-40 mL  5-40 mL IntraVENous PRN Will, Donato R, DO        0.9 % sodium chloride infusion   IntraVENous PRN Will, Donato R, DO        potassium chloride (KLOR-CON M) extended release tablet 40 mEq  40 mEq Oral PRN Will, Donato R, DO        Or    potassium bicarb-citric acid (EFFER-K) effervescent tablet 40 mEq  40 mEq Oral PRN Will, Donato R, DO        Or    potassium chloride 10 mEq/100 mL IVPB (Peripheral Line)  10 mEq IntraVENous PRN Will, Donato R, DO        magnesium sulfate 2000 mg in 50 mL IVPB premix  2,000 mg IntraVENous PRN Donato Solis R, DO        ondansetron (ZOFRAN-ODT) 
Pt is in bed resting comfortably w/ her daughter at her bedside. She is Stateless speaking only. Pt is A/O/ x 4, Indep. NPO at 1900 for procedure. She takes her po medications whole w/ water. Her Last BM on 08/04/2024, no c/o constipation, abd pain, or diarrhea. Bilat Lungs are dim, POX on RA at 95%. No c/o SOB, or difficulties breathing. Bilat. UE pulses are at +2 and palpable. Bilat. LE pulses are at +1, edema noted to LE at +2. Pt has a 20 ga IV in her Right arm that is clean, dry, and intact. Call light is w/in reach.    2046 - Pt c/o pain to her mid anterior chest. She rates her pain at 10/10, discomfort and nagging. Pt received 1mg of Morphine IV. Will reassess in 1 hr.    2146 - Reassessed 1hr later, Pt has no pain.    0004 - Pt c/o a HA, rates her pain at 10/10 and achy. She received 2  mg Tylenols. Will reassess in 1 hr.     0104 - Reassessed 1hr later, pt is in bed   sleeping. Call light is w/in reach.    0620 - Dr Benoit in to see Pt.    0622 - Pt c/o chest pain rates her pain at 10/10, discomfort, nagging, and heavy. Pt received 1 mg PRN Morphine. Will reassess in 1hr   
Donato Solis, DO   650 mg at 08/02/24 1244    Or    acetaminophen (TYLENOL) suppository 650 mg  650 mg Rectal Q6H PRN Donato Solis R, DO        polyethylene glycol (GLYCOLAX) packet 17 g  17 g Oral Daily PRN Donato Solis R, DO        enoxaparin (LOVENOX) injection 40 mg  40 mg SubCUTAneous BID Donato Solis, DO   40 mg at 08/01/24 1956    glucose chewable tablet 16 g  4 tablet Oral PRN Donato Solis R, DO        dextrose bolus 10% 125 mL  125 mL IntraVENous PRN Donato Solis R, DO        Or    dextrose bolus 10% 250 mL  250 mL IntraVENous PRN Donato Solis R, DO        glucagon injection 1 mg  1 mg SubCUTAneous PRN Donato Solis R, DO        dextrose 10 % infusion   IntraVENous Continuous PRN Donato Solis R, DO        insulin glargine (LANTUS) injection vial 44 Units  0.25 Units/kg SubCUTAneous Nightly Donato Solis DO   44 Units at 08/01/24 1955    insulin lispro (HUMALOG,ADMELOG) injection vial 14 Units  14 Units SubCUTAneous TID  Donato Solis DO   14 Units at 08/02/24 1258    insulin lispro (HUMALOG,ADMELOG) injection vial 0-16 Units  0-16 Units SubCUTAneous TID  Donato Solis, DO        insulin lispro (HUMALOG,ADMELOG) injection vial 0-4 Units  0-4 Units SubCUTAneous Nightly Donato Solis DO   4 Units at 08/01/24 1955    isosorbide mononitrate (IMDUR) extended release tablet 30 mg  30 mg Oral Daily Donato Solis DO   30 mg at 08/02/24 0933    levothyroxine (SYNTHROID) tablet 137 mcg  137 mcg Oral Daily Donato Solis DO   137 mcg at 08/02/24 0933    gabapentin (NEURONTIN) capsule 800 mg  800 mg Oral TID Donato Solis DO   800 mg at 08/02/24 1301    aspirin EC tablet 81 mg  81 mg Oral Daily Donato Solis DO   81 mg at 08/02/24 0933       Physical Examination:      Vitals:  /62   Pulse 78   Temp 98.8 °F (37.1 °C) (Oral)   Resp 18   Ht 1.753 m (5' 9.02\")   Wt (!) 147 kg (324 lb)   LMP  (LMP Unknown)   SpO2 98%   BMI 47.82 kg/m²   Temp (24hrs), 
syncope.   Psychiatric/Behavioral:  Negative for agitation.          Physical Examination:    BP (!) 102/57   Pulse 73   Temp 98.2 °F (36.8 °C) (Oral)   Resp 18   Ht 1.753 m (5' 9.02\")   Wt (!) 144.9 kg (319 lb 7.1 oz)   LMP  (LMP Unknown)   SpO2 95%   BMI 47.15 kg/m²    Physical Exam  Constitutional:       General: She is not in acute distress.     Appearance: She is obese.   HENT:      Head: Normocephalic and atraumatic.   Cardiovascular:      Rate and Rhythm: Normal rate and regular rhythm.   Pulmonary:      Effort: Pulmonary effort is normal. No respiratory distress.      Breath sounds: No wheezing, rhonchi or rales.   Abdominal:      Palpations: Abdomen is soft.      Tenderness: There is no abdominal tenderness.   Musculoskeletal:      Cervical back: Normal range of motion and neck supple.      Right lower leg: Edema (trace) present.      Left lower leg: Edema (trace) present.   Skin:     General: Skin is warm and dry.   Neurological:      General: No focal deficit present.      Mental Status: She is alert.      Cranial Nerves: No cranial nerve deficit.   Psychiatric:         Mood and Affect: Mood normal.         LABS:  CBC:  Lab Results   Component Value Date/Time    WBC 5.8 08/04/2024 05:21 AM    RBC 3.96 08/04/2024 05:21 AM    HGB 11.9 08/04/2024 05:21 AM    HCT 34.1 08/04/2024 05:21 AM    MCV 86.1 08/04/2024 05:21 AM    MCH 30.1 08/04/2024 05:21 AM    MCHC 34.9 08/04/2024 05:21 AM    RDW 14.6 08/04/2024 05:21 AM    PLT 73 08/04/2024 05:21 AM    MPV 10.0 10/24/2015 06:29 PM     CBC with Differential:   Lab Results   Component Value Date/Time    WBC 5.8 08/04/2024 05:21 AM    RBC 3.96 08/04/2024 05:21 AM    HGB 11.9 08/04/2024 05:21 AM    HCT 34.1 08/04/2024 05:21 AM    PLT 73 08/04/2024 05:21 AM    MCV 86.1 08/04/2024 05:21 AM    MCH 30.1 08/04/2024 05:21 AM    MCHC 34.9 08/04/2024 05:21 AM    RDW 14.6 08/04/2024 05:21 AM    BANDSPCT 1 04/19/2024 10:31 PM    LYMPHOPCT 28.0 08/04/2024 05:21 AM

## 2024-08-06 LAB
ECHO BSA: 2.93 M2
NUC STRESS EJECTION FRACTION: 67 %
STRESS BASELINE DIAS BP: 66 MMHG
STRESS BASELINE HR: 74 BPM
STRESS BASELINE SYS BP: 112 MMHG
STRESS ESTIMATED WORKLOAD: 1 METS
STRESS PEAK DIAS BP: 66 MMHG
STRESS PEAK SYS BP: 112 MMHG
STRESS PERCENT HR ACHIEVED: 53 %
STRESS POST PEAK HR: 90 BPM
STRESS RATE PRESSURE PRODUCT: NORMAL BPM*MMHG
STRESS TARGET HR: 169 BPM
TID: 1.12

## 2024-08-06 PROCEDURE — 93018 CV STRESS TEST I&R ONLY: CPT | Performed by: INTERNAL MEDICINE

## 2024-08-07 LAB
EKG ATRIAL RATE: 80 BPM
EKG P AXIS: 31 DEGREES
EKG P-R INTERVAL: 162 MS
EKG Q-T INTERVAL: 412 MS
EKG QRS DURATION: 92 MS
EKG QTC CALCULATION (BAZETT): 475 MS
EKG R AXIS: -23 DEGREES
EKG T AXIS: 17 DEGREES
EKG VENTRICULAR RATE: 80 BPM

## 2025-01-25 ENCOUNTER — HOSPITAL ENCOUNTER (EMERGENCY)
Age: 50
Discharge: HOME | End: 2025-01-25
Payer: COMMERCIAL

## 2025-01-25 ENCOUNTER — APPOINTMENT (OUTPATIENT)
Dept: GENERAL RADIOLOGY | Age: 50
End: 2025-01-25
Payer: COMMERCIAL

## 2025-01-25 VITALS
DIASTOLIC BLOOD PRESSURE: 71 MMHG | OXYGEN SATURATION: 100 % | WEIGHT: 293 LBS | BODY MASS INDEX: 43.4 KG/M2 | RESPIRATION RATE: 22 BRPM | TEMPERATURE: 97.9 F | SYSTOLIC BLOOD PRESSURE: 129 MMHG | HEIGHT: 69 IN | HEART RATE: 75 BPM

## 2025-01-25 DIAGNOSIS — R11.2 NAUSEA AND VOMITING, UNSPECIFIED VOMITING TYPE: ICD-10-CM

## 2025-01-25 DIAGNOSIS — J11.1 INFLUENZA WITH RESPIRATORY MANIFESTATION OTHER THAN PNEUMONIA: Primary | ICD-10-CM

## 2025-01-25 DIAGNOSIS — R09.1 PLEURISY: ICD-10-CM

## 2025-01-25 LAB
ALBUMIN SERPL-MCNC: 4.1 G/DL (ref 3.5–4.6)
ALP SERPL-CCNC: 127 U/L (ref 40–130)
ALT SERPL-CCNC: 72 U/L (ref 0–33)
ANION GAP SERPL CALCULATED.3IONS-SCNC: 12 MEQ/L (ref 9–15)
ANISOCYTOSIS BLD QL SMEAR: ABNORMAL
AST SERPL-CCNC: 68 U/L (ref 0–35)
BASOPHILS # BLD: 0 K/UL (ref 0–0.2)
BASOPHILS NFR BLD: 1 %
BILIRUB SERPL-MCNC: 0.4 MG/DL (ref 0.2–0.7)
BUN SERPL-MCNC: 18 MG/DL (ref 6–20)
CALCIUM SERPL-MCNC: 10.4 MG/DL (ref 8.5–9.9)
CHLORIDE SERPL-SCNC: 104 MEQ/L (ref 95–107)
CO2 SERPL-SCNC: 28 MEQ/L (ref 20–31)
CREAT SERPL-MCNC: 0.74 MG/DL (ref 0.5–0.9)
EOSINOPHIL # BLD: 0.1 K/UL (ref 0–0.7)
EOSINOPHIL NFR BLD: 2 %
ERYTHROCYTE [DISTWIDTH] IN BLOOD BY AUTOMATED COUNT: 16.1 % (ref 11.5–14.5)
GLOBULIN SER CALC-MCNC: 4.1 G/DL (ref 2.3–3.5)
GLUCOSE SERPL-MCNC: 115 MG/DL (ref 70–99)
HCT VFR BLD AUTO: 35.6 % (ref 37–47)
HGB BLD-MCNC: 12 G/DL (ref 12–16)
INFLUENZA A BY PCR: POSITIVE
INFLUENZA B BY PCR: NEGATIVE
LIPASE SERPL-CCNC: 13 U/L (ref 12–95)
LYMPHOCYTES # BLD: 1.3 K/UL (ref 1–4.8)
LYMPHOCYTES NFR BLD: 18 %
MCH RBC QN AUTO: 29.5 PG (ref 27–31.3)
MCHC RBC AUTO-ENTMCNC: 33.7 % (ref 33–37)
MCV RBC AUTO: 87.5 FL (ref 79.4–94.8)
MONOCYTES # BLD: 0.2 K/UL (ref 0.2–0.8)
MONOCYTES NFR BLD: 4.8 %
NEUTROPHILS # BLD: 2.9 K/UL (ref 1.4–6.5)
NEUTS SEG NFR BLD: 63 %
PLATELET # BLD AUTO: 80 K/UL (ref 130–400)
PLATELET BLD QL SMEAR: ABNORMAL
POIKILOCYTOSIS BLD QL SMEAR: ABNORMAL
POTASSIUM SERPL-SCNC: 4.4 MEQ/L (ref 3.4–4.9)
PROCALCITONIN SERPL IA-MCNC: 0.09 NG/ML (ref 0–0.15)
PROT SERPL-MCNC: 8.2 G/DL (ref 6.3–8)
RBC # BLD AUTO: 4.07 M/UL (ref 4.2–5.4)
SLIDE REVIEW: ABNORMAL
SMUDGE CELLS BLD QL SMEAR: 15.2
SODIUM SERPL-SCNC: 144 MEQ/L (ref 135–144)
VARIANT LYMPHS NFR BLD: 11 %
WBC # BLD AUTO: 4.6 K/UL (ref 4.8–10.8)

## 2025-01-25 PROCEDURE — 87502 INFLUENZA DNA AMP PROBE: CPT

## 2025-01-25 PROCEDURE — 85025 COMPLETE CBC W/AUTO DIFF WBC: CPT

## 2025-01-25 PROCEDURE — 6370000000 HC RX 637 (ALT 250 FOR IP): Performed by: PHYSICIAN ASSISTANT

## 2025-01-25 PROCEDURE — 36415 COLL VENOUS BLD VENIPUNCTURE: CPT

## 2025-01-25 PROCEDURE — 96375 TX/PRO/DX INJ NEW DRUG ADDON: CPT

## 2025-01-25 PROCEDURE — 96361 HYDRATE IV INFUSION ADD-ON: CPT

## 2025-01-25 PROCEDURE — 84145 PROCALCITONIN (PCT): CPT

## 2025-01-25 PROCEDURE — 6360000002 HC RX W HCPCS: Performed by: PHYSICIAN ASSISTANT

## 2025-01-25 PROCEDURE — 80053 COMPREHEN METABOLIC PANEL: CPT

## 2025-01-25 PROCEDURE — 71045 X-RAY EXAM CHEST 1 VIEW: CPT

## 2025-01-25 PROCEDURE — 83690 ASSAY OF LIPASE: CPT

## 2025-01-25 PROCEDURE — 99285 EMERGENCY DEPT VISIT HI MDM: CPT

## 2025-01-25 PROCEDURE — 2580000003 HC RX 258: Performed by: PHYSICIAN ASSISTANT

## 2025-01-25 PROCEDURE — 94640 AIRWAY INHALATION TREATMENT: CPT

## 2025-01-25 PROCEDURE — 96374 THER/PROPH/DIAG INJ IV PUSH: CPT

## 2025-01-25 PROCEDURE — 93005 ELECTROCARDIOGRAM TRACING: CPT | Performed by: PHYSICIAN ASSISTANT

## 2025-01-25 RX ORDER — MORPHINE SULFATE 2 MG/ML
2 INJECTION, SOLUTION INTRAMUSCULAR; INTRAVENOUS ONCE
Status: COMPLETED | OUTPATIENT
Start: 2025-01-25 | End: 2025-01-25

## 2025-01-25 RX ORDER — KETOROLAC TROMETHAMINE 30 MG/ML
30 INJECTION, SOLUTION INTRAMUSCULAR; INTRAVENOUS ONCE
Status: COMPLETED | OUTPATIENT
Start: 2025-01-25 | End: 2025-01-25

## 2025-01-25 RX ORDER — 0.9 % SODIUM CHLORIDE 0.9 %
500 INTRAVENOUS SOLUTION INTRAVENOUS ONCE
Status: COMPLETED | OUTPATIENT
Start: 2025-01-25 | End: 2025-01-25

## 2025-01-25 RX ORDER — ONDANSETRON 4 MG/1
4 TABLET, ORALLY DISINTEGRATING ORAL 3 TIMES DAILY PRN
Qty: 21 TABLET | Refills: 0 | Status: SHIPPED | OUTPATIENT
Start: 2025-01-25

## 2025-01-25 RX ORDER — MONTELUKAST SODIUM 10 MG/1
10 TABLET ORAL NIGHTLY
Qty: 30 TABLET | Refills: 0 | Status: SHIPPED | OUTPATIENT
Start: 2025-01-25

## 2025-01-25 RX ORDER — ONDANSETRON 2 MG/ML
4 INJECTION INTRAMUSCULAR; INTRAVENOUS ONCE
Status: COMPLETED | OUTPATIENT
Start: 2025-01-25 | End: 2025-01-25

## 2025-01-25 RX ORDER — ALBUTEROL SULFATE 0.83 MG/ML
2.5 SOLUTION RESPIRATORY (INHALATION)
Status: DISCONTINUED | OUTPATIENT
Start: 2025-01-25 | End: 2025-01-26 | Stop reason: HOSPADM

## 2025-01-25 RX ORDER — ETODOLAC 400 MG/1
400 TABLET, FILM COATED ORAL 2 TIMES DAILY
Qty: 14 TABLET | Refills: 0 | Status: SHIPPED | OUTPATIENT
Start: 2025-01-25

## 2025-01-25 RX ORDER — IPRATROPIUM BROMIDE AND ALBUTEROL SULFATE 2.5; .5 MG/3ML; MG/3ML
1 SOLUTION RESPIRATORY (INHALATION) ONCE
Status: COMPLETED | OUTPATIENT
Start: 2025-01-25 | End: 2025-01-25

## 2025-01-25 RX ADMIN — SODIUM CHLORIDE 500 ML: 9 INJECTION, SOLUTION INTRAVENOUS at 21:28

## 2025-01-25 RX ADMIN — MORPHINE SULFATE 2 MG: 2 INJECTION, SOLUTION INTRAMUSCULAR; INTRAVENOUS at 21:31

## 2025-01-25 RX ADMIN — IPRATROPIUM BROMIDE AND ALBUTEROL SULFATE 1 DOSE: 2.5; .5 SOLUTION RESPIRATORY (INHALATION) at 21:43

## 2025-01-25 RX ADMIN — ONDANSETRON 4 MG: 2 INJECTION, SOLUTION INTRAMUSCULAR; INTRAVENOUS at 21:32

## 2025-01-25 RX ADMIN — KETOROLAC TROMETHAMINE 30 MG: 30 INJECTION, SOLUTION INTRAMUSCULAR at 22:54

## 2025-01-25 ASSESSMENT — ENCOUNTER SYMPTOMS
DIARRHEA: 1
COUGH: 1
VOMITING: 1
TROUBLE SWALLOWING: 0
CHEST TIGHTNESS: 1
SHORTNESS OF BREATH: 1
ABDOMINAL PAIN: 1
NAUSEA: 1

## 2025-01-25 ASSESSMENT — LIFESTYLE VARIABLES
HOW OFTEN DO YOU HAVE A DRINK CONTAINING ALCOHOL: NEVER
HOW MANY STANDARD DRINKS CONTAINING ALCOHOL DO YOU HAVE ON A TYPICAL DAY: PATIENT DOES NOT DRINK

## 2025-01-25 ASSESSMENT — PAIN SCALES - GENERAL
PAINLEVEL_OUTOF10: 10

## 2025-01-25 ASSESSMENT — PAIN DESCRIPTION - LOCATION: LOCATION: CHEST

## 2025-01-25 ASSESSMENT — PAIN - FUNCTIONAL ASSESSMENT: PAIN_FUNCTIONAL_ASSESSMENT: 0-10

## 2025-01-26 NOTE — ED PROVIDER NOTES
[facility] EMERGENCY DEPARTMENT  eMERGENCYdEPARTMENT eNCOUnter        Pt Name: Mehnaz Rivera  MRN: [id number]  Birthdate 1975of evaluation: 1/25/2025  Provider:Hiren Machado PA-C  9:11 PM EST    CHIEF COMPLAINT       Chief Complaint   Patient presents with    Abdominal Pain     With nausea and diarrhea    Chest Pain    Cough         HISTORY OF PRESENT ILLNESS  (Location/Symptom, Timing/Onset, Context/Setting, Quality, Duration, Modifying Factors, Severity.)   Mehnaz Rivera is a 49 y.o. female who presents to the emergency department with complaints of chest pain which is pleuritic in nature, cough, shortness of breath, abdominal pain, nausea, vomiting and diarrhea.  Patient was seen at a different facility yesterday and diagnosed with flulike illness.  Patient was discharged on Tamiflu as well as multiple supportive medications however patient states that her shortness of breath is getting worse which is worse at nighttime and her inhalers are not helping.  Patient also complains of pain to the epigastrium.  Patient denies any new symptoms, stating that all the symptoms were present at her last ER visit    HPI    Nursing Notes were reviewed and I agree.    REVIEW OF SYSTEMS    (2-9 systems for level 4, 10 or more for level 5)     Review of Systems   Constitutional:  Positive for fatigue.   HENT:  Positive for congestion. Negative for trouble swallowing.    Respiratory:  Positive for cough, chest tightness and shortness of breath.    Cardiovascular:  Positive for chest pain. Negative for palpitations.   Gastrointestinal:  Positive for abdominal pain, diarrhea, nausea and vomiting.   Genitourinary:  Negative for dysuria.   All other systems reviewed and are negative.       as noted above the remainder of the review of systems was reviewed and negative.       PAST MEDICAL HISTORY     Past Medical History:   Diagnosis Date    Asthma     Chronic abdominal pain     Depression

## 2025-01-26 NOTE — ED TRIAGE NOTES
T to ER with c/o cough, chest pain, abd pain with nausea and diarrhea and SOB since Friday, pt was dx with flu on friday

## 2025-01-27 LAB
EKG ATRIAL RATE: 70 BPM
EKG P AXIS: 17 DEGREES
EKG P-R INTERVAL: 120 MS
EKG Q-T INTERVAL: 408 MS
EKG QRS DURATION: 94 MS
EKG QTC CALCULATION (BAZETT): 440 MS
EKG R AXIS: -24 DEGREES
EKG T AXIS: 43 DEGREES
EKG VENTRICULAR RATE: 70 BPM

## 2025-01-27 PROCEDURE — 93010 ELECTROCARDIOGRAM REPORT: CPT | Performed by: INTERNAL MEDICINE

## 2025-03-16 ENCOUNTER — HOSPITAL ENCOUNTER (EMERGENCY)
Age: 50
Discharge: HOME | End: 2025-03-17
Attending: STUDENT IN AN ORGANIZED HEALTH CARE EDUCATION/TRAINING PROGRAM
Payer: COMMERCIAL

## 2025-03-16 ENCOUNTER — APPOINTMENT (OUTPATIENT)
Dept: CT IMAGING | Age: 50
End: 2025-03-16
Payer: COMMERCIAL

## 2025-03-16 VITALS
OXYGEN SATURATION: 96 % | HEIGHT: 69 IN | HEART RATE: 82 BPM | RESPIRATION RATE: 15 BRPM | BODY MASS INDEX: 43.4 KG/M2 | DIASTOLIC BLOOD PRESSURE: 89 MMHG | TEMPERATURE: 97.9 F | SYSTOLIC BLOOD PRESSURE: 152 MMHG | WEIGHT: 293 LBS

## 2025-03-16 DIAGNOSIS — R73.9 HYPERGLYCEMIA: Primary | ICD-10-CM

## 2025-03-16 DIAGNOSIS — L97.929 ULCER OF LEFT LOWER EXTREMITY, UNSPECIFIED ULCER STAGE: ICD-10-CM

## 2025-03-16 LAB
ANION GAP SERPL CALCULATED.3IONS-SCNC: 15 MEQ/L (ref 9–15)
ANISOCYTOSIS BLD QL SMEAR: ABNORMAL
APTT PPP: 28.8 SEC (ref 24.4–36.8)
BASOPHILS # BLD: 0 K/UL (ref 0–0.2)
BASOPHILS NFR BLD: 0.7 %
BUN SERPL-MCNC: 19 MG/DL (ref 6–20)
CALCIUM SERPL-MCNC: 8.9 MG/DL (ref 8.5–9.9)
CHLORIDE SERPL-SCNC: 96 MEQ/L (ref 95–107)
CHP ED QC CHECK: NORMAL
CO2 SERPL-SCNC: 23 MEQ/L (ref 20–31)
CREAT SERPL-MCNC: 1.01 MG/DL (ref 0.5–0.9)
EOSINOPHIL # BLD: 0.2 K/UL (ref 0–0.7)
EOSINOPHIL NFR BLD: 5.4 %
ERYTHROCYTE [DISTWIDTH] IN BLOOD BY AUTOMATED COUNT: 15.4 % (ref 11.5–14.5)
GLUCOSE BLD-MCNC: 469 MG/DL
GLUCOSE BLD-MCNC: 469 MG/DL (ref 70–99)
GLUCOSE SERPL-MCNC: 498 MG/DL (ref 70–99)
HCT VFR BLD AUTO: 33.5 % (ref 37–47)
HGB BLD-MCNC: 11.7 G/DL (ref 12–16)
INR PPP: 1.2
LACTATE BLDV-SCNC: 5.5 MMOL/L (ref 0.5–2.2)
LYMPHOCYTES # BLD: 1.4 K/UL (ref 1–4.8)
LYMPHOCYTES NFR BLD: 35.3 %
MAGNESIUM SERPL-MCNC: 2 MG/DL (ref 1.7–2.4)
MCH RBC QN AUTO: 30.8 PG (ref 27–31.3)
MCHC RBC AUTO-ENTMCNC: 34.9 % (ref 33–37)
MCV RBC AUTO: 88.2 FL (ref 79.4–94.8)
MONOCYTES # BLD: 0.2 K/UL (ref 0.2–0.8)
MONOCYTES NFR BLD: 5.4 %
NEUTROPHILS # BLD: 2.2 K/UL (ref 1.4–6.5)
NEUTS SEG NFR BLD: 53.2 %
PERFORMED ON: ABNORMAL
PLATELET # BLD AUTO: 71 K/UL (ref 130–400)
PLATELET BLD QL SMEAR: ABNORMAL
POIKILOCYTOSIS BLD QL SMEAR: ABNORMAL
POTASSIUM SERPL-SCNC: 3.8 MEQ/L (ref 3.4–4.9)
PROTHROMBIN TIME: 16 SEC (ref 12.3–14.9)
RBC # BLD AUTO: 3.8 M/UL (ref 4.2–5.4)
SLIDE REVIEW: ABNORMAL
SODIUM SERPL-SCNC: 134 MEQ/L (ref 135–144)
TROPONIN, HIGH SENSITIVITY: 14 NG/L (ref 0–19)
TROPONIN, HIGH SENSITIVITY: 15 NG/L (ref 0–19)
WBC # BLD AUTO: 4.1 K/UL (ref 4.8–10.8)

## 2025-03-16 PROCEDURE — 85610 PROTHROMBIN TIME: CPT

## 2025-03-16 PROCEDURE — 71275 CT ANGIOGRAPHY CHEST: CPT

## 2025-03-16 PROCEDURE — 96375 TX/PRO/DX INJ NEW DRUG ADDON: CPT

## 2025-03-16 PROCEDURE — 36415 COLL VENOUS BLD VENIPUNCTURE: CPT

## 2025-03-16 PROCEDURE — 99285 EMERGENCY DEPT VISIT HI MDM: CPT

## 2025-03-16 PROCEDURE — 93005 ELECTROCARDIOGRAM TRACING: CPT | Performed by: STUDENT IN AN ORGANIZED HEALTH CARE EDUCATION/TRAINING PROGRAM

## 2025-03-16 PROCEDURE — 6360000002 HC RX W HCPCS: Performed by: STUDENT IN AN ORGANIZED HEALTH CARE EDUCATION/TRAINING PROGRAM

## 2025-03-16 PROCEDURE — 83605 ASSAY OF LACTIC ACID: CPT

## 2025-03-16 PROCEDURE — 85025 COMPLETE CBC W/AUTO DIFF WBC: CPT

## 2025-03-16 PROCEDURE — 83735 ASSAY OF MAGNESIUM: CPT

## 2025-03-16 PROCEDURE — 85730 THROMBOPLASTIN TIME PARTIAL: CPT

## 2025-03-16 PROCEDURE — 75635 CT ANGIO ABDOMINAL ARTERIES: CPT

## 2025-03-16 PROCEDURE — 96374 THER/PROPH/DIAG INJ IV PUSH: CPT

## 2025-03-16 PROCEDURE — 84484 ASSAY OF TROPONIN QUANT: CPT

## 2025-03-16 PROCEDURE — 2580000003 HC RX 258: Performed by: STUDENT IN AN ORGANIZED HEALTH CARE EDUCATION/TRAINING PROGRAM

## 2025-03-16 PROCEDURE — 6370000000 HC RX 637 (ALT 250 FOR IP): Performed by: STUDENT IN AN ORGANIZED HEALTH CARE EDUCATION/TRAINING PROGRAM

## 2025-03-16 PROCEDURE — 80048 BASIC METABOLIC PNL TOTAL CA: CPT

## 2025-03-16 PROCEDURE — 6360000004 HC RX CONTRAST MEDICATION: Performed by: STUDENT IN AN ORGANIZED HEALTH CARE EDUCATION/TRAINING PROGRAM

## 2025-03-16 RX ORDER — KETOROLAC TROMETHAMINE 30 MG/ML
30 INJECTION, SOLUTION INTRAMUSCULAR; INTRAVENOUS ONCE
Status: COMPLETED | OUTPATIENT
Start: 2025-03-16 | End: 2025-03-16

## 2025-03-16 RX ORDER — CLINDAMYCIN HYDROCHLORIDE 150 MG/1
300 CAPSULE ORAL ONCE
Status: COMPLETED | OUTPATIENT
Start: 2025-03-16 | End: 2025-03-17

## 2025-03-16 RX ORDER — ONDANSETRON 2 MG/ML
4 INJECTION INTRAMUSCULAR; INTRAVENOUS ONCE
Status: COMPLETED | OUTPATIENT
Start: 2025-03-16 | End: 2025-03-16

## 2025-03-16 RX ORDER — INSULIN LISPRO 100 [IU]/ML
20 INJECTION, SOLUTION INTRAVENOUS; SUBCUTANEOUS
Status: COMPLETED | OUTPATIENT
Start: 2025-03-16 | End: 2025-03-16

## 2025-03-16 RX ORDER — 0.9 % SODIUM CHLORIDE 0.9 %
1000 INTRAVENOUS SOLUTION INTRAVENOUS ONCE
Status: COMPLETED | OUTPATIENT
Start: 2025-03-16 | End: 2025-03-17

## 2025-03-16 RX ORDER — MORPHINE SULFATE 4 MG/ML
4 INJECTION, SOLUTION INTRAMUSCULAR; INTRAVENOUS ONCE
Status: COMPLETED | OUTPATIENT
Start: 2025-03-16 | End: 2025-03-16

## 2025-03-16 RX ORDER — 0.9 % SODIUM CHLORIDE 0.9 %
1000 INTRAVENOUS SOLUTION INTRAVENOUS ONCE
Status: COMPLETED | OUTPATIENT
Start: 2025-03-16 | End: 2025-03-16

## 2025-03-16 RX ORDER — CLINDAMYCIN HYDROCHLORIDE 300 MG/1
300 CAPSULE ORAL 3 TIMES DAILY
Qty: 15 CAPSULE | Refills: 0 | Status: SHIPPED | OUTPATIENT
Start: 2025-03-16 | End: 2025-03-21

## 2025-03-16 RX ORDER — IOPAMIDOL 755 MG/ML
200 INJECTION, SOLUTION INTRAVASCULAR
Status: COMPLETED | OUTPATIENT
Start: 2025-03-16 | End: 2025-03-16

## 2025-03-16 RX ADMIN — ONDANSETRON 4 MG: 2 INJECTION, SOLUTION INTRAMUSCULAR; INTRAVENOUS at 19:49

## 2025-03-16 RX ADMIN — IOPAMIDOL 200 ML: 755 INJECTION, SOLUTION INTRAVENOUS at 21:03

## 2025-03-16 RX ADMIN — SODIUM CHLORIDE 1000 ML: 0.9 INJECTION, SOLUTION INTRAVENOUS at 22:31

## 2025-03-16 RX ADMIN — MORPHINE SULFATE 4 MG: 4 INJECTION, SOLUTION INTRAMUSCULAR; INTRAVENOUS at 19:48

## 2025-03-16 RX ADMIN — KETOROLAC TROMETHAMINE 30 MG: 30 INJECTION, SOLUTION INTRAMUSCULAR at 22:33

## 2025-03-16 RX ADMIN — SODIUM CHLORIDE 1000 ML: 0.9 INJECTION, SOLUTION INTRAVENOUS at 20:11

## 2025-03-16 RX ADMIN — INSULIN LISPRO 20 UNITS: 100 INJECTION, SOLUTION INTRAVENOUS; SUBCUTANEOUS at 20:10

## 2025-03-16 ASSESSMENT — PAIN DESCRIPTION - DESCRIPTORS: DESCRIPTORS: ACHING

## 2025-03-16 ASSESSMENT — PAIN DESCRIPTION - ORIENTATION: ORIENTATION: RIGHT;LEFT

## 2025-03-16 ASSESSMENT — PAIN SCALES - GENERAL
PAINLEVEL_OUTOF10: 8
PAINLEVEL_OUTOF10: 10
PAINLEVEL_OUTOF10: 10

## 2025-03-16 ASSESSMENT — PAIN DESCRIPTION - LOCATION
LOCATION: LEG;CHEST
LOCATION: LEG;CHEST

## 2025-03-16 ASSESSMENT — PAIN - FUNCTIONAL ASSESSMENT: PAIN_FUNCTIONAL_ASSESSMENT: 0-10

## 2025-03-16 ASSESSMENT — PAIN DESCRIPTION - FREQUENCY: FREQUENCY: CONTINUOUS

## 2025-03-16 ASSESSMENT — PAIN DESCRIPTION - PAIN TYPE: TYPE: ACUTE PAIN

## 2025-03-16 NOTE — ED PROVIDER NOTES
[facility] EMERGENCY DEPARTMENT  EMERGENCY DEPARTMENT ENCOUNTER      Pt Name: Mehnaz Rivera  MRN: [id number]  Birthdate 1975  Date of evaluation: 3/16/2025  Provider: Timmy Zapata MD  8:30 PM    CHIEF COMPLAINT       Chief Complaint   Patient presents with    Leg Pain     Bilateral leg pain with chest discomfort         HISTORY OF PRESENT ILLNESS    Mehnaz Rivera is a 49 y.o. female who presents to the emergency department for chest pain, shortness of breath and purple discoloration of both feet    HPI  Patient is a 49-year-old female presenting to ED due to multiple complaints.  Patient has a past medical history of hypertension, hyperlipidemia, type 2 diabetes, anxiety, asthma.  Patient endorsed that today while she was at [employer] she developed left upper chest pain with some nausea but no vomiting.  She denied any lightheadedness or dizziness.  She endorsed having some shortness of breath as well.  She also endorsed that for 3 days now both of her feet have been purplish in color and numb to the dorsal and plantar aspects.  She endorses still being able to ambulate and wiggle toes on each foot.  She denies having history of DVTs or arterial occlusions.  She denies any traumatic injury to her lower extremities.  She also notes that she developed a circular wound to the medial aspect of her left lower extremity with no drainage but it is painful.  Patient takes insulin lispro 80 units 3 times daily, she took 80 units subcutaneously 40 minutes prior to arrival.    Nursing Notes were reviewed.    REVIEW OF SYSTEMS       Review of Systems   Constitutional:  Negative for activity change, appetite change, chills, fatigue and fever.   HENT:  Negative for congestion, postnasal drip and rhinorrhea.    Eyes:  Negative for photophobia, pain, discharge, redness and itching.   Respiratory:  Positive for shortness of breath. Negative for cough, chest tightness and wheezing.    Cardiovascular:

## 2025-03-16 NOTE — ED TRIAGE NOTES
Patient arrived via private car due to a sore on her foot/leg that she thinks is in infected. Then around 1p.m.while she was in [place] she started to have chest pain that traveled down her L arm. Patient has noticed her feet and lower legs are discolored x 3 days. Patient states she has increased SOB  A/O x 4, cool and dry, limited mobility

## 2025-03-17 LAB
EKG ATRIAL RATE: 88 BPM
EKG P AXIS: 33 DEGREES
EKG P-R INTERVAL: 138 MS
EKG Q-T INTERVAL: 410 MS
EKG QRS DURATION: 94 MS
EKG QTC CALCULATION (BAZETT): 496 MS
EKG R AXIS: -31 DEGREES
EKG T AXIS: 29 DEGREES
EKG VENTRICULAR RATE: 88 BPM

## 2025-03-17 PROCEDURE — 93010 ELECTROCARDIOGRAM REPORT: CPT | Performed by: INTERNAL MEDICINE

## 2025-03-17 PROCEDURE — 6370000000 HC RX 637 (ALT 250 FOR IP): Performed by: STUDENT IN AN ORGANIZED HEALTH CARE EDUCATION/TRAINING PROGRAM

## 2025-03-17 RX ADMIN — CLINDAMYCIN HYDROCHLORIDE 300 MG: 150 CAPSULE ORAL at 00:07

## 2025-03-17 ASSESSMENT — ENCOUNTER SYMPTOMS
ABDOMINAL PAIN: 0
EYE ITCHING: 0
CHEST TIGHTNESS: 0
COUGH: 0
VOMITING: 0
NAUSEA: 0
EYE REDNESS: 0
COLOR CHANGE: 1
SHORTNESS OF BREATH: 1
RHINORRHEA: 0
EYE PAIN: 0
PHOTOPHOBIA: 0
EYE DISCHARGE: 0
WHEEZING: 0

## 2025-03-17 NOTE — DISCHARGE INSTRUCTIONS
You were seen in the ER today due to concerns for pain to both feet and some chest pain.  EKG did not reveal any signs of heart attack.  Your troponins were also within normal limits.  CT scan of the abdomen/pelvis did not reveal any abnormalities besides diverticulosis.  The arteries in your legs are fully open and providing blood flow to your legs.  Your blood sugar was in the 400s and you are a type II diabetic.  Continue with your insulin treatment and please follow-up with your endocrinologist and your PCP.  Otherwise her white blood cell count was not significantly elevated and you are not anemic.  You do have an ulcer to your lower leg and given that you are diabetic I will cover you on oral antibiotic therapy.  I will prescribe clindamycin for the ulcer you have on your leg given your diabetic history.  Also give you contact information for the wound care center.  Please follow-up with your endocrinologist and your PCP for reevaluation of her blood sugars.  Given your high blood sugar you could have damaged the nerves in both feet causing a neuropathy type pain.  He develop any crushing chest pain going into your back or down your arms, sweating and nausea with chest pain, swelling or redness to your calf, please come back to the ED as you would need ultrasound imaging of the leg to check for a blood clot in the veins and to further reevaluate your chest pain.  Otherwise you did not have a blood clot off of your CT scan.

## (undated) DEVICE — BRUSH ENDO CLN L90.5IN SHTH DIA1.7MM BRIST DIA5-7MM 2-6MM

## (undated) DEVICE — SINGLE PORT MANIFOLD: Brand: NEPTUNE 2

## (undated) DEVICE — TUBE SET 96 MM 64 MM H2O PERISTALTIC STD AUX CHANNEL

## (undated) DEVICE — Device: Brand: ENDO SMARTCAP

## (undated) DEVICE — ADAPTER FLSH PMP FLD MGMT GI IRRIG OFP 2 DISPOSABLE

## (undated) DEVICE — TUBING, SUCTION, 1/4" X 10', STRAIGHT: Brand: MEDLINE

## (undated) DEVICE — ENDO CARRY-ON PROCEDURE KIT: Brand: ENDO CARRY-ON PROCEDURE KIT

## (undated) DEVICE — CONMED SCOPE SAVER BITE BLOCK, 20X27 MM: Brand: SCOPE SAVER